# Patient Record
Sex: MALE | Race: WHITE | NOT HISPANIC OR LATINO
[De-identification: names, ages, dates, MRNs, and addresses within clinical notes are randomized per-mention and may not be internally consistent; named-entity substitution may affect disease eponyms.]

---

## 2017-04-20 ENCOUNTER — RESULT REVIEW (OUTPATIENT)
Age: 67
End: 2017-04-20

## 2017-04-21 ENCOUNTER — RESULT REVIEW (OUTPATIENT)
Age: 67
End: 2017-04-21

## 2017-05-30 ENCOUNTER — OUTPATIENT (OUTPATIENT)
Dept: OUTPATIENT SERVICES | Facility: HOSPITAL | Age: 67
LOS: 1 days | End: 2017-05-30
Payer: COMMERCIAL

## 2017-05-30 VITALS
TEMPERATURE: 98 F | DIASTOLIC BLOOD PRESSURE: 80 MMHG | SYSTOLIC BLOOD PRESSURE: 140 MMHG | HEIGHT: 68 IN | OXYGEN SATURATION: 96 % | HEART RATE: 76 BPM | WEIGHT: 250 LBS | RESPIRATION RATE: 18 BRPM

## 2017-05-30 DIAGNOSIS — K43.0 INCISIONAL HERNIA WITH OBSTRUCTION, WITHOUT GANGRENE: ICD-10-CM

## 2017-05-30 DIAGNOSIS — I25.10 ATHEROSCLEROTIC HEART DISEASE OF NATIVE CORONARY ARTERY WITHOUT ANGINA PECTORIS: ICD-10-CM

## 2017-05-30 DIAGNOSIS — J44.9 CHRONIC OBSTRUCTIVE PULMONARY DISEASE, UNSPECIFIED: ICD-10-CM

## 2017-05-30 DIAGNOSIS — C19 MALIGNANT NEOPLASM OF RECTOSIGMOID JUNCTION: ICD-10-CM

## 2017-05-30 DIAGNOSIS — I65.29 OCCLUSION AND STENOSIS OF UNSPECIFIED CAROTID ARTERY: ICD-10-CM

## 2017-05-30 DIAGNOSIS — Z95.4 PRESENCE OF OTHER HEART-VALVE REPLACEMENT: Chronic | ICD-10-CM

## 2017-05-30 DIAGNOSIS — G47.30 SLEEP APNEA, UNSPECIFIED: ICD-10-CM

## 2017-05-30 LAB
BASOPHILS # BLD AUTO: 0.01 K/UL — SIGNIFICANT CHANGE UP (ref 0–0.2)
BASOPHILS NFR BLD AUTO: 0.1 % — SIGNIFICANT CHANGE UP (ref 0–2)
BLD GP AB SCN SERPL QL: NEGATIVE — SIGNIFICANT CHANGE UP
BUN SERPL-MCNC: 12 MG/DL — SIGNIFICANT CHANGE UP (ref 7–23)
CALCIUM SERPL-MCNC: 9.1 MG/DL — SIGNIFICANT CHANGE UP (ref 8.4–10.5)
CHLORIDE SERPL-SCNC: 103 MMOL/L — SIGNIFICANT CHANGE UP (ref 98–107)
CO2 SERPL-SCNC: 28 MMOL/L — SIGNIFICANT CHANGE UP (ref 22–31)
CREAT SERPL-MCNC: 0.82 MG/DL — SIGNIFICANT CHANGE UP (ref 0.5–1.3)
EOSINOPHIL # BLD AUTO: 0.15 K/UL — SIGNIFICANT CHANGE UP (ref 0–0.5)
EOSINOPHIL NFR BLD AUTO: 2.2 % — SIGNIFICANT CHANGE UP (ref 0–6)
GLUCOSE SERPL-MCNC: 99 MG/DL — SIGNIFICANT CHANGE UP (ref 70–99)
HCT VFR BLD CALC: 41.1 % — SIGNIFICANT CHANGE UP (ref 39–50)
HGB BLD-MCNC: 13.1 G/DL — SIGNIFICANT CHANGE UP (ref 13–17)
IMM GRANULOCYTES NFR BLD AUTO: 0.1 % — SIGNIFICANT CHANGE UP (ref 0–1.5)
LYMPHOCYTES # BLD AUTO: 0.92 K/UL — LOW (ref 1–3.3)
LYMPHOCYTES # BLD AUTO: 13.5 % — SIGNIFICANT CHANGE UP (ref 13–44)
MCHC RBC-ENTMCNC: 27.2 PG — SIGNIFICANT CHANGE UP (ref 27–34)
MCHC RBC-ENTMCNC: 31.9 % — LOW (ref 32–36)
MCV RBC AUTO: 85.4 FL — SIGNIFICANT CHANGE UP (ref 80–100)
MONOCYTES # BLD AUTO: 0.43 K/UL — SIGNIFICANT CHANGE UP (ref 0–0.9)
MONOCYTES NFR BLD AUTO: 6.3 % — SIGNIFICANT CHANGE UP (ref 2–14)
NEUTROPHILS # BLD AUTO: 5.27 K/UL — SIGNIFICANT CHANGE UP (ref 1.8–7.4)
NEUTROPHILS NFR BLD AUTO: 77.8 % — HIGH (ref 43–77)
PLATELET # BLD AUTO: 251 K/UL — SIGNIFICANT CHANGE UP (ref 150–400)
PMV BLD: 10.1 FL — SIGNIFICANT CHANGE UP (ref 7–13)
POTASSIUM SERPL-MCNC: 4.5 MMOL/L — SIGNIFICANT CHANGE UP (ref 3.5–5.3)
POTASSIUM SERPL-SCNC: 4.5 MMOL/L — SIGNIFICANT CHANGE UP (ref 3.5–5.3)
RBC # BLD: 4.81 M/UL — SIGNIFICANT CHANGE UP (ref 4.2–5.8)
RBC # FLD: 14 % — SIGNIFICANT CHANGE UP (ref 10.3–14.5)
RH IG SCN BLD-IMP: POSITIVE — SIGNIFICANT CHANGE UP
SODIUM SERPL-SCNC: 144 MMOL/L — SIGNIFICANT CHANGE UP (ref 135–145)
WBC # BLD: 6.79 K/UL — SIGNIFICANT CHANGE UP (ref 3.8–10.5)
WBC # FLD AUTO: 6.79 K/UL — SIGNIFICANT CHANGE UP (ref 3.8–10.5)

## 2017-05-30 PROCEDURE — 93010 ELECTROCARDIOGRAM REPORT: CPT

## 2017-05-30 PROCEDURE — 71020: CPT | Mod: 26

## 2017-05-30 RX ORDER — SODIUM CHLORIDE 9 MG/ML
1000 INJECTION, SOLUTION INTRAVENOUS
Qty: 0 | Refills: 0 | Status: DISCONTINUED | OUTPATIENT
Start: 2017-06-12 | End: 2017-06-12

## 2017-05-30 NOTE — H&P PST ADULT - PROBLEM SELECTOR PLAN 5
Pt rx plavix, aspirin ; pt states as per surgeon pt to remain on asa and dc plavix 1 week pre op  Pt to DR Ocampo cardiologist pre op ; pt to review plan with cardiologist prior ot surgery Pt rx plavix, aspirin ; pt states as per surgeon pt to remain on asa and dc plavix 1 week pre op  Pt to DR Oreilly ;cardiologist pre op   Call to DR Oreilly office ; s/w Latesha Charlton to review with DR Oreilly and return call   Dr Oreilly to provide cardiology clearance Pt rx plavix, aspirin ; pt states as per surgeon pt to remain on asa and dc plavix 1 week pre op  Pt to DR Oreilly ;cardiologist pre op   Call to DR Oreilly office ; s/w Latesha Charlton to review with DR Oreilly and return call   Dr Oreilly to provide cardiology clearance  As per Dr Castillo pt to remain on aspirin and hold plavix x 5 days ; Dr Oreilly has cleared pt to be off plavix x 5 days Pt rx plavix, aspirin ; pt states as per surgeon pt to remain on asa and dc plavix 1 week pre op  Pt to DR Oreilly ;cardiologist pre op   Call to DR Oreilly office ; s/w Latesha Charlton to review with DR Oreilly and return call   Dr Oreilly to provide cardiology clearance  As per Dr Castillo pt to remain on aspirin and hold plavix x 5 days ; Dr Oreilly has cleared pt to be off plavix x 5 days  6/08/17 call pt x 2 message left on cell ; no answer on home awaiting return call Pt rx plavix, aspirin ; pt states as per surgeon pt to remain on asa and dc plavix 1 week pre op  Pt to DR Oreilly ;cardiologist pre op   Call to DR Oreilly office ; s/w Latesha Charlton to review with DR Oreilly and return call   Dr Oreilly to provide cardiology clearance  As per Dr Castillo pt to remain on aspirin and hold plavix x 5 days ;confirmed with Dr Castillo s office  Dr Oreilly has cleared pt to be off plavix x 5 days  6/08/17 call pt x 2 message left on cell ; no answer on home awaiting return call  6/08/17 s/w pt ; pt remains on aspirin as per surgeon ; pt dc d plavix 6/04/17 Pt rx plavix, aspirin ; pt states as per surgeon pt to remain on asa and dc plavix 1 week pre op  Pt to DR Oreilly ;cardiologist pre op   Call to DR Oreilly office ; s/w Latesha Charlton to review with DR Oreilly and return call   Dr Oreilly to provide cardiology clearance  As per Dr Castillo pt to remain on aspirin and hold plavix x 5 days ;confirmed with Dr Castillo s office  Dr Oreilly has cleared pt to be off plavix x 5 days  6/08/17 call pt x 2 message left on cell ; no answer on home awaiting return call  6/08/17 s/w pt ; pt remains on aspirin as per surgeon ; pt dc d plavix 6/05/17

## 2017-05-30 NOTE — H&P PST ADULT - LYMPHATIC
anterior cervical R/supraclavicular R/supraclavicular L/posterior cervical R/posterior cervical L/anterior cervical L

## 2017-05-30 NOTE — H&P PST ADULT - PROBLEM SELECTOR PLAN 4
Mild intermittent wheeze  O@ sat 97 % ; pt in nad     Pt to see dr causey for pre op m/c Mild intermittent wheeze  O@ sat 97 % ; pt in nad   CXR done results pending     Pt to see dr causey for pre op m/c Mild intermittent wheeze  O@ sat 97 % ; pt in nad   CXR done results pending     Pt to Dr Romano for pre op pulmonary clearance    Pt to see dr causey for pre op m/c

## 2017-05-30 NOTE — H&P PST ADULT - PROBLEM SELECTOR PLAN 1
Lower Anterior Resection Incisional Hernia Repair    Pre op instructions including pepcid and Hibeclens givento pt pt appears to have a good understanding of pre op instructions    Pt to Dr Marquez for pre op m/c  Pt to Dr Yahir Oreilly for pre op cardiac clearance Lower Anterior Resection Incisional Hernia Repair    Pre op instructions including pepcid and Hibeclens given to pt pt appears to have a good understanding of pre op instructions    Pt to Dr Marquez for pre op m/c  Pt to Dr Yahir Oreilly for pre op cardiac clearance

## 2017-05-30 NOTE — H&P PST ADULT - FALL HARM RISK
other/coagulation(Bleeding disorder R/T clinical cond/anti-coags)/surgery/left fott issues / stability issues

## 2017-05-30 NOTE — H&P PST ADULT - HISTORY OF PRESENT ILLNESS
Pt is a 66 y.o male ; pt s/p Umbilical Hernia Repair 2011 and 2016. Pt reports reoccurrence. Pt reports recent colonoscopy " there was a polyp and it was cancer" Pt to surgeon ; pt now presents for surgery    Pt denies weight loss, pt denies change in bowel habits

## 2017-05-30 NOTE — H&P PST ADULT - NEGATIVE NEUROLOGICAL SYMPTOMS
no syncope/no difficulty walking/no focal seizures/no paresthesias/no vertigo/no loss of sensation/no generalized seizures/no headache

## 2017-05-30 NOTE — H&P PST ADULT - PSH
Aortic valve replaced    History of Appendectomy    History of Hernia Repair    History of Lt Total Knee Replacement  4/11  History of Nasal Septoplasty  1979  I & D of Pilonidal cyst    Plantar Tendonitis  Repair- B L  Renal Calculi  lithotripsy  S/P Cardiac Cath    S/P Cataract Surgery  Right - with lens placement  S/P Cystoscopy  2010  S/P Right Inguinal Hernia Repair  1979  Skin Tag Excision  Right Leg-2010  Tibialis Posterior Tendonitis  Repair B/L revision left 2016

## 2017-05-30 NOTE — H&P PST ADULT - PMH
Amaurosis fugax  od occurring x 2  Aortic valve disease    Arthritis    COPD (Chronic Obstructive Pulmonary Disease)    HTN (hypertension)    HTN (Hypertension)    Hypercholesterolemia    Kidney Stone    Microscopic Hematuria    OA (Osteoarthritis)    Obesity    Pilonidal Abscess    Sleep apnea    Spinal Stenosis    Stenosis of carotid artery, unspecified laterality    Varicose Vein  B/L lower extremities  Venous Insufficiency Amaurosis fugax  od occurring x 2  Aortic valve disease    Arthritis    COPD (Chronic Obstructive Pulmonary Disease)    HTN (Hypertension)    Hypercholesterolemia    Kidney Stone    Microscopic Hematuria    OA (Osteoarthritis)    Obesity    Pilonidal Abscess    Sleep apnea    Spinal Stenosis    Stenosis of carotid artery, unspecified laterality    Varicose Vein  B/L lower extremities  Venous Insufficiency

## 2017-06-12 ENCOUNTER — RESULT REVIEW (OUTPATIENT)
Age: 67
End: 2017-06-12

## 2017-06-12 ENCOUNTER — INPATIENT (INPATIENT)
Facility: HOSPITAL | Age: 67
LOS: 7 days | Discharge: HOME | End: 2017-06-20
Attending: SURGERY | Admitting: SURGERY
Payer: COMMERCIAL

## 2017-06-12 VITALS
HEART RATE: 77 BPM | TEMPERATURE: 98 F | DIASTOLIC BLOOD PRESSURE: 92 MMHG | HEIGHT: 68 IN | SYSTOLIC BLOOD PRESSURE: 159 MMHG | OXYGEN SATURATION: 94 % | RESPIRATION RATE: 18 BRPM | WEIGHT: 250 LBS

## 2017-06-12 DIAGNOSIS — Z95.4 PRESENCE OF OTHER HEART-VALVE REPLACEMENT: Chronic | ICD-10-CM

## 2017-06-12 DIAGNOSIS — K43.0 INCISIONAL HERNIA WITH OBSTRUCTION, WITHOUT GANGRENE: ICD-10-CM

## 2017-06-12 DIAGNOSIS — Z96.651 PRESENCE OF RIGHT ARTIFICIAL KNEE JOINT: Chronic | ICD-10-CM

## 2017-06-12 LAB
BASE EXCESS BLDA CALC-SCNC: 0.2 MMOL/L — SIGNIFICANT CHANGE UP
BUN SERPL-MCNC: 10 MG/DL — SIGNIFICANT CHANGE UP (ref 7–23)
CA-I BLDA-SCNC: 1.13 MMOL/L — LOW (ref 1.15–1.29)
CALCIUM SERPL-MCNC: 8.3 MG/DL — LOW (ref 8.4–10.5)
CHLORIDE SERPL-SCNC: 104 MMOL/L — SIGNIFICANT CHANGE UP (ref 98–107)
CO2 SERPL-SCNC: 26 MMOL/L — SIGNIFICANT CHANGE UP (ref 22–31)
CREAT SERPL-MCNC: 1.16 MG/DL — SIGNIFICANT CHANGE UP (ref 0.5–1.3)
GLUCOSE BLDA-MCNC: 119 MG/DL — HIGH (ref 70–99)
GLUCOSE SERPL-MCNC: 156 MG/DL — HIGH (ref 70–99)
HCO3 BLDA-SCNC: 25 MMOL/L — SIGNIFICANT CHANGE UP (ref 22–26)
HCT VFR BLD CALC: 40.5 % — SIGNIFICANT CHANGE UP (ref 39–50)
HCT VFR BLDA CALC: 34.4 % — LOW (ref 39–51)
HGB BLD-MCNC: 12.8 G/DL — LOW (ref 13–17)
HGB BLDA-MCNC: 11.1 G/DL — LOW (ref 13–17)
MCHC RBC-ENTMCNC: 27.4 PG — SIGNIFICANT CHANGE UP (ref 27–34)
MCHC RBC-ENTMCNC: 31.6 % — LOW (ref 32–36)
MCV RBC AUTO: 86.7 FL — SIGNIFICANT CHANGE UP (ref 80–100)
PCO2 BLDA: 40 MMHG — SIGNIFICANT CHANGE UP (ref 35–48)
PH BLDA: 7.4 PH — SIGNIFICANT CHANGE UP (ref 7.35–7.45)
PLATELET # BLD AUTO: 252 K/UL — SIGNIFICANT CHANGE UP (ref 150–400)
PMV BLD: 9.7 FL — SIGNIFICANT CHANGE UP (ref 7–13)
PO2 BLDA: 162 MMHG — HIGH (ref 83–108)
POTASSIUM BLDA-SCNC: 3.6 MMOL/L — SIGNIFICANT CHANGE UP (ref 3.4–4.5)
POTASSIUM SERPL-MCNC: 3.9 MMOL/L — SIGNIFICANT CHANGE UP (ref 3.5–5.3)
POTASSIUM SERPL-SCNC: 3.9 MMOL/L — SIGNIFICANT CHANGE UP (ref 3.5–5.3)
RBC # BLD: 4.67 M/UL — SIGNIFICANT CHANGE UP (ref 4.2–5.8)
RBC # FLD: 14.3 % — SIGNIFICANT CHANGE UP (ref 10.3–14.5)
SAO2 % BLDA: 99.5 % — HIGH (ref 95–99)
SODIUM BLDA-SCNC: 138 MMOL/L — SIGNIFICANT CHANGE UP (ref 136–146)
SODIUM SERPL-SCNC: 144 MMOL/L — SIGNIFICANT CHANGE UP (ref 135–145)
WBC # BLD: 21.27 K/UL — HIGH (ref 3.8–10.5)
WBC # FLD AUTO: 21.27 K/UL — HIGH (ref 3.8–10.5)

## 2017-06-12 PROCEDURE — 88305 TISSUE EXAM BY PATHOLOGIST: CPT | Mod: 26

## 2017-06-12 PROCEDURE — 88309 TISSUE EXAM BY PATHOLOGIST: CPT | Mod: 26

## 2017-06-12 PROCEDURE — 88331 PATH CONSLTJ SURG 1 BLK 1SPC: CPT | Mod: 26

## 2017-06-12 PROCEDURE — 74000: CPT | Mod: 26

## 2017-06-12 PROCEDURE — 88302 TISSUE EXAM BY PATHOLOGIST: CPT | Mod: 26

## 2017-06-12 PROCEDURE — 88332 PATH CONSLTJ SURG EA ADD BLK: CPT | Mod: 26

## 2017-06-12 RX ORDER — SODIUM CHLORIDE 9 MG/ML
1000 INJECTION, SOLUTION INTRAVENOUS
Qty: 0 | Refills: 0 | Status: DISCONTINUED | OUTPATIENT
Start: 2017-06-12 | End: 2017-06-15

## 2017-06-12 RX ORDER — ONDANSETRON 8 MG/1
4 TABLET, FILM COATED ORAL ONCE
Qty: 0 | Refills: 0 | Status: DISCONTINUED | OUTPATIENT
Start: 2017-06-12 | End: 2017-06-19

## 2017-06-12 RX ORDER — NALOXONE HYDROCHLORIDE 4 MG/.1ML
0.1 SPRAY NASAL
Qty: 0 | Refills: 0 | Status: DISCONTINUED | OUTPATIENT
Start: 2017-06-12 | End: 2017-06-19

## 2017-06-12 RX ORDER — HEPARIN SODIUM 5000 [USP'U]/ML
5000 INJECTION INTRAVENOUS; SUBCUTANEOUS EVERY 8 HOURS
Qty: 0 | Refills: 0 | Status: DISCONTINUED | OUTPATIENT
Start: 2017-06-12 | End: 2017-06-20

## 2017-06-12 RX ORDER — HYDROMORPHONE HYDROCHLORIDE 2 MG/ML
0.5 INJECTION INTRAMUSCULAR; INTRAVENOUS; SUBCUTANEOUS
Qty: 0 | Refills: 0 | Status: DISCONTINUED | OUTPATIENT
Start: 2017-06-12 | End: 2017-06-16

## 2017-06-12 RX ORDER — ACETAMINOPHEN 500 MG
1000 TABLET ORAL ONCE
Qty: 0 | Refills: 0 | Status: COMPLETED | OUTPATIENT
Start: 2017-06-13 | End: 2017-06-13

## 2017-06-12 RX ORDER — ONDANSETRON 8 MG/1
4 TABLET, FILM COATED ORAL EVERY 6 HOURS
Qty: 0 | Refills: 0 | Status: DISCONTINUED | OUTPATIENT
Start: 2017-06-12 | End: 2017-06-19

## 2017-06-12 RX ORDER — HYDROMORPHONE HYDROCHLORIDE 2 MG/ML
0.5 INJECTION INTRAMUSCULAR; INTRAVENOUS; SUBCUTANEOUS
Qty: 0 | Refills: 0 | Status: DISCONTINUED | OUTPATIENT
Start: 2017-06-12 | End: 2017-06-12

## 2017-06-12 RX ORDER — HYDROMORPHONE HYDROCHLORIDE 2 MG/ML
30 INJECTION INTRAMUSCULAR; INTRAVENOUS; SUBCUTANEOUS
Qty: 0 | Refills: 0 | Status: DISCONTINUED | OUTPATIENT
Start: 2017-06-12 | End: 2017-06-13

## 2017-06-12 RX ORDER — ACETAMINOPHEN 500 MG
1000 TABLET ORAL ONCE
Qty: 0 | Refills: 0 | Status: COMPLETED | OUTPATIENT
Start: 2017-06-12 | End: 2017-06-12

## 2017-06-12 RX ADMIN — HYDROMORPHONE HYDROCHLORIDE 0.5 MILLIGRAM(S): 2 INJECTION INTRAMUSCULAR; INTRAVENOUS; SUBCUTANEOUS at 23:00

## 2017-06-12 RX ADMIN — Medication 400 MILLIGRAM(S): at 23:15

## 2017-06-12 RX ADMIN — SODIUM CHLORIDE 100 MILLILITER(S): 9 INJECTION, SOLUTION INTRAVENOUS at 22:46

## 2017-06-12 RX ADMIN — Medication 1000 MILLIGRAM(S): at 23:42

## 2017-06-12 RX ADMIN — Medication 2.5 MILLIGRAM(S): at 22:27

## 2017-06-12 RX ADMIN — HYDROMORPHONE HYDROCHLORIDE 0.5 MILLIGRAM(S): 2 INJECTION INTRAMUSCULAR; INTRAVENOUS; SUBCUTANEOUS at 22:30

## 2017-06-12 RX ADMIN — HEPARIN SODIUM 5000 UNIT(S): 5000 INJECTION INTRAVENOUS; SUBCUTANEOUS at 22:45

## 2017-06-12 RX ADMIN — HYDROMORPHONE HYDROCHLORIDE 0.5 MILLIGRAM(S): 2 INJECTION INTRAMUSCULAR; INTRAVENOUS; SUBCUTANEOUS at 22:45

## 2017-06-12 RX ADMIN — SODIUM CHLORIDE 30 MILLILITER(S): 9 INJECTION, SOLUTION INTRAVENOUS at 12:29

## 2017-06-12 RX ADMIN — HYDROMORPHONE HYDROCHLORIDE 30 MILLILITER(S): 2 INJECTION INTRAMUSCULAR; INTRAVENOUS; SUBCUTANEOUS at 23:24

## 2017-06-12 NOTE — ASU PATIENT PROFILE, ADULT - FALL HARM RISK
surgery/other/coagulation(Bleeding disorder R/T clinical cond/anti-coags)/left fott issues / stability issues

## 2017-06-12 NOTE — ASU PATIENT PROFILE, ADULT - PSH
Aortic valve replaced    History of Appendectomy    History of Hernia Repair    History of Lt Total Knee Replacement  4/11  History of Nasal Septoplasty  1979  I & D of Pilonidal cyst    Plantar Tendonitis  Repair- B L  Renal Calculi  lithotripsy  S/P Cardiac Cath    S/P Cataract Surgery  Right - with lens placement  S/P Cystoscopy  2010  S/P Right Inguinal Hernia Repair  1979  Skin Tag Excision  Right Leg-2010  Tibialis Posterior Tendonitis  Repair B/L revision left 2016 Aortic valve replaced    H/O total knee replacement, right  2011  History of Appendectomy    History of Hernia Repair    History of Lt Total Knee Replacement  4/11  History of Nasal Septoplasty  1979  I & D of Pilonidal cyst    Plantar Tendonitis  Repair- B L  Renal Calculi  lithotripsy  S/P Cardiac Cath    S/P Cataract Surgery  Right - with lens placement  S/P Cystoscopy  2010  S/P Right Inguinal Hernia Repair  1979  Skin Tag Excision  Right Leg-2010  Tibialis Posterior Tendonitis  Repair B/L revision left 2016

## 2017-06-12 NOTE — ASU PATIENT PROFILE, ADULT - PMH
Amaurosis fugax  od occurring x 2  Aortic valve disease    Arthritis    COPD (Chronic Obstructive Pulmonary Disease)    HTN (Hypertension)    Hypercholesterolemia    Kidney Stone    Microscopic Hematuria    OA (Osteoarthritis)    Obesity    Pilonidal Abscess    Sleep apnea    Spinal Stenosis    Stenosis of carotid artery, unspecified laterality    Varicose Vein  B/L lower extremities  Venous Insufficiency

## 2017-06-12 NOTE — BRIEF OPERATIVE NOTE - OPERATION/FINDINGS
low anterior resection with intra-operative colonoscopy low anterior resection with intra-operative colonoscopy, loop ileostomy

## 2017-06-13 LAB
BUN SERPL-MCNC: 11 MG/DL — SIGNIFICANT CHANGE UP (ref 7–23)
CALCIUM SERPL-MCNC: 7.6 MG/DL — LOW (ref 8.4–10.5)
CHLORIDE SERPL-SCNC: 105 MMOL/L — SIGNIFICANT CHANGE UP (ref 98–107)
CO2 SERPL-SCNC: 23 MMOL/L — SIGNIFICANT CHANGE UP (ref 22–31)
CREAT SERPL-MCNC: 1 MG/DL — SIGNIFICANT CHANGE UP (ref 0.5–1.3)
GLUCOSE SERPL-MCNC: 131 MG/DL — HIGH (ref 70–99)
HCT VFR BLD CALC: 35 % — LOW (ref 39–50)
HGB BLD-MCNC: 10.7 G/DL — LOW (ref 13–17)
MAGNESIUM SERPL-MCNC: 1.9 MG/DL — SIGNIFICANT CHANGE UP (ref 1.6–2.6)
MCHC RBC-ENTMCNC: 26.4 PG — LOW (ref 27–34)
MCHC RBC-ENTMCNC: 30.6 % — LOW (ref 32–36)
MCV RBC AUTO: 86.4 FL — SIGNIFICANT CHANGE UP (ref 80–100)
PHOSPHATE SERPL-MCNC: 4.6 MG/DL — HIGH (ref 2.5–4.5)
PLATELET # BLD AUTO: 229 K/UL — SIGNIFICANT CHANGE UP (ref 150–400)
PMV BLD: 9.2 FL — SIGNIFICANT CHANGE UP (ref 7–13)
POTASSIUM SERPL-MCNC: 4.6 MMOL/L — SIGNIFICANT CHANGE UP (ref 3.5–5.3)
POTASSIUM SERPL-SCNC: 4.6 MMOL/L — SIGNIFICANT CHANGE UP (ref 3.5–5.3)
RBC # BLD: 4.05 M/UL — LOW (ref 4.2–5.8)
RBC # FLD: 14.2 % — SIGNIFICANT CHANGE UP (ref 10.3–14.5)
SODIUM SERPL-SCNC: 142 MMOL/L — SIGNIFICANT CHANGE UP (ref 135–145)
WBC # BLD: 15.27 K/UL — HIGH (ref 3.8–10.5)
WBC # FLD AUTO: 15.27 K/UL — HIGH (ref 3.8–10.5)

## 2017-06-13 RX ORDER — ACETAMINOPHEN 500 MG
1000 TABLET ORAL ONCE
Qty: 0 | Refills: 0 | Status: COMPLETED | OUTPATIENT
Start: 2017-06-13 | End: 2017-06-13

## 2017-06-13 RX ORDER — HYDROMORPHONE HYDROCHLORIDE 2 MG/ML
30 INJECTION INTRAMUSCULAR; INTRAVENOUS; SUBCUTANEOUS
Qty: 0 | Refills: 0 | Status: DISCONTINUED | OUTPATIENT
Start: 2017-06-13 | End: 2017-06-16

## 2017-06-13 RX ADMIN — Medication 400 MILLIGRAM(S): at 06:12

## 2017-06-13 RX ADMIN — HEPARIN SODIUM 5000 UNIT(S): 5000 INJECTION INTRAVENOUS; SUBCUTANEOUS at 06:12

## 2017-06-13 RX ADMIN — Medication 1000 MILLIGRAM(S): at 06:57

## 2017-06-13 RX ADMIN — HEPARIN SODIUM 5000 UNIT(S): 5000 INJECTION INTRAVENOUS; SUBCUTANEOUS at 21:37

## 2017-06-13 RX ADMIN — HYDROMORPHONE HYDROCHLORIDE 30 MILLILITER(S): 2 INJECTION INTRAMUSCULAR; INTRAVENOUS; SUBCUTANEOUS at 10:57

## 2017-06-13 RX ADMIN — Medication 1000 MILLIGRAM(S): at 13:15

## 2017-06-13 RX ADMIN — HYDROMORPHONE HYDROCHLORIDE 30 MILLILITER(S): 2 INJECTION INTRAMUSCULAR; INTRAVENOUS; SUBCUTANEOUS at 20:29

## 2017-06-13 RX ADMIN — Medication 400 MILLIGRAM(S): at 19:00

## 2017-06-13 RX ADMIN — HYDROMORPHONE HYDROCHLORIDE 30 MILLILITER(S): 2 INJECTION INTRAMUSCULAR; INTRAVENOUS; SUBCUTANEOUS at 17:02

## 2017-06-13 RX ADMIN — SODIUM CHLORIDE 100 MILLILITER(S): 9 INJECTION, SOLUTION INTRAVENOUS at 07:34

## 2017-06-13 RX ADMIN — Medication 1000 MILLIGRAM(S): at 20:10

## 2017-06-13 RX ADMIN — Medication 400 MILLIGRAM(S): at 12:47

## 2017-06-13 RX ADMIN — HEPARIN SODIUM 5000 UNIT(S): 5000 INJECTION INTRAVENOUS; SUBCUTANEOUS at 14:00

## 2017-06-13 NOTE — PATIENT PROFILE ADULT. - FALL HARM RISK
left fott issues / stability issues/surgery/other/coagulation(Bleeding disorder R/T clinical cond/anti-coags)

## 2017-06-13 NOTE — CONSULT NOTE ADULT - ASSESSMENT
67 yo with a h/o AVR now day #1 s/p incisional hernia repair, anterior resection for colonic polyp.  Patient is doing well post op.  Please obtain a post op ECG.  Continue pre op medications (full pre-op note in paper chart).  Will follow with you.

## 2017-06-13 NOTE — CONSULT NOTE ADULT - SUBJECTIVE AND OBJECTIVE BOX
CHIEF COMPLAINT: Chest Pain    HPI:  Pt is a 66 y.o male ; pt s/p Umbilical Hernia Repair  and . Pt reports reoccurrence. Pt reports recent colonoscopy " there was a polyp and it was cancer" Pt to surgeon ; pt now post op Day # 1 post anterior resection and ventral hernia repair    Pt denies weight loss, pt denies change in bowel habits (30 May 2017 10:13)      PAST MEDICAL & SURGICAL HISTORY:  Arthritis  Hypercholesterolemia  HTN (hypertension)  Stenosis of carotid artery, unspecified laterality  Aortic valve disease  Amaurosis fugax: od occurring x 2  Sleep apnea  Snoring  Venous Insufficiency  Arterial Insufficiency  Microscopic Hematuria  OA (Osteoarthritis)  Obesity  Varicose Vein: B/L lower extremities  Spinal Stenosis  Arthritis  cardiac valve disorder  HTN (Hypertension)  COPD with Chronic Bronchitis  Pilonidal Abscess  Kidney Stone  COPD (Chronic Obstructive Pulmonary Disease)  H/O total knee replacement, right:   Aortic valve replaced  History of Lt Total Knee Replacement:   S/P Cystoscopy:   History of Nasal Septoplasty:   Skin Tag Excision: Right Leg-  Status Post Umbilical Hernia Repair: 2011  S/P Right Inguinal Hernia Repair:   S/P Cataract Surgery: Right - with lens placement  Tibialis Posterior Tendonitis: Repair B/L revision left 2016  Plantar Tendonitis: Repair- B L  Renal Calculi: lithotripsy  I &amp; D of Pilonidal cyst  S/P Cardiac Cath  History of Spinal Stenosis  HTN (Hypertension)  Hyperlipidemia  History of Hernia Repair  History of Appendectomy      Allergies    codeine (Hives)    Intolerances        SOCIAL HISTORY    Smoking Hx:  ETOH Hx:  Marital Status:  Occupational Hx:    FAMILY HISTORY:  Family history of cancer (Mother): Mother ( since - unknown cancer)  Family history of COPD (chronic obstructive pulmonary disease) (Father): Father ( since )      MEDICATIONS:  HYDROmorphone PCA (1 mG/mL) 30milliLiter(s) PCA Continuous PCA Continuous  naloxone Injectable 0.1milliGRAM(s) IV Push every 3 minutes PRN  ondansetron Injectable 4milliGRAM(s) IV Push every 6 hours PRN  HYDROmorphone PCA (1 mG/mL) Rescue Clinician Bolus 0.5milliGRAM(s) IV Push every 15 minutes PRN  heparin  Injectable 5000Unit(s) SubCutaneous every 8 hours  lactated ringers. 1000milliLiter(s) IV Continuous <Continuous>  ondansetron Injectable 4milliGRAM(s) IV Push once PRN  acetaminophen  IVPB. 1000milliGRAM(s) IV Intermittent once      REVIEW OF SYSTEMS:    CONSTITUTIONAL: No weakness, fevers or chills  EYES/ENT: No visual changes;  No vertigo or throat pain   NECK: No pain or stiffness  RESPIRATORY: No cough, wheezing, hemoptysis; No shortness of breath  CARDIOVASCULAR: No chest pain or palpitations  GASTROINTESTINAL: No abdominal or epigastric pain. No nausea, vomiting, or hematemesis; No diarrhea or constipation. No melena or hematochezia.  GENITOURINARY: No dysuria, frequency or hematuria  NEUROLOGICAL: No numbness or weakness  SKIN: No itching, burning, rashes, or lesions   All other review of systems is negative unless indicated above    Vital Signs Last 24 Hrs  T(C): 37.3, Max: 37.4 ( @ 06:00)  T(F): 99.1, Max: 99.3 ( @ 06:00)  HR: 77 (77 - 97)  BP: 138/56 (116/61 - 190/100)  BP(mean): --  RR: 15 (10 - 30)  SpO2: 96% (94% - 99%)    I&O's Summary  I & Os for 24h ending 2017 07:00  =============================================  IN: 1000 ml / OUT: 1105 ml / NET: -105 ml    I & Os for current day (as of 2017 09:41)  =============================================  IN: 300 ml / OUT: 160 ml / NET: 140 ml      PHYSICAL EXAM:    Constitutional: NAD, awake and alert, well-developed  HEENT: PERR, EOMI  Neck: soft and supple, No LAD, No JVD  Respiratory: Breath sounds are clear bilaterally, No wheezing, rales or rhonchi  Cardiovascular: Regular rate and rhythm, normal S1 and S2,  no murmurs, gallops or rubs  Gastrointestinal: Bowel Sounds absent, NG tube in place.   Extremities: No peripheral edema. No clubbing or cyanosis.  Vascular: 2+ peripheral pulses  Neurological: A/O x 3, no focal deficits  Musculoskeletal: no calf tenderness.  Skin: No rashes.      LABS: All Labs Reviewed:                        10.7   15. )-----------( 229      ( 2017 05:57 )             35.0                         12.8   21. )-----------( 252      ( 2017 21:00 )             40.5     2017 05:57    142    |  105    |  11     ----------------------------<  131    4.6     |  23     |  1.00   2017 21:00    144    |  104    |  10     ----------------------------<  156    3.9     |  26     |  1.16     Ca    7.6        2017 05:57  Ca    8.3        2017 21:00  Phos  4.6       2017 05:57  Mg     1.9       2017 05:57    RADIOLOGY/EKG: Not done CHIEF COMPLAINT: Post op follow up    HPI:  Pt is a 66 y.o male ; pt s/p Umbilical Hernia Repair  and . Pt reports reoccurrence. Pt reports recent colonoscopy " there was a polyp and it was cancer" Pt to surgeon ; pt now post op Day # 1 post anterior resection and ventral hernia repair    Pt denies weight loss, pt denies change in bowel habits (30 May 2017 10:13)      PAST MEDICAL & SURGICAL HISTORY:  Arthritis  Hypercholesterolemia  HTN (hypertension)  Stenosis of carotid artery, unspecified laterality  Aortic valve disease  Amaurosis fugax: od occurring x 2  Sleep apnea  Snoring  Venous Insufficiency  Arterial Insufficiency  Microscopic Hematuria  OA (Osteoarthritis)  Obesity  Varicose Vein: B/L lower extremities  Spinal Stenosis  Arthritis  cardiac valve disorder  HTN (Hypertension)  COPD with Chronic Bronchitis  Pilonidal Abscess  Kidney Stone  COPD (Chronic Obstructive Pulmonary Disease)  H/O total knee replacement, right:   Aortic valve replaced  History of Lt Total Knee Replacement:   S/P Cystoscopy:   History of Nasal Septoplasty:   Skin Tag Excision: Right Leg-  Status Post Umbilical Hernia Repair: 2011  S/P Right Inguinal Hernia Repair:   S/P Cataract Surgery: Right - with lens placement  Tibialis Posterior Tendonitis: Repair B/L revision left 2016  Plantar Tendonitis: Repair- B L  Renal Calculi: lithotripsy  I &amp; D of Pilonidal cyst  S/P Cardiac Cath  History of Spinal Stenosis  HTN (Hypertension)  Hyperlipidemia  History of Hernia Repair  History of Appendectomy      Allergies    codeine (Hives)    Intolerances        SOCIAL HISTORY    Smoking Hx:  ETOH Hx:  Marital Status:  Occupational Hx:    FAMILY HISTORY:  Family history of cancer (Mother): Mother ( since - unknown cancer)  Family history of COPD (chronic obstructive pulmonary disease) (Father): Father ( since )      MEDICATIONS:  HYDROmorphone PCA (1 mG/mL) 30milliLiter(s) PCA Continuous PCA Continuous  naloxone Injectable 0.1milliGRAM(s) IV Push every 3 minutes PRN  ondansetron Injectable 4milliGRAM(s) IV Push every 6 hours PRN  HYDROmorphone PCA (1 mG/mL) Rescue Clinician Bolus 0.5milliGRAM(s) IV Push every 15 minutes PRN  heparin  Injectable 5000Unit(s) SubCutaneous every 8 hours  lactated ringers. 1000milliLiter(s) IV Continuous <Continuous>  ondansetron Injectable 4milliGRAM(s) IV Push once PRN  acetaminophen  IVPB. 1000milliGRAM(s) IV Intermittent once      REVIEW OF SYSTEMS:    CONSTITUTIONAL: No weakness, fevers or chills  EYES/ENT: No visual changes;  No vertigo or throat pain   NECK: No pain or stiffness  RESPIRATORY: No cough, wheezing, hemoptysis; No shortness of breath  CARDIOVASCULAR: No chest pain or palpitations  GASTROINTESTINAL: No abdominal or epigastric pain. No nausea, vomiting, or hematemesis; No diarrhea or constipation. No melena or hematochezia.  GENITOURINARY: No dysuria, frequency or hematuria  NEUROLOGICAL: No numbness or weakness  SKIN: No itching, burning, rashes, or lesions   All other review of systems is negative unless indicated above    Vital Signs Last 24 Hrs  T(C): 37.3, Max: 37.4 ( @ 06:00)  T(F): 99.1, Max: 99.3 ( @ 06:00)  HR: 77 (77 - 97)  BP: 138/56 (116/61 - 190/100)  BP(mean): --  RR: 15 (10 - 30)  SpO2: 96% (94% - 99%)    I&O's Summary  I & Os for 24h ending 2017 07:00  =============================================  IN: 1000 ml / OUT: 1105 ml / NET: -105 ml    I & Os for current day (as of 2017 09:41)  =============================================  IN: 300 ml / OUT: 160 ml / NET: 140 ml      PHYSICAL EXAM:    Constitutional: NAD, awake and alert, well-developed  HEENT: PERR, EOMI  Neck: soft and supple, No LAD, No JVD  Respiratory: Breath sounds are clear bilaterally, No wheezing, rales or rhonchi  Cardiovascular: Regular rate and rhythm, normal S1 and S2,  no murmurs, gallops or rubs  Gastrointestinal: Bowel Sounds absent, NG tube in place.   Extremities: No peripheral edema. No clubbing or cyanosis.  Vascular: 2+ peripheral pulses  Neurological: A/O x 3, no focal deficits  Musculoskeletal: no calf tenderness.  Skin: No rashes.      LABS: All Labs Reviewed:                        10.7   15.27 )-----------( 229      ( 2017 05:57 )             35.0                         12.8   . )-----------( 252      ( 2017 21:00 )             40.5     2017 05:57    142    |  105    |  11     ----------------------------<  131    4.6     |  23     |  1.00   2017 21:00    144    |  104    |  10     ----------------------------<  156    3.9     |  26     |  1.16     Ca    7.6        2017 05:57  Ca    8.3        2017 21:00  Phos  4.6       2017 05:57  Mg     1.9       2017 05:57    RADIOLOGY/EKG: Not done

## 2017-06-13 NOTE — PATIENT PROFILE ADULT. - PSH
Aortic valve replaced    H/O total knee replacement, right  2011  History of Appendectomy    History of Hernia Repair    History of Lt Total Knee Replacement  4/11  History of Nasal Septoplasty  1979  I & D of Pilonidal cyst    Plantar Tendonitis  Repair- B L  Renal Calculi  lithotripsy  S/P Cardiac Cath    S/P Cataract Surgery  Right - with lens placement  S/P Cystoscopy  2010  S/P Right Inguinal Hernia Repair  1979  Skin Tag Excision  Right Leg-2010  Tibialis Posterior Tendonitis  Repair B/L revision left 2016

## 2017-06-14 LAB
BASE EXCESS BLDA CALC-SCNC: 1 MMOL/L — SIGNIFICANT CHANGE UP
BASE EXCESS BLDA CALC-SCNC: 1.4 MMOL/L — SIGNIFICANT CHANGE UP
BASE EXCESS BLDA CALC-SCNC: 1.6 MMOL/L — SIGNIFICANT CHANGE UP
BASE EXCESS BLDA CALC-SCNC: 3.9 MMOL/L — SIGNIFICANT CHANGE UP
BUN SERPL-MCNC: 9 MG/DL — SIGNIFICANT CHANGE UP (ref 7–23)
CA-I BLDA-SCNC: 1.16 MMOL/L — SIGNIFICANT CHANGE UP (ref 1.15–1.29)
CALCIUM SERPL-MCNC: 8.1 MG/DL — LOW (ref 8.4–10.5)
CHLORIDE SERPL-SCNC: 105 MMOL/L — SIGNIFICANT CHANGE UP (ref 98–107)
CO2 SERPL-SCNC: 24 MMOL/L — SIGNIFICANT CHANGE UP (ref 22–31)
CREAT SERPL-MCNC: 0.88 MG/DL — SIGNIFICANT CHANGE UP (ref 0.5–1.3)
GLUCOSE BLDA-MCNC: 104 MG/DL — HIGH (ref 70–99)
GLUCOSE BLDA-MCNC: 113 MG/DL — HIGH (ref 70–99)
GLUCOSE BLDA-MCNC: 114 MG/DL — HIGH (ref 70–99)
GLUCOSE SERPL-MCNC: 119 MG/DL — HIGH (ref 70–99)
HCO3 BLDA-SCNC: 24 MMOL/L — SIGNIFICANT CHANGE UP (ref 22–26)
HCO3 BLDA-SCNC: 25 MMOL/L — SIGNIFICANT CHANGE UP (ref 22–26)
HCO3 BLDA-SCNC: 25 MMOL/L — SIGNIFICANT CHANGE UP (ref 22–26)
HCO3 BLDA-SCNC: 28 MMOL/L — HIGH (ref 22–26)
HCT VFR BLD CALC: 34.7 % — LOW (ref 39–50)
HCT VFR BLDA CALC: 30 % — LOW (ref 39–51)
HCT VFR BLDA CALC: 33.9 % — LOW (ref 39–51)
HCT VFR BLDA CALC: 41.2 % — SIGNIFICANT CHANGE UP (ref 39–51)
HGB BLD-MCNC: 10.3 G/DL — LOW (ref 13–17)
HGB BLDA-MCNC: 11 G/DL — LOW (ref 13–17)
HGB BLDA-MCNC: 13.4 G/DL — SIGNIFICANT CHANGE UP (ref 13–17)
HGB BLDA-MCNC: 9.7 G/DL — LOW (ref 13–17)
LACTATE BLDA-SCNC: 0.8 MMOL/L — SIGNIFICANT CHANGE UP (ref 0.5–2)
MAGNESIUM SERPL-MCNC: 2.2 MG/DL — SIGNIFICANT CHANGE UP (ref 1.6–2.6)
MCHC RBC-ENTMCNC: 26.4 PG — LOW (ref 27–34)
MCHC RBC-ENTMCNC: 29.7 % — LOW (ref 32–36)
MCV RBC AUTO: 89 FL — SIGNIFICANT CHANGE UP (ref 80–100)
PCO2 BLDA: 48 MMHG — SIGNIFICANT CHANGE UP (ref 35–48)
PCO2 BLDA: 49 MMHG — HIGH (ref 35–48)
PCO2 BLDA: 54 MMHG — HIGH (ref 35–48)
PCO2 BLDA: 58 MMHG — HIGH (ref 35–48)
PH BLDA: 7.3 PH — LOW (ref 7.35–7.45)
PH BLDA: 7.31 PH — LOW (ref 7.35–7.45)
PH BLDA: 7.35 PH — SIGNIFICANT CHANGE UP (ref 7.35–7.45)
PH BLDA: 7.39 PH — SIGNIFICANT CHANGE UP (ref 7.35–7.45)
PHOSPHATE SERPL-MCNC: 2.5 MG/DL — SIGNIFICANT CHANGE UP (ref 2.5–4.5)
PLATELET # BLD AUTO: 185 K/UL — SIGNIFICANT CHANGE UP (ref 150–400)
PMV BLD: 9.3 FL — SIGNIFICANT CHANGE UP (ref 7–13)
PO2 BLDA: 111 MMHG — HIGH (ref 83–108)
PO2 BLDA: 60 MMHG — LOW (ref 83–108)
PO2 BLDA: 64 MMHG — LOW (ref 83–108)
PO2 BLDA: 88 MMHG — SIGNIFICANT CHANGE UP (ref 83–108)
POTASSIUM BLDA-SCNC: 3.9 MMOL/L — SIGNIFICANT CHANGE UP (ref 3.4–4.5)
POTASSIUM BLDA-SCNC: 4 MMOL/L — SIGNIFICANT CHANGE UP (ref 3.4–4.5)
POTASSIUM BLDA-SCNC: 4.1 MMOL/L — SIGNIFICANT CHANGE UP (ref 3.4–4.5)
POTASSIUM SERPL-MCNC: 4.5 MMOL/L — SIGNIFICANT CHANGE UP (ref 3.5–5.3)
POTASSIUM SERPL-SCNC: 4.5 MMOL/L — SIGNIFICANT CHANGE UP (ref 3.5–5.3)
RBC # BLD: 3.9 M/UL — LOW (ref 4.2–5.8)
RBC # FLD: 14.7 % — HIGH (ref 10.3–14.5)
SAO2 % BLDA: 89.3 % — LOW (ref 95–99)
SAO2 % BLDA: 91 % — LOW (ref 95–99)
SAO2 % BLDA: 97.8 % — SIGNIFICANT CHANGE UP (ref 95–99)
SAO2 % BLDA: 99.2 % — HIGH (ref 95–99)
SODIUM BLDA-SCNC: 140 MMOL/L — SIGNIFICANT CHANGE UP (ref 136–146)
SODIUM BLDA-SCNC: 141 MMOL/L — SIGNIFICANT CHANGE UP (ref 136–146)
SODIUM BLDA-SCNC: 143 MMOL/L — SIGNIFICANT CHANGE UP (ref 136–146)
SODIUM SERPL-SCNC: 143 MMOL/L — SIGNIFICANT CHANGE UP (ref 135–145)
WBC # BLD: 13.67 K/UL — HIGH (ref 3.8–10.5)
WBC # FLD AUTO: 13.67 K/UL — HIGH (ref 3.8–10.5)

## 2017-06-14 PROCEDURE — 99222 1ST HOSP IP/OBS MODERATE 55: CPT

## 2017-06-14 PROCEDURE — 71010: CPT | Mod: 26

## 2017-06-14 RX ORDER — ACETAMINOPHEN 500 MG
1000 TABLET ORAL ONCE
Qty: 0 | Refills: 0 | Status: COMPLETED | OUTPATIENT
Start: 2017-06-14 | End: 2017-06-14

## 2017-06-14 RX ORDER — TETRACAINE/BENZOCAINE/BUTAMBEN 2%-14%-2%
1 OINTMENT (GRAM) TOPICAL DAILY
Qty: 0 | Refills: 0 | Status: DISCONTINUED | OUTPATIENT
Start: 2017-06-14 | End: 2017-06-19

## 2017-06-14 RX ORDER — KETOROLAC TROMETHAMINE 30 MG/ML
30 SYRINGE (ML) INJECTION EVERY 6 HOURS
Qty: 0 | Refills: 0 | Status: DISCONTINUED | OUTPATIENT
Start: 2017-06-14 | End: 2017-06-16

## 2017-06-14 RX ORDER — TETRACAINE/BENZOCAINE/BUTAMBEN 2%-14%-2%
1 OINTMENT (GRAM) TOPICAL EVERY 6 HOURS
Qty: 0 | Refills: 0 | Status: DISCONTINUED | OUTPATIENT
Start: 2017-06-14 | End: 2017-06-14

## 2017-06-14 RX ADMIN — HYDROMORPHONE HYDROCHLORIDE 30 MILLILITER(S): 2 INJECTION INTRAMUSCULAR; INTRAVENOUS; SUBCUTANEOUS at 11:33

## 2017-06-14 RX ADMIN — Medication 1000 MILLIGRAM(S): at 16:03

## 2017-06-14 RX ADMIN — Medication 30 MILLIGRAM(S): at 12:11

## 2017-06-14 RX ADMIN — HYDROMORPHONE HYDROCHLORIDE 30 MILLILITER(S): 2 INJECTION INTRAMUSCULAR; INTRAVENOUS; SUBCUTANEOUS at 08:39

## 2017-06-14 RX ADMIN — HEPARIN SODIUM 5000 UNIT(S): 5000 INJECTION INTRAVENOUS; SUBCUTANEOUS at 15:34

## 2017-06-14 RX ADMIN — HEPARIN SODIUM 5000 UNIT(S): 5000 INJECTION INTRAVENOUS; SUBCUTANEOUS at 22:09

## 2017-06-14 RX ADMIN — Medication 30 MILLIGRAM(S): at 11:56

## 2017-06-14 RX ADMIN — Medication 400 MILLIGRAM(S): at 21:30

## 2017-06-14 RX ADMIN — Medication 63.75 MILLIMOLE(S): at 11:33

## 2017-06-14 RX ADMIN — Medication 1000 MILLIGRAM(S): at 22:00

## 2017-06-14 RX ADMIN — HYDROMORPHONE HYDROCHLORIDE 30 MILLILITER(S): 2 INJECTION INTRAMUSCULAR; INTRAVENOUS; SUBCUTANEOUS at 20:32

## 2017-06-14 RX ADMIN — Medication 400 MILLIGRAM(S): at 00:52

## 2017-06-14 RX ADMIN — HYDROMORPHONE HYDROCHLORIDE 0.5 MILLIGRAM(S): 2 INJECTION INTRAMUSCULAR; INTRAVENOUS; SUBCUTANEOUS at 11:32

## 2017-06-14 RX ADMIN — HYDROMORPHONE HYDROCHLORIDE 0.5 MILLIGRAM(S): 2 INJECTION INTRAMUSCULAR; INTRAVENOUS; SUBCUTANEOUS at 00:38

## 2017-06-14 RX ADMIN — Medication 30 MILLIGRAM(S): at 19:03

## 2017-06-14 RX ADMIN — HYDROMORPHONE HYDROCHLORIDE 0.5 MILLIGRAM(S): 2 INJECTION INTRAMUSCULAR; INTRAVENOUS; SUBCUTANEOUS at 12:24

## 2017-06-14 RX ADMIN — SODIUM CHLORIDE 100 MILLILITER(S): 9 INJECTION, SOLUTION INTRAVENOUS at 11:33

## 2017-06-14 RX ADMIN — SODIUM CHLORIDE 100 MILLILITER(S): 9 INJECTION, SOLUTION INTRAVENOUS at 05:56

## 2017-06-14 RX ADMIN — Medication 1000 MILLIGRAM(S): at 08:12

## 2017-06-14 RX ADMIN — ONDANSETRON 4 MILLIGRAM(S): 8 TABLET, FILM COATED ORAL at 20:40

## 2017-06-14 RX ADMIN — Medication 1 SPRAY(S): at 20:33

## 2017-06-14 RX ADMIN — Medication 400 MILLIGRAM(S): at 15:33

## 2017-06-14 RX ADMIN — Medication 1000 MILLIGRAM(S): at 01:22

## 2017-06-14 RX ADMIN — Medication 400 MILLIGRAM(S): at 07:42

## 2017-06-14 RX ADMIN — HEPARIN SODIUM 5000 UNIT(S): 5000 INJECTION INTRAVENOUS; SUBCUTANEOUS at 05:55

## 2017-06-14 RX ADMIN — Medication 30 MILLIGRAM(S): at 19:18

## 2017-06-14 NOTE — PROVIDER CONTACT NOTE (OTHER) - ASSESSMENT
not in any visible distress. denies SOB. hx of COPD. shown how to use incentive spirometer without much improvement in sats.

## 2017-06-15 LAB
BUN SERPL-MCNC: 11 MG/DL — SIGNIFICANT CHANGE UP (ref 7–23)
CALCIUM SERPL-MCNC: 8.6 MG/DL — SIGNIFICANT CHANGE UP (ref 8.4–10.5)
CHLORIDE SERPL-SCNC: 105 MMOL/L — SIGNIFICANT CHANGE UP (ref 98–107)
CO2 SERPL-SCNC: 31 MMOL/L — SIGNIFICANT CHANGE UP (ref 22–31)
CREAT SERPL-MCNC: 0.81 MG/DL — SIGNIFICANT CHANGE UP (ref 0.5–1.3)
GLUCOSE SERPL-MCNC: 100 MG/DL — HIGH (ref 70–99)
HCT VFR BLD CALC: 34.3 % — LOW (ref 39–50)
HGB BLD-MCNC: 10.4 G/DL — LOW (ref 13–17)
MAGNESIUM SERPL-MCNC: 2.3 MG/DL — SIGNIFICANT CHANGE UP (ref 1.6–2.6)
MCHC RBC-ENTMCNC: 27 PG — SIGNIFICANT CHANGE UP (ref 27–34)
MCHC RBC-ENTMCNC: 30.3 % — LOW (ref 32–36)
MCV RBC AUTO: 89.1 FL — SIGNIFICANT CHANGE UP (ref 80–100)
PHOSPHATE SERPL-MCNC: 2 MG/DL — LOW (ref 2.5–4.5)
PLATELET # BLD AUTO: 202 K/UL — SIGNIFICANT CHANGE UP (ref 150–400)
PMV BLD: 9.7 FL — SIGNIFICANT CHANGE UP (ref 7–13)
POTASSIUM SERPL-MCNC: 3.9 MMOL/L — SIGNIFICANT CHANGE UP (ref 3.5–5.3)
POTASSIUM SERPL-SCNC: 3.9 MMOL/L — SIGNIFICANT CHANGE UP (ref 3.5–5.3)
RBC # BLD: 3.85 M/UL — LOW (ref 4.2–5.8)
RBC # FLD: 14.6 % — HIGH (ref 10.3–14.5)
SODIUM SERPL-SCNC: 150 MMOL/L — HIGH (ref 135–145)
WBC # BLD: 11.09 K/UL — HIGH (ref 3.8–10.5)
WBC # FLD AUTO: 11.09 K/UL — HIGH (ref 3.8–10.5)

## 2017-06-15 RX ORDER — SODIUM CHLORIDE 9 MG/ML
1000 INJECTION, SOLUTION INTRAVENOUS
Qty: 0 | Refills: 0 | Status: DISCONTINUED | OUTPATIENT
Start: 2017-06-15 | End: 2017-06-16

## 2017-06-15 RX ORDER — POTASSIUM PHOSPHATE, MONOBASIC POTASSIUM PHOSPHATE, DIBASIC 236; 224 MG/ML; MG/ML
15 INJECTION, SOLUTION INTRAVENOUS ONCE
Qty: 0 | Refills: 0 | Status: COMPLETED | OUTPATIENT
Start: 2017-06-15 | End: 2017-06-15

## 2017-06-15 RX ORDER — PANTOPRAZOLE SODIUM 20 MG/1
40 TABLET, DELAYED RELEASE ORAL DAILY
Qty: 0 | Refills: 0 | Status: DISCONTINUED | OUTPATIENT
Start: 2017-06-15 | End: 2017-06-19

## 2017-06-15 RX ADMIN — Medication 30 MILLIGRAM(S): at 18:35

## 2017-06-15 RX ADMIN — HEPARIN SODIUM 5000 UNIT(S): 5000 INJECTION INTRAVENOUS; SUBCUTANEOUS at 13:33

## 2017-06-15 RX ADMIN — Medication 30 MILLIGRAM(S): at 13:30

## 2017-06-15 RX ADMIN — HEPARIN SODIUM 5000 UNIT(S): 5000 INJECTION INTRAVENOUS; SUBCUTANEOUS at 05:56

## 2017-06-15 RX ADMIN — SODIUM CHLORIDE 100 MILLILITER(S): 9 INJECTION, SOLUTION INTRAVENOUS at 18:01

## 2017-06-15 RX ADMIN — Medication 30 MILLIGRAM(S): at 00:46

## 2017-06-15 RX ADMIN — Medication 30 MILLIGRAM(S): at 17:55

## 2017-06-15 RX ADMIN — Medication 30 MILLIGRAM(S): at 05:56

## 2017-06-15 RX ADMIN — Medication 30 MILLIGRAM(S): at 00:16

## 2017-06-15 RX ADMIN — SODIUM CHLORIDE 75 MILLILITER(S): 9 INJECTION, SOLUTION INTRAVENOUS at 10:07

## 2017-06-15 RX ADMIN — HEPARIN SODIUM 5000 UNIT(S): 5000 INJECTION INTRAVENOUS; SUBCUTANEOUS at 21:22

## 2017-06-15 RX ADMIN — HYDROMORPHONE HYDROCHLORIDE 30 MILLILITER(S): 2 INJECTION INTRAMUSCULAR; INTRAVENOUS; SUBCUTANEOUS at 20:14

## 2017-06-15 RX ADMIN — HYDROMORPHONE HYDROCHLORIDE 30 MILLILITER(S): 2 INJECTION INTRAMUSCULAR; INTRAVENOUS; SUBCUTANEOUS at 08:36

## 2017-06-15 RX ADMIN — Medication 30 MILLIGRAM(S): at 01:33

## 2017-06-15 RX ADMIN — Medication 1.25 MILLIGRAM(S): at 17:54

## 2017-06-15 RX ADMIN — Medication 1.25 MILLIGRAM(S): at 12:45

## 2017-06-15 RX ADMIN — PANTOPRAZOLE SODIUM 40 MILLIGRAM(S): 20 TABLET, DELAYED RELEASE ORAL at 13:28

## 2017-06-15 RX ADMIN — POTASSIUM PHOSPHATE, MONOBASIC POTASSIUM PHOSPHATE, DIBASIC 62.5 MILLIMOLE(S): 236; 224 INJECTION, SOLUTION INTRAVENOUS at 10:28

## 2017-06-15 RX ADMIN — Medication 30 MILLIGRAM(S): at 06:26

## 2017-06-15 NOTE — PHYSICAL THERAPY INITIAL EVALUATION ADULT - ADDITIONAL COMMENTS
PAtient lives wife in an apartment with 2 SARAH; patient uses a Single Axis Cane and works at [employer]

## 2017-06-15 NOTE — PROVIDER CONTACT NOTE (OTHER) - ASSESSMENT
Patient refuses to use BiPAP mask to sleep, states that he is not able to sleep to with mask on, as it is uncomfortable.  Currently patient is on Venturi mask, satting between 91-94%. Patient denies SOB, dizziness or chest pain.

## 2017-06-15 NOTE — PHYSICAL THERAPY INITIAL EVALUATION ADULT - GENERAL OBSERVATIONS, REHAB EVAL
Patient received supine in bed; No apparent distress. (+) oxygen via Nasal Canula, stephen, NG tube connected to suction, IV fluids, PCA pump

## 2017-06-15 NOTE — PROVIDER CONTACT NOTE (OTHER) - ACTION/TREATMENT ORDERED:
MD stated that patient can be off BiPAP device, to continue with venturi mask. No other action taken at this moment. RN will continue to monitor.

## 2017-06-16 LAB
BUN SERPL-MCNC: 11 MG/DL — SIGNIFICANT CHANGE UP (ref 7–23)
CALCIUM SERPL-MCNC: 8.5 MG/DL — SIGNIFICANT CHANGE UP (ref 8.4–10.5)
CHLORIDE SERPL-SCNC: 105 MMOL/L — SIGNIFICANT CHANGE UP (ref 98–107)
CO2 SERPL-SCNC: 32 MMOL/L — HIGH (ref 22–31)
CREAT SERPL-MCNC: 0.73 MG/DL — SIGNIFICANT CHANGE UP (ref 0.5–1.3)
GLUCOSE SERPL-MCNC: 130 MG/DL — HIGH (ref 70–99)
HCT VFR BLD CALC: 34.4 % — LOW (ref 39–50)
HGB BLD-MCNC: 10.4 G/DL — LOW (ref 13–17)
MAGNESIUM SERPL-MCNC: 2.3 MG/DL — SIGNIFICANT CHANGE UP (ref 1.6–2.6)
MCHC RBC-ENTMCNC: 27.1 PG — SIGNIFICANT CHANGE UP (ref 27–34)
MCHC RBC-ENTMCNC: 30.2 % — LOW (ref 32–36)
MCV RBC AUTO: 89.6 FL — SIGNIFICANT CHANGE UP (ref 80–100)
PHOSPHATE SERPL-MCNC: 1.8 MG/DL — LOW (ref 2.5–4.5)
PLATELET # BLD AUTO: 248 K/UL — SIGNIFICANT CHANGE UP (ref 150–400)
PMV BLD: 9.6 FL — SIGNIFICANT CHANGE UP (ref 7–13)
POTASSIUM SERPL-MCNC: 3.7 MMOL/L — SIGNIFICANT CHANGE UP (ref 3.5–5.3)
POTASSIUM SERPL-SCNC: 3.7 MMOL/L — SIGNIFICANT CHANGE UP (ref 3.5–5.3)
RBC # BLD: 3.84 M/UL — LOW (ref 4.2–5.8)
RBC # FLD: 15 % — HIGH (ref 10.3–14.5)
SODIUM SERPL-SCNC: 149 MMOL/L — HIGH (ref 135–145)
WBC # BLD: 10.75 K/UL — HIGH (ref 3.8–10.5)
WBC # FLD AUTO: 10.75 K/UL — HIGH (ref 3.8–10.5)

## 2017-06-16 RX ORDER — HYDROMORPHONE HYDROCHLORIDE 2 MG/ML
4 INJECTION INTRAMUSCULAR; INTRAVENOUS; SUBCUTANEOUS
Qty: 0 | Refills: 0 | Status: DISCONTINUED | OUTPATIENT
Start: 2017-06-16 | End: 2017-06-20

## 2017-06-16 RX ORDER — HYDROMORPHONE HYDROCHLORIDE 2 MG/ML
1 INJECTION INTRAMUSCULAR; INTRAVENOUS; SUBCUTANEOUS EVERY 6 HOURS
Qty: 0 | Refills: 0 | Status: DISCONTINUED | OUTPATIENT
Start: 2017-06-16 | End: 2017-06-19

## 2017-06-16 RX ORDER — ASPIRIN/CALCIUM CARB/MAGNESIUM 324 MG
81 TABLET ORAL DAILY
Qty: 0 | Refills: 0 | Status: DISCONTINUED | OUTPATIENT
Start: 2017-06-17 | End: 2017-06-20

## 2017-06-16 RX ORDER — BUDESONIDE AND FORMOTEROL FUMARATE DIHYDRATE 160; 4.5 UG/1; UG/1
2 AEROSOL RESPIRATORY (INHALATION)
Qty: 0 | Refills: 0 | Status: DISCONTINUED | OUTPATIENT
Start: 2017-06-16 | End: 2017-06-20

## 2017-06-16 RX ORDER — ACETAMINOPHEN 500 MG
650 TABLET ORAL EVERY 6 HOURS
Qty: 0 | Refills: 0 | Status: COMPLETED | OUTPATIENT
Start: 2017-06-16 | End: 2017-06-18

## 2017-06-16 RX ORDER — LOSARTAN POTASSIUM 100 MG/1
100 TABLET, FILM COATED ORAL DAILY
Qty: 0 | Refills: 0 | Status: DISCONTINUED | OUTPATIENT
Start: 2017-06-17 | End: 2017-06-20

## 2017-06-16 RX ORDER — SIMVASTATIN 20 MG/1
20 TABLET, FILM COATED ORAL AT BEDTIME
Qty: 0 | Refills: 0 | Status: DISCONTINUED | OUTPATIENT
Start: 2017-06-16 | End: 2017-06-20

## 2017-06-16 RX ORDER — AMLODIPINE BESYLATE 2.5 MG/1
10 TABLET ORAL DAILY
Qty: 0 | Refills: 0 | Status: DISCONTINUED | OUTPATIENT
Start: 2017-06-16 | End: 2017-06-17

## 2017-06-16 RX ORDER — POTASSIUM PHOSPHATE, MONOBASIC POTASSIUM PHOSPHATE, DIBASIC 236; 224 MG/ML; MG/ML
15 INJECTION, SOLUTION INTRAVENOUS ONCE
Qty: 0 | Refills: 0 | Status: COMPLETED | OUTPATIENT
Start: 2017-06-16 | End: 2017-06-16

## 2017-06-16 RX ORDER — HYDROMORPHONE HYDROCHLORIDE 2 MG/ML
2 INJECTION INTRAMUSCULAR; INTRAVENOUS; SUBCUTANEOUS
Qty: 0 | Refills: 0 | Status: DISCONTINUED | OUTPATIENT
Start: 2017-06-16 | End: 2017-06-20

## 2017-06-16 RX ORDER — ACETAMINOPHEN 500 MG
1000 TABLET ORAL EVERY 6 HOURS
Qty: 0 | Refills: 0 | Status: DISCONTINUED | OUTPATIENT
Start: 2017-06-16 | End: 2017-06-16

## 2017-06-16 RX ORDER — LOSARTAN POTASSIUM 100 MG/1
100 TABLET, FILM COATED ORAL DAILY
Qty: 0 | Refills: 0 | Status: DISCONTINUED | OUTPATIENT
Start: 2017-06-16 | End: 2017-06-16

## 2017-06-16 RX ADMIN — Medication 30 MILLIGRAM(S): at 12:53

## 2017-06-16 RX ADMIN — Medication 1.25 MILLIGRAM(S): at 06:28

## 2017-06-16 RX ADMIN — Medication 30 MILLIGRAM(S): at 06:28

## 2017-06-16 RX ADMIN — HYDROMORPHONE HYDROCHLORIDE 4 MILLIGRAM(S): 2 INJECTION INTRAMUSCULAR; INTRAVENOUS; SUBCUTANEOUS at 22:54

## 2017-06-16 RX ADMIN — HEPARIN SODIUM 5000 UNIT(S): 5000 INJECTION INTRAVENOUS; SUBCUTANEOUS at 22:50

## 2017-06-16 RX ADMIN — PANTOPRAZOLE SODIUM 40 MILLIGRAM(S): 20 TABLET, DELAYED RELEASE ORAL at 12:24

## 2017-06-16 RX ADMIN — BUDESONIDE AND FORMOTEROL FUMARATE DIHYDRATE 2 PUFF(S): 160; 4.5 AEROSOL RESPIRATORY (INHALATION) at 21:45

## 2017-06-16 RX ADMIN — Medication 30 MILLIGRAM(S): at 00:04

## 2017-06-16 RX ADMIN — Medication 650 MILLIGRAM(S): at 17:47

## 2017-06-16 RX ADMIN — HYDROMORPHONE HYDROCHLORIDE 30 MILLILITER(S): 2 INJECTION INTRAMUSCULAR; INTRAVENOUS; SUBCUTANEOUS at 08:16

## 2017-06-16 RX ADMIN — HYDROMORPHONE HYDROCHLORIDE 4 MILLIGRAM(S): 2 INJECTION INTRAMUSCULAR; INTRAVENOUS; SUBCUTANEOUS at 18:17

## 2017-06-16 RX ADMIN — HEPARIN SODIUM 5000 UNIT(S): 5000 INJECTION INTRAVENOUS; SUBCUTANEOUS at 06:28

## 2017-06-16 RX ADMIN — AMLODIPINE BESYLATE 10 MILLIGRAM(S): 2.5 TABLET ORAL at 22:52

## 2017-06-16 RX ADMIN — Medication 30 MILLIGRAM(S): at 12:23

## 2017-06-16 RX ADMIN — Medication 1.25 MILLIGRAM(S): at 00:04

## 2017-06-16 RX ADMIN — POTASSIUM PHOSPHATE, MONOBASIC POTASSIUM PHOSPHATE, DIBASIC 62.5 MILLIMOLE(S): 236; 224 INJECTION, SOLUTION INTRAVENOUS at 12:23

## 2017-06-16 RX ADMIN — SODIUM CHLORIDE 100 MILLILITER(S): 9 INJECTION, SOLUTION INTRAVENOUS at 02:53

## 2017-06-16 RX ADMIN — SIMVASTATIN 20 MILLIGRAM(S): 20 TABLET, FILM COATED ORAL at 22:50

## 2017-06-16 RX ADMIN — HEPARIN SODIUM 5000 UNIT(S): 5000 INJECTION INTRAVENOUS; SUBCUTANEOUS at 14:15

## 2017-06-16 RX ADMIN — Medication 30 MILLIGRAM(S): at 00:34

## 2017-06-16 RX ADMIN — HYDROMORPHONE HYDROCHLORIDE 4 MILLIGRAM(S): 2 INJECTION INTRAMUSCULAR; INTRAVENOUS; SUBCUTANEOUS at 23:24

## 2017-06-16 RX ADMIN — Medication 1.25 MILLIGRAM(S): at 12:25

## 2017-06-16 RX ADMIN — HYDROMORPHONE HYDROCHLORIDE 4 MILLIGRAM(S): 2 INJECTION INTRAMUSCULAR; INTRAVENOUS; SUBCUTANEOUS at 17:47

## 2017-06-17 LAB
BUN SERPL-MCNC: 9 MG/DL — SIGNIFICANT CHANGE UP (ref 7–23)
CALCIUM SERPL-MCNC: 8.5 MG/DL — SIGNIFICANT CHANGE UP (ref 8.4–10.5)
CHLORIDE SERPL-SCNC: 100 MMOL/L — SIGNIFICANT CHANGE UP (ref 98–107)
CO2 SERPL-SCNC: 29 MMOL/L — SIGNIFICANT CHANGE UP (ref 22–31)
CREAT SERPL-MCNC: 0.59 MG/DL — SIGNIFICANT CHANGE UP (ref 0.5–1.3)
GLUCOSE SERPL-MCNC: 113 MG/DL — HIGH (ref 70–99)
HCT VFR BLD CALC: 34.9 % — LOW (ref 39–50)
HGB BLD-MCNC: 10.7 G/DL — LOW (ref 13–17)
MAGNESIUM SERPL-MCNC: 1.9 MG/DL — SIGNIFICANT CHANGE UP (ref 1.6–2.6)
MCHC RBC-ENTMCNC: 26.8 PG — LOW (ref 27–34)
MCHC RBC-ENTMCNC: 30.7 % — LOW (ref 32–36)
MCV RBC AUTO: 87.3 FL — SIGNIFICANT CHANGE UP (ref 80–100)
PHOSPHATE SERPL-MCNC: 2.9 MG/DL — SIGNIFICANT CHANGE UP (ref 2.5–4.5)
PLATELET # BLD AUTO: 218 K/UL — SIGNIFICANT CHANGE UP (ref 150–400)
PMV BLD: 9.5 FL — SIGNIFICANT CHANGE UP (ref 7–13)
POTASSIUM SERPL-MCNC: 3.7 MMOL/L — SIGNIFICANT CHANGE UP (ref 3.5–5.3)
POTASSIUM SERPL-SCNC: 3.7 MMOL/L — SIGNIFICANT CHANGE UP (ref 3.5–5.3)
RBC # BLD: 4 M/UL — LOW (ref 4.2–5.8)
RBC # FLD: 14.9 % — HIGH (ref 10.3–14.5)
SODIUM SERPL-SCNC: 142 MMOL/L — SIGNIFICANT CHANGE UP (ref 135–145)
WBC # BLD: 7.48 K/UL — SIGNIFICANT CHANGE UP (ref 3.8–10.5)
WBC # FLD AUTO: 7.48 K/UL — SIGNIFICANT CHANGE UP (ref 3.8–10.5)

## 2017-06-17 RX ORDER — AMLODIPINE BESYLATE 2.5 MG/1
10 TABLET ORAL
Qty: 0 | Refills: 0 | Status: DISCONTINUED | OUTPATIENT
Start: 2017-06-17 | End: 2017-06-20

## 2017-06-17 RX ORDER — SODIUM,POTASSIUM PHOSPHATES 278-250MG
1 POWDER IN PACKET (EA) ORAL
Qty: 0 | Refills: 0 | Status: COMPLETED | OUTPATIENT
Start: 2017-06-17 | End: 2017-06-17

## 2017-06-17 RX ORDER — CLOPIDOGREL BISULFATE 75 MG/1
75 TABLET, FILM COATED ORAL DAILY
Qty: 0 | Refills: 0 | Status: DISCONTINUED | OUTPATIENT
Start: 2017-06-17 | End: 2017-06-20

## 2017-06-17 RX ORDER — MAGNESIUM SULFATE 500 MG/ML
1 VIAL (ML) INJECTION ONCE
Qty: 0 | Refills: 0 | Status: COMPLETED | OUTPATIENT
Start: 2017-06-17 | End: 2017-06-17

## 2017-06-17 RX ADMIN — Medication 1 TABLET(S): at 11:56

## 2017-06-17 RX ADMIN — HYDROMORPHONE HYDROCHLORIDE 4 MILLIGRAM(S): 2 INJECTION INTRAMUSCULAR; INTRAVENOUS; SUBCUTANEOUS at 05:45

## 2017-06-17 RX ADMIN — BUDESONIDE AND FORMOTEROL FUMARATE DIHYDRATE 2 PUFF(S): 160; 4.5 AEROSOL RESPIRATORY (INHALATION) at 09:16

## 2017-06-17 RX ADMIN — LOSARTAN POTASSIUM 100 MILLIGRAM(S): 100 TABLET, FILM COATED ORAL at 06:48

## 2017-06-17 RX ADMIN — HEPARIN SODIUM 5000 UNIT(S): 5000 INJECTION INTRAVENOUS; SUBCUTANEOUS at 05:15

## 2017-06-17 RX ADMIN — Medication 1 TABLET(S): at 19:33

## 2017-06-17 RX ADMIN — Medication 650 MILLIGRAM(S): at 19:33

## 2017-06-17 RX ADMIN — AMLODIPINE BESYLATE 10 MILLIGRAM(S): 2.5 TABLET ORAL at 05:15

## 2017-06-17 RX ADMIN — HYDROMORPHONE HYDROCHLORIDE 4 MILLIGRAM(S): 2 INJECTION INTRAMUSCULAR; INTRAVENOUS; SUBCUTANEOUS at 05:15

## 2017-06-17 RX ADMIN — BUDESONIDE AND FORMOTEROL FUMARATE DIHYDRATE 2 PUFF(S): 160; 4.5 AEROSOL RESPIRATORY (INHALATION) at 21:12

## 2017-06-17 RX ADMIN — PANTOPRAZOLE SODIUM 40 MILLIGRAM(S): 20 TABLET, DELAYED RELEASE ORAL at 11:53

## 2017-06-17 RX ADMIN — CLOPIDOGREL BISULFATE 75 MILLIGRAM(S): 75 TABLET, FILM COATED ORAL at 11:53

## 2017-06-17 RX ADMIN — HEPARIN SODIUM 5000 UNIT(S): 5000 INJECTION INTRAVENOUS; SUBCUTANEOUS at 15:00

## 2017-06-17 RX ADMIN — Medication 100 GRAM(S): at 11:56

## 2017-06-17 RX ADMIN — Medication 650 MILLIGRAM(S): at 11:53

## 2017-06-17 RX ADMIN — HEPARIN SODIUM 5000 UNIT(S): 5000 INJECTION INTRAVENOUS; SUBCUTANEOUS at 21:47

## 2017-06-17 RX ADMIN — Medication 650 MILLIGRAM(S): at 05:15

## 2017-06-17 RX ADMIN — SIMVASTATIN 20 MILLIGRAM(S): 20 TABLET, FILM COATED ORAL at 21:47

## 2017-06-17 RX ADMIN — Medication 1 TABLET(S): at 21:47

## 2017-06-17 RX ADMIN — Medication 81 MILLIGRAM(S): at 11:53

## 2017-06-17 RX ADMIN — Medication 650 MILLIGRAM(S): at 00:03

## 2017-06-18 LAB
BUN SERPL-MCNC: 11 MG/DL — SIGNIFICANT CHANGE UP (ref 7–23)
CALCIUM SERPL-MCNC: 8.6 MG/DL — SIGNIFICANT CHANGE UP (ref 8.4–10.5)
CHLORIDE SERPL-SCNC: 103 MMOL/L — SIGNIFICANT CHANGE UP (ref 98–107)
CO2 SERPL-SCNC: 26 MMOL/L — SIGNIFICANT CHANGE UP (ref 22–31)
CREAT SERPL-MCNC: 0.73 MG/DL — SIGNIFICANT CHANGE UP (ref 0.5–1.3)
GLUCOSE SERPL-MCNC: 114 MG/DL — HIGH (ref 70–99)
HCT VFR BLD CALC: 35.6 % — LOW (ref 39–50)
HGB BLD-MCNC: 11.2 G/DL — LOW (ref 13–17)
MCHC RBC-ENTMCNC: 26.5 PG — LOW (ref 27–34)
MCHC RBC-ENTMCNC: 31.5 % — LOW (ref 32–36)
MCV RBC AUTO: 84.4 FL — SIGNIFICANT CHANGE UP (ref 80–100)
PLATELET # BLD AUTO: 262 K/UL — SIGNIFICANT CHANGE UP (ref 150–400)
PMV BLD: 9.5 FL — SIGNIFICANT CHANGE UP (ref 7–13)
POTASSIUM SERPL-MCNC: 3.6 MMOL/L — SIGNIFICANT CHANGE UP (ref 3.5–5.3)
POTASSIUM SERPL-SCNC: 3.6 MMOL/L — SIGNIFICANT CHANGE UP (ref 3.5–5.3)
RBC # BLD: 4.22 M/UL — SIGNIFICANT CHANGE UP (ref 4.2–5.8)
RBC # FLD: 14.9 % — HIGH (ref 10.3–14.5)
SODIUM SERPL-SCNC: 145 MMOL/L — SIGNIFICANT CHANGE UP (ref 135–145)
WBC # BLD: 7.28 K/UL — SIGNIFICANT CHANGE UP (ref 3.8–10.5)
WBC # FLD AUTO: 7.28 K/UL — SIGNIFICANT CHANGE UP (ref 3.8–10.5)

## 2017-06-18 RX ORDER — POTASSIUM CHLORIDE 20 MEQ
40 PACKET (EA) ORAL ONCE
Qty: 0 | Refills: 0 | Status: COMPLETED | OUTPATIENT
Start: 2017-06-18 | End: 2017-06-18

## 2017-06-18 RX ADMIN — LOSARTAN POTASSIUM 100 MILLIGRAM(S): 100 TABLET, FILM COATED ORAL at 05:50

## 2017-06-18 RX ADMIN — HEPARIN SODIUM 5000 UNIT(S): 5000 INJECTION INTRAVENOUS; SUBCUTANEOUS at 05:50

## 2017-06-18 RX ADMIN — HYDROMORPHONE HYDROCHLORIDE 1 MILLIGRAM(S): 2 INJECTION INTRAMUSCULAR; INTRAVENOUS; SUBCUTANEOUS at 19:21

## 2017-06-18 RX ADMIN — HEPARIN SODIUM 5000 UNIT(S): 5000 INJECTION INTRAVENOUS; SUBCUTANEOUS at 14:00

## 2017-06-18 RX ADMIN — HYDROMORPHONE HYDROCHLORIDE 4 MILLIGRAM(S): 2 INJECTION INTRAMUSCULAR; INTRAVENOUS; SUBCUTANEOUS at 13:45

## 2017-06-18 RX ADMIN — HYDROMORPHONE HYDROCHLORIDE 4 MILLIGRAM(S): 2 INJECTION INTRAMUSCULAR; INTRAVENOUS; SUBCUTANEOUS at 04:01

## 2017-06-18 RX ADMIN — AMLODIPINE BESYLATE 10 MILLIGRAM(S): 2.5 TABLET ORAL at 12:45

## 2017-06-18 RX ADMIN — HYDROMORPHONE HYDROCHLORIDE 4 MILLIGRAM(S): 2 INJECTION INTRAMUSCULAR; INTRAVENOUS; SUBCUTANEOUS at 12:54

## 2017-06-18 RX ADMIN — BUDESONIDE AND FORMOTEROL FUMARATE DIHYDRATE 2 PUFF(S): 160; 4.5 AEROSOL RESPIRATORY (INHALATION) at 09:00

## 2017-06-18 RX ADMIN — HYDROMORPHONE HYDROCHLORIDE 4 MILLIGRAM(S): 2 INJECTION INTRAMUSCULAR; INTRAVENOUS; SUBCUTANEOUS at 03:31

## 2017-06-18 RX ADMIN — Medication 40 MILLIEQUIVALENT(S): at 12:44

## 2017-06-18 RX ADMIN — PANTOPRAZOLE SODIUM 40 MILLIGRAM(S): 20 TABLET, DELAYED RELEASE ORAL at 12:44

## 2017-06-18 RX ADMIN — HYDROMORPHONE HYDROCHLORIDE 1 MILLIGRAM(S): 2 INJECTION INTRAMUSCULAR; INTRAVENOUS; SUBCUTANEOUS at 20:15

## 2017-06-18 RX ADMIN — CLOPIDOGREL BISULFATE 75 MILLIGRAM(S): 75 TABLET, FILM COATED ORAL at 12:44

## 2017-06-18 RX ADMIN — Medication 81 MILLIGRAM(S): at 12:44

## 2017-06-18 NOTE — PROGRESS NOTE ADULT - ATTENDING COMMENTS
Must adjust pain meds  ANesthesia eval  OOB to chair  Insentive spirometry  wound looks good  no gi fxn yet  stoma pink  cont ngt stephen
Doing well. Stoma functioning.  - start clears  - ambulate
monitor stoma output  reasses from phys tx  Patient seen and examined   agree with above plan

## 2017-06-19 LAB
BUN SERPL-MCNC: 15 MG/DL — SIGNIFICANT CHANGE UP (ref 7–23)
CALCIUM SERPL-MCNC: 8.9 MG/DL — SIGNIFICANT CHANGE UP (ref 8.4–10.5)
CHLORIDE SERPL-SCNC: 101 MMOL/L — SIGNIFICANT CHANGE UP (ref 98–107)
CO2 SERPL-SCNC: 26 MMOL/L — SIGNIFICANT CHANGE UP (ref 22–31)
CREAT SERPL-MCNC: 0.89 MG/DL — SIGNIFICANT CHANGE UP (ref 0.5–1.3)
GLUCOSE SERPL-MCNC: 110 MG/DL — HIGH (ref 70–99)
POTASSIUM SERPL-MCNC: 4 MMOL/L — SIGNIFICANT CHANGE UP (ref 3.5–5.3)
POTASSIUM SERPL-SCNC: 4 MMOL/L — SIGNIFICANT CHANGE UP (ref 3.5–5.3)
SODIUM SERPL-SCNC: 141 MMOL/L — SIGNIFICANT CHANGE UP (ref 135–145)
SURGICAL PATHOLOGY STUDY: SIGNIFICANT CHANGE UP

## 2017-06-19 RX ORDER — PANTOPRAZOLE SODIUM 20 MG/1
40 TABLET, DELAYED RELEASE ORAL
Qty: 0 | Refills: 0 | Status: DISCONTINUED | OUTPATIENT
Start: 2017-06-19 | End: 2017-06-20

## 2017-06-19 RX ADMIN — SIMVASTATIN 20 MILLIGRAM(S): 20 TABLET, FILM COATED ORAL at 00:29

## 2017-06-19 RX ADMIN — HEPARIN SODIUM 5000 UNIT(S): 5000 INJECTION INTRAVENOUS; SUBCUTANEOUS at 06:30

## 2017-06-19 RX ADMIN — HEPARIN SODIUM 5000 UNIT(S): 5000 INJECTION INTRAVENOUS; SUBCUTANEOUS at 00:28

## 2017-06-19 RX ADMIN — AMLODIPINE BESYLATE 10 MILLIGRAM(S): 2.5 TABLET ORAL at 12:48

## 2017-06-19 RX ADMIN — CLOPIDOGREL BISULFATE 75 MILLIGRAM(S): 75 TABLET, FILM COATED ORAL at 12:48

## 2017-06-19 RX ADMIN — HEPARIN SODIUM 5000 UNIT(S): 5000 INJECTION INTRAVENOUS; SUBCUTANEOUS at 22:20

## 2017-06-19 RX ADMIN — HYDROMORPHONE HYDROCHLORIDE 4 MILLIGRAM(S): 2 INJECTION INTRAMUSCULAR; INTRAVENOUS; SUBCUTANEOUS at 04:14

## 2017-06-19 RX ADMIN — HEPARIN SODIUM 5000 UNIT(S): 5000 INJECTION INTRAVENOUS; SUBCUTANEOUS at 13:26

## 2017-06-19 RX ADMIN — LOSARTAN POTASSIUM 100 MILLIGRAM(S): 100 TABLET, FILM COATED ORAL at 06:30

## 2017-06-19 RX ADMIN — HYDROMORPHONE HYDROCHLORIDE 4 MILLIGRAM(S): 2 INJECTION INTRAMUSCULAR; INTRAVENOUS; SUBCUTANEOUS at 01:00

## 2017-06-19 RX ADMIN — SIMVASTATIN 20 MILLIGRAM(S): 20 TABLET, FILM COATED ORAL at 22:20

## 2017-06-19 RX ADMIN — BUDESONIDE AND FORMOTEROL FUMARATE DIHYDRATE 2 PUFF(S): 160; 4.5 AEROSOL RESPIRATORY (INHALATION) at 10:53

## 2017-06-19 RX ADMIN — PANTOPRAZOLE SODIUM 40 MILLIGRAM(S): 20 TABLET, DELAYED RELEASE ORAL at 12:48

## 2017-06-19 RX ADMIN — Medication 81 MILLIGRAM(S): at 12:48

## 2017-06-19 RX ADMIN — HYDROMORPHONE HYDROCHLORIDE 4 MILLIGRAM(S): 2 INJECTION INTRAMUSCULAR; INTRAVENOUS; SUBCUTANEOUS at 00:29

## 2017-06-19 RX ADMIN — HYDROMORPHONE HYDROCHLORIDE 4 MILLIGRAM(S): 2 INJECTION INTRAMUSCULAR; INTRAVENOUS; SUBCUTANEOUS at 04:50

## 2017-06-20 ENCOUNTER — INPATIENT (INPATIENT)
Facility: HOSPITAL | Age: 67
LOS: 3 days | Discharge: HOME | End: 2017-06-24
Attending: SURGERY | Admitting: SURGERY
Payer: COMMERCIAL

## 2017-06-20 VITALS
OXYGEN SATURATION: 96 % | DIASTOLIC BLOOD PRESSURE: 71 MMHG | RESPIRATION RATE: 20 BRPM | HEART RATE: 102 BPM | TEMPERATURE: 98 F | SYSTOLIC BLOOD PRESSURE: 117 MMHG

## 2017-06-20 VITALS — WEIGHT: 251.33 LBS

## 2017-06-20 DIAGNOSIS — Z96.651 PRESENCE OF RIGHT ARTIFICIAL KNEE JOINT: Chronic | ICD-10-CM

## 2017-06-20 DIAGNOSIS — Z95.4 PRESENCE OF OTHER HEART-VALVE REPLACEMENT: Chronic | ICD-10-CM

## 2017-06-20 DIAGNOSIS — K62.5 HEMORRHAGE OF ANUS AND RECTUM: ICD-10-CM

## 2017-06-20 LAB
ALBUMIN SERPL ELPH-MCNC: 3.6 G/DL — SIGNIFICANT CHANGE UP (ref 3.3–5)
ALP SERPL-CCNC: 87 U/L — SIGNIFICANT CHANGE UP (ref 40–120)
ALT FLD-CCNC: 16 U/L — SIGNIFICANT CHANGE UP (ref 4–41)
APTT BLD: 27.3 SEC — LOW (ref 27.5–37.4)
AST SERPL-CCNC: 22 U/L — SIGNIFICANT CHANGE UP (ref 4–40)
BASOPHILS # BLD AUTO: 0.03 K/UL — SIGNIFICANT CHANGE UP (ref 0–0.2)
BASOPHILS NFR BLD AUTO: 0.2 % — SIGNIFICANT CHANGE UP (ref 0–2)
BILIRUB SERPL-MCNC: 0.6 MG/DL — SIGNIFICANT CHANGE UP (ref 0.2–1.2)
BLD GP AB SCN SERPL QL: NEGATIVE — SIGNIFICANT CHANGE UP
BUN SERPL-MCNC: 19 MG/DL — SIGNIFICANT CHANGE UP (ref 7–23)
BUN SERPL-MCNC: 21 MG/DL — SIGNIFICANT CHANGE UP (ref 7–23)
CALCIUM SERPL-MCNC: 7.6 MG/DL — LOW (ref 8.4–10.5)
CALCIUM SERPL-MCNC: 9.2 MG/DL — SIGNIFICANT CHANGE UP (ref 8.4–10.5)
CHLORIDE SERPL-SCNC: 102 MMOL/L — SIGNIFICANT CHANGE UP (ref 98–107)
CHLORIDE SERPL-SCNC: 107 MMOL/L — SIGNIFICANT CHANGE UP (ref 98–107)
CO2 SERPL-SCNC: 20 MMOL/L — LOW (ref 22–31)
CO2 SERPL-SCNC: 23 MMOL/L — SIGNIFICANT CHANGE UP (ref 22–31)
CREAT SERPL-MCNC: 1.59 MG/DL — HIGH (ref 0.5–1.3)
CREAT SERPL-MCNC: 1.84 MG/DL — HIGH (ref 0.5–1.3)
EOSINOPHIL # BLD AUTO: 0.23 K/UL — SIGNIFICANT CHANGE UP (ref 0–0.5)
EOSINOPHIL NFR BLD AUTO: 1.4 % — SIGNIFICANT CHANGE UP (ref 0–6)
GLUCOSE SERPL-MCNC: 143 MG/DL — HIGH (ref 70–99)
GLUCOSE SERPL-MCNC: 179 MG/DL — HIGH (ref 70–99)
HCT VFR BLD CALC: 31.4 % — LOW (ref 39–50)
HCT VFR BLD CALC: 36.2 % — LOW (ref 39–50)
HGB BLD-MCNC: 10.1 G/DL — LOW (ref 13–17)
HGB BLD-MCNC: 11.4 G/DL — LOW (ref 13–17)
HYPOCHROMIA BLD QL: SLIGHT — SIGNIFICANT CHANGE UP
IMM GRANULOCYTES NFR BLD AUTO: 1.6 % — HIGH (ref 0–1.5)
INR BLD: 1.07 — SIGNIFICANT CHANGE UP (ref 0.88–1.17)
LACTATE SERPL-SCNC: 2.1 MMOL/L — HIGH (ref 0.5–2)
LYMPHOCYTES # BLD AUTO: 1.75 K/UL — SIGNIFICANT CHANGE UP (ref 1–3.3)
LYMPHOCYTES # BLD AUTO: 10.9 % — LOW (ref 13–44)
MAGNESIUM SERPL-MCNC: 1.7 MG/DL — SIGNIFICANT CHANGE UP (ref 1.6–2.6)
MANUAL SMEAR VERIFICATION: SIGNIFICANT CHANGE UP
MCHC RBC-ENTMCNC: 27.1 PG — SIGNIFICANT CHANGE UP (ref 27–34)
MCHC RBC-ENTMCNC: 27.7 PG — SIGNIFICANT CHANGE UP (ref 27–34)
MCHC RBC-ENTMCNC: 31.5 % — LOW (ref 32–36)
MCHC RBC-ENTMCNC: 32.2 % — SIGNIFICANT CHANGE UP (ref 32–36)
MCV RBC AUTO: 86 FL — SIGNIFICANT CHANGE UP (ref 80–100)
MCV RBC AUTO: 86.3 FL — SIGNIFICANT CHANGE UP (ref 80–100)
MONOCYTES # BLD AUTO: 0.76 K/UL — SIGNIFICANT CHANGE UP (ref 0–0.9)
MONOCYTES NFR BLD AUTO: 4.7 % — SIGNIFICANT CHANGE UP (ref 2–14)
NEUTROPHILS # BLD AUTO: 13 K/UL — HIGH (ref 1.8–7.4)
NEUTROPHILS NFR BLD AUTO: 81.2 % — HIGH (ref 43–77)
PHOSPHATE SERPL-MCNC: 5.1 MG/DL — HIGH (ref 2.5–4.5)
PLATELET # BLD AUTO: 250 K/UL — SIGNIFICANT CHANGE UP (ref 150–400)
PLATELET # BLD AUTO: 339 K/UL — SIGNIFICANT CHANGE UP (ref 150–400)
PLATELET COUNT - ESTIMATE: NORMAL — SIGNIFICANT CHANGE UP
PMV BLD: 9.5 FL — SIGNIFICANT CHANGE UP (ref 7–13)
PMV BLD: 9.7 FL — SIGNIFICANT CHANGE UP (ref 7–13)
POTASSIUM SERPL-MCNC: 4.5 MMOL/L — SIGNIFICANT CHANGE UP (ref 3.5–5.3)
POTASSIUM SERPL-MCNC: 4.6 MMOL/L — SIGNIFICANT CHANGE UP (ref 3.5–5.3)
POTASSIUM SERPL-SCNC: 4.5 MMOL/L — SIGNIFICANT CHANGE UP (ref 3.5–5.3)
POTASSIUM SERPL-SCNC: 4.6 MMOL/L — SIGNIFICANT CHANGE UP (ref 3.5–5.3)
PROT SERPL-MCNC: 6.9 G/DL — SIGNIFICANT CHANGE UP (ref 6–8.3)
PROTHROM AB SERPL-ACNC: 12 SEC — SIGNIFICANT CHANGE UP (ref 9.8–13.1)
RBC # BLD: 3.64 M/UL — LOW (ref 4.2–5.8)
RBC # BLD: 4.21 M/UL — SIGNIFICANT CHANGE UP (ref 4.2–5.8)
RBC # FLD: 14.8 % — HIGH (ref 10.3–14.5)
RBC # FLD: 15.4 % — HIGH (ref 10.3–14.5)
RH IG SCN BLD-IMP: POSITIVE — SIGNIFICANT CHANGE UP
SODIUM SERPL-SCNC: 142 MMOL/L — SIGNIFICANT CHANGE UP (ref 135–145)
SODIUM SERPL-SCNC: 143 MMOL/L — SIGNIFICANT CHANGE UP (ref 135–145)
WBC # BLD: 16.02 K/UL — HIGH (ref 3.8–10.5)
WBC # BLD: 18.82 K/UL — HIGH (ref 3.8–10.5)
WBC # FLD AUTO: 16.02 K/UL — HIGH (ref 3.8–10.5)
WBC # FLD AUTO: 18.82 K/UL — HIGH (ref 3.8–10.5)

## 2017-06-20 RX ORDER — PANTOPRAZOLE SODIUM 20 MG/1
1 TABLET, DELAYED RELEASE ORAL
Qty: 0 | Refills: 0 | COMMUNITY
Start: 2017-06-20

## 2017-06-20 RX ORDER — HYDROMORPHONE HYDROCHLORIDE 2 MG/ML
1 INJECTION INTRAMUSCULAR; INTRAVENOUS; SUBCUTANEOUS
Qty: 20 | Refills: 0 | OUTPATIENT
Start: 2017-06-20

## 2017-06-20 RX ORDER — METRONIDAZOLE 500 MG
500 TABLET ORAL EVERY 8 HOURS
Qty: 0 | Refills: 0 | Status: DISCONTINUED | OUTPATIENT
Start: 2017-06-21 | End: 2017-06-22

## 2017-06-20 RX ORDER — MIDAZOLAM HYDROCHLORIDE 1 MG/ML
2 INJECTION, SOLUTION INTRAMUSCULAR; INTRAVENOUS ONCE
Qty: 0 | Refills: 0 | Status: DISCONTINUED | OUTPATIENT
Start: 2017-06-20 | End: 2017-06-20

## 2017-06-20 RX ORDER — NALOXONE HYDROCHLORIDE 4 MG/.1ML
0.2 SPRAY NASAL ONCE
Qty: 0 | Refills: 0 | Status: DISCONTINUED | OUTPATIENT
Start: 2017-06-20 | End: 2017-06-21

## 2017-06-20 RX ORDER — ACETYLCYSTEINE 200 MG/ML
1200 VIAL (ML) MISCELLANEOUS EVERY 12 HOURS
Qty: 0 | Refills: 0 | Status: DISCONTINUED | OUTPATIENT
Start: 2017-06-20 | End: 2017-06-20

## 2017-06-20 RX ORDER — METRONIDAZOLE 500 MG
TABLET ORAL
Qty: 0 | Refills: 0 | Status: DISCONTINUED | OUTPATIENT
Start: 2017-06-20 | End: 2017-06-20

## 2017-06-20 RX ORDER — SODIUM CHLORIDE 9 MG/ML
2000 INJECTION INTRAMUSCULAR; INTRAVENOUS; SUBCUTANEOUS ONCE
Qty: 0 | Refills: 0 | Status: COMPLETED | OUTPATIENT
Start: 2017-06-20 | End: 2017-06-20

## 2017-06-20 RX ORDER — METRONIDAZOLE 500 MG
TABLET ORAL
Qty: 0 | Refills: 0 | Status: DISCONTINUED | OUTPATIENT
Start: 2017-06-21 | End: 2017-06-22

## 2017-06-20 RX ORDER — NOREPINEPHRINE BITARTRATE/D5W 8 MG/250ML
0.3 PLASTIC BAG, INJECTION (ML) INTRAVENOUS
Qty: 16 | Refills: 0 | Status: DISCONTINUED | OUTPATIENT
Start: 2017-06-20 | End: 2017-06-21

## 2017-06-20 RX ORDER — CIPROFLOXACIN LACTATE 400MG/40ML
400 VIAL (ML) INTRAVENOUS EVERY 12 HOURS
Qty: 0 | Refills: 0 | Status: DISCONTINUED | OUTPATIENT
Start: 2017-06-21 | End: 2017-06-22

## 2017-06-20 RX ORDER — DESMOPRESSIN ACETATE 0.1 MG/1
30 TABLET ORAL ONCE
Qty: 0 | Refills: 0 | Status: COMPLETED | OUTPATIENT
Start: 2017-06-20 | End: 2017-06-20

## 2017-06-20 RX ORDER — HYDROMORPHONE HYDROCHLORIDE 2 MG/ML
0.5 INJECTION INTRAMUSCULAR; INTRAVENOUS; SUBCUTANEOUS ONCE
Qty: 0 | Refills: 0 | Status: DISCONTINUED | OUTPATIENT
Start: 2017-06-20 | End: 2017-06-20

## 2017-06-20 RX ORDER — ACETAMINOPHEN 500 MG
1000 TABLET ORAL ONCE
Qty: 0 | Refills: 0 | Status: COMPLETED | OUTPATIENT
Start: 2017-06-20 | End: 2017-06-20

## 2017-06-20 RX ORDER — CIPROFLOXACIN LACTATE 400MG/40ML
VIAL (ML) INTRAVENOUS
Qty: 0 | Refills: 0 | Status: DISCONTINUED | OUTPATIENT
Start: 2017-06-20 | End: 2017-06-20

## 2017-06-20 RX ORDER — SODIUM CHLORIDE 9 MG/ML
1000 INJECTION INTRAMUSCULAR; INTRAVENOUS; SUBCUTANEOUS
Qty: 0 | Refills: 0 | Status: DISCONTINUED | OUTPATIENT
Start: 2017-06-20 | End: 2017-06-21

## 2017-06-20 RX ORDER — ALBUTEROL 90 UG/1
2 AEROSOL, METERED ORAL EVERY 6 HOURS
Qty: 0 | Refills: 0 | Status: DISCONTINUED | OUTPATIENT
Start: 2017-06-20 | End: 2017-06-24

## 2017-06-20 RX ORDER — CIPROFLOXACIN LACTATE 400MG/40ML
VIAL (ML) INTRAVENOUS
Qty: 0 | Refills: 0 | Status: DISCONTINUED | OUTPATIENT
Start: 2017-06-21 | End: 2017-06-22

## 2017-06-20 RX ORDER — METRONIDAZOLE 500 MG
500 TABLET ORAL ONCE
Qty: 0 | Refills: 0 | Status: COMPLETED | OUTPATIENT
Start: 2017-06-20 | End: 2017-06-21

## 2017-06-20 RX ORDER — FENTANYL CITRATE 50 UG/ML
50 INJECTION INTRAVENOUS ONCE
Qty: 0 | Refills: 0 | Status: DISCONTINUED | OUTPATIENT
Start: 2017-06-20 | End: 2017-06-20

## 2017-06-20 RX ORDER — HYDROMORPHONE HYDROCHLORIDE 2 MG/ML
1 INJECTION INTRAMUSCULAR; INTRAVENOUS; SUBCUTANEOUS
Qty: 0 | Refills: 0 | COMMUNITY
Start: 2017-06-20

## 2017-06-20 RX ORDER — FENTANYL CITRATE 50 UG/ML
100 INJECTION INTRAVENOUS ONCE
Qty: 0 | Refills: 0 | Status: DISCONTINUED | OUTPATIENT
Start: 2017-06-20 | End: 2017-06-20

## 2017-06-20 RX ORDER — CIPROFLOXACIN LACTATE 400MG/40ML
400 VIAL (ML) INTRAVENOUS ONCE
Qty: 0 | Refills: 0 | Status: COMPLETED | OUTPATIENT
Start: 2017-06-20 | End: 2017-06-21

## 2017-06-20 RX ORDER — MIDAZOLAM HYDROCHLORIDE 1 MG/ML
1 INJECTION, SOLUTION INTRAMUSCULAR; INTRAVENOUS ONCE
Qty: 0 | Refills: 0 | Status: DISCONTINUED | OUTPATIENT
Start: 2017-06-20 | End: 2017-06-20

## 2017-06-20 RX ORDER — SODIUM CHLORIDE 9 MG/ML
1000 INJECTION INTRAMUSCULAR; INTRAVENOUS; SUBCUTANEOUS ONCE
Qty: 0 | Refills: 0 | Status: COMPLETED | OUTPATIENT
Start: 2017-06-20 | End: 2017-06-21

## 2017-06-20 RX ORDER — DESMOPRESSIN ACETATE 0.1 MG/1
30 TABLET ORAL ONCE
Qty: 0 | Refills: 0 | Status: DISCONTINUED | OUTPATIENT
Start: 2017-06-20 | End: 2017-06-20

## 2017-06-20 RX ADMIN — HYDROMORPHONE HYDROCHLORIDE 4 MILLIGRAM(S): 2 INJECTION INTRAMUSCULAR; INTRAVENOUS; SUBCUTANEOUS at 01:34

## 2017-06-20 RX ADMIN — Medication 1200 MILLIGRAM(S): at 19:49

## 2017-06-20 RX ADMIN — PANTOPRAZOLE SODIUM 40 MILLIGRAM(S): 20 TABLET, DELAYED RELEASE ORAL at 12:51

## 2017-06-20 RX ADMIN — Medication 400 MILLIGRAM(S): at 23:30

## 2017-06-20 RX ADMIN — HYDROMORPHONE HYDROCHLORIDE 4 MILLIGRAM(S): 2 INJECTION INTRAMUSCULAR; INTRAVENOUS; SUBCUTANEOUS at 02:10

## 2017-06-20 RX ADMIN — DESMOPRESSIN ACETATE 230 MICROGRAM(S): 0.1 TABLET ORAL at 23:19

## 2017-06-20 RX ADMIN — FENTANYL CITRATE 50 MICROGRAM(S): 50 INJECTION INTRAVENOUS at 22:15

## 2017-06-20 RX ADMIN — MIDAZOLAM HYDROCHLORIDE 1 MILLIGRAM(S): 1 INJECTION, SOLUTION INTRAMUSCULAR; INTRAVENOUS at 22:25

## 2017-06-20 RX ADMIN — MIDAZOLAM HYDROCHLORIDE 2 MILLIGRAM(S): 1 INJECTION, SOLUTION INTRAMUSCULAR; INTRAVENOUS at 22:18

## 2017-06-20 RX ADMIN — CLOPIDOGREL BISULFATE 75 MILLIGRAM(S): 75 TABLET, FILM COATED ORAL at 12:52

## 2017-06-20 RX ADMIN — LOSARTAN POTASSIUM 100 MILLIGRAM(S): 100 TABLET, FILM COATED ORAL at 06:21

## 2017-06-20 RX ADMIN — HYDROMORPHONE HYDROCHLORIDE 0.5 MILLIGRAM(S): 2 INJECTION INTRAMUSCULAR; INTRAVENOUS; SUBCUTANEOUS at 23:15

## 2017-06-20 RX ADMIN — Medication 81 MILLIGRAM(S): at 12:51

## 2017-06-20 RX ADMIN — SODIUM CHLORIDE 2000 MILLILITER(S): 9 INJECTION INTRAMUSCULAR; INTRAVENOUS; SUBCUTANEOUS at 18:33

## 2017-06-20 RX ADMIN — BUDESONIDE AND FORMOTEROL FUMARATE DIHYDRATE 2 PUFF(S): 160; 4.5 AEROSOL RESPIRATORY (INHALATION) at 12:51

## 2017-06-20 RX ADMIN — FENTANYL CITRATE 50 MICROGRAM(S): 50 INJECTION INTRAVENOUS at 23:14

## 2017-06-20 RX ADMIN — HEPARIN SODIUM 5000 UNIT(S): 5000 INJECTION INTRAVENOUS; SUBCUTANEOUS at 06:21

## 2017-06-20 RX ADMIN — SODIUM CHLORIDE 150 MILLILITER(S): 9 INJECTION INTRAMUSCULAR; INTRAVENOUS; SUBCUTANEOUS at 19:49

## 2017-06-20 RX ADMIN — FENTANYL CITRATE 50 MICROGRAM(S): 50 INJECTION INTRAVENOUS at 22:20

## 2017-06-20 RX ADMIN — HYDROMORPHONE HYDROCHLORIDE 0.5 MILLIGRAM(S): 2 INJECTION INTRAMUSCULAR; INTRAVENOUS; SUBCUTANEOUS at 23:30

## 2017-06-20 RX ADMIN — Medication 1000 MILLIGRAM(S): at 23:45

## 2017-06-20 NOTE — ED PROVIDER NOTE - FAMILY HISTORY
Father  Still living? Unknown  Family history of COPD (chronic obstructive pulmonary disease), Age at diagnosis: Age Unknown     Mother  Still living? Unknown  Family history of cancer, Age at diagnosis: Age Unknown

## 2017-06-20 NOTE — DISCHARGE NOTE ADULT - INSTRUCTIONS
LRD as tolerated Call md for follow up appointment.  Call md for any increase in pain fever or unable to tolerate food or fluids. Ostomy care as instructed by ostomy nurse. Supplies given. Call md for any problems with ostomy

## 2017-06-20 NOTE — ED PROVIDER NOTE - ATTENDING CONTRIBUTION TO CARE
Dr Bloch- Patient examined,  hx taken, just d/c today after surgery for rectal CA, passing large amts of blood per rectum, no rectal pain, feels lightheaded. no CP SOB, hr 104, /71- pale, heent nml, lungs clear heart sounds nml abd soft, passesd large amt of blood.

## 2017-06-20 NOTE — DISCHARGE NOTE ADULT - MEDICATION SUMMARY - MEDICATIONS TO TAKE
I will START or STAY ON the medications listed below when I get home from the hospital:    HYDROmorphone 2 mg oral tablet  -- 1 tab(s) by mouth every 3 hours, As needed, Moderate Pain (4 - 6)  -- Indication: For pain prn     HYDROmorphone 4 mg oral tablet  -- 1 tab(s) by mouth every 3 hours, As needed, Severe Pain (7 - 10)  -- Indication: For pain prn     Tylenol 500 mg oral tablet  -- 2 tab(s) by mouth 1 to 2 times a day, As Needed  -- Indication: For parn prn     aspirin 81 mg oral tablet  -- 1 tab(s) by mouth once a day  -- Indication: For Home med    losartan 100 mg oral tablet  -- 1 tab(s) by mouth once a day  -- Indication: For Home med     simvastatin 20 mg oral tablet  -- 1 tab(s) by mouth once a day (at bedtime)  -- Indication: For Home med     clopidogrel 75 mg oral tablet  -- 1 tab(s) by mouth once a day  -- Indication: For Home med     Breo Ellipta 200 mcg-25 mcg/inh inhalation powder  -- 1 puff(s) inhaled once a day, As Needed  -- Indication: For Home med     amLODIPine 10 mg oral tablet  -- 1 tab(s) by mouth once a day  -- Indication: For Home med     pantoprazole 40 mg oral delayed release tablet  -- 1 tab(s) by mouth once a day (before a meal)  -- Indication: For Home med I will START or STAY ON the medications listed below when I get home from the hospital:    out patient Physical Therapy   -- Indication: For Physical Therapy     Tylenol 500 mg oral tablet  -- 2 tab(s) by mouth 1 to 2 times a day, As Needed  -- Indication: For Pain Control    aspirin 81 mg oral tablet  -- 1 tab(s) by mouth once a day  -- Indication: For Home med    HYDROmorphone 2 mg oral tablet  -- 1 tab(s) by mouth every 3 hours, As needed, Moderate Pain (4 - 6)  -- Indication: For pain prn     HYDROmorphone 4 mg oral tablet  -- 1 tab(s) by mouth every 3 hours, As needed, Severe Pain (7 - 10)  -- Indication: For pain prn     losartan 100 mg oral tablet  -- 1 tab(s) by mouth once a day  -- Indication: For Home med     simvastatin 20 mg oral tablet  -- 1 tab(s) by mouth once a day (at bedtime)  -- Indication: For Home med     clopidogrel 75 mg oral tablet  -- 1 tab(s) by mouth once a day  -- Indication: For Home med     Breo Ellipta 200 mcg-25 mcg/inh inhalation powder  -- 1 puff(s) inhaled once a day, As Needed  -- Indication: For Home med     amLODIPine 10 mg oral tablet  -- 1 tab(s) by mouth once a day  -- Indication: For Home med     pantoprazole 40 mg oral delayed release tablet  -- 1 tab(s) by mouth once a day (before a meal)  -- Indication: For Home med I will START or STAY ON the medications listed below when I get home from the hospital:    out patient Physical Therapy   -- Indication: For Physical Therapy     HYDROmorphone 2 mg oral tablet  -- 1 tab(s) by mouth every 4 hours (5 times/day), As Needed, Moderate Pain (4 - 6) MDD:6  -- Indication: For pain control    HYDROmorphone 4 mg oral tablet  -- 1 tab(s) by mouth every 3 hours, As needed, Severe Pain (7 - 10)  -- Indication: For pain prn     Tylenol 500 mg oral tablet  -- 2 tab(s) by mouth 1 to 2 times a day, As Needed  -- Indication: For Pain Control    aspirin 81 mg oral tablet  -- 1 tab(s) by mouth once a day  -- Indication: For Home med    losartan 100 mg oral tablet  -- 1 tab(s) by mouth once a day  -- Indication: For Home med     simvastatin 20 mg oral tablet  -- 1 tab(s) by mouth once a day (at bedtime)  -- Indication: For Home med     clopidogrel 75 mg oral tablet  -- 1 tab(s) by mouth once a day  -- Indication: For Home med     Breo Ellipta 200 mcg-25 mcg/inh inhalation powder  -- 1 puff(s) inhaled once a day, As Needed  -- Indication: For Home med     amLODIPine 10 mg oral tablet  -- 1 tab(s) by mouth once a day  -- Indication: For Home med     pantoprazole 40 mg oral delayed release tablet  -- 1 tab(s) by mouth once a day (before a meal)  -- Indication: For Home med I will START or STAY ON the medications listed below when I get home from the hospital:    out patient Physical Therapy   -- Indication: For Physical Therapy     HYDROmorphone 2 mg oral tablet  -- 1 tab(s) by mouth every 4 hours (5 times/day), As Needed, Moderate Pain (4 - 6) MDD:6  -- Indication: For Moderate pain     HYDROmorphone 4 mg oral tablet  -- 1 tab(s) by mouth every 3 hours, As needed, Severe Pain (7 - 10)  -- Indication: For severe pain    Tylenol 500 mg oral tablet  -- 2 tab(s) by mouth 1 to 2 times a day, As Needed  -- Indication: For Mild pain    aspirin 81 mg oral tablet  -- 1 tab(s) by mouth once a day  -- Indication: For Home med    losartan 100 mg oral tablet  -- 1 tab(s) by mouth once a day  -- Indication: For Home med     simvastatin 20 mg oral tablet  -- 1 tab(s) by mouth once a day (at bedtime)  -- Indication: For Home med     clopidogrel 75 mg oral tablet  -- 1 tab(s) by mouth once a day  -- Indication: For Home med     Breo Ellipta 200 mcg-25 mcg/inh inhalation powder  -- 1 puff(s) inhaled once a day, As Needed  -- Indication: For Home med     amLODIPine 10 mg oral tablet  -- 1 tab(s) by mouth once a day  -- Indication: For Home med     pantoprazole 40 mg oral delayed release tablet  -- 1 tab(s) by mouth once a day (before a meal)  -- Indication: For Home med

## 2017-06-20 NOTE — DISCHARGE NOTE ADULT - CONDITIONS AT DISCHARGE
Tolerating po.  Pt doing ostomy care. Pt changed appliance with ostomy nurse today. Pt expresses understanding of care.  Voiding without difficulty.  OOB ambulating with walker

## 2017-06-20 NOTE — PROGRESS NOTE ADULT - PROVIDER SPECIALTY LIST ADULT
Cardiology
Pain Medicine
Surgery
Cardiology
Cardiology

## 2017-06-20 NOTE — PROGRESS NOTE ADULT - SUBJECTIVE AND OBJECTIVE BOX
COLONOSCOPY    -Informed consent obtained from patient prior to exam. pt wife    INDICATION:  brbpr    ANESTHESIA provided by:[facility] staff versed and fentanyl    RECTUM: blood clots anastamosis seen and scope passed mariela the cecum appendix visualized blood throughout thwe colon, bleeding seen at anstamosis 4 clips and 10cc of 1 to [id number] of epi were placed into the area    The patient tolerated the procedure well.    FINDINGS: anastamotic bleed    PLAN: as per [facility] team    npo   ivf  check cbc and transfuse   reverse coagulopathy  in and out  if rebleed ct angio to be done  ppi once a day   to follow closely

## 2017-06-20 NOTE — PROGRESS NOTE ADULT - SUBJECTIVE AND OBJECTIVE BOX
66y Male  Post operative day number 8 LAR loop ileost IHR  T(C): 36.8, Max: 36.8 (06-20 @ 16:33)  HR: 91 (87 - 102)  BP: 157/78 (117/71 - 157/78)  RR: 22 (20 - 22)  SpO2: 98% (96% - 98%)  Wt(kg): --                          11.4   16.02 )-----------( 339      ( 20 Jun 2017 17:16 )             36.2     06-20    142  |  102  |  19  ----------------------------<  143<H>  4.5   |  23  |  1.59<H>    Ca    9.2      20 Jun 2017 16:55    TPro  6.9  /  Alb  3.6  /  TBili  0.6  /  DBili  x   /  AST  22  /  ALT  16  /  AlkPhos  87  06-20    LIVER FUNCTIONS - ( 20 Jun 2017 16:55 )  Alb: 3.6 g/dL / Pro: 6.9 g/dL / ALK PHOS: 87 u/L / ALT: 16 u/L / AST: 22 u/L / GGT: x           PT/INR - ( 20 Jun 2017 17:16 )   PT: 12.0 SEC;   INR: 1.07          PTT - ( 20 Jun 2017 17:16 )  PTT:27.3 SEC    Physical exam  clots in rectum  wound looks god   stoma fxn pink no blood in stoma  hold plavix asa  DDAVP to correct plavix assoc plt dysfxn        Plan   IVF  hydrate 2l  CTA  flex sig in am  IV abx r/o ischemic anast  CT r/o leak  sig to clip bleeding  admit to ICU      Tulio Castillo MD FACS [facility]  [phone] office

## 2017-06-20 NOTE — DISCHARGE NOTE ADULT - PATIENT PORTAL LINK FT
“You can access the [facility] Patient Portal, offered by [facility], by registering with the following website: [email or web address]”

## 2017-06-20 NOTE — DISCHARGE NOTE ADULT - CARE PLAN
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

## 2017-06-20 NOTE — DIETITIAN INITIAL EVALUATION ADULT. - OTHER INFO
Initial Dietitian Evaluation 2/2 to extended length of stay. Pt remains s/p Low anterior resection, loop Ileostomy. Pt reports to tolerating current diet without nausea, vomiting. Attempted to provide nutrition education on Ileostomy and related diet---pt reported not good time at present.

## 2017-06-20 NOTE — H&P ADULT - ASSESSMENT
66M POD 8 LAR with ileostomy, readmission for blood per rectum  - Admit to Dr. Castillo, A team, ICU level care  - NPO/IV hydration  - Plan for CTA A/P after 2L NC hydration (Cr 1.5)  - Serial CBC  - Plan for flex sig/colonoscopy   - Cipro/flagyl IV concern for ischemia at anastamosis

## 2017-06-20 NOTE — H&P ADULT - NSHPPHYSICALEXAM_GEN_ALL_CORE
On exam the patient was afebrile with stable vital signs. He appeared anxious but in no acute distress. His abdomen was soft, NT/ND. Abdomen soft without tenderness. Well-healing midline incision. Ostomy patent/viable. No bleeding in ostomy bag. Blood (dark clots) per rectum observed in ED.

## 2017-06-20 NOTE — ED PROVIDER NOTE - OBJECTIVE STATEMENT
66 year old m HTN HLD SAMMY COPDs/p low anterior resection with diverting loop ileostomy arrived with rectal bleeding today after leaving the hospital today.  Patient reports urinating and noting blood leaking from his rectum.  Patient reports filling the toilet and covering the floor and bath tub.  Patient was discussing with Dr Castillo his surgeon who instructed him to return to the ED.    Patient denies chest pain, shortness of breath, dizziness, nausea or vomiting.

## 2017-06-20 NOTE — H&P ADULT - NSHPLABSRESULTS_GEN_ALL_CORE
11.4   16.02 )-----------( 339      ( 20 Jun 2017 17:16 )             36.2   06-20    142  |  102  |  19  ----------------------------<  143<H>  4.5   |  23  |  1.59<H>    Ca    9.2      20 Jun 2017 16:55    TPro  6.9  /  Alb  3.6  /  TBili  0.6  /  DBili  x   /  AST  22  /  ALT  16  /  AlkPhos  87  06-20

## 2017-06-20 NOTE — DISCHARGE NOTE ADULT - CARE PROVIDER_API CALL
Tulio Castillo), ColonRectal Surgery; Surgery  [address] [facility] [address]  [place] [address]  Phone: [phone]  Fax: [phone]

## 2017-06-20 NOTE — ED PROVIDER NOTE - MEDICAL DECISION MAKING DETAILS
post op day 8 s/p LAR with diverting loop ileostomy arrived with rectal bleeding concern for bleeding anastomosis.  Dr. Castillo at bedside during interview and exam.  Will admit to [facility] for colonoscopy.

## 2017-06-20 NOTE — PROGRESS NOTE ADULT - SUBJECTIVE AND OBJECTIVE BOX
Feeling better  Now passing gas and unformed stool Started on PO fluids    VITALS:    T(F): 98.6, Max: 98.7 (06-16 @ 22:00)  HR: 79 (62 - 79)  BP: 142/84 (142/84 - 189/93)  RR: 18 (17 - 18)  SpO2: 95% (95% - 97%)  Wt(kg): --  ,   I&O's Summary  I & Os for 24h ending 17 Jun 2017 07:00  =============================================  IN: 420 ml / OUT: 2947.5 ml / NET: -2527.5 ml    I & Os for current day (as of 17 Jun 2017 11:28)  =============================================  IN: 0 ml / OUT: 845 ml / NET: -845 ml      Lungs clear  RR 1-2/6 systolic murmur  No edema                          10.7   7.48  )-----------( 218      ( 17 Jun 2017 06:23 )             34.9               06-17    142  |  100  |  9   ----------------------------<  113<H>  3.7   |  29  |  0.59    Ca    8.5      17 Jun 2017 06:23  Phos  2.9     06-17  Mg     1.9     06-17
Patient is a 66y old  Male s/p intraop colonoscopy  and loop ileostomy , LAR       SUBJECTIVE: Pt complained of abdominal pain and back pain. He was unable to sleep through the night.  Pt denies  nausea/vomiting/headache/dizziness/chest pain/shortness of breath    OBJECTIVE:  PHYSICAL EXAM:  GA: NAD  Abdomen:  -  soft, non-distended, appropriate incisional tenderness  - Incision: clean/dry/intact   - ostomy : viable, 40 ml output     Vitals/Labs:  Vital Signs Last 24 Hrs  T(C): 37, Max: 37.3 (06-13 @ 21:16)  T(F): 98.6, Max: 99.1 (06-13 @ 21:16)  HR: 94 (76 - 94)  BP: 142/80 (110/68 - 158/91)  BP(mean): --  RR: 19 (15 - 19)  SpO2: 93% (91% - 96%)    I&O's Detail    I & Os for current day (as of 14 Jun 2017 08:00)  =============================================  IN:    lactated ringers.: 1200 ml    Total IN: 1200 ml  ---------------------------------------------  OUT:    Indwelling Catheter - Urethral: 1630 ml    Bulb: 40 ml    Ileostomy: 40 ml    Bulb: 27.5 ml    Bulb: 27.5 ml    Total OUT: 1765 ml  ---------------------------------------------  Total NET: -565 ml                            10.7   15.27 )-----------( 229      ( 13 Jun 2017 05:57 )             35.0       06-13    142  |  105  |  11  ----------------------------<  131<H>  4.6   |  23  |  1.00    Ca    7.6<L>      13 Jun 2017 05:57  Phos  4.6     06-13  Mg     1.9     06-13        CAPILLARY BLOOD GLUCOSE                  ASSESSMENT/PLAN: ESTRELLA GARCIA 66y Male s/p    - Diet: NPO   - Pain control   - f/u Resp status   - Input and outputs   - DVT ppx  - OOB/incentive spirometry     MEDICATIONS  (STANDING):  heparin  Injectable 5000Unit(s) SubCutaneous every 8 hours  lactated ringers. 1000milliLiter(s) IV Continuous <Continuous>  HYDROmorphone PCA (1 mG/mL) 30milliLiter(s) PCA Continuous PCA Continuous  acetaminophen  IVPB. 1000milliGRAM(s) IV Intermittent once  acetaminophen  IVPB. 1000milliGRAM(s) IV Intermittent once    MEDICATIONS  (PRN):  naloxone Injectable 0.1milliGRAM(s) IV Push every 3 minutes PRN For ANY of the following changes in patient status:  A. RR LESS THAN 10 breaths per minute, B. Oxygen saturation LESS THAN 90%, C. Sedation score of 6  ondansetron Injectable 4milliGRAM(s) IV Push every 6 hours PRN Nausea  HYDROmorphone PCA (1 mG/mL) Rescue Clinician Bolus 0.5milliGRAM(s) IV Push every 15 minutes PRN for Pain Scale GREATER THAN 6  ondansetron Injectable 4milliGRAM(s) IV Push once PRN Nausea and/or Vomiting
The patient denies chest pain, shortness of breath, arm pain or jaw pain, dizziness or palpitations.    VITALS:    T(F): 97.5, Max: 98.6 (06-17 @ 10:58)  HR: 72 (71 - 85)  BP: 155/92 (133/86 - 175/92)  RR: 18 (15 - 18)  SpO2: 95% (94% - 99%)  Wt(kg): --  ,   I&O's Summary    I & Os for current day (as of 18 Jun 2017 07:46)  =============================================  IN: 400 ml / OUT: 3254.5 ml / NET: -2854.5 ml      Physical exam:  Lungs clear  RR 1-2/6 systolic murmur  No edema    Labs:                        10.7   7.48  )-----------( 218      ( 17 Jun 2017 06:23 )             34.9               06-17    142  |  100  |  9   ----------------------------<  113<H>  3.7   |  29  |  0.59    Ca    8.5      17 Jun 2017 06:23  Phos  2.9     06-17  Mg     1.9     06-17
A Team DAILY PROGRESS NOTE:    Subjective:  No events overnight.    Objective:  T(C): 36.4, Max: 37 (06-17 @ 10:58)  HR: 72 (71 - 85)  BP: 155/92 (133/86 - 175/92)  ABP: --  ABP(mean): --  RR: 18 (15 - 18)  SpO2: 95% (94% - 99%)  Wt(kg): --  CVP(mm Hg): --      I & Os for current day (as of 06-18 @ 08:56)  =============================================  IN:    Oral Fluid: 400 ml    Total IN: 400 ml  ---------------------------------------------  OUT:    Voided: 2550 ml    Ileostomy: 600 ml    Bulb: 40.5 ml    Bulb: 40 ml    Bulb: 24 ml    Total OUT: 3254.5 ml  ---------------------------------------------  Total NET: -2854.5 ml    EXAM:  Awake and alert in NAD  Abdomen soft, distended, non-tender  Ostomy with gas and liquid stool
Anesthesia Pain Management Service    SUBJECTIVE: Pt doing well with IV PCA without problems reported.    Therapy:	  [ X] IV PCA	   [ ] Epidural           [ ] s/p Spinal Opoid              [ ] Postpartum infusion	  [ ] Patient controlled regional anesthesia (PCRA)    [ ] prn Analgesics    Allergies    codeine (Hives)    Intolerances      MEDICATIONS  (STANDING):  heparin  Injectable 5000Unit(s) SubCutaneous every 8 hours  HYDROmorphone PCA (1 mG/mL) 30milliLiter(s) PCA Continuous PCA Continuous  ketorolac   Injectable 30milliGRAM(s) IV Push every 6 hours  dextrose 5% + sodium chloride 0.45%. 1000milliLiter(s) IV Continuous <Continuous>  enalaprilat Injectable 1.25milliGRAM(s) IV Push every 6 hours  pantoprazole  Injectable 40milliGRAM(s) IV Push daily    MEDICATIONS  (PRN):  naloxone Injectable 0.1milliGRAM(s) IV Push every 3 minutes PRN For ANY of the following changes in patient status:  A. RR LESS THAN 10 breaths per minute, B. Oxygen saturation LESS THAN 90%, C. Sedation score of 6  ondansetron Injectable 4milliGRAM(s) IV Push every 6 hours PRN Nausea  HYDROmorphone PCA (1 mG/mL) Rescue Clinician Bolus 0.5milliGRAM(s) IV Push every 15 minutes PRN for Pain Scale GREATER THAN 6  ondansetron Injectable 4milliGRAM(s) IV Push once PRN Nausea and/or Vomiting  tetracaine/benzocaine/butamben Spray 1Spray(s) Topical daily PRN sore throat      OBJECTIVE:   [X] No new signs     [ ] Other:    Side Effects:  [X ] None			[ ] Other:    Assessment of Catheter Site:		[ ] Intact		[ ] Other:    ASSESSMENT/PLAN  [ X] Continue current therapy    [ ] Therapy changed to:    [ ] IV PCA       [ ] Epidural     [ ] prn Analgesics     Comments:
Anesthesia Pain Management Service    SUBJECTIVE: Pt now off IV PCA without problems reported.    Therapy:	  [ X] IV PCA	   [ ] Epidural           [ ] s/p Spinal Opoid              [ ] Postpartum infusion	  [ ] Patient controlled regional anesthesia (PCRA)    [ ] prn Analgesics    Allergies    codeine (Hives)    Intolerances      MEDICATIONS  (STANDING):  heparin  Injectable 5000Unit(s) SubCutaneous every 8 hours  pantoprazole  Injectable 40milliGRAM(s) IV Push daily  amLODIPine   Tablet 10milliGRAM(s) Oral daily  simvastatin 20milliGRAM(s) Oral at bedtime  buDESOnide 160 MICROgram(s)/formoterol 4.5 MICROgram(s) Inhaler 2Puff(s) Inhalation two times a day  acetaminophen   Tablet 650milliGRAM(s) Oral every 6 hours    MEDICATIONS  (PRN):  naloxone Injectable 0.1milliGRAM(s) IV Push every 3 minutes PRN For ANY of the following changes in patient status:  A. RR LESS THAN 10 breaths per minute, B. Oxygen saturation LESS THAN 90%, C. Sedation score of 6  ondansetron Injectable 4milliGRAM(s) IV Push every 6 hours PRN Nausea  ondansetron Injectable 4milliGRAM(s) IV Push once PRN Nausea and/or Vomiting  tetracaine/benzocaine/butamben Spray 1Spray(s) Topical daily PRN sore throat  HYDROmorphone   Tablet 2milliGRAM(s) Oral every 3 hours PRN Moderate Pain (4 - 6)  HYDROmorphone   Tablet 4milliGRAM(s) Oral every 3 hours PRN Severe Pain (7 - 10)  HYDROmorphone  Injectable 1milliGRAM(s) IV Push every 6 hours PRN Severe Pain unrelieved by oral opioids      OBJECTIVE:   [X] No new signs     [ ] Other:    Side Effects:  [X ] None			[ ] Other:    Assessment of Catheter Site:		[ ] Intact		[ ] Other:    ASSESSMENT/PLAN  [ ] Continue current therapy    [X ] Therapy changed to:    [ ] IV PCA       [ ] Epidural     [ X] prn Analgesics     Comments: Opioids or Adjuvent analgesics to be used at this point.
Anesthesia Pain Management Service- Attending Addendum    SUBJECTIVE: Pt doing well with IV PCA without problems reported.    Therapy:	  [ X] IV PCA	   [ ] Epidural           [ ] s/p Spinal Opoid              [ ] Postpartum infusion	  [ ] Patient controlled regional anesthesia (PCRA)    [ ] prn Analgesics    Allergies    codeine (Hives)    Intolerances      MEDICATIONS  (STANDING):  heparin  Injectable 5000Unit(s) SubCutaneous every 8 hours  lactated ringers. 1000milliLiter(s) IV Continuous <Continuous>  HYDROmorphone PCA (1 mG/mL) 30milliLiter(s) PCA Continuous PCA Continuous    MEDICATIONS  (PRN):  naloxone Injectable 0.1milliGRAM(s) IV Push every 3 minutes PRN For ANY of the following changes in patient status:  A. RR LESS THAN 10 breaths per minute, B. Oxygen saturation LESS THAN 90%, C. Sedation score of 6  ondansetron Injectable 4milliGRAM(s) IV Push every 6 hours PRN Nausea  HYDROmorphone PCA (1 mG/mL) Rescue Clinician Bolus 0.5milliGRAM(s) IV Push every 15 minutes PRN for Pain Scale GREATER THAN 6  ondansetron Injectable 4milliGRAM(s) IV Push once PRN Nausea and/or Vomiting      OBJECTIVE:   [X] No new signs     [ ] Other:    Side Effects:  [X ] None			[ ] Other:    Assessment of Catheter Site:		[ ] Intact		[ ] Other:    ASSESSMENT/PLAN  [ X] Continue current therapy    [ ] Therapy changed to:    [ ] IV PCA       [ ] Epidural     [ ] prn Analgesics     Comments:
Anesthesia Pain Management Service- Attending Addendum    SUBJECTIVE: Pt doing well with IV PCA without problems reported. Increased this morning.  Ketorolac added with improvement    Therapy:	  [ X] IV PCA	   [ ] Epidural           [ ] s/p Spinal Opoid              [ ] Postpartum infusion	  [ ] Patient controlled regional anesthesia (PCRA)    [ ] prn Analgesics    Allergies    codeine (Hives)    Intolerances      MEDICATIONS  (STANDING):  heparin  Injectable 5000Unit(s) SubCutaneous every 8 hours  lactated ringers. 1000milliLiter(s) IV Continuous <Continuous>  HYDROmorphone PCA (1 mG/mL) 30milliLiter(s) PCA Continuous PCA Continuous  acetaminophen  IVPB. 1000milliGRAM(s) IV Intermittent once  acetaminophen  IVPB. 1000milliGRAM(s) IV Intermittent once  ketorolac   Injectable 30milliGRAM(s) IV Push every 6 hours    MEDICATIONS  (PRN):  naloxone Injectable 0.1milliGRAM(s) IV Push every 3 minutes PRN For ANY of the following changes in patient status:  A. RR LESS THAN 10 breaths per minute, B. Oxygen saturation LESS THAN 90%, C. Sedation score of 6  ondansetron Injectable 4milliGRAM(s) IV Push every 6 hours PRN Nausea  HYDROmorphone PCA (1 mG/mL) Rescue Clinician Bolus 0.5milliGRAM(s) IV Push every 15 minutes PRN for Pain Scale GREATER THAN 6  ondansetron Injectable 4milliGRAM(s) IV Push once PRN Nausea and/or Vomiting      OBJECTIVE:   [X] No new signs     [ ] Other:    Side Effects:  [X ] None			[ ] Other:    Assessment of Catheter Site:		[ ] Intact		[ ] Other:    ASSESSMENT/PLAN  [ X] Continue current therapy    [ ] Therapy changed to:    [ ] IV PCA       [ ] Epidural     [ ] prn Analgesics     Comments:
Complaining of generalized pain but no chest pain or dyspnea  BP  HR 95  O2 Sat 94%  Lungs clear  RR 1/6 systolic murmur  No edema    ABG 7.30 / 58 / 64  Hgb 11.0  Hct 33.9    Imp: Stable from a CV standpoint  no CHF or ischemia  Rec: Pulmonary follow up re: COPD and sleep apnea
Day _2_ of Anesthesia Pain Management Service    Allergies    codeine (Hives)    Intolerances        SUBJECTIVE: "It seems to help when I press."    Pain Scale Score	At rest: _6/10_ 	With Activity: ___ 	[ ] Refer to charted pain scores    THERAPY:    [ ] IV PCA Morphine		[ ] 5 mg/mL	[ ] 1 mg/mL  [X] IV PCA Hydromorphone	[ ] 5 mg/mL	[X] 1 mg/mL  [ ] IV PCA Fentanyl		[ ] 50 micrograms/mL    Demand dose _0.3mg_ lockout _10_ (minutes) Continuous Rate _0_ Total: _8.6mg_  Daily      MEDICATIONS  (STANDING):  HYDROmorphone PCA (1 mG/mL) 30milliLiter(s) PCA Continuous PCA Continuous  heparin  Injectable 5000Unit(s) SubCutaneous every 8 hours  lactated ringers. 1000milliLiter(s) IV Continuous <Continuous>    MEDICATIONS  (PRN):  naloxone Injectable 0.1milliGRAM(s) IV Push every 3 minutes PRN For ANY of the following changes in patient status:  A. RR LESS THAN 10 breaths per minute, B. Oxygen saturation LESS THAN 90%, C. Sedation score of 6  ondansetron Injectable 4milliGRAM(s) IV Push every 6 hours PRN Nausea  HYDROmorphone PCA (1 mG/mL) Rescue Clinician Bolus 0.5milliGRAM(s) IV Push every 15 minutes PRN for Pain Scale GREATER THAN 6  ondansetron Injectable 4milliGRAM(s) IV Push once PRN Nausea and/or Vomiting      OBJECTIVE: Asleep, awakens to verbal stimuli, NAD, sitting up in chair.     Sedation Score:	[ ] Alert	[ ] Drowsy	[X] Arousable	[X] Asleep	[ ] Unresponsive    Side Effects:	[X] None	[ ] Nausea	[ ] Vomiting	[ ] Pruritus  		  [ ] Weakness		[ ] Numbness	[ ] Other:                              10.7   15.27 )-----------( 229      ( 13 Jun 2017 05:57 )             35.0       06-13    142  |  105  |  11  ----------------------------<  131<H>  4.6   |  23  |  1.00    Ca    7.6<L>      13 Jun 2017 05:57  Phos  4.6     06-13  Mg     1.9     06-13        ASSESSMENT/ PLAN    Therapy to  be:	[X] Continue   [ ] Discontinued   [ ] Change to prn Analgesics    Documentation and Verification of current medications:  [X] Done	[ ] Not done, not eligible  [ ] Not done, reason not given      Comments:  NPO with NGTube. Discussed use of Ketorolac 15mg IV Push q6hrs x 2 days or IV Tylenol 1gram q6hrs x2 days with [facility] surgery.
Day _3_ of Anesthesia Pain Management Service    Allergies    codeine (Hives)    Intolerances        SUBJECTIVE: "My back hurts. I've had back pain since before surgery. The surgical pain is better now after the additional medications."    Pain Scale Score	At rest: _5-6/10_ 	With Activity: ___ 	[ ] Refer to charted pain scores    THERAPY:    [ ] IV PCA Morphine		[ ] 5 mg/mL	[ ] 1 mg/mL  [X] IV PCA Hydromorphone	[ ] 5 mg/mL	[X ] 1 mg/mL  [ ] IV PCA Fentanyl		[ ] 50 micrograms/mL    Demand dose _0.35mg_ lockout _6_ (minutes) Continuous Rate _0_ Total: _13.3mg_  Daily      MEDICATIONS  (STANDING):  heparin  Injectable 5000Unit(s) SubCutaneous every 8 hours  lactated ringers. 1000milliLiter(s) IV Continuous <Continuous>  HYDROmorphone PCA (1 mG/mL) 30milliLiter(s) PCA Continuous PCA Continuous  acetaminophen  IVPB. 1000milliGRAM(s) IV Intermittent once  acetaminophen  IVPB. 1000milliGRAM(s) IV Intermittent once  ketorolac   Injectable 30milliGRAM(s) IV Push every 6 hours    MEDICATIONS  (PRN):  naloxone Injectable 0.1milliGRAM(s) IV Push every 3 minutes PRN For ANY of the following changes in patient status:  A. RR LESS THAN 10 breaths per minute, B. Oxygen saturation LESS THAN 90%, C. Sedation score of 6  ondansetron Injectable 4milliGRAM(s) IV Push every 6 hours PRN Nausea  HYDROmorphone PCA (1 mG/mL) Rescue Clinician Bolus 0.5milliGRAM(s) IV Push every 15 minutes PRN for Pain Scale GREATER THAN 6  ondansetron Injectable 4milliGRAM(s) IV Push once PRN Nausea and/or Vomiting      OBJECTIVE: A&Ox3, supine in bed, NAD with O2 via facemask    Sedation Score:	[X] Alert	[ ] Drowsy	[ ] Arousable	[ ] Asleep	[ ] Unresponsive    Side Effects:	[X] None	[ ] Nausea	[ ] Vomiting	[ ] Pruritus  		  [ ] Weakness		[ ] Numbness	[ ] Other:                              10.3   13.67 )-----------( 185      ( 14 Jun 2017 09:30 )             34.7       06-14    143  |  105  |  9   ----------------------------<  119<H>  4.5   |  24  |  0.88    Ca    8.1<L>      14 Jun 2017 09:30  Phos  2.5     06-14  Mg     2.2     06-14        ASSESSMENT/ PLAN    Therapy to  be:	[X] Continue   [ ] Discontinued   [ ] Change to prn Analgesics    Documentation and Verification of current medications:  [X] Done	[ ] Not done, not eligible  [ ] Not done, reason not given    Comments:  NPO with NG Tube  Encouraged to get up OOB to chair  Use of IVPCA reviewed
Day _4_ of Anesthesia Pain Management Service    Allergies    codeine (Hives)    Intolerances    SUBJECTIVE: "My back still hurts. The surgical area is just sore."    Pain Scale Score	At rest: _5/10_ 	With Activity: ___ 	[ ] Refer to charted pain scores    THERAPY:    [ ] IV PCA Morphine		[ ] 5 mg/mL	[ ] 1 mg/mL  [X] IV PCA Hydromorphone	[ ] 5 mg/mL	[X] 1 mg/mL  [ ] IV PCA Fentanyl		[ ] 50 micrograms/mL    Demand dose _0.2mg_ lockout _6_ (minutes) Continuous Rate _0_ Total: _5.6mg_  Daily      MEDICATIONS  (STANDING):  heparin  Injectable 5000Unit(s) SubCutaneous every 8 hours  HYDROmorphone PCA (1 mG/mL) 30milliLiter(s) PCA Continuous PCA Continuous  ketorolac   Injectable 30milliGRAM(s) IV Push every 6 hours  dextrose 5% + sodium chloride 0.45%. 1000milliLiter(s) IV Continuous <Continuous>  enalaprilat Injectable 1.25milliGRAM(s) IV Push every 6 hours  pantoprazole  Injectable 40milliGRAM(s) IV Push daily    MEDICATIONS  (PRN):  naloxone Injectable 0.1milliGRAM(s) IV Push every 3 minutes PRN For ANY of the following changes in patient status:  A. RR LESS THAN 10 breaths per minute, B. Oxygen saturation LESS THAN 90%, C. Sedation score of 6  ondansetron Injectable 4milliGRAM(s) IV Push every 6 hours PRN Nausea  HYDROmorphone PCA (1 mG/mL) Rescue Clinician Bolus 0.5milliGRAM(s) IV Push every 15 minutes PRN for Pain Scale GREATER THAN 6  ondansetron Injectable 4milliGRAM(s) IV Push once PRN Nausea and/or Vomiting  tetracaine/benzocaine/butamben Spray 1Spray(s) Topical daily PRN sore throat      OBJECTIVE: A&Ox3, NAD, supine in bed    Sedation Score:	[X] Alert	[ ] Drowsy	[ ] Arousable	[ ] Asleep	[ ] Unresponsive    Side Effects:	[X] None	[ ] Nausea	[ ] Vomiting	[ ] Pruritus  		  [ ] Weakness		[ ] Numbness	[ ] Other:                              10.4   11.09 )-----------( 202      ( 15 Cristobal 2017 06:22 )             34.3       06-15    150<H>  |  105  |  11  ----------------------------<  100<H>  3.9   |  31  |  0.81    Ca    8.6      15 Cristobal 2017 06:22  Phos  2.0     06-15  Mg     2.3     06-15        ASSESSMENT/ PLAN    Therapy to  be:	[X] Continue   [ ] Discontinued   [ ] Change to prn Analgesics    Documentation and Verification of current medications:  [X] Done	[ ] Not done, not eligible  [ ] Not done, reason not given    [ ]  [place]  Reviewed and Copied to Chart    Comments:  Mariya
Day _5_ of Anesthesia Pain Management Service    Allergies    codeine (Hives)    Intolerances    SUBJECTIVE: "I'm a little better today."    Pain Scale Score	At rest: _4/10_ 	With Activity: ___ 	[ ] Refer to charted pain scores    THERAPY:    [ ] IV PCA Morphine		[ ] 5 mg/mL	[ ] 1 mg/mL  [X] IV PCA Hydromorphone	[ ] 5 mg/mL	[X] 1 mg/mL  [ ] IV PCA Fentanyl		[ ] 50 micrograms/mL    Demand dose _0.35mg_ lockout _6_ (minutes) Continuous Rate _0_ Total: _7mg_  Daily      MEDICATIONS  (STANDING):  heparin  Injectable 5000Unit(s) SubCutaneous every 8 hours  ketorolac   Injectable 30milliGRAM(s) IV Push every 6 hours  enalaprilat Injectable 1.25milliGRAM(s) IV Push every 6 hours  pantoprazole  Injectable 40milliGRAM(s) IV Push daily    MEDICATIONS  (PRN):  naloxone Injectable 0.1milliGRAM(s) IV Push every 3 minutes PRN For ANY of the following changes in patient status:  A. RR LESS THAN 10 breaths per minute, B. Oxygen saturation LESS THAN 90%, C. Sedation score of 6  ondansetron Injectable 4milliGRAM(s) IV Push every 6 hours PRN Nausea  ondansetron Injectable 4milliGRAM(s) IV Push once PRN Nausea and/or Vomiting  tetracaine/benzocaine/butamben Spray 1Spray(s) Topical daily PRN sore throat  HYDROmorphone   Tablet 2milliGRAM(s) Oral every 3 hours PRN Moderate Pain (4 - 6)  HYDROmorphone   Tablet 4milliGRAM(s) Oral every 3 hours PRN Severe Pain (7 - 10)  HYDROmorphone  Injectable 1milliGRAM(s) IV Push every 6 hours PRN Severe Pain unrelieved by oral opioids      OBJECTIVE: A&Ox3, NAD, sitting up in chair    Sedation Score:	[X] Alert	[ ] Drowsy	[ ] Arousable	[ ] Asleep	[ ] Unresponsive    Side Effects:	[X] None	[ ] Nausea	[ ] Vomiting	[ ] Pruritus  		  [ ] Weakness		[ ] Numbness	[ ] Other:                            10.4   10.75 )-----------( 248      ( 16 Jun 2017 07:30 )             34.4       06-16    149<H>  |  105  |  11  ----------------------------<  130<H>  3.7   |  32<H>  |  0.73    Ca    8.5      16 Jun 2017 07:30  Phos  1.8     06-16  Mg     2.3     06-16        ASSESSMENT/ PLAN    Therapy to  be:	[ ] Continue   [X] Discontinued   [X] Change to prn Analgesics    Documentation and Verification of current medications:  [X] Done	[ ] Not done, not eligible  [ ] Not done, reason not given    [ ]  [place]  Reviewed and Copied to Chart    Comments:  Tolerates clear liquids
Feeling better  Now passing gas and unformed stool Started on PO fluids  /83 HR 73  Lungs clear  RR 1-2/6 systolic murmur  No edema    WBC 10.7  Hgb 10.4  Hct 34.4 Plt 248K  BUN 11 Crt 0.73  Na 149    Imp: Stable from a CV standpoint  No CHF or ischemia  Continue present care as per surgery.
GENERAL SURGERY DAILY PROGRESS NOTE:     Hospital Day: 7    Postoperative Day: 6    Status post: Loop ileostomy and LAR    Subjective:  No acute events ON.  Ostomy functioning.  Tolerating diet.  Pain controlled.         Objective:    MEDICATIONS  (STANDING):  heparin  Injectable 5000Unit(s) SubCutaneous every 8 hours  pantoprazole  Injectable 40milliGRAM(s) IV Push daily  simvastatin 20milliGRAM(s) Oral at bedtime  losartan 100milliGRAM(s) Oral daily  buDESOnide 160 MICROgram(s)/formoterol 4.5 MICROgram(s) Inhaler 2Puff(s) Inhalation two times a day  aspirin enteric coated 81milliGRAM(s) Oral daily  acetaminophen   Tablet 650milliGRAM(s) Oral every 6 hours  clopidogrel Tablet 75milliGRAM(s) Oral daily  amLODIPine   Tablet 10milliGRAM(s) Oral <User Schedule>    MEDICATIONS  (PRN):  naloxone Injectable 0.1milliGRAM(s) IV Push every 3 minutes PRN For ANY of the following changes in patient status:  A. RR LESS THAN 10 breaths per minute, B. Oxygen saturation LESS THAN 90%, C. Sedation score of 6  ondansetron Injectable 4milliGRAM(s) IV Push every 6 hours PRN Nausea  ondansetron Injectable 4milliGRAM(s) IV Push once PRN Nausea and/or Vomiting  tetracaine/benzocaine/butamben Spray 1Spray(s) Topical daily PRN sore throat  HYDROmorphone   Tablet 2milliGRAM(s) Oral every 3 hours PRN Moderate Pain (4 - 6)  HYDROmorphone   Tablet 4milliGRAM(s) Oral every 3 hours PRN Severe Pain (7 - 10)  HYDROmorphone  Injectable 1milliGRAM(s) IV Push every 6 hours PRN Severe Pain unrelieved by oral opioids      Vital Signs Last 24 Hrs  T(C): 36.8, Max: 37 (06-17 @ 10:58)  T(F): 98.2, Max: 98.6 (06-17 @ 10:58)  HR: 85 (62 - 85)  BP: 145/82 (136/97 - 189/93)  BP(mean): --  RR: 17 (15 - 18)  SpO2: 99% (95% - 99%)    I&O's Detail  I & Os for 24h ending 17 Jun 2017 07:00  =============================================  IN:    Oral Fluid: 420 ml    Total IN: 420 ml  ---------------------------------------------  OUT:    Voided: 2075 ml    Ileostomy: 450 ml    Indwelling Catheter - Urethral: 200 ml    Bulb: 97.5 ml    Bulb: 70 ml    Bulb: 55 ml    Total OUT: 2947.5 ml  ---------------------------------------------  Total NET: -2527.5 ml    I & Os for current day (as of 17 Jun 2017 23:51)  =============================================  IN:    Oral Fluid: 250 ml    Total IN: 250 ml  ---------------------------------------------  OUT:    Voided: 1900 ml    Ileostomy: 450 ml    Bulb: 27.5 ml    Bulb: 25.5 ml    Bulb: 16.5 ml    Total OUT: 2419.5 ml  ---------------------------------------------  Total NET: -2169.5 ml      EXAM:  Awake and alert in NAD  Abdomen soft, distended, non-tender  Ostomy with gas and liquid stool    LABS:                        10.7   7.48  )-----------( 218      ( 17 Jun 2017 06:23 )             34.9     06-17    142  |  100  |  9   ----------------------------<  113<H>  3.7   |  29  |  0.59    Ca    8.5      17 Jun 2017 06:23  Phos  2.9     06-17  Mg     1.9     06-17            RADIOLOGY & ADDITIONAL STUDIES:
INTERVAL HPI/OVERNIGHT EVENTS: Feeling ok, pain controlled. No GI function. Afebrile.     STATUS POST:  Low anterior resection with diverting loop ileostomy, incisional hernia repair    POST OPERATIVE DAY #: 1     MEDICATIONS  (STANDING):  HYDROmorphone PCA (1 mG/mL) 30milliLiter(s) PCA Continuous PCA Continuous  heparin  Injectable 5000Unit(s) SubCutaneous every 8 hours  lactated ringers. 1000milliLiter(s) IV Continuous <Continuous>  acetaminophen  IVPB. 1000milliGRAM(s) IV Intermittent once    MEDICATIONS  (PRN):  naloxone Injectable 0.1milliGRAM(s) IV Push every 3 minutes PRN For ANY of the following changes in patient status:  A. RR LESS THAN 10 breaths per minute, B. Oxygen saturation LESS THAN 90%, C. Sedation score of 6  ondansetron Injectable 4milliGRAM(s) IV Push every 6 hours PRN Nausea  HYDROmorphone PCA (1 mG/mL) Rescue Clinician Bolus 0.5milliGRAM(s) IV Push every 15 minutes PRN for Pain Scale GREATER THAN 6  ondansetron Injectable 4milliGRAM(s) IV Push once PRN Nausea and/or Vomiting      Vital Signs Last 24 Hrs  T(C): 37.3, Max: 37.4 (06-13 @ 06:00)  T(F): 99.1, Max: 99.3 (06-13 @ 06:00)  HR: 77 (77 - 97)  BP: 138/56 (116/61 - 190/100)  BP(mean): --  RR: 14 (10 - 30)  SpO2: 98% (94% - 99%)    PHYSICAL EXAM:      Constitutional: AOx3, NAD    Gastrointestinal: Abd softly distended, appropriate tenderness. Dressing C/D/I. Stoma, pink viable, no stoma function. Drains serosanguinous        I&O's Detail  I & Os for 24h ending 13 Jun 2017 07:00  =============================================  IN:    lactated ringers.: 1000 ml    Total IN: 1000 ml  ---------------------------------------------  OUT:    Indwelling Catheter - Urethral: 850 ml    Bulb: 90 ml    Bulb: 75 ml    Bulb: 65 ml    Ileostomy: 25 ml    Total OUT: 1105 ml  ---------------------------------------------  Total NET: -105 ml    I & Os for current day (as of 13 Jun 2017 09:19)  =============================================  IN:    lactated ringers.: 200 ml    Total IN: 200 ml  ---------------------------------------------  OUT:    Indwelling Catheter - Urethral: 80 ml    Total OUT: 80 ml  ---------------------------------------------  Total NET: 120 ml      LABS:                        10.7   15.27 )-----------( 229      ( 13 Jun 2017 05:57 )             35.0     06-13    142  |  105  |  11  ----------------------------<  131<H>  4.6   |  23  |  1.00    Ca    7.6<L>      13 Jun 2017 05:57  Phos  4.6     06-13  Mg     1.9     06-13            RADIOLOGY & ADDITIONAL STUDIES:
INTERVAL HPI/OVERNIGHT EVENTS: Feeling well. Tolerating diet, stoma functioning w/ gas and stool. Afebrile    STATUS POST:  Low anterior resection with diverting loop ileostomy    POST OPERATIVE DAY #: 8    MEDICATIONS  (STANDING):  heparin  Injectable 5000Unit(s) SubCutaneous every 8 hours  simvastatin 20milliGRAM(s) Oral at bedtime  losartan 100milliGRAM(s) Oral daily  buDESOnide 160 MICROgram(s)/formoterol 4.5 MICROgram(s) Inhaler 2Puff(s) Inhalation two times a day  aspirin enteric coated 81milliGRAM(s) Oral daily  clopidogrel Tablet 75milliGRAM(s) Oral daily  amLODIPine   Tablet 10milliGRAM(s) Oral <User Schedule>  pantoprazole    Tablet 40milliGRAM(s) Oral before breakfast    MEDICATIONS  (PRN):  HYDROmorphone   Tablet 2milliGRAM(s) Oral every 3 hours PRN Moderate Pain (4 - 6)  HYDROmorphone   Tablet 4milliGRAM(s) Oral every 3 hours PRN Severe Pain (7 - 10)      Vital Signs Last 24 Hrs  T(C): 36.7, Max: 37.1 (06-19 @ 18:00)  T(F): 98, Max: 98.8 (06-20 @ 02:50)  HR: 75 (72 - 96)  BP: 144/91 (128/68 - 150/90)  BP(mean): --  RR: 18 (17 - 18)  SpO2: 98% (76% - 98%)    PHYSICAL EXAM:      Constitutional: AOx3, NAD    Gastrointestinal: Abd soft, minimal tenderness to palpation, ND    Wound: Incision healing nicely, staples in place    Stoma: Pink viable, thin brown stool & gas      I&O's Detail    I & Os for current day (as of 20 Jun 2017 10:15)  =============================================  IN:    Oral Fluid: 600 ml    Total IN: 600 ml  ---------------------------------------------  OUT:    Ileostomy: 600 ml    Voided: 400 ml    Total OUT: 1000 ml  ---------------------------------------------  Total NET: -400 ml      LABS:    06-19    141  |  101  |  15  ----------------------------<  110<H>  4.0   |  26  |  0.89    Ca    8.9      19 Jun 2017 07:05            RADIOLOGY & ADDITIONAL STUDIES:
INTERVAL HPI/OVERNIGHT EVENTS: NG tube dislodged after patient moved in bed    STATUS POST:  LAR with intra-op colonoscopy and loop ileostomy     POST OPERATIVE DAY #: 4    SUBJECTIVE: Patient seen and examined at bedside, patient without complaints. He states he feels "much better" today.     T(C): 36.8, Max: 37.6 (06-15 @ 22:05)  HR: 73 (73 - 88)  BP: 164/90 (144/90 - 168/94)  RR: 18 (18 - 19)  SpO2: 96% (92% - 97%)  Wt(kg): --  I&O's Detail    I & Os for current day (as of 16 Jun 2017 08:33)  =============================================  IN:    dextrose 5% + sodium chloride 0.45%.: 700 ml    IV PiggyBack: 250 ml    lactated ringers.: 100 ml    Total IN: 1050 ml  ---------------------------------------------  OUT:    Indwelling Catheter - Urethral: 1400 ml    Nasoenteral Tube: 1050 ml    Ileostomy: 125 ml    Bulb: 55 ml    Bulb: 47.5 ml    Bulb: 26.5 ml    Total OUT: 2704 ml  ---------------------------------------------  Total NET: -1654 ml    Physical Exam  General: A&Ox3, NAD, 2 L NC   Abdominal: obese, soft nontender, ostomy with stool and air in bag, obdulia serosang.
INTERVAL HPI/OVERNIGHT EVENTS: Stoma functioning, Tolerating diet. Awaiting stoma teaching.     STATUS POST:  Low anterior resection with diverting loop ileostomy for malignant polyp    POST OPERATIVE DAY #: 7    MEDICATIONS  (STANDING):  heparin  Injectable 5000Unit(s) SubCutaneous every 8 hours  pantoprazole  Injectable 40milliGRAM(s) IV Push daily  simvastatin 20milliGRAM(s) Oral at bedtime  losartan 100milliGRAM(s) Oral daily  buDESOnide 160 MICROgram(s)/formoterol 4.5 MICROgram(s) Inhaler 2Puff(s) Inhalation two times a day  aspirin enteric coated 81milliGRAM(s) Oral daily  clopidogrel Tablet 75milliGRAM(s) Oral daily  amLODIPine   Tablet 10milliGRAM(s) Oral <User Schedule>    MEDICATIONS  (PRN):  naloxone Injectable 0.1milliGRAM(s) IV Push every 3 minutes PRN For ANY of the following changes in patient status:  A. RR LESS THAN 10 breaths per minute, B. Oxygen saturation LESS THAN 90%, C. Sedation score of 6  ondansetron Injectable 4milliGRAM(s) IV Push every 6 hours PRN Nausea  ondansetron Injectable 4milliGRAM(s) IV Push once PRN Nausea and/or Vomiting  tetracaine/benzocaine/butamben Spray 1Spray(s) Topical daily PRN sore throat  HYDROmorphone   Tablet 2milliGRAM(s) Oral every 3 hours PRN Moderate Pain (4 - 6)  HYDROmorphone   Tablet 4milliGRAM(s) Oral every 3 hours PRN Severe Pain (7 - 10)  HYDROmorphone  Injectable 1milliGRAM(s) IV Push every 6 hours PRN Severe Pain unrelieved by oral opioids      Vital Signs Last 24 Hrs  T(C): 36.8, Max: 37 (06-18 @ 11:55)  T(F): 98.2, Max: 98.6 (06-18 @ 11:55)  HR: 72 (72 - 81)  BP: 156/68 (136/84 - 156/68)  BP(mean): --  RR: 17 (16 - 18)  SpO2: 94% (94% - 98%)    PHYSICAL EXAM:      Constitutional: AOx3, NAD    Gastrointestinal: Abd soft, mildly distended, appropriately tender. JPs serous (all three d/c'd)      I&O's Detail    I & Os for current day (as of 19 Jun 2017 10:51)  =============================================  IN:    Oral Fluid: 300 ml    Total IN: 300 ml  ---------------------------------------------  OUT:    Voided: 2000 ml    Ileostomy: 550 ml    Bulb: 29 ml    Bulb: 28 ml    Bulb: 24.5 ml    Total OUT: 2631.5 ml  ---------------------------------------------  Total NET: -2331.5 ml      LABS:                        11.2   7.28  )-----------( 262      ( 18 Jun 2017 07:10 )             35.6     06-19    141  |  101  |  15  ----------------------------<  110<H>  4.0   |  26  |  0.89    Ca    8.9      19 Jun 2017 07:05            RADIOLOGY & ADDITIONAL STUDIES:
Patient is a 66y old  Male who presents with a chief complaint of "I had a polyp in my colon and it is cancer ' (30 May 2017 10:13)      SUBJECTIVE: pain localized to abdomen, no nausea/vomiting/headache/dizziness/chest pain/shortness of breath    OBJECTIVE:  PHYSICAL EXAM:  GA: NAD  Abdomen:  - distended, incisional tenderness  - Incision: clean/dry/intact   - ostomy: pink/viable     Vitals/Labs:  Vital Signs Last 24 Hrs  T(C): 37.4, Max: 37.4 (06-13 @ 06:00)  T(F): 99.3, Max: 99.3 (06-13 @ 06:00)  HR: 80 (77 - 97)  BP: 116/61 (116/61 - 190/100)  BP(mean): --  RR: 22 (10 - 30)  SpO2: 98% (94% - 99%)    I&O's Detail    I & Os for current day (as of 13 Jun 2017 07:07)  =============================================  IN:    lactated ringers.: 1000 ml    Total IN: 1000 ml  ---------------------------------------------  OUT:    Indwelling Catheter - Urethral: 850 ml    Bulb: 90 ml    Bulb: 75 ml    Bulb: 65 ml    Ileostomy: 25 ml    Total OUT: 1105 ml  ---------------------------------------------  Total NET: -105 ml                            10.7   15.27 )-----------( 229      ( 13 Jun 2017 05:57 )             35.0       06-13    142  |  105  |  11  ----------------------------<  131<H>  4.6   |  23  |  1.00    Ca    7.6<L>      13 Jun 2017 05:57  Phos  4.6     06-13  Mg     1.9     06-13        CAPILLARY BLOOD GLUCOSE  102 (12 Jun 2017 11:44)          ASSESSMENT/PLAN: ESTRELLA GARCIA 66y Male s/p  LAR with intra-op colonoscopy and loop ileostomy   - Diet: NPO/NGT  - hold ASA and plavix   - Pain control with PCA  - Input and outputs   - DVT ppx  - OOB/incentive spirometry     A team Surgery  Pager: [phone]
Patient is a 66y old  Male who presents with a chief complaint of "I had a polyp in my colon and it is cancer ' (30 May 2017 10:13)    SUBJECTIVE: Pain localized to abdomen, but improving. No nausea/vomiting/headache/dizziness/chest pain. Shortness of breath improving     OBJECTIVE:  PHYSICAL EXAM:  GA: NAD, NGT, face mask   Abdomen:  - distended, incisional tenderness  - Incision: clean/dry/intact   - ostomy: pink/viable, negative flatus/BM      Vital Signs Last 24 Hrs  T(C): 36.9, Max: 37.3 (06-14 @ 10:03)  T(F): 98.5, Max: 99.1 (06-14 @ 10:03)  HR: 83 (75 - 88)  BP: 163/86 (140/60 - 166/91)  BP(mean): --  RR: 19 (18 - 19)  SpO2: 94% (90% - 98%)    I&O's Detail    I & Os for current day (as of 15 Cristobal 2017 10:01)  =============================================  IN:    lactated ringers.: 2100 ml    IV PiggyBack: 350 ml    Total IN: 2450 ml  ---------------------------------------------  OUT:    Nasoenteral Tube: 2200 ml    Indwelling Catheter - Urethral: 1640 ml    Ileostomy: 115 ml    Bulb: 42.5 ml    Bulb: 31.5 ml    Bulb: 14.5 ml    Total OUT: 4043.5 ml  ---------------------------------------------  Total NET: -1593.5 ml                            10.4   11.09 )-----------( 202      ( 15 Cristobal 2017 06:22 )             34.3       06-15    150<H>  |  105  |  11  ----------------------------<  100<H>  3.9   |  31  |  0.81    Ca    8.6      15 Cristobal 2017 06:22  Phos  2.0     06-15  Mg     2.3     06-15      ASSESSMENT/PLAN: ESTRELLA GARCIA 66y Male s/p  LAR with intra-op colonoscopy and loop ileostomy   - Diet: NPO/NGT  - Pain control with PCA, IV acetaminophen/ketorolac   - Input and outputs   - DVT ppx  - OOB/incentive spirometry   - BIPAP  - f/u pulmonary     A team Surgery  Pager: [phone]
Patient is a 66y old  Male who presents with a chief complaint of "I had a polyp in my colon and it is cancer ' (30 May 2017 10:13)   s/p  intraop colonoscopy and loop ileostomy , LAR for rectosigmoid Cancer     SUBJECTIVE: pain controlled, no nausea/vomiting/headache/dizziness/chest pain/shortness of breath    OBJECTIVE:  PHYSICAL EXAM:  GA: NAD  Abdomen:  -  soft,distended, appropriate incisional tenderness  - Incision: clean/dry/intact   - Drain: serosanguinous output     Vitals/Labs:  Vital Signs Last 24 Hrs  T(C): 36.8, Max: 37.1 (06-18 @ 10:33)  T(F): 98.2, Max: 98.7 (06-18 @ 10:33)  HR: 72 (72 - 81)  BP: 156/68 (136/84 - 156/68)  BP(mean): --  RR: 17 (16 - 18)  SpO2: 94% (94% - 98%)    I&O's Detail    I & Os for current day (as of 19 Jun 2017 07:15)  =============================================  IN:    Oral Fluid: 300 ml    Total IN: 300 ml  ---------------------------------------------  OUT:    Voided: 2000 ml    Ileostomy: 550 ml    Bulb: 29 ml    Bulb: 28 ml    Bulb: 24.5 ml    Total OUT: 2631.5 ml  ---------------------------------------------  Total NET: -2331.5 ml                            11.2   7.28  )-----------( 262      ( 18 Jun 2017 07:10 )             35.6       06-18    145  |  103  |  11  ----------------------------<  114<H>  3.6   |  26  |  0.73    Ca    8.6      18 Jun 2017 07:10        CAPILLARY BLOOD GLUCOSE          ASSESSMENT/PLAN: ESTRELLA GARCIA 66y Male s/p  intraop colonoscopy and loop ileostomy , LAR for rectosigmoid Cancer     - Diet: Low Residue Diet   - Pain control   - Input and outputs   - DVT ppx  - OOB/incentive spirometry  -PT evaluation   -Ostomy teaching       MEDICATIONS  (STANDING):  heparin  Injectable 5000Unit(s) SubCutaneous every 8 hours  pantoprazole  Injectable 40milliGRAM(s) IV Push daily  simvastatin 20milliGRAM(s) Oral at bedtime  losartan 100milliGRAM(s) Oral daily  buDESOnide 160 MICROgram(s)/formoterol 4.5 MICROgram(s) Inhaler 2Puff(s) Inhalation two times a day  aspirin enteric coated 81milliGRAM(s) Oral daily  clopidogrel Tablet 75milliGRAM(s) Oral daily  amLODIPine   Tablet 10milliGRAM(s) Oral <User Schedule>    MEDICATIONS  (PRN):  naloxone Injectable 0.1milliGRAM(s) IV Push every 3 minutes PRN For ANY of the following changes in patient status:  A. RR LESS THAN 10 breaths per minute, B. Oxygen saturation LESS THAN 90%, C. Sedation score of 6  ondansetron Injectable 4milliGRAM(s) IV Push every 6 hours PRN Nausea  ondansetron Injectable 4milliGRAM(s) IV Push once PRN Nausea and/or Vomiting  tetracaine/benzocaine/butamben Spray 1Spray(s) Topical daily PRN sore throat  HYDROmorphone   Tablet 2milliGRAM(s) Oral every 3 hours PRN Moderate Pain (4 - 6)  HYDROmorphone   Tablet 4milliGRAM(s) Oral every 3 hours PRN Severe Pain (7 - 10)  HYDROmorphone  Injectable 1milliGRAM(s) IV Push every 6 hours PRN Severe Pain unrelieved by oral opioids
Patient is a 66y old  Male who presents with a chief complaint of "I had a polyp in my colon and it is cancer ' (30 May 2017 10:13) s/p intraop colonoscopy and loop ileostomy , LAR for rectosigmoid cancer     POD : 8       SUBJECTIVE:  Pt was on NC 2 L , overnight . Pain well controlled, no nausea/vomiting/headache/dizziness/chest pain. Positive Flatus , Positive Bowel movement .     OBJECTIVE:  PHYSICAL EXAM:  GA: NAD  Abdomen:  -  soft, non-distended, appropriate incisional tenderness  - Incision: clean/dry/intact   - ostomy functioning        Vitals/Labs:  Vital Signs Last 24 Hrs  T(C): 36.7, Max: 37.1 (06-19 @ 18:00)  T(F): 98, Max: 98.8 (06-20 @ 02:50)  HR: 75 (72 - 96)  BP: 144/91 (128/68 - 150/90)  BP(mean): --  RR: 18 (17 - 18)  SpO2: 98% (76% - 98%)    I&O's Detail    I & Os for current day (as of 20 Jun 2017 08:16)  =============================================  IN:    Oral Fluid: 360 ml    Total IN: 360 ml  ---------------------------------------------  OUT:    Ileostomy: 600 ml    Voided: 400 ml    Total OUT: 1000 ml  ---------------------------------------------  Total NET: -640 ml          06-19    141  |  101  |  15  ----------------------------<  110<H>  4.0   |  26  |  0.89    Ca    8.9      19 Jun 2017 07:05        CAPILLARY BLOOD GLUCOSE          ASSESSMENT/PLAN: ESTRELLA GARCIA 66y Male s/p  intraop colonoscopy and loop ileostomy , LAR for rectosigmoid cancer  - Diet: Low Fiber   - Pain control   - Wean Nasal Cannula as tolerated   - Input and outputs   - DVT ppx  - OOB/incentive spirometry   - Ostomy Teaching   - Discharge Planning   -Will discuss with attending     MEDICATIONS  (STANDING):  heparin  Injectable 5000Unit(s) SubCutaneous every 8 hours  simvastatin 20milliGRAM(s) Oral at bedtime  losartan 100milliGRAM(s) Oral daily  buDESOnide 160 MICROgram(s)/formoterol 4.5 MICROgram(s) Inhaler 2Puff(s) Inhalation two times a day  aspirin enteric coated 81milliGRAM(s) Oral daily  clopidogrel Tablet 75milliGRAM(s) Oral daily  amLODIPine   Tablet 10milliGRAM(s) Oral <User Schedule>  pantoprazole    Tablet 40milliGRAM(s) Oral before breakfast    MEDICATIONS  (PRN):  HYDROmorphone   Tablet 2milliGRAM(s) Oral every 3 hours PRN Moderate Pain (4 - 6)  HYDROmorphone   Tablet 4milliGRAM(s) Oral every 3 hours PRN Severe Pain (7 - 10)        A team   [id number]
SUBJECTIVE: Asymptomatic, anxious for discharge.  	  MEDICATIONS:  losartan 100milliGRAM(s) Oral daily  amLODIPine   Tablet 10milliGRAM(s) Oral <User Schedule>  buDESOnide 160 MICROgram(s)/formoterol 4.5 MICROgram(s) Inhaler 2Puff(s) Inhalation two times a day  HYDROmorphone   Tablet 2milliGRAM(s) Oral every 3 hours PRN  HYDROmorphone   Tablet 4milliGRAM(s) Oral every 3 hours PRN  pantoprazole    Tablet 40milliGRAM(s) Oral before breakfast  simvastatin 20milliGRAM(s) Oral at bedtime  heparin  Injectable 5000Unit(s) SubCutaneous every 8 hours  aspirin enteric coated 81milliGRAM(s) Oral daily  clopidogrel Tablet 75milliGRAM(s) Oral daily      REVIEW OF SYSTEMS:    CONSTITUTIONAL: No fever, weight loss, or fatigue  EYES: No eye pain, visual disturbances, or discharge  NECK: No pain or stiffness  RESPIRATORY: No cough, wheezing, chills or hemoptysis; No Shortness of Breath  CARDIOVASCULAR: No chest pain, palpitations, dizziness, or leg swelling  GASTROINTESTINAL: No abdominal or epigastric pain. No nausea, vomiting, or hematemesis; No diarrhea or constipation. No melena or hematochezia.  GENITOURINARY: No dysuria, frequency, hematuria, or incontinence  NEUROLOGICAL: No headaches, memory loss, loss of strength, numbness, or tremors  SKIN: No itching, burning, rashes, or lesions   LYMPH Nodes: No enlarged glands  MUSCULOSKELETAL: No joint pain or swelling; No muscle, back, or extremity pain  All other review of systems are negative.      PHYSICAL EXAM:  T(C): 36.3, Max: 37.1 (06-19 @ 18:00)  HR: 81 (70 - 96)  BP: 142/89 (128/68 - 150/90)  RR: 18 (18 - 18)  SpO2: 97% (76% - 98%)  Wt(kg): --  I&O's Summary    I & Os for current day (as of 20 Jun 2017 11:35)  =============================================  IN: 600 ml / OUT: 1000 ml / NET: -400 ml        PHYSICAL EXAM    Appearance: Normal	  HEENT:   Normal oral mucosa, PERRL, EOMI	  NECK: Soft and supple, No LAD, No JVD  Cardiovascular: Regular Rate and Rhythm, Normal S1 S2, healed midline scar, crisp pv sounds.  Respiratory: Lungs clear to auscultation	  Gastrointestinal:  Soft, Non-tender, + BS	  Skin: No rashes, No ecchymoses, No cyanosis  Neurologic: Non-focal  Extremities: No clubbing, cyanosis or edema  Vascular: Peripheral pulses palpable 2+ bilaterally    T  LABS:	 	        06-19    141  |  101  |  15  ----------------------------<  110<H>  4.0   |  26  |  0.89    Ca    8.9      19 Jun 2017 07:05
The patient denies chest pain, shortness of breath, arm pain or jaw pain, dizziness or palpitations.    VITALS:    T(F): 98.2, Max: 98.7 (06-18 @ 10:33)  HR: 72 (72 - 81)  BP: 156/68 (136/84 - 156/68)  RR: 17 (16 - 18)  SpO2: 94% (94% - 98%)  Wt(kg): --  ,   I&O's Summary    I & Os for current day (as of 19 Jun 2017 09:44)  =============================================  IN: 300 ml / OUT: 2631.5 ml / NET: -2331.5 ml          Physical exam:  Lungs clear  RR 1-2/6 systolic murmur  Abd: drains still present, colostomy bag RMQ  No edema    Labs:                                     11.2   7.28  )-----------( 262      ( 18 Jun 2017 07:10 )             35.6               06-19    141  |  101  |  15  ----------------------------<  110<H>  4.0   |  26  |  0.89    Ca    8.9      19 Jun 2017 07:05
· Subjective and Objective: 	  Complaining of generalized pain but no chest pain or dyspnea  T(F): 98.8, Max: 99 (06-14 @ 18:29)  HR: 88 (75 - 88)  BP: 168/94 (140/60 - 168/94)  RR: 19 (18 - 19)  SpO2: 96% (90% - 98%)  Wt(kg): --  ,   I&O's Summary  I & Os for 24h ending 15 Cristobal 2017 07:00  =============================================  IN: 2450 ml / OUT: 4043.5 ml / NET: -1593.5 ml    I & Os for current day (as of 15 Cristobal 2017 12:23)  =============================================  IN: 575 ml / OUT: 571.5 ml / NET: 3.5 ml      Lungs clear  RR 1/6 systolic murmur  No edema                          10.4   11.09 )-----------( 202      ( 15 Cristobal 2017 06:22 )             34.3               06-15    150<H>  |  105  |  11  ----------------------------<  100<H>  3.9   |  31  |  0.81    Ca    8.6      15 Cristobal 2017 06:22  Phos  2.0     06-15  Mg     2.3     06-15                       ABG - ( 14 Jun 2017 18:46 )  pH: 7.39  /  pCO2: 48    /  pO2: 111   / HCO3: 28    / Base Excess: 3.9   /  SaO2: 99.2

## 2017-06-20 NOTE — H&P ADULT - HISTORY OF PRESENT ILLNESS
66M POD 8 LAR with diverting loop ileostomy for rectosigmoid CA presents to ED approximately 6h post discharge with c/o blood per rectum. Patient states that he was at home when he experienced 2 episodes of large volume, dark-colored blood per rectum. Patient called Dr. Tulio Castillo ([facility]) who advised presentation to ED. Patient had additional large volume (approx 500cc) episode of dark blood per rectum in ED in presence of surgical team. Denies dizziness, SOB at this time. Denies associated fevers/chills.  Patient's PMH: COPD, HTN, obesity, rectosigmoid CA. SurgHx significant for AVR, LAR with loop ileostomy, R IHR, TKR, Appendectomy. He was discharged on his home ASA/Plavix along with Losartan, Simvastatin, Breo inhaler, Amlodipine, and Protonix.  On exam the patient was afebrile with stable vital signs. He appeared anxious but in no acute distress. His abdomen was soft, NT/ND. Abdomen soft without tenderness. Well-healing midline incision. Ostomy patent/viable. No bleeding in ostomy bag. Blood (dark clots) per rectum observed in ED.     Laboratory values showed leukocytosis to 16. H/H 11/36, drawn prior to bleeding observed in ED. INR 1.07. Cr elevated to 1.5.

## 2017-06-20 NOTE — DISCHARGE NOTE ADULT - HOSPITAL COURSE
Pt is a 66 y.o male ; pt s/p Umbilical Hernia Repair 2011 and 2016. Pt reports reoccurrence. Pt reports recent colonoscopy " there was a polyp and it was cancer" Pt to surgeon ; pt now presents for surgery  Pt denies weight loss, pt denies change in bowel habits. PT is sp LAR with intraop colonoscopy with a diverting loop ileostomy; tolerated procedure well. Pain controlled, tolerated diet, plan for discharge home with out pt PT. Continue ASA/Plavix. Pt is a 66 y.o male ; pt s/p Umbilical Hernia Repair 2011 and 2016. Pt reports reoccurrence. Pt reports recent colonoscopy " there was a polyp and it was cancer" Pt to surgeon ; pt now presents for surgery  Pt denies weight loss, pt denies change in bowel habits.   PT is s/p LAR with intraop colonoscopy with a diverting loop ileostomy; tolerated procedure well.   Ostomy teaching was given .   Pain controlled, tolerated diet, plan for discharge home with out pt PT. Continue ASA/Plavix.

## 2017-06-20 NOTE — PROGRESS NOTE ADULT - ASSESSMENT
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  Expected discharge early next week.
Stable from a CV standpoint  No CHF or ischemia  Continue present care as per surgery.  Expected discharge early next week.
#S/p AVR   #COPD   # s/p  Low anterior resection with diverting loop ileostomy  Stable cv perspective.
65 yo man with rectosigmoid cancer s/p loop ileostomy and lower anterior resection  Stable from a CV standpoint  No CHF or ischemia  Continue present care as per surgery.
66M POD# 8 Low anterior resection with diverting loop ileostomy
66M pod# 7 LAR with diverting loop ileostomy for malignant colon polyp
66M with Adenocarcinoma of the sigmoid colon
66y Male s/p  LAR with intra-op colonoscopy and loop ileostomy     Imp: Stable from a CV standpoint  no CHF or ischemia  Rec: Pulmonary follow up re: COPD and sleep apnea
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     - LRD  - pain control prn  - monitor gi fxn  - OOB  - DVT ppx
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     - LRD  - pain control prn  - monitor gi fxn  - OOB  - DVT ppx
Patient is a 66y Male s/p  LAR with intra-op colonoscopy and loop ileostomy POD 4, patient now with ostomy function. Patient doing well today.  -NPO  -DC NGT  -DC Vazquez  -OOB to chair  -Wean Nasal cannula, continue bipap at night  -DVT ppx  -D/W a team surgery

## 2017-06-20 NOTE — ED ADULT NURSE REASSESSMENT NOTE - NS ED NURSE REASSESS COMMENT FT1
ER S/P ileostomy Sx. was D/C yest. and coming with acting BRBPR today approx. 1 cup with blood clots, while in ER pt had another episode of BRBPR with blood clots. pt AOX 3 denies ABd pain/dizziness 2 IV line place to right arm labs sent.   pending dispo.     Gisell Kearns RN (facilitator)

## 2017-06-20 NOTE — ED ADULT TRIAGE NOTE - CHIEF COMPLAINT QUOTE
Pt was d/c'd from here 1h ago, went home and went to bathroom and states blood was gushing out of rectum.  Denies dizziness, lightheadedness, or CP.  Pt has colostomy.  Denies blood in colostomy.  Appears pale and shaky.  On aspirin and plavix.

## 2017-06-21 ENCOUNTER — TRANSCRIPTION ENCOUNTER (OUTPATIENT)
Age: 67
End: 2017-06-21

## 2017-06-21 LAB
ALBUMIN SERPL ELPH-MCNC: 2.4 G/DL — LOW (ref 3.3–5)
ALP SERPL-CCNC: 54 U/L — SIGNIFICANT CHANGE UP (ref 40–120)
ALT FLD-CCNC: 15 U/L — SIGNIFICANT CHANGE UP (ref 4–41)
APTT BLD: 24.2 SEC — LOW (ref 27.5–37.4)
APTT BLD: 24.8 SEC — LOW (ref 27.5–37.4)
AST SERPL-CCNC: 14 U/L — SIGNIFICANT CHANGE UP (ref 4–40)
BASE EXCESS BLDA CALC-SCNC: -2.6 MMOL/L — SIGNIFICANT CHANGE UP
BILIRUB SERPL-MCNC: 1 MG/DL — SIGNIFICANT CHANGE UP (ref 0.2–1.2)
BUN SERPL-MCNC: 22 MG/DL — SIGNIFICANT CHANGE UP (ref 7–23)
BUN SERPL-MCNC: 24 MG/DL — HIGH (ref 7–23)
CA-I BLD-SCNC: 0.96 MMOL/L — LOW (ref 1.03–1.23)
CA-I BLDA-SCNC: 1.06 MMOL/L — LOW (ref 1.15–1.29)
CALCIUM SERPL-MCNC: 7.1 MG/DL — LOW (ref 8.4–10.5)
CALCIUM SERPL-MCNC: 7.1 MG/DL — LOW (ref 8.4–10.5)
CALCIUM SERPL-MCNC: 7.2 MG/DL — LOW (ref 8.4–10.5)
CALCIUM SERPL-MCNC: 7.5 MG/DL — LOW (ref 8.4–10.5)
CHLORIDE SERPL-SCNC: 107 MMOL/L — SIGNIFICANT CHANGE UP (ref 98–107)
CHLORIDE SERPL-SCNC: 109 MMOL/L — HIGH (ref 98–107)
CO2 SERPL-SCNC: 19 MMOL/L — LOW (ref 22–31)
CO2 SERPL-SCNC: 20 MMOL/L — LOW (ref 22–31)
CO2 SERPL-SCNC: 20 MMOL/L — LOW (ref 22–31)
CO2 SERPL-SCNC: 21 MMOL/L — LOW (ref 22–31)
CREAT ?TM UR-MCNC: 255.11 MG/DL — SIGNIFICANT CHANGE UP
CREAT SERPL-MCNC: 1.91 MG/DL — HIGH (ref 0.5–1.3)
CREAT SERPL-MCNC: 2.05 MG/DL — HIGH (ref 0.5–1.3)
CREAT SERPL-MCNC: 2.12 MG/DL — HIGH (ref 0.5–1.3)
CREAT SERPL-MCNC: 2.12 MG/DL — HIGH (ref 0.5–1.3)
FIBRINOGEN PPP-MCNC: 458 MG/DL — SIGNIFICANT CHANGE UP (ref 310–510)
GLUCOSE BLDA-MCNC: 114 MG/DL — HIGH (ref 70–99)
GLUCOSE SERPL-MCNC: 120 MG/DL — HIGH (ref 70–99)
GLUCOSE SERPL-MCNC: 120 MG/DL — HIGH (ref 70–99)
GLUCOSE SERPL-MCNC: 123 MG/DL — HIGH (ref 70–99)
GLUCOSE SERPL-MCNC: 196 MG/DL — HIGH (ref 70–99)
HCO3 BLDA-SCNC: 22 MMOL/L — SIGNIFICANT CHANGE UP (ref 22–26)
HCT VFR BLD CALC: 25 % — LOW (ref 39–50)
HCT VFR BLD CALC: 26.2 % — LOW (ref 39–50)
HCT VFR BLD CALC: 26.3 % — LOW (ref 39–50)
HCT VFR BLD CALC: 28.3 % — LOW (ref 39–50)
HCT VFR BLDA CALC: 28.6 % — LOW (ref 39–51)
HGB BLD-MCNC: 8.4 G/DL — LOW (ref 13–17)
HGB BLD-MCNC: 8.6 G/DL — LOW (ref 13–17)
HGB BLD-MCNC: 8.8 G/DL — LOW (ref 13–17)
HGB BLD-MCNC: 9.5 G/DL — LOW (ref 13–17)
HGB BLDA-MCNC: 9.2 G/DL — LOW (ref 13–17)
INR BLD: 1.21 — HIGH (ref 0.88–1.17)
INR BLD: 1.29 — HIGH (ref 0.88–1.17)
LACTATE BLDA-SCNC: 1 MMOL/L — SIGNIFICANT CHANGE UP (ref 0.5–2)
MAGNESIUM SERPL-MCNC: 1.6 MG/DL — SIGNIFICANT CHANGE UP (ref 1.6–2.6)
MAGNESIUM SERPL-MCNC: 1.7 MG/DL — SIGNIFICANT CHANGE UP (ref 1.6–2.6)
MAGNESIUM SERPL-MCNC: 2.2 MG/DL — SIGNIFICANT CHANGE UP (ref 1.6–2.6)
MCHC RBC-ENTMCNC: 28.3 PG — SIGNIFICANT CHANGE UP (ref 27–34)
MCHC RBC-ENTMCNC: 28.4 PG — SIGNIFICANT CHANGE UP (ref 27–34)
MCHC RBC-ENTMCNC: 28.5 PG — SIGNIFICANT CHANGE UP (ref 27–34)
MCHC RBC-ENTMCNC: 28.5 PG — SIGNIFICANT CHANGE UP (ref 27–34)
MCHC RBC-ENTMCNC: 32.7 % — SIGNIFICANT CHANGE UP (ref 32–36)
MCHC RBC-ENTMCNC: 33.6 % — SIGNIFICANT CHANGE UP (ref 32–36)
MCV RBC AUTO: 84.7 FL — SIGNIFICANT CHANGE UP (ref 80–100)
MCV RBC AUTO: 84.7 FL — SIGNIFICANT CHANGE UP (ref 80–100)
MCV RBC AUTO: 84.8 FL — SIGNIFICANT CHANGE UP (ref 80–100)
MCV RBC AUTO: 86.5 FL — SIGNIFICANT CHANGE UP (ref 80–100)
PCO2 BLDA: 39 MMHG — SIGNIFICANT CHANGE UP (ref 35–48)
PH BLDA: 7.37 PH — SIGNIFICANT CHANGE UP (ref 7.35–7.45)
PHOSPHATE SERPL-MCNC: 4.9 MG/DL — HIGH (ref 2.5–4.5)
PHOSPHATE SERPL-MCNC: 5.4 MG/DL — HIGH (ref 2.5–4.5)
PHOSPHATE SERPL-MCNC: 7.2 MG/DL — HIGH (ref 2.5–4.5)
PLATELET # BLD AUTO: 230 K/UL — SIGNIFICANT CHANGE UP (ref 150–400)
PLATELET # BLD AUTO: 230 K/UL — SIGNIFICANT CHANGE UP (ref 150–400)
PLATELET # BLD AUTO: 231 K/UL — SIGNIFICANT CHANGE UP (ref 150–400)
PLATELET # BLD AUTO: 283 K/UL — SIGNIFICANT CHANGE UP (ref 150–400)
PMV BLD: 9.6 FL — SIGNIFICANT CHANGE UP (ref 7–13)
PMV BLD: 9.7 FL — SIGNIFICANT CHANGE UP (ref 7–13)
PMV BLD: 9.7 FL — SIGNIFICANT CHANGE UP (ref 7–13)
PMV BLD: 9.8 FL — SIGNIFICANT CHANGE UP (ref 7–13)
PO2 BLDA: 148 MMHG — HIGH (ref 83–108)
POTASSIUM BLDA-SCNC: 4.5 MMOL/L — SIGNIFICANT CHANGE UP (ref 3.4–4.5)
POTASSIUM SERPL-MCNC: 4.7 MMOL/L — SIGNIFICANT CHANGE UP (ref 3.5–5.3)
POTASSIUM SERPL-MCNC: 4.9 MMOL/L — SIGNIFICANT CHANGE UP (ref 3.5–5.3)
POTASSIUM SERPL-SCNC: 4.7 MMOL/L — SIGNIFICANT CHANGE UP (ref 3.5–5.3)
POTASSIUM SERPL-SCNC: 4.9 MMOL/L — SIGNIFICANT CHANGE UP (ref 3.5–5.3)
PROT SERPL-MCNC: 4.5 G/DL — LOW (ref 6–8.3)
PROTHROM AB SERPL-ACNC: 13.6 SEC — HIGH (ref 9.8–13.1)
PROTHROM AB SERPL-ACNC: 14.5 SEC — HIGH (ref 9.8–13.1)
RBC # BLD: 2.95 M/UL — LOW (ref 4.2–5.8)
RBC # BLD: 3.04 M/UL — LOW (ref 4.2–5.8)
RBC # BLD: 3.09 M/UL — LOW (ref 4.2–5.8)
RBC # BLD: 3.34 M/UL — LOW (ref 4.2–5.8)
RBC # FLD: 14.8 % — HIGH (ref 10.3–14.5)
RBC # FLD: 15 % — HIGH (ref 10.3–14.5)
RBC # FLD: 15.6 % — HIGH (ref 10.3–14.5)
RBC # FLD: 15.9 % — HIGH (ref 10.3–14.5)
SAO2 % BLDA: 99.6 % — HIGH (ref 95–99)
SODIUM BLDA-SCNC: 134 MMOL/L — LOW (ref 136–146)
SODIUM SERPL-SCNC: 140 MMOL/L — SIGNIFICANT CHANGE UP (ref 135–145)
SODIUM SERPL-SCNC: 140 MMOL/L — SIGNIFICANT CHANGE UP (ref 135–145)
SODIUM SERPL-SCNC: 141 MMOL/L — SIGNIFICANT CHANGE UP (ref 135–145)
SODIUM SERPL-SCNC: 142 MMOL/L — SIGNIFICANT CHANGE UP (ref 135–145)
SODIUM UR-SCNC: 14 MEQ/L — SIGNIFICANT CHANGE UP
WBC # BLD: 12.43 K/UL — HIGH (ref 3.8–10.5)
WBC # BLD: 15.85 K/UL — HIGH (ref 3.8–10.5)
WBC # BLD: 20.12 K/UL — HIGH (ref 3.8–10.5)
WBC # BLD: 21.67 K/UL — HIGH (ref 3.8–10.5)
WBC # FLD AUTO: 12.43 K/UL — HIGH (ref 3.8–10.5)
WBC # FLD AUTO: 15.85 K/UL — HIGH (ref 3.8–10.5)
WBC # FLD AUTO: 20.12 K/UL — HIGH (ref 3.8–10.5)
WBC # FLD AUTO: 21.67 K/UL — HIGH (ref 3.8–10.5)

## 2017-06-21 PROCEDURE — 99232 SBSQ HOSP IP/OBS MODERATE 35: CPT | Mod: GC

## 2017-06-21 PROCEDURE — 71010: CPT | Mod: 26

## 2017-06-21 RX ORDER — SIMVASTATIN 20 MG/1
20 TABLET, FILM COATED ORAL AT BEDTIME
Qty: 0 | Refills: 0 | Status: DISCONTINUED | OUTPATIENT
Start: 2017-06-21 | End: 2017-06-24

## 2017-06-21 RX ORDER — CHLORHEXIDINE GLUCONATE 213 G/1000ML
1 SOLUTION TOPICAL DAILY
Qty: 0 | Refills: 0 | Status: DISCONTINUED | OUTPATIENT
Start: 2017-06-21 | End: 2017-06-22

## 2017-06-21 RX ORDER — AMLODIPINE BESYLATE 2.5 MG/1
10 TABLET ORAL
Qty: 0 | Refills: 0 | Status: DISCONTINUED | OUTPATIENT
Start: 2017-06-22 | End: 2017-06-24

## 2017-06-21 RX ORDER — ACETAMINOPHEN 500 MG
1000 TABLET ORAL ONCE
Qty: 0 | Refills: 0 | Status: COMPLETED | OUTPATIENT
Start: 2017-06-21 | End: 2017-06-21

## 2017-06-21 RX ORDER — SODIUM CHLORIDE 9 MG/ML
1000 INJECTION, SOLUTION INTRAVENOUS
Qty: 0 | Refills: 0 | Status: DISCONTINUED | OUTPATIENT
Start: 2017-06-21 | End: 2017-06-22

## 2017-06-21 RX ORDER — HEPARIN SODIUM 5000 [USP'U]/ML
5000 INJECTION INTRAVENOUS; SUBCUTANEOUS EVERY 8 HOURS
Qty: 0 | Refills: 0 | Status: DISCONTINUED | OUTPATIENT
Start: 2017-06-21 | End: 2017-06-22

## 2017-06-21 RX ORDER — NOREPINEPHRINE BITARTRATE/D5W 8 MG/250ML
0.1 PLASTIC BAG, INJECTION (ML) INTRAVENOUS
Qty: 8 | Refills: 0 | Status: DISCONTINUED | OUTPATIENT
Start: 2017-06-21 | End: 2017-06-21

## 2017-06-21 RX ORDER — MAGNESIUM SULFATE 500 MG/ML
2 VIAL (ML) INJECTION ONCE
Qty: 0 | Refills: 0 | Status: COMPLETED | OUTPATIENT
Start: 2017-06-21 | End: 2017-06-21

## 2017-06-21 RX ORDER — CALCIUM GLUCONATE 100 MG/ML
2 VIAL (ML) INTRAVENOUS ONCE
Qty: 2 | Refills: 0 | Status: COMPLETED | OUTPATIENT
Start: 2017-06-21 | End: 2017-06-21

## 2017-06-21 RX ORDER — HEPARIN SODIUM 5000 [USP'U]/ML
5000 INJECTION INTRAVENOUS; SUBCUTANEOUS EVERY 8 HOURS
Qty: 0 | Refills: 0 | Status: DISCONTINUED | OUTPATIENT
Start: 2017-06-21 | End: 2017-06-21

## 2017-06-21 RX ORDER — LOSARTAN POTASSIUM 100 MG/1
25 TABLET, FILM COATED ORAL
Qty: 0 | Refills: 0 | Status: DISCONTINUED | OUTPATIENT
Start: 2017-06-21 | End: 2017-06-24

## 2017-06-21 RX ADMIN — Medication 100 MILLIGRAM(S): at 00:20

## 2017-06-21 RX ADMIN — Medication 100 MILLIGRAM(S): at 08:28

## 2017-06-21 RX ADMIN — Medication 200 MILLIGRAM(S): at 00:20

## 2017-06-21 RX ADMIN — HEPARIN SODIUM 5000 UNIT(S): 5000 INJECTION INTRAVENOUS; SUBCUTANEOUS at 14:19

## 2017-06-21 RX ADMIN — Medication 200 MILLIGRAM(S): at 12:07

## 2017-06-21 RX ADMIN — SODIUM CHLORIDE 2000 MILLILITER(S): 9 INJECTION INTRAMUSCULAR; INTRAVENOUS; SUBCUTANEOUS at 00:16

## 2017-06-21 RX ADMIN — CHLORHEXIDINE GLUCONATE 1 APPLICATION(S): 213 SOLUTION TOPICAL at 04:00

## 2017-06-21 RX ADMIN — SODIUM CHLORIDE 100 MILLILITER(S): 9 INJECTION, SOLUTION INTRAVENOUS at 10:08

## 2017-06-21 RX ADMIN — Medication 100 MILLIGRAM(S): at 16:01

## 2017-06-21 RX ADMIN — SIMVASTATIN 20 MILLIGRAM(S): 20 TABLET, FILM COATED ORAL at 21:13

## 2017-06-21 RX ADMIN — Medication 50 GRAM(S): at 09:34

## 2017-06-21 RX ADMIN — Medication 1000 MILLIGRAM(S): at 08:30

## 2017-06-21 RX ADMIN — SODIUM CHLORIDE 150 MILLILITER(S): 9 INJECTION INTRAMUSCULAR; INTRAVENOUS; SUBCUTANEOUS at 08:29

## 2017-06-21 RX ADMIN — Medication 200 GRAM(S): at 06:07

## 2017-06-21 RX ADMIN — Medication 400 MILLIGRAM(S): at 08:03

## 2017-06-21 RX ADMIN — HEPARIN SODIUM 5000 UNIT(S): 5000 INJECTION INTRAVENOUS; SUBCUTANEOUS at 21:13

## 2017-06-21 NOTE — CONSULT NOTE ADULT - PROBLEM SELECTOR RECOMMENDATION 9
continue to observe for gi bleed  cbc daily   advance diet as tolerated  hold asa/plavix  care per sicu

## 2017-06-21 NOTE — PROCEDURE NOTE - NSNUMATTEMPT_GEN_A_CORE
Bilateral radial arteries attempted, left femoral attempted; eventual success on second attempt in right radial/4
1

## 2017-06-21 NOTE — PROGRESS NOTE ADULT - SUBJECTIVE AND OBJECTIVE BOX
A Team Progress Note    Overnight events: Patient transferred to SICU with hypotension to 80/50s with continued episodes of clots from rectum with complaints of lethargy and diaphoretic. Patient was bolused 1 L with moderate response. Dr. Claire performed bedside colonoscopy which demostrated active bleeding at the prior anastomitic site which was clipped and injected with epinephrine. Rectal bleeding stopped following the colonoscopy. Patient received 1 FFP and 1 u pRBC after procedure for continued hypotension for a total of 4 u pRBC, 1 u FFP, 1 PLT. Patient intermittently required levo but is off as of this morning.    --------------------------------------------------------------------------------------    VITAL SIGNS, INS/OUTS (last 24 hours):  --------------------------------------------------------------------------------------  T(C): 36.4, Max: 36.9 (06-20 @ 23:30)  HR: 68 (61 - 102)  BP: 116/44 (78/45 - 157/78)  BP(mean): 59 (49 - 94)  ABP: 113/54 (105/56 - 128/64)  ABP(mean): 73 (72 - 86)  RR: 17 (14 - 27)  SpO2: 100% (90% - 100%)  Wt(kg): --  CVP(mm Hg): --  CI: --  CAPILLARY BLOOD GLUCOSE   N/A      I & Os for current day (as of 06-21 @ 05:31)  =============================================  IN:    Sodium Chloride 0.9% IV Bolus: 2000 ml    sodium chloride 0.9%.: 1425 ml    Packed Red Blood Cells: 1400 ml    IV PiggyBack: 450 ml    FFP (Fresh Frozen Plasma): 300 ml    Platelets - Single Donor: 200 ml    Oral Fluid: 50 ml    norepinephrine Infusion: 40.9 ml    norepinephrine Infusion: 19.8 ml    Total IN: 5885.7 ml  ---------------------------------------------  OUT:    Indwelling Catheter - Urethral: 105 ml    Total OUT: 105 ml  ---------------------------------------------  Total NET: 5780.7 ml    --------------------------------------------------------------------------------------                                              9.5                   Neurophils% (auto):   x      (06-21 @ 05:05):    20.12)-----------(230          Lymphocytes% (auto):  x                                             28.3                   Eosinphils% (auto):   x        Manual%: Neutrophils x    ; Lymphocytes x    ; Eosinophils x    ; Bands%: x    ; Blasts x          06-21    140  |  107  |  24<H>  ----------------------------<  120<H>  4.9   |  20<L>  |  2.12<H>    Ca    7.1<L>      21 Jun 2017 05:05  Phos  5.4     06-21  Mg     1.6     06-21    TPro  4.5<L>  /  Alb  2.4<L>  /  TBili  1.0  /  DBili  x   /  AST  14  /  ALT  15  /  AlkPhos  54  06-21    ( 06-21 @ 05:05 )   PT: 13.6 SEC;   INR: 1.21   aPTT: 24.2 SEC    ABG - ( 21 Jun 2017 05:05 )  pH: 7.37  /  pCO2: 39    /  pO2: 148   / HCO3: 22    / Base Excess: -2.6  /  SaO2: 99.6  / Lactate: 1.0        RECENT CULTURES:        Physical Exam:  Gen: NAD, Awake, Alert, Oriented  Abdominal Exam: Soft, NT, ND, air in stool present in ostomy bag  Wound: Staples clean, dry, intact.    \ASSESSMENT:  66y Male colonic polyp s/p LAR, diverting loop ileostomy with return to hospital for bleeding per rectum s/p colonoscopic clipping, now stabilized.     PLAN:    - Monitor hemodynamic  - Remain NPO  - Gifx, check stool color.  - afternoon CBC.

## 2017-06-21 NOTE — PROGRESS NOTE ADULT - SUBJECTIVE AND OBJECTIVE BOX
SICU AM Progress Note  =====================================================  Overnight events: Upon arrival to SICU, patient became acutely hypotensive to 80/50s.  He continued to have multiple episodes of dark blood clots per rectum, began complaining of feeling weak and became lethargic and diaphoretic.  Patient bolused 1 liter crystalloid and continued blood transfusion.  Dr. Castillo notified and arrived shortly after with Dr. Claire of gastroenterology.  They performed a bedside colonoscopy, which revealed blood clots throughout entire colon and active bleeding at the anastamosis.  The bleeding area was injected with epinephrine and clipped, which achieved hemostasis.  Patient had no bloody bowel movements after the procedure but again became hypotensive, then given 1FFP and a 4th unit of PRBC.  Patient with worsening ELIAS and oliguria. Started on levophed overnight but weaned off by morning.      HPI: 66y male POD#9 s/p LAR with diverting loop ileostomy for malignant colon polyp presented with multiple episodes of blood and clots per rectum.  At the time of presentation, he had no complaints of abdominal pain, no shortness of breath, dizziness or chest pain.  The surgical ICU team was consulted for close hemodynamic monitoring.    HISTORY    66M POD 8 LAR with diverting loop ileostomy for rectosigmoid CA presents to ED after being discharged this afternoon with complaint of blood per rectum. Patient states that he was at home when he experienced 2 episodes of large volume, dark-colored blood per rectum. Patient called Dr. Tulio Castillo ([facility]) who advised presentation to ED. Patient had additional large volume episodes of dark blood per rectum in ED and in the SICU.      Laboratory values showed leukocytosis to 16. H/H 11/36, drawn prior to bleeding observed in ED. INR 1.07. Cr elevated to 1.5. (2017 18:50)    Allergies:   PAST MEDICAL & SURGICAL HISTORY:  Arthritis  Hypercholesterolemia  HTN (hypertension)  Stenosis of carotid artery, unspecified laterality  Aortic valve disease  Amaurosis fugax: od occurring x 2  Sleep apnea  Snoring  Venous Insufficiency  Arterial Insufficiency  Microscopic Hematuria  OA (Osteoarthritis)  Obesity  Varicose Vein: B/L lower extremities  Spinal Stenosis  Arthritis  cardiac valve disorder  HTN (Hypertension)  COPD with Chronic Bronchitis  Pilonidal Abscess  Kidney Stone  COPD (Chronic Obstructive Pulmonary Disease)  H/O total knee replacement, right:   Aortic valve replaced  History of Lt Total Knee Replacement:   S/P Cystoscopy:   History of Nasal Septoplasty:   Skin Tag Excision: Right Leg-  Status Post Umbilical Hernia Repair: 2011  S/P Right Inguinal Hernia Repair:   S/P Cataract Surgery: Right - with lens placement  Tibialis Posterior Tendonitis: Repair B/L revision left 2016  Plantar Tendonitis: Repair- B L  Renal Calculi: lithotripsy  I &amp; D of Pilonidal cyst  S/P Cardiac Cath  History of Spinal Stenosis  HTN (Hypertension)  Hyperlipidemia  History of Hernia Repair  History of Appendectomy    FAMILY HISTORY:  Family history of cancer: Mother ( since - unknown cancer)  Family history of COPD (chronic obstructive pulmonary disease): Father ( since )    ADVANCE DIRECTIVES: Full Code     REVIEW OF SYSTEMS:    General: No loss of consciousness, no falls  Skin/Breast: Non-Contributory  Ophthalmologic: No blurry vision  ENMT: Non-Contributory  Respiratory and Thorax: No shortness of breath  Cardiovascular: No chest pain  Gastrointestinal: No abdominal pain; bleeding per rectum  Genitourinary: No hematuria  Neurological: No focal weakness  Psychiatric: Non-Contributory    CURRENT MEDICATIONS:   --------------------------------------------------------------------------------------  Neurologic Medications    Respiratory Medications  ALBUTerol    90 MICROgram(s) HFA Inhaler 2Puff(s) Inhalation every 6 hours PRN Shortness of Breath    Cardiovascular Medications  norepinephrine Infusion 0.1MICROgram(s)/kG/Min IV Continuous <Continuous>    Gastrointestinal Medications  sodium chloride 0.9%. 1000milliLiter(s) IV Continuous <Continuous>    Genitourinary Medications    Hematologic/Oncologic Medications    Antimicrobial/Immunologic Medications  metroNIDAZOLE  IVPB     ciprofloxacin   IVPB     metroNIDAZOLE  IVPB 500milliGRAM(s) IV Intermittent every 8 hours  ciprofloxacin   IVPB 400milliGRAM(s) IV Intermittent every 12 hours    Endocrine/Metabolic Medications    Topical/Other Medications    --------------------------------------------------------------------------------------    VITAL SIGNS, INS/OUTS (last 24 hours):  --------------------------------------------------------------------------------------  T(C): 36.4, Max: 36.9 ( @ 23:30)  HR: 68 (61 - 102)  BP: 116/44 (78/45 - 157/78)  BP(mean): 59 (49 - 94)  ABP: 113/54 (105/56 - 128/64)  ABP(mean): 73 (72 - 86)  RR: 17 (14 - 27)  SpO2: 100% (90% - 100%)  Wt(kg): --  CVP(mm Hg): --  CI: --  CAPILLARY BLOOD GLUCOSE   N/A      I & Os for current day (as of  @ 05:31)  =============================================  IN:    Sodium Chloride 0.9% IV Bolus: 2000 ml    sodium chloride 0.9%.: 1425 ml    Packed Red Blood Cells: 1400 ml    IV PiggyBack: 450 ml    FFP (Fresh Frozen Plasma): 300 ml    Platelets - Single Donor: 200 ml    Oral Fluid: 50 ml    norepinephrine Infusion: 40.9 ml    norepinephrine Infusion: 19.8 ml    Total IN: 5885.7 ml  ---------------------------------------------  OUT:    Indwelling Catheter - Urethral: 105 ml    Total OUT: 105 ml  ---------------------------------------------  Total NET: 5780.7 ml    --------------------------------------------------------------------------------------    EXAM  NEUROLOGY  RASS:   	GCS:    Exam: Lethargic but fully oriented, no focal deficits.     HEENT  Exam: Normocephalic, atraumatic    RESPIRATORY  Exam: Breathing unlabored    CARDIOVASCULAR  Exam: S1, S2.  Regular rate and rhythm.      GI/NUTRITION  Exam: Abdomen soft, Non-tender, nondistended; ostomy viable with air and stool in bag; no blood in bag; midline incision healing well with staples, no bleeding or signs of infection  Current Diet:  NPO     VASCULAR  Exam: Extremities warm, well-perfused, palpable distal pulses    MUSCULOSKELETAL  Exam: All extremities moving spontaneously without limitations.     SKIN:  Exam: Good skin turgor    METABOLIC/FLUIDS/ELECTROLYTES  sodium chloride 0.9%. 1000milliLiter(s) IV Continuous <Continuous>      HEMATOLOGIC  [x] DVT Prophylaxis:   Transfusions:	[x] PRBCx4	[x] Plateletsx1		[x] FFPx1	[] Cryoprecipitate    INFECTIOUS DISEASE  Antimicrobials/Immunologic Medications:  metroNIDAZOLE  IVPB     ciprofloxacin   IVPB     metroNIDAZOLE  IVPB 500milliGRAM(s) IV Intermittent every 8 hours  ciprofloxacin   IVPB 400milliGRAM(s) IV Intermittent every 12 hours    Day #  2    Tubes/Lines/Drains    [x] Peripheral IV  [x] Central Venous Line     	[x] R	[] L	[x] IJ	[] Fem	[] SC	Date Placed:   [x] Arterial Line		[x] R	[] L	[] Fem	[x] Rad	[] Ax	Date Placed:   [] PICC:         	[] Midline		[] Mediport  [x] Urinary Catheter		Date Placed:     LABS  --------------------------------------------------------------------------------------  CBC ( @ 00:00)                          8.6<L>                   21.67<H>  )--------------(  283        --    % Neuts, --    % Lymphs, ANC: --                              26.3<L>  CBC ( 21:50)                          10.1<L>                   18.82<H>  )--------------(  250        --    % Neuts, --    % Lymphs, ANC: --                              31.4<L>    BMP ( @ 00:00)       142     |  109<H>  |  22    			Ca++ --      Ca 7.2<L>       ---------------------------------( 196<H>		Mg 1.7          4.9     |  19<L>   |  2.05<H>			Ph 4.9<H>  BMP ( 21:50)       143     |  107     |  21    			Ca++ --      Ca 7.6<L>       ---------------------------------( 179<H>		Mg 1.7          4.6     |  20<L>   |  1.84<H>			Ph 5.1<H>    LFTs ( @ 16:55)      TPro 6.9 / Alb 3.6 / TBili 0.6 / DBili -- / AST 22 / ALT 16 / AlkPhos 87    Coags ( @ 00:00)  aPTT 24.8<L> / INR 1.29<H> / PT 14.5<H>  Coags ( @ 17:16)  aPTT 27.3<L> / INR 1.07 / PT 12.0      ABG ( 21:50)      /  /  /  /  / %     Lactate:  2.1<H>    --------------------------------------------------------------------------------------  ASSESSMENT:  66y Male POD#9 s/p LAR, diverting loop ileostomy for colonic polyp with bleeding per rectum s/p colonoscopic clipping, now stabilized.     PLAN:    Neurologic: monitor mental status  Respiratory: no acute issues; COPD - will give breo ellipta   Cardiovascular: bleeding per rectum, resolved at this time - monitor blood pressure, heart rate; resuscitate as needed;  Gastrointestinal/Nutrition: NPO, monitor bowel movements  Renal/Genitourinary: ELIAS - trend creatinine and resuscitate as needed; likely secondary to hypoperfusion  Hematologic: on antiplatelet medications - gave platelets and DDAVP  Infectious Disease: cipro/flagyl for leukocytosis, concern for anastamotic leak  Tubes/Lines/Drains: keep lines for now  Endocrine: no acute issues   Disposition: SICU for hemodynamic monitoring and resuscitation  --------------------------------------------------------------------------------------    Critical Care Diagnoses:    Hemorrhagic shock  Gastrointestinal bleeding  Hypotension

## 2017-06-21 NOTE — PROCEDURE NOTE - NSPROCDETAILS_GEN_ALL_CORE
sutured in place/connected to a pressurized flush line/hemostasis with direct pressure, dressing applied/all materials/supplies accounted for at end of procedure/positive blood return obtained via catheter/Seldinger technique/location identified, draped/prepped, sterile technique used, needle inserted/introduced/ultrasound guidance
sterile technique, catheter placed/lumen(s) aspirated and flushed/ultrasound guidance/sterile dressing applied/guidewire recovered

## 2017-06-21 NOTE — PROCEDURE NOTE - PROCEDURE
Arterial line placement  06/21/2017    Active  LAYAO
Central venous catheter insertion  06/21/2017    Active  INNA

## 2017-06-22 DIAGNOSIS — C19 MALIGNANT NEOPLASM OF RECTOSIGMOID JUNCTION: ICD-10-CM

## 2017-06-22 DIAGNOSIS — K62.6 ULCER OF ANUS AND RECTUM: ICD-10-CM

## 2017-06-22 LAB
BASOPHILS # BLD AUTO: 0.01 K/UL — SIGNIFICANT CHANGE UP (ref 0–0.2)
BASOPHILS NFR BLD AUTO: 0.1 % — SIGNIFICANT CHANGE UP (ref 0–2)
BUN SERPL-MCNC: 16 MG/DL — SIGNIFICANT CHANGE UP (ref 7–23)
CA-I BLD-SCNC: 1.08 MMOL/L — SIGNIFICANT CHANGE UP (ref 1.03–1.23)
CALCIUM SERPL-MCNC: 7.4 MG/DL — LOW (ref 8.4–10.5)
CHLORIDE SERPL-SCNC: 109 MMOL/L — HIGH (ref 98–107)
CO2 SERPL-SCNC: 20 MMOL/L — LOW (ref 22–31)
CREAT SERPL-MCNC: 1.08 MG/DL — SIGNIFICANT CHANGE UP (ref 0.5–1.3)
EOSINOPHIL # BLD AUTO: 0.3 K/UL — SIGNIFICANT CHANGE UP (ref 0–0.5)
EOSINOPHIL NFR BLD AUTO: 3 % — SIGNIFICANT CHANGE UP (ref 0–6)
GLUCOSE SERPL-MCNC: 126 MG/DL — HIGH (ref 70–99)
HCT VFR BLD CALC: 24.4 % — LOW (ref 39–50)
HGB BLD-MCNC: 8 G/DL — LOW (ref 13–17)
IMM GRANULOCYTES NFR BLD AUTO: 1.6 % — HIGH (ref 0–1.5)
LYMPHOCYTES # BLD AUTO: 1.21 K/UL — SIGNIFICANT CHANGE UP (ref 1–3.3)
LYMPHOCYTES # BLD AUTO: 12.2 % — LOW (ref 13–44)
MAGNESIUM SERPL-MCNC: 1.9 MG/DL — SIGNIFICANT CHANGE UP (ref 1.6–2.6)
MCHC RBC-ENTMCNC: 27.9 PG — SIGNIFICANT CHANGE UP (ref 27–34)
MCHC RBC-ENTMCNC: 32.8 % — SIGNIFICANT CHANGE UP (ref 32–36)
MCV RBC AUTO: 85 FL — SIGNIFICANT CHANGE UP (ref 80–100)
MONOCYTES # BLD AUTO: 0.7 K/UL — SIGNIFICANT CHANGE UP (ref 0–0.9)
MONOCYTES NFR BLD AUTO: 7.1 % — SIGNIFICANT CHANGE UP (ref 2–14)
NEUTROPHILS # BLD AUTO: 7.5 K/UL — HIGH (ref 1.8–7.4)
NEUTROPHILS NFR BLD AUTO: 76 % — SIGNIFICANT CHANGE UP (ref 43–77)
PHOSPHATE SERPL-MCNC: 2.5 MG/DL — SIGNIFICANT CHANGE UP (ref 2.5–4.5)
PLATELET # BLD AUTO: 211 K/UL — SIGNIFICANT CHANGE UP (ref 150–400)
PMV BLD: 8.9 FL — SIGNIFICANT CHANGE UP (ref 7–13)
POTASSIUM SERPL-MCNC: 4.3 MMOL/L — SIGNIFICANT CHANGE UP (ref 3.5–5.3)
POTASSIUM SERPL-SCNC: 4.3 MMOL/L — SIGNIFICANT CHANGE UP (ref 3.5–5.3)
RBC # BLD: 2.87 M/UL — LOW (ref 4.2–5.8)
RBC # FLD: 15.9 % — HIGH (ref 10.3–14.5)
SODIUM SERPL-SCNC: 142 MMOL/L — SIGNIFICANT CHANGE UP (ref 135–145)
WBC # BLD: 9.88 K/UL — SIGNIFICANT CHANGE UP (ref 3.8–10.5)
WBC # FLD AUTO: 9.88 K/UL — SIGNIFICANT CHANGE UP (ref 3.8–10.5)

## 2017-06-22 RX ORDER — METRONIDAZOLE 500 MG
500 TABLET ORAL EVERY 8 HOURS
Qty: 0 | Refills: 0 | Status: DISCONTINUED | OUTPATIENT
Start: 2017-06-22 | End: 2017-06-22

## 2017-06-22 RX ORDER — ENOXAPARIN SODIUM 100 MG/ML
40 INJECTION SUBCUTANEOUS
Qty: 0 | Refills: 0 | Status: DISCONTINUED | OUTPATIENT
Start: 2017-06-22 | End: 2017-06-24

## 2017-06-22 RX ORDER — SODIUM CHLORIDE 9 MG/ML
1000 INJECTION, SOLUTION INTRAVENOUS
Qty: 0 | Refills: 0 | Status: DISCONTINUED | OUTPATIENT
Start: 2017-06-22 | End: 2017-06-22

## 2017-06-22 RX ORDER — HYDROMORPHONE HYDROCHLORIDE 2 MG/ML
0.5 INJECTION INTRAMUSCULAR; INTRAVENOUS; SUBCUTANEOUS ONCE
Qty: 0 | Refills: 0 | Status: DISCONTINUED | OUTPATIENT
Start: 2017-06-22 | End: 2017-06-22

## 2017-06-22 RX ORDER — LOPERAMIDE HCL 2 MG
2 TABLET ORAL
Qty: 0 | Refills: 0 | Status: DISCONTINUED | OUTPATIENT
Start: 2017-06-22 | End: 2017-06-24

## 2017-06-22 RX ORDER — METRONIDAZOLE 500 MG
500 TABLET ORAL EVERY 8 HOURS
Qty: 0 | Refills: 0 | Status: DISCONTINUED | OUTPATIENT
Start: 2017-06-22 | End: 2017-06-24

## 2017-06-22 RX ORDER — HYDRALAZINE HCL 50 MG
10 TABLET ORAL ONCE
Qty: 0 | Refills: 0 | Status: DISCONTINUED | OUTPATIENT
Start: 2017-06-22 | End: 2017-06-22

## 2017-06-22 RX ORDER — CIPROFLOXACIN LACTATE 400MG/40ML
500 VIAL (ML) INTRAVENOUS EVERY 12 HOURS
Qty: 0 | Refills: 0 | Status: DISCONTINUED | OUTPATIENT
Start: 2017-06-22 | End: 2017-06-22

## 2017-06-22 RX ORDER — CIPROFLOXACIN LACTATE 400MG/40ML
400 VIAL (ML) INTRAVENOUS EVERY 12 HOURS
Qty: 0 | Refills: 0 | Status: DISCONTINUED | OUTPATIENT
Start: 2017-06-22 | End: 2017-06-24

## 2017-06-22 RX ORDER — ENOXAPARIN SODIUM 100 MG/ML
40 INJECTION SUBCUTANEOUS DAILY
Qty: 0 | Refills: 0 | Status: DISCONTINUED | OUTPATIENT
Start: 2017-06-22 | End: 2017-06-22

## 2017-06-22 RX ADMIN — Medication 200 MILLIGRAM(S): at 23:00

## 2017-06-22 RX ADMIN — ENOXAPARIN SODIUM 40 MILLIGRAM(S): 100 INJECTION SUBCUTANEOUS at 12:24

## 2017-06-22 RX ADMIN — Medication 100 MILLIGRAM(S): at 00:29

## 2017-06-22 RX ADMIN — LOSARTAN POTASSIUM 25 MILLIGRAM(S): 100 TABLET, FILM COATED ORAL at 09:46

## 2017-06-22 RX ADMIN — HYDROMORPHONE HYDROCHLORIDE 0.5 MILLIGRAM(S): 2 INJECTION INTRAMUSCULAR; INTRAVENOUS; SUBCUTANEOUS at 04:58

## 2017-06-22 RX ADMIN — Medication 2 MILLIGRAM(S): at 17:33

## 2017-06-22 RX ADMIN — HYDROMORPHONE HYDROCHLORIDE 0.5 MILLIGRAM(S): 2 INJECTION INTRAMUSCULAR; INTRAVENOUS; SUBCUTANEOUS at 04:43

## 2017-06-22 RX ADMIN — Medication 200 MILLIGRAM(S): at 00:29

## 2017-06-22 RX ADMIN — SIMVASTATIN 20 MILLIGRAM(S): 20 TABLET, FILM COATED ORAL at 22:24

## 2017-06-22 RX ADMIN — Medication 200 MILLIGRAM(S): at 12:24

## 2017-06-22 RX ADMIN — AMLODIPINE BESYLATE 10 MILLIGRAM(S): 2.5 TABLET ORAL at 09:46

## 2017-06-22 RX ADMIN — HEPARIN SODIUM 5000 UNIT(S): 5000 INJECTION INTRAVENOUS; SUBCUTANEOUS at 06:01

## 2017-06-22 RX ADMIN — Medication 100 MILLIGRAM(S): at 07:30

## 2017-06-22 RX ADMIN — Medication 100 MILLIGRAM(S): at 22:25

## 2017-06-22 NOTE — PROGRESS NOTE ADULT - SUBJECTIVE AND OBJECTIVE BOX
A Team Progress Note    Overnight events: Patient doing well overnight. Tolerating diet. H/H stable. Patient feels well.  Vitals:  --------------------------------------------------------------------------------------  T(C): 37.1, Max: 37.1 (06-22 @ 00:00)  HR: 67 (61 - 74)  BP: --  ABP: 146/70 (123/57 - 148/69)  ABP(mean): 96 (75 - 100)  RR: 20 (17 - 23)  SpO2: 97% (95% - 100%)  Wt(kg): --  CVP(mm Hg): --    I & Os for 24h ending 06-22 @ 07:00  =============================================  IN:    dextrose 5% + sodium chloride 0.9%.: 2200 ml    IV PiggyBack: 550 ml    sodium chloride 0.9%: 100 ml    Total IN: 2850 ml  ---------------------------------------------  OUT:    Indwelling Catheter - Urethral: 1540 ml    Colostomy: 425 ml    Total OUT: 1965 ml  ---------------------------------------------  Total NET: 885 ml    I & Os for current day (as of 06-22 @ 08:33)  =============================================  IN:    dextrose 5% + sodium chloride 0.9%.: 100 ml    Total IN: 100 ml  ---------------------------------------------  OUT:    Total OUT: 0 ml  ---------------------------------------------  Total NET: 100 ml                                              8.0                   Neurophils% (auto):   76.0   (06-22 @ 03:20):    9.88 )-----------(211          Lymphocytes% (auto):  12.2                                          24.4                   Eosinphils% (auto):   3.0      Manual%: Neutrophils x    ; Lymphocytes x    ; Eosinophils x    ; Bands%: x    ; Blasts x          06-22    142  |  109<H>  |  16  ----------------------------<  126<H>  4.3   |  20<L>  |  1.08    Ca    7.4<L>      22 Jun 2017 03:20  Phos  2.5     06-22  Mg     1.9     06-22    TPro  4.5<L>  /  Alb  2.4<L>  /  TBili  1.0  /  DBili  x   /  AST  14  /  ALT  15  /  AlkPhos  54  06-21      ABG - ( 21 Jun 2017 05:05 )  pH: 7.37  /  pCO2: 39    /  pO2: 148   / HCO3: 22    / Base Excess: -2.6  /  SaO2: 99.6  / Lactate: 1.0        Physical Exam:  Gen: NAD, Awake, Alert, Oriented  Abdominal Exam: Soft, NT, ND, air in stool present in ostomy bag  Wound: Staples clean, dry, intact.    \ASSESSMENT:  66y Male colonic polyp s/p LAR, diverting loop ileostomy with return to hospital for bleeding per rectum s/p colonoscopic clipping, now stabilized.     PLAN:    - Follow Hct/Hgb daily.  - Consider removal of central and arterial lines.  - CLD   - Gifx, check stool color.  - OOB/ ambulate

## 2017-06-22 NOTE — PROGRESS NOTE ADULT - ASSESSMENT
66y Male colonic polyp s/p LAR, diverting loop ileostomy with return to hospital for bleeding per rectum s/p colonoscopic clipping, now stabilized.

## 2017-06-22 NOTE — PROGRESS NOTE ADULT - SUBJECTIVE AND OBJECTIVE BOX
INTERVAL HPI/OVERNIGHT EVENTS: Transferred from SICU to floor today. Afebrile. No further rectal bleed. Denies pain. Patient feels hungry     STATUS POST:  LAR with diverting loop ileostomy    POST OPERATIVE DAY #: 10      MEDICATIONS  (STANDING):  metroNIDAZOLE  IVPB     ciprofloxacin   IVPB     metroNIDAZOLE  IVPB 500milliGRAM(s) IV Intermittent every 8 hours  ciprofloxacin   IVPB 400milliGRAM(s) IV Intermittent every 12 hours  simvastatin 20milliGRAM(s) Oral at bedtime  amLODIPine   Tablet 10milliGRAM(s) Oral <User Schedule>  losartan 25milliGRAM(s) Oral <User Schedule>  enoxaparin Injectable 40milliGRAM(s) SubCutaneous <User Schedule>  loperamide 2milliGRAM(s) Oral two times a day    MEDICATIONS  (PRN):  ALBUTerol    90 MICROgram(s) HFA Inhaler 2Puff(s) Inhalation every 6 hours PRN Shortness of Breath      Vital Signs Last 24 Hrs  T(C): 36.9, Max: 37.1 (06-22 @ 00:00)  T(F): 98.5, Max: 98.8 (06-22 @ 04:00)  HR: 74 (61 - 77)  BP: 139/92 (133/73 - 139/92)  BP(mean): 86 (86 - 86)  RR: 18 (17 - 24)  SpO2: 99% (95% - 99%)    PHYSICAL EXAM:      Constitutional:  WA, AOx3, NAD    Gastrointestinal: Abd soft, NT, ND    Stoma: Pink & Viable, thin brown stool        I&O's Detail  I & Os for 24h ending 22 Jun 2017 07:00  =============================================  IN:    dextrose 5% + sodium chloride 0.9%: 2200 ml    IV PiggyBack: 550 ml    sodium chloride 0.9%: 100 ml    Total IN: 2850 ml  ---------------------------------------------  OUT:    Indwelling Catheter - Urethral: 1540 ml    Colostomy: 425 ml    Total OUT: 1965 ml  ---------------------------------------------  Total NET: 885 ml    I & Os for current day (as of 22 Jun 2017 14:34)  =============================================  IN:    dextrose 5% + sodium chloride 0.9%: 200 ml    Total IN: 200 ml  ---------------------------------------------  OUT:    Voided: 700 ml    Indwelling Catheter - Urethral: 625 ml    Colostomy: 420 ml    Total OUT: 1745 ml  ---------------------------------------------  Total NET: -1545 ml      LABS:                        8.0    9.88  )-----------( 211      ( 22 Jun 2017 03:20 )             24.4     06-22    142  |  109<H>  |  16  ----------------------------<  126<H>  4.3   |  20<L>  |  1.08    Ca    7.4<L>      22 Jun 2017 03:20  Phos  2.5     06-22  Mg     1.9     06-22    TPro  4.5<L>  /  Alb  2.4<L>  /  TBili  1.0  /  DBili  x   /  AST  14  /  ALT  15  /  AlkPhos  54  06-21    PT/INR - ( 21 Jun 2017 05:05 )   PT: 13.6 SEC;   INR: 1.21          PTT - ( 21 Jun 2017 05:05 )  PTT:24.2 SEC      RADIOLOGY & ADDITIONAL STUDIES:

## 2017-06-22 NOTE — PROGRESS NOTE ADULT - SUBJECTIVE AND OBJECTIVE BOX
INTERVAL HPI/OVERNIGHT EVENTS:    abd pain controlled  no gib noted overnight or this morning in ostomy bag  tolerating cld    MEDICATIONS  (STANDING):  metroNIDAZOLE  IVPB     ciprofloxacin   IVPB     metroNIDAZOLE  IVPB 500milliGRAM(s) IV Intermittent every 8 hours  ciprofloxacin   IVPB 400milliGRAM(s) IV Intermittent every 12 hours  simvastatin 20milliGRAM(s) Oral at bedtime  amLODIPine   Tablet 10milliGRAM(s) Oral <User Schedule>  losartan 25milliGRAM(s) Oral <User Schedule>  enoxaparin Injectable 40milliGRAM(s) SubCutaneous <User Schedule>    MEDICATIONS  (PRN):  ALBUTerol    90 MICROgram(s) HFA Inhaler 2Puff(s) Inhalation every 6 hours PRN Shortness of Breath      Allergies    codeine (Hives)    Intolerances        Review of Systems:    General:  No wt loss, fevers, chills, night sweats,fatigue,   Eyes:  Good vision, no reported pain  ENT:  No sore throat, pain, runny nose, dysphagia  CV:  No pain, palpitatioins, hypo/hypertension  Resp:  No dyspnea, cough, tachypnea, wheezing  GI:  No pain, No nausea, No vomiting, No diarrhea, No constipatiion, No weight loss, No fever, No pruritis, No rectal bleeding, No tarry stools, No dysphagia,  :  No pain, bleeding, incontinence, nocturia  Muscle:  No pain, weakness  Neuro:  No weakness, tingling, memory problems  Psych:  No fatigue, insomnia, mood problems, depression  Endocrine:  No polyuria, polydypsia, cold/heat intolerance  Heme:  No petechiae, ecchymosis, easy bruisability  Skin:  No rash, tattoos, scars, edema      Vital Signs Last 24 Hrs  T(C): 36.9, Max: 37.1 (06-22 @ 00:00)  T(F): 98.5, Max: 98.8 (06-22 @ 04:00)  HR: 74 (61 - 77)  BP: 139/92 (133/73 - 139/92)  BP(mean): 86 (86 - 86)  RR: 18 (17 - 24)  SpO2: 99% (95% - 100%)    PHYSICAL EXAM:    Constitutional: NAD, well-developed  HEENT: EOMI, throat clear  Neck: No LAD, supple  Respiratory: CTA and P  Cardiovascular: S1 and S2, RRR, no M  Gastrointestinal: BS+, soft, NT/ND, neg HSM, +ostomy with no melena or blood noted  Extremities: No peripheral edema, neg clubing, cyanosis  Vascular: 2+ peripheral pulses  Neurological: A/O x 3, no focal deficits  Psychiatric: Normal mood, normal affect  Skin: No rashes      LABS:                        8.0    9.88  )-----------( 211      ( 22 Jun 2017 03:20 )             24.4     06-22    142  |  109<H>  |  16  ----------------------------<  126<H>  4.3   |  20<L>  |  1.08    Ca    7.4<L>      22 Jun 2017 03:20  Phos  2.5     06-22  Mg     1.9     06-22    TPro  4.5<L>  /  Alb  2.4<L>  /  TBili  1.0  /  DBili  x   /  AST  14  /  ALT  15  /  AlkPhos  54  06-21    PT/INR - ( 21 Jun 2017 05:05 )   PT: 13.6 SEC;   INR: 1.21          PTT - ( 21 Jun 2017 05:05 )  PTT:24.2 SEC      RADIOLOGY & ADDITIONAL TESTS:

## 2017-06-22 NOTE — PROGRESS NOTE ADULT - SUBJECTIVE AND OBJECTIVE BOX
SICU Daily Progress Note  =====================================================  Interval/Overnight Events:     No acute events overnight, remains off pressors. H/H stable.     POD#10 SICU#2    HISTORY    66M POD 10 LAR with diverting loop ileostomy for rectosigmoid CA presents to ED after being discharged this afternoon with complaint of blood per rectum. Patient states that he was at home when he experienced 2 episodes of large volume, dark-colored blood per rectum. Patient called Dr. Tulio Castillo ([facility]) who advised presentation to ED. Patient had additional large volume episodes of dark blood per rectum in ED and in the SICU.      PAST MEDICAL & SURGICAL HISTORY:  Arthritis  Hypercholesterolemia  HTN (hypertension)  Stenosis of carotid artery, unspecified laterality  Aortic valve disease  Amaurosis fugax: od occurring x 2  Sleep apnea  Snoring  Venous Insufficiency  Arterial Insufficiency  Microscopic Hematuria  OA (Osteoarthritis)  Obesity  Varicose Vein: B/L lower extremities  Spinal Stenosis  Arthritis  cardiac valve disorder  HTN (Hypertension)  COPD with Chronic Bronchitis  Pilonidal Abscess  Kidney Stone  COPD (Chronic Obstructive Pulmonary Disease)  H/O total knee replacement, right: 2011  Aortic valve replaced  History of Lt Total Knee Replacement: 4/11  S/P Cystoscopy: 2010  History of Nasal Septoplasty: 1979  Skin Tag Excision: Right Leg-2010  Status Post Umbilical Hernia Repair: 2/2011  S/P Right Inguinal Hernia Repair: 1979  S/P Cataract Surgery: Right - with lens placement  Tibialis Posterior Tendonitis: Repair B/L revision left 2016  Plantar Tendonitis: Repair- B L  Renal Calculi: lithotripsy  I &amp; D of Pilonidal cyst  S/P Cardiac Cath  History of Spinal Stenosis  HTN (Hypertension)  Hyperlipidemia  History of Hernia Repair  History of Appendectomy  LAR for malignant polyp, diverting ileostomy.      Allergies: codeine (Hives)      MEDICATIONS:   --------------------------------------------------------------------------------------  Neurologic Medications    Respiratory Medications  ALBUTerol    90 MICROgram(s) HFA Inhaler 2Puff(s) Inhalation every 6 hours PRN Shortness of Breath    Cardiovascular Medications  amLODIPine   Tablet 10milliGRAM(s) Oral <User Schedule>  losartan 25milliGRAM(s) Oral <User Schedule>    Gastrointestinal Medications  dextrose 5% + sodium chloride 0.9%. 1000milliLiter(s) IV Continuous <Continuous>    Genitourinary Medications    Hematologic/Oncologic Medications  heparin  Injectable 5000Unit(s) SubCutaneous every 8 hours    Antimicrobial/Immunologic Medications  metroNIDAZOLE  IVPB     ciprofloxacin   IVPB     metroNIDAZOLE  IVPB 500milliGRAM(s) IV Intermittent every 8 hours  ciprofloxacin   IVPB 400milliGRAM(s) IV Intermittent every 12 hours    Endocrine/Metabolic Medications  simvastatin 20milliGRAM(s) Oral at bedtime    Topical/Other Medications  chlorhexidine 4% Liquid 1Application(s) Topical daily    --------------------------------------------------------------------------------------    VITAL SIGNS, INS/OUTS (last 24 hours):  --------------------------------------------------------------------------------------  ((Insert SICU Vitals/Is+Os here))***  --------------------------------------------------------------------------------------    EXAM  NEUROLOGY  RASS:   	GCS:    Exam: Normal, NAD, alert, oriented x3, no focal deficits. ***    HEENT  Exam: Normocephalic, atraumatic, EOMI.  ***    RESPIRATORY  Exam: Lungs clear to auscultation, Normal expansion/effort. ***  Mechanical Ventilation:     CARDIOVASCULAR  Exam: S1, S2.  Regular rate and rhythm.   ***    GI/NUTRITION  Exam: Abdomen soft, Non-tender, Non-distended.  ***  Wound:  ***  Current Diet:  NPO***    VASCULAR  Exam: Extremities warm, pink, well-perfused. ***    MUSCULOSKELETAL  Exam: All extremities moving spontaneously without limitations. ***    SKIN  Exam: Good skin turgor, no skin breakdown. ***    METABOLIC/FLUIDS/ELECTROLYTES  dextrose 5% + sodium chloride 0.9%. 1000milliLiter(s) IV Continuous <Continuous>      HEMATOLOGIC  [x] VTE Prophylaxis: heparin  Injectable 5000Unit(s) SubCutaneous every 8 hours    Transfusions:	[] PRBC	[] Platelets		[] FFP	[] Cryoprecipitate    INFECTIOUS DISEASE  Antimicrobials/Immunologic Medications:  metroNIDAZOLE  IVPB     ciprofloxacin   IVPB     metroNIDAZOLE  IVPB 500milliGRAM(s) IV Intermittent every 8 hours  ciprofloxacin   IVPB 400milliGRAM(s) IV Intermittent every 12 hours      Tubes/Lines/Drains    [x] Peripheral IV  [] Central Venous Line     	[] R	[] L	[] IJ	[] Fem	[] SC	Date Placed:   [] Arterial Line		[] R	[] L	[] Fem	[] Rad	[] Ax	Date Placed:   [] PICC		[] Midline		[] Mediport  [] Urinary Catheter		Date Placed:   [x] Necessity of urinary, arterial, and venous catheters discussed    LABS  --------------------------------------------------------------------------------------  ((Insert SICU Labs here))***  --------------------------------------------------------------------------------------    OTHER LABORATORY:     IMAGING STUDIES:   CXR:     ASSESSMENT:  66y Male POD#9 s/p LAR, diverting loop ileostomy for colonic polyp with bleeding per rectum s/p colonoscopic clipping, now stabilized    PLAN:    Neurologic: Monitor mental status  Respiratory: Albuterol PRN  Cardiovascular: Monitor vitals, no further episodes of BRBPR, continue losartan/amlodipine/simvastatin  Gastrointestinal/Nutrition: CLD, advance to regular diet   Renal/Genitourinary: ELIAS - trend creatinine and resuscitate as needed; likely secondary to hypoperfusion, replete lytes PRN  Hematologic: VTE ppx with SQH  Infectious Disease: Cipro/flagyl for leukocytosis, concern for anastamotic leak, follow up cultures   Tubes/Lines/Drains: Keep lines   Endocrine: No active issues    Disposition: SICU for hemodynamic monitoring and resuscitation    --------------------------------------------------------------------------------------    Critical Care Diagnoses:

## 2017-06-22 NOTE — PROGRESS NOTE ADULT - PROBLEM SELECTOR PLAN 2
-s/p colonoscopy with recto-sigmoid ulcer noted s/p  injection of epinephrine & clipping   -cbc qd  -hgb stable  -monitor gi output/color for evidence of recurrent gib  -diet per surgery

## 2017-06-23 DIAGNOSIS — J44.9 CHRONIC OBSTRUCTIVE PULMONARY DISEASE, UNSPECIFIED: ICD-10-CM

## 2017-06-23 DIAGNOSIS — D50.0 IRON DEFICIENCY ANEMIA SECONDARY TO BLOOD LOSS (CHRONIC): ICD-10-CM

## 2017-06-23 DIAGNOSIS — M19.90 UNSPECIFIED OSTEOARTHRITIS, UNSPECIFIED SITE: ICD-10-CM

## 2017-06-23 DIAGNOSIS — I10 ESSENTIAL (PRIMARY) HYPERTENSION: ICD-10-CM

## 2017-06-23 LAB
BLD GP AB SCN SERPL QL: NEGATIVE — SIGNIFICANT CHANGE UP
BUN SERPL-MCNC: 8 MG/DL — SIGNIFICANT CHANGE UP (ref 7–23)
CALCIUM SERPL-MCNC: 8.2 MG/DL — LOW (ref 8.4–10.5)
CHLORIDE SERPL-SCNC: 103 MMOL/L — SIGNIFICANT CHANGE UP (ref 98–107)
CO2 SERPL-SCNC: 23 MMOL/L — SIGNIFICANT CHANGE UP (ref 22–31)
CREAT SERPL-MCNC: 0.88 MG/DL — SIGNIFICANT CHANGE UP (ref 0.5–1.3)
GLUCOSE SERPL-MCNC: 106 MG/DL — HIGH (ref 70–99)
HCT VFR BLD CALC: 26.9 % — LOW (ref 39–50)
HGB BLD-MCNC: 8.8 G/DL — LOW (ref 13–17)
MAGNESIUM SERPL-MCNC: 1.7 MG/DL — SIGNIFICANT CHANGE UP (ref 1.6–2.6)
MCHC RBC-ENTMCNC: 28.2 PG — SIGNIFICANT CHANGE UP (ref 27–34)
MCHC RBC-ENTMCNC: 32.7 % — SIGNIFICANT CHANGE UP (ref 32–36)
MCV RBC AUTO: 86.2 FL — SIGNIFICANT CHANGE UP (ref 80–100)
PHOSPHATE SERPL-MCNC: 2.7 MG/DL — SIGNIFICANT CHANGE UP (ref 2.5–4.5)
PLATELET # BLD AUTO: 267 K/UL — SIGNIFICANT CHANGE UP (ref 150–400)
PMV BLD: 9.2 FL — SIGNIFICANT CHANGE UP (ref 7–13)
POTASSIUM SERPL-MCNC: 4.2 MMOL/L — SIGNIFICANT CHANGE UP (ref 3.5–5.3)
POTASSIUM SERPL-SCNC: 4.2 MMOL/L — SIGNIFICANT CHANGE UP (ref 3.5–5.3)
RBC # BLD: 3.12 M/UL — LOW (ref 4.2–5.8)
RBC # FLD: 15.5 % — HIGH (ref 10.3–14.5)
RH IG SCN BLD-IMP: POSITIVE — SIGNIFICANT CHANGE UP
SODIUM SERPL-SCNC: 140 MMOL/L — SIGNIFICANT CHANGE UP (ref 135–145)
WBC # BLD: 10 K/UL — SIGNIFICANT CHANGE UP (ref 3.8–10.5)
WBC # FLD AUTO: 10 K/UL — SIGNIFICANT CHANGE UP (ref 3.8–10.5)

## 2017-06-23 RX ORDER — MAGNESIUM SULFATE 500 MG/ML
2 VIAL (ML) INJECTION ONCE
Qty: 0 | Refills: 0 | Status: COMPLETED | OUTPATIENT
Start: 2017-06-23 | End: 2017-06-23

## 2017-06-23 RX ADMIN — SIMVASTATIN 20 MILLIGRAM(S): 20 TABLET, FILM COATED ORAL at 21:41

## 2017-06-23 RX ADMIN — AMLODIPINE BESYLATE 10 MILLIGRAM(S): 2.5 TABLET ORAL at 10:45

## 2017-06-23 RX ADMIN — Medication 200 MILLIGRAM(S): at 23:05

## 2017-06-23 RX ADMIN — LOSARTAN POTASSIUM 25 MILLIGRAM(S): 100 TABLET, FILM COATED ORAL at 06:38

## 2017-06-23 RX ADMIN — Medication 200 MILLIGRAM(S): at 10:45

## 2017-06-23 RX ADMIN — Medication 100 MILLIGRAM(S): at 13:40

## 2017-06-23 RX ADMIN — Medication 2 MILLIGRAM(S): at 17:25

## 2017-06-23 RX ADMIN — Medication 50 GRAM(S): at 10:44

## 2017-06-23 RX ADMIN — Medication 100 MILLIGRAM(S): at 21:41

## 2017-06-23 RX ADMIN — Medication 2 MILLIGRAM(S): at 05:30

## 2017-06-23 RX ADMIN — Medication 100 MILLIGRAM(S): at 05:30

## 2017-06-23 RX ADMIN — ENOXAPARIN SODIUM 40 MILLIGRAM(S): 100 INJECTION SUBCUTANEOUS at 10:47

## 2017-06-23 NOTE — PROGRESS NOTE ADULT - PROBLEM SELECTOR PLAN 2
-s/p colonoscopy with recto-sigmoid ulcer noted s/p injection of epinephrine & clipping   -cbc qd  -hgb stable  -monitor gi output/color for evidence of recurrent gib; remains brown  -tolerating diet  -dc per primary team with outpatient GI followup in our office -s/p colonoscopy with recto-sigmoid ulcer noted s/p injection of epinephrine & clipping   -cbc qd  -hgb stable  -monitor gi output/color for evidence of recurrent gib; remains brown  -tolerating diet  --outpt GI fu in office w/Dr. Hollins on 7/10/17 @ 1:45pm

## 2017-06-23 NOTE — PROGRESS NOTE ADULT - SUBJECTIVE AND OBJECTIVE BOX
INTERVAL HPI/OVERNIGHT EVENTS: No overnight events, No further rectal bleed. Tolerating diet, wants to go home.     STATUS POST:  LAR with diverting loop ileostomy    POST OPERATIVE DAY #: 11      T(C): 36.8, Max: 36.9 (06-22 @ 13:06)  HR: 72 (67 - 77)  BP: 133/80 (122/70 - 139/92)  RR: 18 (18 - 24)  SpO2: 96% (96% - 99%)  Wt(kg): --  I&O's Detail    I & Os for current day (as of 23 Jun 2017 07:14)  =============================================  IN:    dextrose 5% + sodium chloride 0.9%: 200 ml    IV PiggyBack: 200 ml    Total IN: 400 ml  ---------------------------------------------  OUT:    Voided: 2100 ml    Indwelling Catheter - Urethral: 625 ml    Colostomy: 400 ml    Ileostomy: 320 ml    Total OUT: 3445 ml  ---------------------------------------------  Total NET: -3045 ml    PHYSICAL EXAM:  Constitutional:  WA, AOx3, NAD  Gastrointestinal: Abd soft, nontender, nondistended  Stoma: [place] & Viable, gas in bag, minimal stool INTERVAL HPI/OVERNIGHT EVENTS: No overnight events, No further rectal bleed. Tolerating diet, wants to go home.     STATUS POST:  LAR with diverting loop ileostomy    POST OPERATIVE DAY #: 11    T(C): 36.8, Max: 36.9 (06-22 @ 13:06)  HR: 72 (67 - 77)  BP: 133/80 (122/70 - 139/92)  RR: 18 (18 - 24)  SpO2: 96% (96% - 99%)  Wt(kg): --  I&O's Detail    I & Os for current day (as of 23 Jun 2017 07:14)  =============================================  IN:    dextrose 5% + sodium chloride 0.9%: 200 ml    IV PiggyBack: 200 ml    Total IN: 400 ml  ---------------------------------------------  OUT:    Voided: 2100 ml    Indwelling Catheter - Urethral: 625 ml    Colostomy: 400 ml    Ileostomy: 320 ml    Total OUT: 3445 ml  ---------------------------------------------  Total NET: -3045 ml    PHYSICAL EXAM:  Constitutional:  WA, AOx3, NAD  Gastrointestinal: Abd soft, nontender, nondistended  Stoma: [place] & Viable, gas in bag, minimal stool                          8.8    10.00 )-----------( 267      ( 23 Jun 2017 06:18 )             26.9

## 2017-06-23 NOTE — CONSULT NOTE ADULT - PROBLEM SELECTOR RECOMMENDATION 2
A single (solitary) ulcer was found in the recto-sigmoid colo, area was successfully injected with 10 mL of a epinephrine for hemostasis. For hemostasis, four hemostatic clips were successfully placed. Patient monitored in SICU, now transferred out  Hb improved to 8.8 today  Recommend discharge on po Feso4 BID x I month  then discontinue  discussed with surgery service

## 2017-06-23 NOTE — DISCHARGE NOTE ADULT - PLAN OF CARE
s/p colonoscopic clipping for Lower GastroIntestional Bleeding WOUND CARE:  Please keep incisions clean and dry. Please do not Scrub or rub incisions. Do not use lotion or powder on incisions  BATHING: Please do not submerge wound underwater. You may shower and/or sponge bathe.  ACTIVITY: No heavy lifting or straining. Otherwise, you may return to your usual level of physical activity. If you are taking narcotic pain medication (such as Percocet) DO NOT drive a car, operate machinery or make important decisions.  DIET: Return to your usual diet.  NOTIFY YOUR SURGEON IF: You have any bleeding that does not stop, any pus draining from your wound(s), any fever (over 100.4 F) or chills, persistent nausea/vomiting, persistent diarrhea, or if your pain is not controlled on your discharge pain medications.  FOLLOW-UP: Please follow up with your primary care physician in one week regarding your hospitalization.  Please follow up with your surgeon, Dr. Castillo in  7 to 10 days

## 2017-06-23 NOTE — PROGRESS NOTE ADULT - SUBJECTIVE AND OBJECTIVE BOX
INTERVAL HPI/OVERNIGHT EVENTS: Feeling well. No further GI bleed. Tolerating diet    STATUS POST:  LAR with diverting loop ileostomy, readmitted with LGIB    POST OPERATIVE DAY #: 11      MEDICATIONS  (STANDING):  simvastatin 20milliGRAM(s) Oral at bedtime  amLODIPine   Tablet 10milliGRAM(s) Oral <User Schedule>  losartan 25milliGRAM(s) Oral <User Schedule>  enoxaparin Injectable 40milliGRAM(s) SubCutaneous <User Schedule>  loperamide 2milliGRAM(s) Oral two times a day  ciprofloxacin   IVPB 400milliGRAM(s) IV Intermittent every 12 hours  metroNIDAZOLE  IVPB 500milliGRAM(s) IV Intermittent every 8 hours  magnesium sulfate  IVPB 2Gram(s) IV Intermittent once    MEDICATIONS  (PRN):  ALBUTerol    90 MICROgram(s) HFA Inhaler 2Puff(s) Inhalation every 6 hours PRN Shortness of Breath      Vital Signs Last 24 Hrs  T(C): 36.8, Max: 36.9 (06-22 @ 13:06)  T(F): 98.3, Max: 98.5 (06-22 @ 13:06)  HR: 72 (67 - 77)  BP: 133/80 (122/70 - 139/92)  BP(mean): 86 (86 - 86)  RR: 18 (18 - 24)  SpO2: 96% (96% - 99%)    PHYSICAL EXAM:      Constitutional: AOx3, NAD    Gastrointestinal: Abd soft, NT, ND. Stoma functioning, thin brown stool. Wound healing nicely, staples in place      I&O's Detail  I & Os for 24h ending 23 Jun 2017 07:00  =============================================  IN:    dextrose 5% + sodium chloride 0.9%: 200 ml    IV PiggyBack: 200 ml    Total IN: 400 ml  ---------------------------------------------  OUT:    Voided: 2100 ml    Indwelling Catheter - Urethral: 625 ml    Colostomy: 400 ml    Ileostomy: 320 ml    Total OUT: 3445 ml  ---------------------------------------------  Total NET: -3045 ml    I & Os for current day (as of 23 Jun 2017 10:06)  =============================================  IN:    Total IN: 0 ml  ---------------------------------------------  OUT:    Voided: 450 ml    Total OUT: 450 ml  ---------------------------------------------  Total NET: -450 ml      LABS:                        8.8    10.00 )-----------( 267      ( 23 Jun 2017 06:18 )             26.9     06-23    140  |  103  |  8   ----------------------------<  106<H>  4.2   |  23  |  0.88    Ca    8.2<L>      23 Jun 2017 06:18  Phos  2.7     06-23  Mg     1.7     06-23            RADIOLOGY & ADDITIONAL STUDIES:

## 2017-06-23 NOTE — DISCHARGE NOTE ADULT - CARE PROVIDER_API CALL
Tulio Castillo), ColonRectal Surgery; Surgery  [address] [facility] [address]  [place] [address]  Phone: [phone]  Fax: [phone]    Bulmaro Hollins), [facility]  [address] [address]  Phone: [phone]  Fax: [phone]

## 2017-06-23 NOTE — PROGRESS NOTE ADULT - ASSESSMENT
66M s/p LAR with diverting loop ileostomy, readmitted with LGIB s/p  s/p colonoscopic clipping, HD#3 66M s/p LAR with diverting loop ileostomy, readmitted with LGIB s/p colonoscopy found to have oozing ulcer which was injected with epinephrine and was clipped, HD#3 66M s/p LAR with diverting loop ileostomy, readmitted with LGIB s/p colonoscopy found to have oozing ulcer which was injected with epinephrine and was clipped, HD#3, Hemocrit increased today.

## 2017-06-23 NOTE — DISCHARGE NOTE ADULT - HOME CARE AGENCY
[facility] [phone]. Initial visit will be day after discharge home. A nurse will call prior to home visit

## 2017-06-23 NOTE — DISCHARGE NOTE ADULT - MEDICATION SUMMARY - MEDICATIONS TO TAKE
I will START or STAY ON the medications listed below when I get home from the hospital:    out patient Physical Therapy   -- Indication: For Home    Flagyl 500 mg oral tablet  -- 1 tab(s) by mouth every 8 hours  -- Do not drink alcoholic beverages when taking this medication.  Finish all this medication unless otherwise directed by prescriber.  May discolor urine or feces.    -- Indication: For Abdominal Surgery    HYDROmorphone 2 mg oral tablet  -- 1 tab(s) by mouth every 4 hours (5 times/day), As Needed, Moderate Pain (4 - 6) MDD:6  -- Indication: For Pain Medication    HYDROmorphone 4 mg oral tablet  -- 1 tab(s) by mouth every 3 hours, As needed, Severe Pain (7 - 10)  -- Indication: For Pain Medication    Tylenol 500 mg oral tablet  -- 2 tab(s) by mouth 1 to 2 times a day, As Needed  -- Indication: For Home    losartan 100 mg oral tablet  -- 1 tab(s) by mouth once a day  -- Indication: For Home    simvastatin 20 mg oral tablet  -- 1 tab(s) by mouth once a day (at bedtime)  -- Indication: For Home    Breo Ellipta 200 mcg-25 mcg/inh inhalation powder  -- 1 puff(s) inhaled once a day, As Needed  -- Indication: For Home    amLODIPine 10 mg oral tablet  -- 1 tab(s) by mouth once a day  -- Indication: For Home    pantoprazole 40 mg oral delayed release tablet  -- 1 tab(s) by mouth once a day (before a meal)  -- Indication: For Home    Cipro 500 mg oral tablet  -- 1 tab(s) by mouth 2 times a day  -- Avoid prolonged or excessive exposure to direct and/or artificial sunlight while taking this medication.  Check with your doctor before becoming pregnant.  Do not take dairy products, antacids, or iron preparations within one hour of this medication.  Finish all this medication unless otherwise directed by prescriber.  Medication should be taken with plenty of water.    -- Indication: For Abdominal Surgery

## 2017-06-23 NOTE — PROGRESS NOTE ADULT - SUBJECTIVE AND OBJECTIVE BOX
INTERVAL HPI/OVERNIGHT EVENTS:    "much better, no more bleeding"  tolerating PO intake, +flatus/+bm, no abd pain    MEDICATIONS  (STANDING):  simvastatin 20milliGRAM(s) Oral at bedtime  amLODIPine   Tablet 10milliGRAM(s) Oral <User Schedule>  losartan 25milliGRAM(s) Oral <User Schedule>  enoxaparin Injectable 40milliGRAM(s) SubCutaneous <User Schedule>  loperamide 2milliGRAM(s) Oral two times a day  ciprofloxacin   IVPB 400milliGRAM(s) IV Intermittent every 12 hours  metroNIDAZOLE  IVPB 500milliGRAM(s) IV Intermittent every 8 hours  magnesium sulfate  IVPB 2Gram(s) IV Intermittent once    MEDICATIONS  (PRN):  ALBUTerol    90 MICROgram(s) HFA Inhaler 2Puff(s) Inhalation every 6 hours PRN Shortness of Breath      Allergies    codeine (Hives)    Intolerances        Review of Systems:    General:  No wt loss, fevers, chills, night sweats,fatigue,   Eyes:  Good vision, no reported pain  ENT:  No sore throat, pain, runny nose, dysphagia  CV:  No pain, palpitatioins, hypo/hypertension  Resp:  No dyspnea, cough, tachypnea, wheezing  GI:  No pain, No nausea, No vomiting, No diarrhea, No constipatiion, No weight loss, No fever, No pruritis, No rectal bleeding, No tarry stools, No dysphagia,  :  No pain, bleeding, incontinence, nocturia  Muscle:  No pain, weakness  Neuro:  No weakness, tingling, memory problems  Psych:  No fatigue, insomnia, mood problems, depression  Endocrine:  No polyuria, polydypsia, cold/heat intolerance  Heme:  No petechiae, ecchymosis, easy bruisability  Skin:  No rash, tattoos, scars, edema      Vital Signs Last 24 Hrs  T(C): 36.8, Max: 36.9 (06-22 @ 13:06)  T(F): 98.3, Max: 98.5 (06-22 @ 13:06)  HR: 72 (67 - 77)  BP: 133/80 (122/70 - 139/92)  BP(mean): 86 (86 - 86)  RR: 18 (18 - 24)  SpO2: 96% (96% - 99%)    PHYSICAL EXAM:    Constitutional: NAD, well-developed  HEENT: EOMI, throat clear  Neck: No LAD, supple  Respiratory: CTA and P  Cardiovascular: S1 and S2, RRR, no M  Gastrointestinal: BS+, soft, NT/ND, neg HSM, +brown stool in ileostomy  Extremities: No peripheral edema, neg clubing, cyanosis  Vascular: 2+ peripheral pulses  Neurological: A/O x 3, no focal deficits  Psychiatric: Normal mood, normal affect  Skin: No rashes      LABS:                        8.8    10.00 )-----------( 267      ( 23 Jun 2017 06:18 )             26.9     06-23    140  |  103  |  8   ----------------------------<  106<H>  4.2   |  23  |  0.88    Ca    8.2<L>      23 Jun 2017 06:18  Phos  2.7     06-23  Mg     1.7     06-23            RADIOLOGY & ADDITIONAL TESTS:

## 2017-06-23 NOTE — DISCHARGE NOTE ADULT - HOSPITAL COURSE
66M s/p LAR with diverting loop ileostomy, readmitted on POD 8 with LGIB.  That same day he went for a colonscopy with Dr. Nicole,  A single (solitary) ulcer was found in the recto-sigmoid colo, area was successfully injected with 10 mL of a epinephrine for hemostasis. For hemostasis, four hemostatic clips were successfully placed. Patient monitored in SICU. HD 2 he was transferred to the floor. Patients diet was slowly advanced as tolerated.  At this time, pt is tolerating a regular diet, ambulating and voiding. Patient without further Gi Bleed. Pt is stable for discharge as per the attending at this time.

## 2017-06-23 NOTE — CONSULT NOTE ADULT - SUBJECTIVE AND OBJECTIVE BOX
Patient is a 66y old  Male who presents with a chief complaint of lower GI bleeding (2017 07:18)      HPI:  66M POD 8 LAR with diverting loop ileostomy for rectosigmoid CA presents to ED approximately 6h post discharge with c/o blood per rectum. Patient states that he was at home when he experienced 2 episodes of large volume, dark-colored blood per rectum. Patient called Dr. Tulio Castillo ([facility]) who advised presentation to ED. Patient had additional large volume (approx 500cc) episode of dark blood per rectum in ED in presence of surgical team. Denies dizziness, SOB at this time. Denies associated fevers/chills.  Patient's PMH: COPD, HTN, obesity, rectosigmoid CA. SurgHx significant for AVR, LAR with loop ileostomy, R IHR, TKR, Appendectomy. He was discharged on his home ASA/Plavix along with Losartan, Simvastatin, Breo inhaler, Amlodipine, and Protonix.  On exam the patient was afebrile with stable vital signs. He appeared anxious but in no acute distress. His abdomen was soft, NT/ND. Abdomen soft without tenderness. Well-healing midline incision. Ostomy patent/viable. No bleeding in ostomy bag. Blood (dark clots) per rectum observed in ED.   Pt with initial SICU stay now transferred out t floor  No N/V  tolerating diet      PAST MEDICAL & SURGICAL HISTORY:  Arthritis  Hypercholesterolemia  HTN (hypertension)  Stenosis of carotid artery, unspecified laterality  Aortic valve disease  Amaurosis fugax: od occurring x 2  Sleep apnea  Snoring  Venous Insufficiency  Arterial Insufficiency  Microscopic Hematuria  OA (Osteoarthritis)  Obesity  Varicose Vein: B/L lower extremities  Spinal Stenosis  Arthritis  cardiac valve disorder  HTN (Hypertension)  COPD with Chronic Bronchitis  Pilonidal Abscess  Kidney Stone  COPD (Chronic Obstructive Pulmonary Disease)  H/O total knee replacement, right:   Aortic valve replaced  History of Lt Total Knee Replacement:   S/P Cystoscopy:   History of Nasal Septoplasty:   Skin Tag Excision: Right Leg-  Status Post Umbilical Hernia Repair: 2011  S/P Right Inguinal Hernia Repair:   S/P Cataract Surgery: Right - with lens placement  Tibialis Posterior Tendonitis: Repair B/L revision left 2016  Plantar Tendonitis: Repair- B L  Renal Calculi: lithotripsy  I &amp; D of Pilonidal cyst  S/P Cardiac Cath  History of Spinal Stenosis  HTN (Hypertension)  Hyperlipidemia  History of Hernia Repair  History of Appendectomy      Review of Systems:   CONSTITUTIONAL: No fever, weight loss, or fatigue  EYES: No eye pain, visual disturbances, or discharge  ENMT:  No difficulty hearing, tinnitus, vertigo; No sinus or throat pain  NECK: No pain or stiffness  RESPIRATORY: No cough, wheezing, chills or hemoptysis; No shortness of breath  CARDIOVASCULAR: No chest pain, palpitations, dizziness, or leg swelling  GASTROINTESTINAL: No abdominal or epigastric pain. No nausea, vomiting, or hematemesis; No diarrhea or constipation.   positive hematochezia on admit  GENITOURINARY: No dysuria, frequency, hematuria, or incontinence  NEUROLOGICAL: No headaches, memory loss, loss of strength, numbness, or tremors  SKIN: No itching, burning, rashes, or lesions   LYMPH NODES: No enlarged glands  ENDOCRINE: No heat or cold intolerance; No hair loss  MUSCULOSKELETAL: No joint pain or swelling; No muscle, back, or extremity pain  PSYCHIATRIC: No depression, anxiety, mood swings, or difficulty sleeping  HEME/LYMPH: No easy bruising, or bleeding gums      Allergies     codeine (Hives)        Social History:  nonsmoker, no IVDA, rare ETOH    FAMILY HISTORY:  Family history of cancer: Mother ( since - unknown cancer)  Family history of COPD (chronic obstructive pulmonary disease): Father ( since )      MEDICATIONS  (STANDING):  simvastatin 20milliGRAM(s) Oral at bedtime  amLODIPine   Tablet 10milliGRAM(s) Oral <User Schedule>  losartan 25milliGRAM(s) Oral <User Schedule>  enoxaparin Injectable 40milliGRAM(s) SubCutaneous <User Schedule>  loperamide 2milliGRAM(s) Oral two times a day  ciprofloxacin   IVPB 400milliGRAM(s) IV Intermittent every 12 hours  metroNIDAZOLE  IVPB 500milliGRAM(s) IV Intermittent every 8 hours    MEDICATIONS  (PRN):  ALBUTerol    90 MICROgram(s) HFA Inhaler 2Puff(s) Inhalation every 6 hours PRN Shortness of Breath      Vital Signs Last 24 Hrs  T(C): 37, Max: 37 (06-23 @ 10:30)  HR: 72 (67 - 79)  BP: 130/83 (122/70 - 148/91)  RR: 20 (18 - 20)  SpO2: 98% (96% - 99%)  Wt(kg): --  CAPILLARY BLOOD GLUCOSE    I&O's Summary  I & Os for 24h ending 2017 07:00  =============================================  IN: 400 ml / OUT: 3445 ml / NET: -3045 ml    I & Os for current day (as of 2017 11:57)  =============================================  IN: 0 ml / OUT: 975 ml / NET: -975 ml      PHYSICAL EXAM:  GENERAL: NAD, well-developed  HEAD:  Atraumatic, Normocephalic  EYES: EOMI, PERRLA, conjunctiva and sclera clear, mild pallor  NECK: Supple, No JVD  CHEST/LUNG: Clear to auscultation bilaterally; No wheeze  HEART: Regular rate and rhythm; No murmurs, rubs, or gallops  ABDOMEN: Soft, Nontender, Nondistended; Bowel sounds present  healing incision  EXTREMITIES:  2+ Peripheral Pulses, No clubbing, cyanosis, or edema  PSYCH: AAOx3  NEUROLOGY: non-focal  SKIN: No rashes or lesions    LABS:                        8.8    10.00 )-----------( 267      ( 2017 06:18 )             26.9     06-23    140  |  103  |  8   ----------------------------<  106<H>  4.2   |  23  |  0.88    Ca    8.2<L>      2017 06:18  Phos  2.7     06-23  Mg     1.7     -                RADIOLOGY & ADDITIONAL TESTS:    Imaging Personally Reviewed:    Consultant(s) Notes Reviewed:      Care Discussed with Consultants/Other Providers:
66 year old male with a past history of tissue AVR, HTN, paroxysmal atrial flutter (immediately posy op), TIA's, COPD who returns with severe rectal bleeding requiring 4 units of PRBC's 1 unit of FFP and 1 unit of Platelets  Colonoscopy revealed an anastomotic bleeding requiring clips and injection with Epinephrine.  He is now off Levophed.    Meds: IV Cipro            IV Flagyl    /66  HR 68 (sinus)   Lungs clear  RR 1/6 systolic murmur  No edema    WBC 20.12 Hgb 9.5  Hct 28.3  Plt 230K  BUN 24  Crt 2.12  CO2  20     Imp: Stable from a CV standpoint.  No CHF despite the multiple units of blood products. Remains in NSR  Plan: Monitor CBC  Hold ASA and Plavix
Chief Complaint:  Patient is a 66y old  Male who presents with a chief complaint of lower GI bleeding (2017 00:31)      HPI: 66M with recent resection and diversion doing, well went home returns with lower gi bleed requiring icu care and vasopressors  patient had urgent colonoscopy with injection of epinephrine and clips placed  doing well sitting in bed  no bloody stool since colonoscopy    Allergies:  codeine (Hives)      Medications:  metroNIDAZOLE  IVPB     ciprofloxacin   IVPB     metroNIDAZOLE  IVPB 500milliGRAM(s) IV Intermittent every 8 hours  ciprofloxacin   IVPB 400milliGRAM(s) IV Intermittent every 12 hours  ALBUTerol    90 MICROgram(s) HFA Inhaler 2Puff(s) Inhalation every 6 hours PRN  chlorhexidine 4% Liquid 1Application(s) Topical daily  dextrose 5% + sodium chloride 0.9%. 1000milliLiter(s) IV Continuous <Continuous>  heparin  Injectable 5000Unit(s) SubCutaneous every 8 hours      PMHX/PSHX:  Arthritis  Hypercholesterolemia  HTN (hypertension)  Stenosis of carotid artery, unspecified laterality  Aortic valve disease  Amaurosis fugax  Sleep apnea  Snoring  Venous Insufficiency  Arterial Insufficiency  Microscopic Hematuria  OA (Osteoarthritis)  Obesity  Varicose Vein  Spinal Stenosis  Arthritis  cardiac valve disorder  HTN (Hypertension)  COPD with Chronic Bronchitis  Pilonidal Abscess  Kidney Stone  COPD (Chronic Obstructive Pulmonary Disease)  H/O total knee replacement, right  Aortic valve replaced  History of Lt Total Knee Replacement  S/P Cystoscopy  History of Nasal Septoplasty  Skin Tag Excision  Status Post Umbilical Hernia Repair  S/P Right Inguinal Hernia Repair  S/P Cataract Surgery  Tibialis Posterior Tendonitis  Plantar Tendonitis  Renal Calculi  I &amp; D of Pilonidal cyst  S/P Cardiac Cath  History of Spinal Stenosis  HTN (Hypertension)  Hyperlipidemia  History of Hernia Repair  History of Appendectomy      Family history:  Family history of cancer  Family history of COPD (chronic obstructive pulmonary disease)      Social History:     ROS:     General:  No wt loss, fevers, chills, night sweats, fatigue,   Eyes:  Good vision, no reported pain  ENT:  No sore throat, pain, runny nose, dysphagia  CV:  No pain, palpitations, hypo/hypertension  Resp:  No dyspnea, cough, tachypnea, wheezing  GI:  No pain, No nausea, No vomiting, No diarrhea, No constipation, No weight loss, No fever, No pruritis, + rectal bleeding, No tarry stools, No dysphagia,  :  No pain, bleeding, incontinence, nocturia  Muscle:  No pain, weakness  Neuro:  No weakness, tingling, memory problems  Psych:  No fatigue, insomnia, mood problems, depression  Endocrine:  No polyuria, polydipsia, cold/heat intolerance  Heme:  No petechiae, ecchymosis, easy bruisability  Skin:  No rash, tattoos, scars, edema      PHYSICAL EXAM:   Vital Signs:  Vital Signs Last 24 Hrs  T(C): 36.6, Max: 36.9 ( @ 23:30)  T(F): 97.9, Max: 98.5 ( @ 23:30)  HR: 66 (61 - 102)  BP: 116/44 (78/45 - 157/78)  BP(mean): 59 (49 - 94)  RR: 17 (14 - 27)  SpO2: 100% (90% - 100%)  Daily Height in cm: 175.26 (2017 20:00)    Daily Weight in k.3 (2017 06:00)    GENERAL:  Appears stated age, well-groomed, well-nourished, no distress  HEENT:  NC/AT,  conjunctivae clear and pink, no thyromegaly, nodules, adenopathy, no JVD, sclera -anicteric  CHEST:  Full & symmetric excursion, no increased effort, breath sounds clear  HEART:  Regular rhythm, S1, S2, no murmur/rub/S3/S4, no abdominal bruit, no edema  ABDOMEN:  Soft, non-tender, non-distended, normoactive bowel sounds,  no masses ,no hepato-splenomegaly, no signs of chronic liver disease  EXTEREMITIES:  no cyanosis,clubbing or edema  SKIN:  No rash/erythema/ecchymoses/petechiae/wounds/abscess/warm/dry  NEURO:  Alert, oriented, no asterixis, no tremor, no encephalopathy    LABS:                        8.8    15.85 )-----------( 231      ( 2017 11:30 )             26.2     06-    141  |  107  |  24<H>  ----------------------------<  123<H>  4.7   |  21<L>  |  1.91<H>    Ca    7.5<L>      2017 11:30  Phos  7.2     06-  Mg     2.2     -    TPro  4.5<L>  /  Alb  2.4<L>  /  TBili  1.0  /  DBili  x   /  AST  14  /  ALT  15  /  AlkPhos  54  -    LIVER FUNCTIONS - ( 2017 05:05 )  Alb: 2.4 g/dL / Pro: 4.5 g/dL / ALK PHOS: 54 u/L / ALT: 15 u/L / AST: 14 u/L / GGT: x           PT/INR - ( 2017 05:05 )   PT: 13.6 SEC;   INR: 1.21          PTT - ( 2017 05:05 )  PTT:24.2 SEC        Imaging:
Patient is a 66y old  Male who presents with a chief complaint of Blood per rectum (20 Jun 2017 18:50)  HPI:  66M POD 8 LAR with diverting loop ileostomy for rectosigmoid CA presents to ED approximately 6h post discharge with c/o blood per rectum. Patient states that he was at home when he experienced 2 episodes of large volume, dark-colored blood per rectum. Patient had additional large volume episode of dark blood per rectum in ED and later in the SICU. Denies dizziness, SOB at this time. Denies associated fevers/chills. No Abdominal pain, no fever, No diarrhea ostomy functioning,    PAST MEDICAL & SURGICAL HISTORY:  Arthritis  Hypercholesterolemia  HTN (hypertension)  Stenosis of carotid artery, unspecified laterality  Aortic valve disease  Amaurosis fugax: od occurring x 2  Sleep apnea  Snoring  Venous Insufficiency  Arterial Insufficiency  Microscopic Hematuria  OA (Osteoarthritis)  Obesity  Varicose Vein: B/L lower extremities  Spinal Stenosis  Arthritis  cardiac valve disorder  HTN (Hypertension)  COPD with Chronic Bronchitis  Pilonidal Abscess  Kidney Stone  COPD (Chronic Obstructive Pulmonary Disease)  H/O total knee replacement, right: 2011  Aortic valve replaced  History of Lt Total Knee Replacement: 4/11  S/P Cystoscopy: 2010  History of Nasal Septoplasty: 1979  Skin Tag Excision: Right Leg-2010  Status Post Umbilical Hernia Repair: 2/2011  S/P Right Inguinal Hernia Repair: 1979  S/P Cataract Surgery: Right - with lens placement  Tibialis Posterior Tendonitis: Repair B/L revision left 2016  Plantar Tendonitis: Repair- B L  Renal Calculi: lithotripsy  I &amp; D of Pilonidal cyst  S/P Cardiac Cath  History of Spinal Stenosis  HTN (Hypertension)  Hyperlipidemia  History of Hernia Repair  History of Appendectomy  LAR for malignant polyp, diverting ileostomy.    FH: NC  Social HX: non smoker, no ETOH Abuse  Allergies    codeine (Hives)    Vital Signs Last 24 Hrs  T(C): 36.8, Max: 36.8 (06-20 @ 16:33)  T(F): 98.3, Max: 98.3 (06-20 @ 19:45)  HR: 82 (67 - 102)  BP: 99/56 (81/45 - 157/78)  BP(mean): 59 (54 - 94)  RR: 19 (14 - 27)  SpO2: 100% (90% - 100%)  PHYSICAL EXAM:      Constitutional: NAD, pale, anxious.    General:  AOx3    Respiratory:  CTABL    Cardiovascular: S1+S2+0    Gastrointestinal : Abdomen soft, non distended, NT, dressing and incision CDI. Ostomy viable functional.  large vol clots per rectum.  Extremities: NV intact    Neurological: No focal deficit.    Labs:                          10.1   18.82 )-----------( 250      ( 20 Jun 2017 21:50 )             31.4       06-20    143  |  107  |  x   ----------------------------<  x   4.6   |  x   |  x     Ca    9.2      20 Jun 2017 16:55    TPro  6.9  /  Alb  3.6  /  TBili  0.6  /  DBili  x   /  AST  22  /  ALT  16  /  AlkPhos  87  06-20      PT/INR - ( 20 Jun 2017 17:16 )   PT: 12.0 SEC;   INR: 1.07          PTT - ( 20 Jun 2017 17:16 )  PTT:27.3 SEC    Hematocrit: 31.4 % (06.20.17 @ 21:50)    Lactate, Blood: 2.1 mmol/L (06.20.17 @ 21:50)
SICU Consultation Note  =====================================================  HPI: 66y male POD#8 s/p LAR with diverting loop ileostomy for malignant colon polyp presents with multiple episodes of blood and clots per rectum.  At the time of presentation, he had no complaints of abdominal pain, no shortness of breath, dizziness or chest pain.  The surgical ICU team was consulted for close hemodynamic monitoring.    HISTORY  HPI:  66M POD 8 LAR with diverting loop ileostomy for rectosigmoid CA presents to ED after being discharged this afternoon with complaint of blood per rectum. Patient states that he was at home when he experienced 2 episodes of large volume, dark-colored blood per rectum. Patient called Dr. Tulio Castillo ([facility]) who advised presentation to ED. Patient had additional large volume episodes of dark blood per rectum in ED and in the SICU.      Laboratory values showed leukocytosis to 16. H/H , drawn prior to bleeding observed in ED. INR 1.07. Cr elevated to 1.5. (2017 18:50)    Allergies:   PAST MEDICAL & SURGICAL HISTORY:  Arthritis  Hypercholesterolemia  HTN (hypertension)  Stenosis of carotid artery, unspecified laterality  Aortic valve disease  Amaurosis fugax: od occurring x 2  Sleep apnea  Snoring  Venous Insufficiency  Arterial Insufficiency  Microscopic Hematuria  OA (Osteoarthritis)  Obesity  Varicose Vein: B/L lower extremities  Spinal Stenosis  Arthritis  cardiac valve disorder  HTN (Hypertension)  COPD with Chronic Bronchitis  Pilonidal Abscess  Kidney Stone  COPD (Chronic Obstructive Pulmonary Disease)  H/O total knee replacement, right:   Aortic valve replaced  History of Lt Total Knee Replacement:   S/P Cystoscopy:   History of Nasal Septoplasty:   Skin Tag Excision: Right Leg-  Status Post Umbilical Hernia Repair: 2011  S/P Right Inguinal Hernia Repair:   S/P Cataract Surgery: Right - with lens placement  Tibialis Posterior Tendonitis: Repair B/L revision left 2016  Plantar Tendonitis: Repair- B L  Renal Calculi: lithotripsy  I &amp; D of Pilonidal cyst  S/P Cardiac Cath  History of Spinal Stenosis  HTN (Hypertension)  Hyperlipidemia  History of Hernia Repair  History of Appendectomy    FAMILY HISTORY:  Family history of cancer: Mother ( since - unknown cancer)  Family history of COPD (chronic obstructive pulmonary disease): Father ( since )    ADVANCE DIRECTIVES:Full Code     REVIEW OF SYSTEMS:  ***  General: No loss of consciousness, no falls  Skin/Breast: Non-Contributory  Ophthalmologic: No blurry vision  ENMT: Non-Contributory  Respiratory and Thorax: No shortness of breath  Cardiovascular: No chest pain  Gastrointestinal: No abdominal pain; bleeding per rectum  Genitourinary: No hematuria  Neurological: No focal weakness  Psychiatric: Non-Contributory    CURRENT MEDICATIONS:   --------------------------------------------------------------------------------------  Neurologic Medications    Respiratory Medications  ALBUTerol    90 MICROgram(s) HFA Inhaler 2Puff(s) Inhalation every 6 hours PRN Shortness of Breath    Cardiovascular Medications  norepinephrine Infusion 0.1MICROgram(s)/kG/Min IV Continuous <Continuous>    Gastrointestinal Medications  sodium chloride 0.9%. 1000milliLiter(s) IV Continuous <Continuous>    Genitourinary Medications    Hematologic/Oncologic Medications    Antimicrobial/Immunologic Medications  metroNIDAZOLE  IVPB     ciprofloxacin   IVPB     metroNIDAZOLE  IVPB 500milliGRAM(s) IV Intermittent every 8 hours  ciprofloxacin   IVPB 400milliGRAM(s) IV Intermittent every 12 hours    Endocrine/Metabolic Medications    Topical/Other Medications    --------------------------------------------------------------------------------------    VITAL SIGNS, INS/OUTS (last 24 hours):  --------------------------------------------------------------------------------------  T(C): 36.9, Max: 36.9 ( @ 23:30)  HR: 71 (61 - 102)  BP: 116/44 (78/45 - 157/78)  BP(mean): 59 (49 - 94)  ABP: 114/58 (107/55 - 127/61)  ABP(mean): 77 (73 - 83)  RR: 18 (14 - 27)  SpO2: 100% (90% - 100%)  Wt(kg): --  CVP(mm Hg): --  CI: --  CAPILLARY BLOOD GLUCOSE   N/A      I & Os for current day (as of  @ 01:21)  =============================================  IN:    Sodium Chloride 0.9% IV Bolus: 2000 ml    Packed Red Blood Cells: 1050 ml    sodium chloride 0.9%.: 562.5 ml    IV PiggyBack: 350 ml    Platelets - Single Donor: 200 ml    Oral Fluid: 50 ml    norepinephrine Infusion: 19.8 ml    norepinephrine Infusion: 15.6 ml    Total IN: 4247.9 ml  ---------------------------------------------  OUT:    Indwelling Catheter - Urethral: 70 ml    Total OUT: 70 ml  ---------------------------------------------  Total NET: 4177.9 ml    --------------------------------------------------------------------------------------    EXAM  NEUROLOGY  RASS:   	GCS:    Exam: NAD, alert, oriented x 3, no focal deficits.     HEENT  Exam: Normocephalic, atraumatic    RESPIRATORY  Exam: Breathing unlabored    CARDIOVASCULAR  Exam: S1, S2.  Regular rate and rhythm.      GI/NUTRITION  Exam: Abdomen soft, Non-tender, nondistended; ostomy viable with air and stool in bag; no blood in bag; midline incision healing well with staples, no bleeding or signs of infection  Current Diet:  NPO     VASCULAR  Exam: Extremities warm, well-perfused, palpable distal pulses    MUSCULOSKELETAL  Exam: All extremities moving spontaneously without limitations.     SKIN:  Exam: Good skin turgor    METABOLIC/FLUIDS/ELECTROLYTES  sodium chloride 0.9%. 1000milliLiter(s) IV Continuous <Continuous>      HEMATOLOGIC  [x] DVT Prophylaxis:   Transfusions:	[x] PRBCx2	[x] Plateletsx1		[] FFP	[] Cryoprecipitate    INFECTIOUS DISEASE  Antimicrobials/Immunologic Medications:  metroNIDAZOLE  IVPB     ciprofloxacin   IVPB     metroNIDAZOLE  IVPB 500milliGRAM(s) IV Intermittent every 8 hours  ciprofloxacin   IVPB 400milliGRAM(s) IV Intermittent every 12 hours    Day #  1    Tubes/Lines/Drains    [x] Peripheral IV  [] Central Venous Line     	[] R	[] L	[] IJ	[] Fem	[] SC	Date Placed:   [] Arterial Line		[] R	[] L	[] Fem	[] Rad	[] Ax	Date Placed:   [] PICC:         	[] Midline		[] Mediport  [] Urinary Catheter		Date Placed:     LABS  --------------------------------------------------------------------------------------  CBC ( @ 00:00)                          8.6<L>                   21.67<H>  )--------------(  283        --    % Neuts, --    % Lymphs, ANC: --                              26.3<L>  CBC ( @ 21:50)                          10.1<L>                   18.82<H>  )--------------(  250        --    % Neuts, --    % Lymphs, ANC: --                              31.4<L>    BMP ( @ 00:00)       142     |  109<H>  |  22    			Ca++ --      Ca 7.2<L>       ---------------------------------( 196<H>		Mg 1.7          4.9     |  19<L>   |  2.05<H>			Ph 4.9<H>  BMP ( @ 21:50)       143     |  107     |  21    			Ca++ --      Ca 7.6<L>       ---------------------------------( 179<H>		Mg 1.7          4.6     |  20<L>   |  1.84<H>			Ph 5.1<H>    LFTs ( @ 16:55)      TPro 6.9 / Alb 3.6 / TBili 0.6 / DBili -- / AST 22 / ALT 16 / AlkPhos 87    Coags ( @ 00:00)  aPTT 24.8<L> / INR 1.29<H> / PT 14.5<H>  Coags ( @ 17:16)  aPTT 27.3<L> / INR 1.07 / PT 12.0      ABG ( @ 21:50)      /  /  /  /  / %     Lactate:  2.1<H>  --------------------------------------------------------------------------------------    ASSESSMENT:  66y Male POD#8 s/p LAR, diverting loop ileostomy for colonic polyp with bleeding per rectum, likely at anastamosis.     PLAN:    Neurologic: no acute issues; monitor mental status  Respiratory: no acute issues; COPD - will give breo ellipta   Cardiovascular: bleeding per rectum - monitor blood pressure, heart rate; resuscitate as needed; plan for colonoscopy this evening if persistently bleeding otherwise in AM  Gastrointestinal/Nutrition: NPO, monitor bowel movements  Renal/Genitourinary: ELIAS - trend creatinine and resuscitate as needed; likely secondary to hypoperfusione  Hematologic: on antiplatelet medications - gave platelets, will give DDAVP  Infectious Disease: cipro/flagyl for leukocytosis, concern for anastamotic leak  Tubes/Lines/Drains: hailee, will need arterial and central lines placed  Endocrine: no acute issues   Disposition: SICU for hemodynamic monitoring and resuscitation  --------------------------------------------------------------------------------------    Critical Care Diagnoses:    Hemorrhagic shock  Gastrointestinal bleeding  Hypotension

## 2017-06-23 NOTE — DISCHARGE NOTE ADULT - INSTRUCTIONS
No dietary changes. Call md for follow up appointment. Call md for any return in symptoms.  Ostomy care as instructed .  Call md for any pain not relieved by medications.  Fever or unable to tolerate food or fluids

## 2017-06-23 NOTE — CONSULT NOTE ADULT - CONSULT REASON
Cardiology evaluation
Bleeding per rectum
GI bleed, S/P LAR, diverting loop ileostomy 6/12
gi bleed
clearance for discharge

## 2017-06-23 NOTE — DISCHARGE NOTE ADULT - CONDITIONS AT DISCHARGE
Stable, Awake, Alert Stable, Awake, Alert Tolerating diet. No bleeding noted. Pt doing ostomy care.  Ostomy functioning for liquid brown stool

## 2017-06-23 NOTE — DISCHARGE NOTE ADULT - PATIENT PORTAL LINK FT
“You can access the [facility] Patient Portal, offered by [facility], by registering with the following website: [email or web address]”

## 2017-06-23 NOTE — DISCHARGE NOTE ADULT - CARE PLAN
Principal Discharge DX:	Rectal bleeding  Goal:	s/p colonoscopic clipping for Lower GastroIntestional Bleeding  Instructions for follow-up, activity and diet:	WOUND CARE:  Please keep incisions clean and dry. Please do not Scrub or rub incisions. Do not use lotion or powder on incisions  BATHING: Please do not submerge wound underwater. You may shower and/or sponge bathe.  ACTIVITY: No heavy lifting or straining. Otherwise, you may return to your usual level of physical activity. If you are taking narcotic pain medication (such as Percocet) DO NOT drive a car, operate machinery or make important decisions.  DIET: Return to your usual diet.  NOTIFY YOUR SURGEON IF: You have any bleeding that does not stop, any pus draining from your wound(s), any fever (over 100.4 F) or chills, persistent nausea/vomiting, persistent diarrhea, or if your pain is not controlled on your discharge pain medications.  FOLLOW-UP: Please follow up with your primary care physician in one week regarding your hospitalization.  Please follow up with your surgeon, Dr. Castillo in  7 to 10 days

## 2017-06-23 NOTE — CONSULT NOTE ADULT - ASSESSMENT
66 y old male S/P LAR, diverting ileostomy 6/12 with bleeding SD    Pt received 2 units of PRBC,1 unit of platelet  Hct dropped from 36 to 31, hypotensive in 80s, central line placed.  Now S/P colonoscopy , injection of epinephrine and clipping of bleeding site at anastomosis by GI  Procedural sedation was provided during the colonoscopy  low urine out put  will continue IV  1 more unit PRBC  DDAVP  serial abdominal exam  Mechanical DVt prophylaxis  Hold Antiplatelets  Monitor urine out put  Trend H/h Q 4 hours  trend S /Lactate  D/W Dr Castillo.
Patient is a 66y old  Male who presents with a chief complaint of lower GI bleeding, now resolved

## 2017-06-24 VITALS
DIASTOLIC BLOOD PRESSURE: 78 MMHG | HEART RATE: 83 BPM | TEMPERATURE: 98 F | SYSTOLIC BLOOD PRESSURE: 140 MMHG | OXYGEN SATURATION: 99 % | RESPIRATION RATE: 19 BRPM

## 2017-06-24 LAB
BUN SERPL-MCNC: 7 MG/DL — SIGNIFICANT CHANGE UP (ref 7–23)
CALCIUM SERPL-MCNC: 8.4 MG/DL — SIGNIFICANT CHANGE UP (ref 8.4–10.5)
CHLORIDE SERPL-SCNC: 101 MMOL/L — SIGNIFICANT CHANGE UP (ref 98–107)
CO2 SERPL-SCNC: 23 MMOL/L — SIGNIFICANT CHANGE UP (ref 22–31)
CREAT SERPL-MCNC: 0.88 MG/DL — SIGNIFICANT CHANGE UP (ref 0.5–1.3)
GLUCOSE SERPL-MCNC: 109 MG/DL — HIGH (ref 70–99)
HCT VFR BLD CALC: 29 % — LOW (ref 39–50)
HGB BLD-MCNC: 9.6 G/DL — LOW (ref 13–17)
MAGNESIUM SERPL-MCNC: 2 MG/DL — SIGNIFICANT CHANGE UP (ref 1.6–2.6)
MCHC RBC-ENTMCNC: 28.6 PG — SIGNIFICANT CHANGE UP (ref 27–34)
MCHC RBC-ENTMCNC: 33.1 % — SIGNIFICANT CHANGE UP (ref 32–36)
MCV RBC AUTO: 86.3 FL — SIGNIFICANT CHANGE UP (ref 80–100)
PHOSPHATE SERPL-MCNC: 3.3 MG/DL — SIGNIFICANT CHANGE UP (ref 2.5–4.5)
PLATELET # BLD AUTO: 315 K/UL — SIGNIFICANT CHANGE UP (ref 150–400)
PMV BLD: 9 FL — SIGNIFICANT CHANGE UP (ref 7–13)
POTASSIUM SERPL-MCNC: 4.1 MMOL/L — SIGNIFICANT CHANGE UP (ref 3.5–5.3)
POTASSIUM SERPL-SCNC: 4.1 MMOL/L — SIGNIFICANT CHANGE UP (ref 3.5–5.3)
RBC # BLD: 3.36 M/UL — LOW (ref 4.2–5.8)
RBC # FLD: 15.5 % — HIGH (ref 10.3–14.5)
SODIUM SERPL-SCNC: 140 MMOL/L — SIGNIFICANT CHANGE UP (ref 135–145)
WBC # BLD: 11.77 K/UL — HIGH (ref 3.8–10.5)
WBC # FLD AUTO: 11.77 K/UL — HIGH (ref 3.8–10.5)

## 2017-06-24 RX ORDER — MOXIFLOXACIN HYDROCHLORIDE TABLETS, 400 MG 400 MG/1
1 TABLET, FILM COATED ORAL
Qty: 14 | Refills: 0 | OUTPATIENT
Start: 2017-06-24 | End: 2017-07-01

## 2017-06-24 RX ORDER — METRONIDAZOLE 500 MG
1 TABLET ORAL
Qty: 21 | Refills: 0 | OUTPATIENT
Start: 2017-06-24 | End: 2017-07-01

## 2017-06-24 RX ORDER — ASPIRIN/CALCIUM CARB/MAGNESIUM 324 MG
1 TABLET ORAL
Qty: 0 | Refills: 0 | COMMUNITY

## 2017-06-24 RX ADMIN — AMLODIPINE BESYLATE 10 MILLIGRAM(S): 2.5 TABLET ORAL at 10:31

## 2017-06-24 RX ADMIN — ENOXAPARIN SODIUM 40 MILLIGRAM(S): 100 INJECTION SUBCUTANEOUS at 10:31

## 2017-06-24 RX ADMIN — Medication 100 MILLIGRAM(S): at 06:44

## 2017-06-24 RX ADMIN — LOSARTAN POTASSIUM 25 MILLIGRAM(S): 100 TABLET, FILM COATED ORAL at 06:44

## 2017-06-24 RX ADMIN — Medication 2 MILLIGRAM(S): at 06:45

## 2017-06-24 NOTE — PROGRESS NOTE ADULT - SUBJECTIVE AND OBJECTIVE BOX
Patient is a 66y old  Male who presents with a chief complaint of lower GI bleeding (23 Jun 2017 07:18)      SUBJECTIVE / OVERNIGHT EVENTS: No nausea, vomiting or diarrhea, no fever or chills, no dizziness or chest pain, no dysuria or hematuria, no jt pain or swelling  No BRBPR no melena    "I want to go home"    MEDICATIONS  (STANDING):  simvastatin 20milliGRAM(s) Oral at bedtime  amLODIPine   Tablet 10milliGRAM(s) Oral <User Schedule>  losartan 25milliGRAM(s) Oral <User Schedule>  enoxaparin Injectable 40milliGRAM(s) SubCutaneous <User Schedule>  loperamide 2milliGRAM(s) Oral two times a day  ciprofloxacin   IVPB 400milliGRAM(s) IV Intermittent every 12 hours  metroNIDAZOLE  IVPB 500milliGRAM(s) IV Intermittent every 8 hours    MEDICATIONS  (PRN):  ALBUTerol    90 MICROgram(s) HFA Inhaler 2Puff(s) Inhalation every 6 hours PRN Shortness of Breath      Vital Signs Last 24 Hrs  T(C): 36.9, Max: 37.1 (06-23 @ 15:54)  HR: 83 (59 - 83)  BP: 140/78 (125/84 - 140/78)  RR: 19 (18 - 19)  SpO2: 99% (96% - 100%)  Wt(kg): --  CAPILLARY BLOOD GLUCOSE    I&O's Summary  I & Os for 24h ending 24 Jun 2017 07:00  =============================================  IN: 350 ml / OUT: 2945 ml / NET: -2595 ml    I & Os for current day (as of 24 Jun 2017 11:52)  =============================================  IN: 240 ml / OUT: 350 ml / NET: -110 ml          LABS:                        9.6    11.77 )-----------( 315      ( 24 Jun 2017 06:30 )             29.0     06-24    140  |  101  |  7   ----------------------------<  109<H>  4.1   |  23  |  0.88    Ca    8.4      24 Jun 2017 06:30  Phos  3.3     06-24  Mg     2.0     06-24                  Consultant(s) Notes Reviewed:      Care Discussed with Consultants/Other Providers:    Contact Number, Dr Real [phone] Patient is a 66y old  Male who presents with a chief complaint of lower GI bleeding (23 Jun 2017 07:18)      SUBJECTIVE / OVERNIGHT EVENTS: No nausea, vomiting or diarrhea, no fever or chills, no dizziness or chest pain, no dysuria or hematuria, no jt pain or swelling  No BRBPR no melena    "I want to go home"    MEDICATIONS  (STANDING):  simvastatin 20milliGRAM(s) Oral at bedtime  amLODIPine   Tablet 10milliGRAM(s) Oral <User Schedule>  losartan 25milliGRAM(s) Oral <User Schedule>  enoxaparin Injectable 40milliGRAM(s) SubCutaneous <User Schedule>  loperamide 2milliGRAM(s) Oral two times a day  ciprofloxacin   IVPB 400milliGRAM(s) IV Intermittent every 12 hours  metroNIDAZOLE  IVPB 500milliGRAM(s) IV Intermittent every 8 hours    MEDICATIONS  (PRN):  ALBUTerol    90 MICROgram(s) HFA Inhaler 2Puff(s) Inhalation every 6 hours PRN Shortness of Breath      Vital Signs Last 24 Hrs  T(C): 36.9, Max: 37.1 (06-23 @ 15:54)  HR: 83 (59 - 83)  BP: 140/78 (125/84 - 140/78)  RR: 19 (18 - 19)  SpO2: 99% (96% - 100%)  Wt(kg): --  CAPILLARY BLOOD GLUCOSE    I&O's Summary  I & Os for 24h ending 24 Jun 2017 07:00  =============================================  IN: 350 ml / OUT: 2945 ml / NET: -2595 ml    I & Os for current day (as of 24 Jun 2017 11:52)  =============================================  IN: 240 ml / OUT: 350 ml / NET: -110 ml    PHYSICAL EXAM:  GENERAL: NAD, well-developed  HEAD:  Atraumatic, Normocephalic  EYES: EOMI, PERRLA, conjunctiva and sclera clear, mild pallor  NECK: Supple, No JVD  CHEST/LUNG: Clear to auscultation bilaterally; No wheeze  HEART: Regular rate and rhythm; No murmurs, rubs, or gallops  ABDOMEN: Soft, Nontender, Nondistended; Bowel sounds present  healing incision  EXTREMITIES:  2+ Peripheral Pulses, No clubbing, cyanosis, or edema  PSYCH: AAOx3  NEUROLOGY: non-focal  SKIN: No rashes or lesions      LABS:                        9.6    11.77 )-----------( 315      ( 24 Jun 2017 06:30 )             29.0     06-24    140  |  101  |  7   ----------------------------<  109<H>  4.1   |  23  |  0.88    Ca    8.4      24 Jun 2017 06:30  Phos  3.3     06-24  Mg     2.0     06-24                  Consultant(s) Notes Reviewed:      Care Discussed with Consultants/Other Providers:    Contact Number, Dr Real [phone]

## 2017-06-24 NOTE — PROGRESS NOTE ADULT - PROBLEM SELECTOR PLAN 1
- Regular diet  - Imodium 2m po BID  - Cont IV ABX  - OOB  - Possible discharge today
- Regular diet  - Imodium 2m po BID  - Cont IV ABX, complete 7 day course PO antibiotic once discharged.  - OOB  - discharge today
-s/p recent LAR, diverting loop ileostomy  -surgery following/appreciated
- Adv to regular diet  - Trend H&H  - transfuse as necessary  - Imodium 2m po BID  - Cont IV ABX
- Monitor HCT  - Transfuse as needed
as above  full conmsult and report to follow
-s/p recent LAR, diverting loop ileostomy  -surgery following/appreciated
well controlled  continue Losartan 25 and amlodipine 10 mg qd

## 2017-06-24 NOTE — PROGRESS NOTE ADULT - SUBJECTIVE AND OBJECTIVE BOX
INTERVAL HPI/OVERNIGHT EVENTS:  Tolerating diet, wants to go home. Pt to home today.     T(C): 36.8, Max: 37.1 (06-23 @ 15:54)  HR: 59 (59 - 79)  BP: 126/76 (125/84 - 148/91)  ABP: --  ABP(mean): --  RR: 18 (18 - 20)  SpO2: 100% (96% - 100%)  Wt(kg): --  CVP(mm Hg): --      I & Os for current day (as of 06-24 @ 08:28)  =============================================  IN:    IV PiggyBack: 300 ml    Oral Fluid: 50 ml    Total IN: 350 ml  ---------------------------------------------  OUT:    Voided: 2200 ml    Ileostomy: 545 ml    Total OUT: 2745 ml  ---------------------------------------------  Total NET: -2395 ml      PHYSICAL EXAM:  Constitutional:  WA, AOx3, NAD  Gastrointestinal: Abd soft, nontender, nondistended  Stoma: [place] & Viable, gas in bag,  stool present

## 2017-06-24 NOTE — PROGRESS NOTE ADULT - PROBLEM SELECTOR PROBLEM 1
Rectosigmoid cancer
HTN (Hypertension)
Rectal bleeding
Rectal bleeding
Rectosigmoid cancer
Rectal bleeding

## 2017-06-24 NOTE — PROGRESS NOTE ADULT - ASSESSMENT
66M s/p LAR with diverting loop ileostomy, readmitted with LGIB s/p colonoscopy found to have oozing ulcer which was injected with epinephrine and was clipped.

## 2017-06-24 NOTE — PROGRESS NOTE ADULT - ATTENDING COMMENTS
June 21st, 2017  8 PM    Hospital Day #1  Post op Day #9    This patient remains in the SICU where he was seen / evaluated on daily rounds with the multidisciplinary critical care team. The adjacent SICU house staff note was reviewed. He is examined independently now.    BP		=	105 – 147/53 - 75  P		=	61 - 95			O2 Sat	=	90% - 100%  R		=	14 - 27			I/O	=	6,110 in/1,500 out  Temp		=	36.1 –36.9				+6 liters since admission    Glucose	=	-    Current wt	=	104.3 Kg  Max wt		=	104.3 Kg  Admit wt	=	104.3 Kg    BMI		=	34    Awake, alert, and fully oriented. In no acute distress. Non-toxic  Anicteric. Pupils reactive. Extra-occular movements preserved.  No thrush  Right IJ CVC in place  Lungs clear with non-labored respirations. Symmetrical chest wall movements.  COR – RRR. I don’t hear a murmur (has a bovine aortic valve replacement).  Abdomen softly distended. Non-tender. Ostomy viable and functional. Wound clean with staples in place.  Extremities well perfused. No rash.    WBC		=	16	Na		=	141  Neutro		=	-	K		=	4.7  Hgb		=	9	Cl		=	107  Hct		=	26%	HCO3		=	21  Plts		=	231	Glucose	=	123	  				BUN		=	24 (increased from 9)  PT		=	13.6	Creat		=	1.9 (increased from 0.6, decreased from 2.1)  PTT		=	24.2  INR		=	1.21  Fibrinogen	=	458    pH		=	7.37	Lactate		=	1.0  pCO2		=	39  pO2		=	148  BE		=	-2.6    Remains on:    1)	Clear liquid diet  2)	D5NS @ 100 ml/hour  3)	Ciprofloxacin 400 IV q12 hours		-	Day #1  4)	Flagyl 500 mg IV q8 hours		-	Day #1  5)	Heparin 5,000 units sq q8 hours  6)	Chlorhexidine bath q24 hours  7)	Albuterol 2 puffs via spacer q6 hours prn    Assessment:	This patient is assessed as being an obese (BMI = 34) 66-year-old hypertensive  		male who has had paroxysmal atrial flutter, TIA’s and chronic lung disease who  		had a bovine aortic valve replacement and who was found to have a neoplastic  		rectal polyp that was removed. He is now 9 days s/p low anterior resection with  		primary stapled anastomosis and creation of a diverting loop ileostomy who has  		had bleeding from a rectal ulcer that was treated with endoscopic injection of  		epinephrine and the placement of 4 clips. He was transfused 4 units of blood,  		a unit of platelets and 1 units of plasma. He is more stable and does not appear  		to have ongoing bleeding. His antiplatelet medications (aspirin and Plavix) were  		reversed with the transfusion of platelets. He has blood loss anemia.  Plan to:    1)	Resume Zocor.  2)	Resume Amlodipine.  3)	Resume Losartan.  4)	Re-check hematocrit.  5)	Lower the rate of parenteral fluid administration.  6)	Doubt anastomotic leak. Will plan to discontinue the ciprofloxacin and flagyl as  	per the primary surgical team. Will also plan to advance his diet.  7)	Clarify the use of unfractionated sq heparin.  8)	Plan to allow out of bed with assistance.  9)	Monitor vital signs and I/O’s every 4 hours. Probably remove the arterial cannula and  	CVC in the next 12 – 24 hours if stable (expect him to be stable but will monitor).  10)	Care discussed with the patient and his spouse.  11)	Full support  in place    Dustin Ashraf  40 minutes exclusive of procedures
was at bedside last night and in am  now hemodynamically stable, no further bleeding s/p cliping vessel at anastomoses  wbc down to 15  remains afebrile with absolutely no abd, no pelvis pain  if any concerns such as fever or pain or rebleed will get CT but since he is absolutely stable now and no obvious signs of sepsis will hold off  wbc likely reactive and assoc dehydration  ok for clears  monitor in ICU
no medical contraindication to discharge

## 2017-08-28 ENCOUNTER — APPOINTMENT (OUTPATIENT)
Dept: RADIOLOGY | Facility: HOSPITAL | Age: 67
End: 2017-08-28

## 2017-08-28 ENCOUNTER — OUTPATIENT (OUTPATIENT)
Dept: OUTPATIENT SERVICES | Facility: HOSPITAL | Age: 67
LOS: 1 days | End: 2017-08-28
Payer: COMMERCIAL

## 2017-08-28 DIAGNOSIS — Z95.4 PRESENCE OF OTHER HEART-VALVE REPLACEMENT: Chronic | ICD-10-CM

## 2017-08-28 DIAGNOSIS — C20 MALIGNANT NEOPLASM OF RECTUM: ICD-10-CM

## 2017-08-28 DIAGNOSIS — Z96.651 PRESENCE OF RIGHT ARTIFICIAL KNEE JOINT: Chronic | ICD-10-CM

## 2017-08-28 PROCEDURE — 74270 X-RAY XM COLON 1CNTRST STD: CPT | Mod: 26

## 2017-09-01 ENCOUNTER — RESULT REVIEW (OUTPATIENT)
Age: 67
End: 2017-09-01

## 2017-09-06 ENCOUNTER — OUTPATIENT (OUTPATIENT)
Dept: OUTPATIENT SERVICES | Facility: HOSPITAL | Age: 67
LOS: 1 days | End: 2017-09-06

## 2017-09-06 VITALS
OXYGEN SATURATION: 96 % | DIASTOLIC BLOOD PRESSURE: 84 MMHG | HEART RATE: 87 BPM | TEMPERATURE: 209 F | RESPIRATION RATE: 18 BRPM | HEIGHT: 66.5 IN | SYSTOLIC BLOOD PRESSURE: 148 MMHG | WEIGHT: 244.05 LBS

## 2017-09-06 DIAGNOSIS — Z95.4 PRESENCE OF OTHER HEART-VALVE REPLACEMENT: Chronic | ICD-10-CM

## 2017-09-06 DIAGNOSIS — Z93.2 ILEOSTOMY STATUS: ICD-10-CM

## 2017-09-06 DIAGNOSIS — Z98.890 OTHER SPECIFIED POSTPROCEDURAL STATES: Chronic | ICD-10-CM

## 2017-09-06 DIAGNOSIS — Z43.2 ENCOUNTER FOR ATTENTION TO ILEOSTOMY: ICD-10-CM

## 2017-09-06 DIAGNOSIS — Z98.49 CATARACT EXTRACTION STATUS, UNSPECIFIED EYE: Chronic | ICD-10-CM

## 2017-09-06 DIAGNOSIS — Z95.2 PRESENCE OF PROSTHETIC HEART VALVE: Chronic | ICD-10-CM

## 2017-09-06 DIAGNOSIS — G47.33 OBSTRUCTIVE SLEEP APNEA (ADULT) (PEDIATRIC): ICD-10-CM

## 2017-09-06 DIAGNOSIS — R06.09 OTHER FORMS OF DYSPNEA: ICD-10-CM

## 2017-09-06 DIAGNOSIS — Z90.49 ACQUIRED ABSENCE OF OTHER SPECIFIED PARTS OF DIGESTIVE TRACT: Chronic | ICD-10-CM

## 2017-09-06 DIAGNOSIS — Z96.651 PRESENCE OF RIGHT ARTIFICIAL KNEE JOINT: Chronic | ICD-10-CM

## 2017-09-06 LAB
ALBUMIN SERPL ELPH-MCNC: 4 G/DL — SIGNIFICANT CHANGE UP (ref 3.3–5)
ALP SERPL-CCNC: 104 U/L — SIGNIFICANT CHANGE UP (ref 40–120)
ALT FLD-CCNC: 15 U/L — SIGNIFICANT CHANGE UP (ref 4–41)
AST SERPL-CCNC: 15 U/L — SIGNIFICANT CHANGE UP (ref 4–40)
BILIRUB SERPL-MCNC: 0.5 MG/DL — SIGNIFICANT CHANGE UP (ref 0.2–1.2)
BLD GP AB SCN SERPL QL: NEGATIVE — SIGNIFICANT CHANGE UP
BUN SERPL-MCNC: 17 MG/DL — SIGNIFICANT CHANGE UP (ref 7–23)
CALCIUM SERPL-MCNC: 8.9 MG/DL — SIGNIFICANT CHANGE UP (ref 8.4–10.5)
CHLORIDE SERPL-SCNC: 103 MMOL/L — SIGNIFICANT CHANGE UP (ref 98–107)
CO2 SERPL-SCNC: 23 MMOL/L — SIGNIFICANT CHANGE UP (ref 22–31)
CREAT SERPL-MCNC: 1.05 MG/DL — SIGNIFICANT CHANGE UP (ref 0.5–1.3)
GLUCOSE SERPL-MCNC: 137 MG/DL — HIGH (ref 70–99)
HBA1C BLD-MCNC: 6.1 % — HIGH (ref 4–5.6)
HCT VFR BLD CALC: 35.1 % — LOW (ref 39–50)
HGB BLD-MCNC: 10.7 G/DL — LOW (ref 13–17)
MCHC RBC-ENTMCNC: 24.6 PG — LOW (ref 27–34)
MCHC RBC-ENTMCNC: 30.5 % — LOW (ref 32–36)
MCV RBC AUTO: 80.7 FL — SIGNIFICANT CHANGE UP (ref 80–100)
NRBC # FLD: 0 — SIGNIFICANT CHANGE UP
PLATELET # BLD AUTO: 285 K/UL — SIGNIFICANT CHANGE UP (ref 150–400)
PMV BLD: 9.5 FL — SIGNIFICANT CHANGE UP (ref 7–13)
POTASSIUM SERPL-MCNC: 4.2 MMOL/L — SIGNIFICANT CHANGE UP (ref 3.5–5.3)
POTASSIUM SERPL-SCNC: 4.2 MMOL/L — SIGNIFICANT CHANGE UP (ref 3.5–5.3)
PROT SERPL-MCNC: 7.1 G/DL — SIGNIFICANT CHANGE UP (ref 6–8.3)
RBC # BLD: 4.35 M/UL — SIGNIFICANT CHANGE UP (ref 4.2–5.8)
RBC # FLD: 15 % — HIGH (ref 10.3–14.5)
RH IG SCN BLD-IMP: POSITIVE — SIGNIFICANT CHANGE UP
SODIUM SERPL-SCNC: 141 MMOL/L — SIGNIFICANT CHANGE UP (ref 135–145)
WBC # BLD: 5.96 K/UL — SIGNIFICANT CHANGE UP (ref 3.8–10.5)
WBC # FLD AUTO: 5.96 K/UL — SIGNIFICANT CHANGE UP (ref 3.8–10.5)

## 2017-09-06 RX ORDER — SODIUM CHLORIDE 9 MG/ML
1000 INJECTION, SOLUTION INTRAVENOUS
Qty: 0 | Refills: 0 | Status: DISCONTINUED | OUTPATIENT
Start: 2017-09-18 | End: 2017-09-19

## 2017-09-06 RX ORDER — AMLODIPINE BESYLATE 2.5 MG/1
1 TABLET ORAL
Qty: 0 | Refills: 0 | COMMUNITY

## 2017-09-06 NOTE — H&P PST ADULT - RX
CPAP was ordered, pt. states he couldn't afford it and "I can't sleep with that big thing on my face"

## 2017-09-06 NOTE — H&P PST ADULT - PSH
H/O aortic valve replacement  2015  H/O cataract removal with insertion of prosthetic lens  right-9/7/2004  H/O total knee replacement, right  2011  H/O umbilical hernia repair  x2-2011, 2016  History of Appendectomy    History of Lt Total Knee Replacement  4/2011  History of Nasal Septoplasty  1979  I & D of Pilonidal cyst    Plantar Tendonitis  Repair- B L  Renal Calculi  lithotripsy  S/P Cardiac Cath    S/P Cataract Surgery  Right - with lens placement  S/P colon resection  and umbilical hernia repair-6/12/2017  S/P Cystoscopy  2010  S/P Right Inguinal Hernia Repair  1979  Skin Tag Excision  Right Leg-2010  Tibialis Posterior Tendonitis  Repair B/L revision left 2016 H/O aortic valve replacement  2015  H/O cataract removal with insertion of prosthetic lens  right-9/7/2004  H/O total knee replacement, right  2011  H/O umbilical hernia repair  x2-2011, 2016  History of Appendectomy    History of Lt Total Knee Replacement  4/2011  History of Nasal Septoplasty  1979  I & D of Pilonidal cyst    Plantar Tendonitis  Repair- B L  Renal Calculi  lithotripsy  S/P Cardiac Cath    S/P Cataract Surgery  Right - with lens placement  S/P colon resection  and umbilical hernia repair-6/12/2017  S/P Cystoscopy  2010  S/P reconstruction procedure  "my left foot I had no arch"-12/2015  S/P Right Inguinal Hernia Repair  1979  Skin Tag Excision  Right Leg-2010  Tibialis Posterior Tendonitis  Repair B/L revision left 2016

## 2017-09-06 NOTE — H&P PST ADULT - LAST STRESS TEST
pre op AVR, pass? "it was a long time ago, I don't remember, I'm pretty sure I did, otherwise I wouldn't have been able to have the hearst surgery

## 2017-09-06 NOTE — H&P PST ADULT - PROBLEM SELECTOR PLAN 2
Pt. is scheduled for cardiac clearance 9/8/17.  Pt. had aortic valve replacement and has been on ASA and Plavix.  Pt. had a hemorrhage after discharge home 6/20/17 requiring a blood transfusion.  Consequently, ASA and Plavix were d'cd.  Discussed with Dr. Denson.

## 2017-09-06 NOTE — H&P PST ADULT - FAMILY HISTORY
Father  Still living? No  Family history of COPD (chronic obstructive pulmonary disease), Age at diagnosis: Age Unknown     Mother  Still living? No  Family history of lung cancer, Age at diagnosis: Age Unknown

## 2017-09-06 NOTE — H&P PST ADULT - FALL HARM RISK
other/coagulation(Bleeding disorder R/T clinical cond/anti-coags)/left fott issues / stability issues/surgery other/left foot issues / stability issues/surgery

## 2017-09-06 NOTE — H&P PST ADULT - PMH
Amaurosis fugax  "every once in awhile my right eye goes out for about a minute"  Aortic valve disease    Arthritis    COPD (Chronic Obstructive Pulmonary Disease)    HTN (Hypertension)    Hypercholesterolemia    Kidney Stone    Microscopic Hematuria    OA (Osteoarthritis)    Obesity    Pilonidal Abscess    Sleep apnea    Spinal Stenosis    Stenosis of carotid artery, unspecified laterality    Varicose Vein  B/L lower extremities  Venous Insufficiency

## 2017-09-06 NOTE — H&P PST ADULT - MALLAMPATI CLASS
Class II - visualization of the soft palate, fauces, and uvula on phonation/Class III - visualization of the soft palate and the base of the uvula

## 2017-09-06 NOTE — H&P PST ADULT - VENOUS THROMBOEMBOLISM CURRENT STATUS
major surgery lasting 2-3 hrs varicose veins/major surgery lasting 2-3 hrs/present cancer or chemotherapy

## 2017-09-12 ENCOUNTER — APPOINTMENT (OUTPATIENT)
Dept: NUCLEAR MEDICINE | Facility: IMAGING CENTER | Age: 67
End: 2017-09-12
Payer: COMMERCIAL

## 2017-09-12 ENCOUNTER — OUTPATIENT (OUTPATIENT)
Dept: OUTPATIENT SERVICES | Facility: HOSPITAL | Age: 67
LOS: 1 days | End: 2017-09-12
Payer: COMMERCIAL

## 2017-09-12 DIAGNOSIS — Z98.890 OTHER SPECIFIED POSTPROCEDURAL STATES: Chronic | ICD-10-CM

## 2017-09-12 DIAGNOSIS — Z90.49 ACQUIRED ABSENCE OF OTHER SPECIFIED PARTS OF DIGESTIVE TRACT: Chronic | ICD-10-CM

## 2017-09-12 DIAGNOSIS — Z00.8 ENCOUNTER FOR OTHER GENERAL EXAMINATION: ICD-10-CM

## 2017-09-12 DIAGNOSIS — Z95.2 PRESENCE OF PROSTHETIC HEART VALVE: Chronic | ICD-10-CM

## 2017-09-12 DIAGNOSIS — Z98.49 CATARACT EXTRACTION STATUS, UNSPECIFIED EYE: Chronic | ICD-10-CM

## 2017-09-12 DIAGNOSIS — Z96.651 PRESENCE OF RIGHT ARTIFICIAL KNEE JOINT: Chronic | ICD-10-CM

## 2017-09-12 PROCEDURE — 78815 PET IMAGE W/CT SKULL-THIGH: CPT | Mod: 26,PS

## 2017-09-12 PROCEDURE — 78815 PET IMAGE W/CT SKULL-THIGH: CPT

## 2017-09-12 PROCEDURE — A9552: CPT

## 2017-09-18 ENCOUNTER — RESULT REVIEW (OUTPATIENT)
Age: 67
End: 2017-09-18

## 2017-09-18 ENCOUNTER — INPATIENT (INPATIENT)
Facility: HOSPITAL | Age: 67
LOS: 3 days | Discharge: HOME HEALTH | End: 2017-09-22
Attending: SURGERY | Admitting: SURGERY
Payer: COMMERCIAL

## 2017-09-18 VITALS
HEIGHT: 66.5 IN | HEART RATE: 82 BPM | TEMPERATURE: 99 F | SYSTOLIC BLOOD PRESSURE: 137 MMHG | RESPIRATION RATE: 15 BRPM | DIASTOLIC BLOOD PRESSURE: 84 MMHG | OXYGEN SATURATION: 96 % | WEIGHT: 244.05 LBS

## 2017-09-18 DIAGNOSIS — Z90.49 ACQUIRED ABSENCE OF OTHER SPECIFIED PARTS OF DIGESTIVE TRACT: Chronic | ICD-10-CM

## 2017-09-18 DIAGNOSIS — Z43.2 ENCOUNTER FOR ATTENTION TO ILEOSTOMY: ICD-10-CM

## 2017-09-18 DIAGNOSIS — Z96.651 PRESENCE OF RIGHT ARTIFICIAL KNEE JOINT: Chronic | ICD-10-CM

## 2017-09-18 DIAGNOSIS — Z95.2 PRESENCE OF PROSTHETIC HEART VALVE: Chronic | ICD-10-CM

## 2017-09-18 DIAGNOSIS — Z98.890 OTHER SPECIFIED POSTPROCEDURAL STATES: Chronic | ICD-10-CM

## 2017-09-18 DIAGNOSIS — Z98.49 CATARACT EXTRACTION STATUS, UNSPECIFIED EYE: Chronic | ICD-10-CM

## 2017-09-18 LAB
BASOPHILS # BLD AUTO: 0.03 K/UL — SIGNIFICANT CHANGE UP (ref 0–0.2)
BASOPHILS NFR BLD AUTO: 0.3 % — SIGNIFICANT CHANGE UP (ref 0–2)
BLD GP AB SCN SERPL QL: NEGATIVE — SIGNIFICANT CHANGE UP
BUN SERPL-MCNC: 17 MG/DL — SIGNIFICANT CHANGE UP (ref 7–23)
CALCIUM SERPL-MCNC: 8.7 MG/DL — SIGNIFICANT CHANGE UP (ref 8.4–10.5)
CHLORIDE SERPL-SCNC: 103 MMOL/L — SIGNIFICANT CHANGE UP (ref 98–107)
CO2 SERPL-SCNC: 22 MMOL/L — SIGNIFICANT CHANGE UP (ref 22–31)
CREAT SERPL-MCNC: 1.1 MG/DL — SIGNIFICANT CHANGE UP (ref 0.5–1.3)
EOSINOPHIL # BLD AUTO: 0.01 K/UL — SIGNIFICANT CHANGE UP (ref 0–0.5)
EOSINOPHIL NFR BLD AUTO: 0.1 % — SIGNIFICANT CHANGE UP (ref 0–6)
GLUCOSE SERPL-MCNC: 142 MG/DL — HIGH (ref 70–99)
HCT VFR BLD CALC: 34.8 % — LOW (ref 39–50)
HGB BLD-MCNC: 10.6 G/DL — LOW (ref 13–17)
IMM GRANULOCYTES # BLD AUTO: 0.05 # — SIGNIFICANT CHANGE UP
IMM GRANULOCYTES NFR BLD AUTO: 0.5 % — SIGNIFICANT CHANGE UP (ref 0–1.5)
LYMPHOCYTES # BLD AUTO: 0.71 K/UL — LOW (ref 1–3.3)
LYMPHOCYTES # BLD AUTO: 6.9 % — LOW (ref 13–44)
MCHC RBC-ENTMCNC: 24.3 PG — LOW (ref 27–34)
MCHC RBC-ENTMCNC: 30.5 % — LOW (ref 32–36)
MCV RBC AUTO: 79.6 FL — LOW (ref 80–100)
MONOCYTES # BLD AUTO: 0.1 K/UL — SIGNIFICANT CHANGE UP (ref 0–0.9)
MONOCYTES NFR BLD AUTO: 1 % — LOW (ref 2–14)
NEUTROPHILS # BLD AUTO: 9.35 K/UL — HIGH (ref 1.8–7.4)
NEUTROPHILS NFR BLD AUTO: 91.2 % — HIGH (ref 43–77)
NRBC # FLD: 0 — SIGNIFICANT CHANGE UP
PLATELET # BLD AUTO: 243 K/UL — SIGNIFICANT CHANGE UP (ref 150–400)
PMV BLD: 9.3 FL — SIGNIFICANT CHANGE UP (ref 7–13)
POTASSIUM SERPL-MCNC: 4.3 MMOL/L — SIGNIFICANT CHANGE UP (ref 3.5–5.3)
POTASSIUM SERPL-SCNC: 4.3 MMOL/L — SIGNIFICANT CHANGE UP (ref 3.5–5.3)
RBC # BLD: 4.37 M/UL — SIGNIFICANT CHANGE UP (ref 4.2–5.8)
RBC # FLD: 15.6 % — HIGH (ref 10.3–14.5)
SODIUM SERPL-SCNC: 139 MMOL/L — SIGNIFICANT CHANGE UP (ref 135–145)
WBC # BLD: 10.25 K/UL — SIGNIFICANT CHANGE UP (ref 3.8–10.5)
WBC # FLD AUTO: 10.25 K/UL — SIGNIFICANT CHANGE UP (ref 3.8–10.5)

## 2017-09-18 PROCEDURE — 88304 TISSUE EXAM BY PATHOLOGIST: CPT | Mod: 26

## 2017-09-18 RX ORDER — ONDANSETRON 8 MG/1
4 TABLET, FILM COATED ORAL ONCE
Qty: 0 | Refills: 0 | Status: DISCONTINUED | OUTPATIENT
Start: 2017-09-18 | End: 2017-09-19

## 2017-09-18 RX ORDER — FENTANYL CITRATE 50 UG/ML
25 INJECTION INTRAVENOUS
Qty: 0 | Refills: 0 | Status: DISCONTINUED | OUTPATIENT
Start: 2017-09-18 | End: 2017-09-19

## 2017-09-18 RX ORDER — HYDROMORPHONE HYDROCHLORIDE 2 MG/ML
0.5 INJECTION INTRAMUSCULAR; INTRAVENOUS; SUBCUTANEOUS
Qty: 0 | Refills: 0 | Status: DISCONTINUED | OUTPATIENT
Start: 2017-09-18 | End: 2017-09-20

## 2017-09-18 RX ORDER — NALOXONE HYDROCHLORIDE 4 MG/.1ML
0.1 SPRAY NASAL
Qty: 0 | Refills: 0 | Status: DISCONTINUED | OUTPATIENT
Start: 2017-09-18 | End: 2017-09-19

## 2017-09-18 RX ORDER — HYDROMORPHONE HYDROCHLORIDE 2 MG/ML
30 INJECTION INTRAMUSCULAR; INTRAVENOUS; SUBCUTANEOUS
Qty: 0 | Refills: 0 | Status: DISCONTINUED | OUTPATIENT
Start: 2017-09-18 | End: 2017-09-19

## 2017-09-18 RX ORDER — DIPHENHYDRAMINE HCL 50 MG
25 CAPSULE ORAL EVERY 4 HOURS
Qty: 0 | Refills: 0 | Status: DISCONTINUED | OUTPATIENT
Start: 2017-09-18 | End: 2017-09-19

## 2017-09-18 RX ORDER — ONDANSETRON 8 MG/1
4 TABLET, FILM COATED ORAL EVERY 6 HOURS
Qty: 0 | Refills: 0 | Status: DISCONTINUED | OUTPATIENT
Start: 2017-09-18 | End: 2017-09-19

## 2017-09-18 RX ORDER — CEFOTETAN DISODIUM 1 G
2 VIAL (EA) INJECTION EVERY 12 HOURS
Qty: 0 | Refills: 0 | Status: COMPLETED | OUTPATIENT
Start: 2017-09-19 | End: 2017-09-19

## 2017-09-18 RX ORDER — DEXTROSE MONOHYDRATE, SODIUM CHLORIDE, AND POTASSIUM CHLORIDE 50; .745; 4.5 G/1000ML; G/1000ML; G/1000ML
1000 INJECTION, SOLUTION INTRAVENOUS
Qty: 0 | Refills: 0 | Status: DISCONTINUED | OUTPATIENT
Start: 2017-09-18 | End: 2017-09-20

## 2017-09-18 RX ORDER — HYDROMORPHONE HYDROCHLORIDE 2 MG/ML
0.5 INJECTION INTRAMUSCULAR; INTRAVENOUS; SUBCUTANEOUS
Qty: 0 | Refills: 0 | Status: DISCONTINUED | OUTPATIENT
Start: 2017-09-18 | End: 2017-09-19

## 2017-09-18 RX ORDER — HEPARIN SODIUM 5000 [USP'U]/ML
5000 INJECTION INTRAVENOUS; SUBCUTANEOUS EVERY 8 HOURS
Qty: 0 | Refills: 0 | Status: DISCONTINUED | OUTPATIENT
Start: 2017-09-18 | End: 2017-09-19

## 2017-09-18 RX ORDER — SODIUM CHLORIDE 9 MG/ML
500 INJECTION, SOLUTION INTRAVENOUS ONCE
Qty: 0 | Refills: 0 | Status: DISCONTINUED | OUTPATIENT
Start: 2017-09-18 | End: 2017-09-19

## 2017-09-18 RX ADMIN — HYDROMORPHONE HYDROCHLORIDE 0.5 MILLIGRAM(S): 2 INJECTION INTRAMUSCULAR; INTRAVENOUS; SUBCUTANEOUS at 22:22

## 2017-09-18 RX ADMIN — HYDROMORPHONE HYDROCHLORIDE 0.5 MILLIGRAM(S): 2 INJECTION INTRAMUSCULAR; INTRAVENOUS; SUBCUTANEOUS at 22:50

## 2017-09-18 RX ADMIN — FENTANYL CITRATE 25 MICROGRAM(S): 50 INJECTION INTRAVENOUS at 20:40

## 2017-09-18 RX ADMIN — HEPARIN SODIUM 5000 UNIT(S): 5000 INJECTION INTRAVENOUS; SUBCUTANEOUS at 21:22

## 2017-09-18 RX ADMIN — FENTANYL CITRATE 25 MICROGRAM(S): 50 INJECTION INTRAVENOUS at 22:24

## 2017-09-18 RX ADMIN — FENTANYL CITRATE 25 MICROGRAM(S): 50 INJECTION INTRAVENOUS at 22:02

## 2017-09-18 RX ADMIN — FENTANYL CITRATE 25 MICROGRAM(S): 50 INJECTION INTRAVENOUS at 20:59

## 2017-09-18 RX ADMIN — FENTANYL CITRATE 25 MICROGRAM(S): 50 INJECTION INTRAVENOUS at 21:21

## 2017-09-18 RX ADMIN — HYDROMORPHONE HYDROCHLORIDE 30 MILLILITER(S): 2 INJECTION INTRAMUSCULAR; INTRAVENOUS; SUBCUTANEOUS at 23:17

## 2017-09-18 RX ADMIN — FENTANYL CITRATE 25 MICROGRAM(S): 50 INJECTION INTRAVENOUS at 21:01

## 2017-09-18 RX ADMIN — DEXTROSE MONOHYDRATE, SODIUM CHLORIDE, AND POTASSIUM CHLORIDE 125 MILLILITER(S): 50; .745; 4.5 INJECTION, SOLUTION INTRAVENOUS at 20:05

## 2017-09-18 RX ADMIN — FENTANYL CITRATE 25 MICROGRAM(S): 50 INJECTION INTRAVENOUS at 22:00

## 2017-09-18 RX ADMIN — HYDROMORPHONE HYDROCHLORIDE 0.5 MILLIGRAM(S): 2 INJECTION INTRAMUSCULAR; INTRAVENOUS; SUBCUTANEOUS at 23:15

## 2017-09-18 NOTE — ASU PATIENT PROFILE, ADULT - PSH
H/O aortic valve replacement  2015  H/O cataract removal with insertion of prosthetic lens  right-9/7/2004  H/O total knee replacement, right  2011  H/O umbilical hernia repair  x2-2011, 2016  History of Appendectomy    History of Lt Total Knee Replacement  4/2011  History of Nasal Septoplasty  1979  I & D of Pilonidal cyst    Plantar Tendonitis  Repair- B L  Renal Calculi  lithotripsy  S/P Cardiac Cath    S/P Cataract Surgery  Right - with lens placement  S/P colon resection  and umbilical hernia repair-6/12/2017  S/P Cystoscopy  2010  S/P reconstruction procedure  "my left foot I had no arch"-12/2015  S/P Right Inguinal Hernia Repair  1979  Skin Tag Excision  Right Leg-2010  Tibialis Posterior Tendonitis  Repair B/L revision left 2016

## 2017-09-18 NOTE — BRIEF OPERATIVE NOTE - PROCEDURE
<<-----Click on this checkbox to enter Procedure Ileostomy removal  09/18/2017  with primary anastamosis  Active  DLOMASNEY

## 2017-09-19 LAB
BASOPHILS # BLD AUTO: 0.01 K/UL — SIGNIFICANT CHANGE UP (ref 0–0.2)
BASOPHILS NFR BLD AUTO: 0.1 % — SIGNIFICANT CHANGE UP (ref 0–2)
BUN SERPL-MCNC: 16 MG/DL — SIGNIFICANT CHANGE UP (ref 7–23)
CALCIUM SERPL-MCNC: 8.4 MG/DL — SIGNIFICANT CHANGE UP (ref 8.4–10.5)
CHLORIDE SERPL-SCNC: 103 MMOL/L — SIGNIFICANT CHANGE UP (ref 98–107)
CO2 SERPL-SCNC: 20 MMOL/L — LOW (ref 22–31)
CREAT SERPL-MCNC: 1.05 MG/DL — SIGNIFICANT CHANGE UP (ref 0.5–1.3)
EOSINOPHIL # BLD AUTO: 0 K/UL — SIGNIFICANT CHANGE UP (ref 0–0.5)
EOSINOPHIL NFR BLD AUTO: 0 % — SIGNIFICANT CHANGE UP (ref 0–6)
GLUCOSE SERPL-MCNC: 154 MG/DL — HIGH (ref 70–99)
HCT VFR BLD CALC: 32.9 % — LOW (ref 39–50)
HGB BLD-MCNC: 9.9 G/DL — LOW (ref 13–17)
IMM GRANULOCYTES # BLD AUTO: 0.05 # — SIGNIFICANT CHANGE UP
IMM GRANULOCYTES NFR BLD AUTO: 0.4 % — SIGNIFICANT CHANGE UP (ref 0–1.5)
LYMPHOCYTES # BLD AUTO: 0.47 K/UL — LOW (ref 1–3.3)
LYMPHOCYTES # BLD AUTO: 4.2 % — LOW (ref 13–44)
MAGNESIUM SERPL-MCNC: 1.8 MG/DL — SIGNIFICANT CHANGE UP (ref 1.6–2.6)
MCHC RBC-ENTMCNC: 23.9 PG — LOW (ref 27–34)
MCHC RBC-ENTMCNC: 30.1 % — LOW (ref 32–36)
MCV RBC AUTO: 79.3 FL — LOW (ref 80–100)
MONOCYTES # BLD AUTO: 0.35 K/UL — SIGNIFICANT CHANGE UP (ref 0–0.9)
MONOCYTES NFR BLD AUTO: 3.1 % — SIGNIFICANT CHANGE UP (ref 2–14)
NEUTROPHILS # BLD AUTO: 10.37 K/UL — HIGH (ref 1.8–7.4)
NEUTROPHILS NFR BLD AUTO: 92.2 % — HIGH (ref 43–77)
NRBC # FLD: 0 — SIGNIFICANT CHANGE UP
PHOSPHATE SERPL-MCNC: 4 MG/DL — SIGNIFICANT CHANGE UP (ref 2.5–4.5)
PLATELET # BLD AUTO: 239 K/UL — SIGNIFICANT CHANGE UP (ref 150–400)
PMV BLD: 9.5 FL — SIGNIFICANT CHANGE UP (ref 7–13)
POTASSIUM SERPL-MCNC: 4.7 MMOL/L — SIGNIFICANT CHANGE UP (ref 3.5–5.3)
POTASSIUM SERPL-SCNC: 4.7 MMOL/L — SIGNIFICANT CHANGE UP (ref 3.5–5.3)
RBC # BLD: 4.15 M/UL — LOW (ref 4.2–5.8)
RBC # FLD: 15.6 % — HIGH (ref 10.3–14.5)
SODIUM SERPL-SCNC: 140 MMOL/L — SIGNIFICANT CHANGE UP (ref 135–145)
WBC # BLD: 11.25 K/UL — HIGH (ref 3.8–10.5)
WBC # FLD AUTO: 11.25 K/UL — HIGH (ref 3.8–10.5)

## 2017-09-19 RX ORDER — ALBUTEROL 90 UG/1
2 AEROSOL, METERED ORAL EVERY 6 HOURS
Qty: 0 | Refills: 0 | Status: DISCONTINUED | OUTPATIENT
Start: 2017-09-19 | End: 2017-09-22

## 2017-09-19 RX ORDER — SIMVASTATIN 20 MG/1
20 TABLET, FILM COATED ORAL AT BEDTIME
Qty: 0 | Refills: 0 | Status: DISCONTINUED | OUTPATIENT
Start: 2017-09-19 | End: 2017-09-22

## 2017-09-19 RX ORDER — INFLUENZA VIRUS VACCINE 15; 15; 15; 15 UG/.5ML; UG/.5ML; UG/.5ML; UG/.5ML
0.5 SUSPENSION INTRAMUSCULAR ONCE
Qty: 0 | Refills: 0 | Status: DISCONTINUED | OUTPATIENT
Start: 2017-09-19 | End: 2017-09-22

## 2017-09-19 RX ORDER — ACETAMINOPHEN 500 MG
1000 TABLET ORAL EVERY 6 HOURS
Qty: 0 | Refills: 0 | Status: COMPLETED | OUTPATIENT
Start: 2017-09-19 | End: 2017-09-22

## 2017-09-19 RX ORDER — ENOXAPARIN SODIUM 100 MG/ML
40 INJECTION SUBCUTANEOUS DAILY
Qty: 0 | Refills: 0 | Status: DISCONTINUED | OUTPATIENT
Start: 2017-09-19 | End: 2017-09-22

## 2017-09-19 RX ORDER — LOSARTAN POTASSIUM 100 MG/1
100 TABLET, FILM COATED ORAL DAILY
Qty: 0 | Refills: 0 | Status: DISCONTINUED | OUTPATIENT
Start: 2017-09-19 | End: 2017-09-22

## 2017-09-19 RX ADMIN — Medication 400 MILLIGRAM(S): at 17:54

## 2017-09-19 RX ADMIN — Medication 1000 MILLIGRAM(S): at 18:24

## 2017-09-19 RX ADMIN — DEXTROSE MONOHYDRATE, SODIUM CHLORIDE, AND POTASSIUM CHLORIDE 50 MILLILITER(S): 50; .745; 4.5 INJECTION, SOLUTION INTRAVENOUS at 17:53

## 2017-09-19 RX ADMIN — HYDROMORPHONE HYDROCHLORIDE 30 MILLILITER(S): 2 INJECTION INTRAMUSCULAR; INTRAVENOUS; SUBCUTANEOUS at 08:13

## 2017-09-19 RX ADMIN — Medication 100 GRAM(S): at 17:53

## 2017-09-19 RX ADMIN — SIMVASTATIN 20 MILLIGRAM(S): 20 TABLET, FILM COATED ORAL at 23:16

## 2017-09-19 RX ADMIN — Medication 400 MILLIGRAM(S): at 23:17

## 2017-09-19 RX ADMIN — Medication 1000 MILLIGRAM(S): at 23:45

## 2017-09-19 RX ADMIN — Medication 100 GRAM(S): at 05:36

## 2017-09-19 RX ADMIN — HYDROMORPHONE HYDROCHLORIDE 30 MILLILITER(S): 2 INJECTION INTRAMUSCULAR; INTRAVENOUS; SUBCUTANEOUS at 04:33

## 2017-09-19 RX ADMIN — Medication 1000 MILLIGRAM(S): at 12:53

## 2017-09-19 RX ADMIN — Medication 400 MILLIGRAM(S): at 12:23

## 2017-09-19 NOTE — CHART NOTE - NSCHARTNOTEFT_GEN_A_CORE
STATUS POST:  ileostomy reversal    POST OPERATIVE DAY #: 0    SUBJECTIVE: Pt seen and examined. Tolerated procedure well, called after check that he just voided.  SOB:  [ ] YES [ X] NO  Chest Discomfort: [ ] YES [ X] NO    Nausea: [ ] YES [X ] NO           Vomiting: [ ] YES [X ] NO  Flatus: [ ] YES [X ] NO             Bowel Movement: [ ] YES [X ] NO      Void: [X ]YES [ ]No      Pain Control Adequate: [X ] YES [ ] NO  Vazquez: none  NGT: none    Vital Signs Last 24 Hrs  T(C): 36.8 (18 Sep 2017 23:00), Max: 37.1 (18 Sep 2017 14:11)  T(F): 98.3 (18 Sep 2017 23:00), Max: 98.7 (18 Sep 2017 14:11)  HR: 84 (19 Sep 2017 00:00) (69 - 84)  BP: 118/85 (19 Sep 2017 00:00) (118/85 - 160/92)  BP(mean): --  RR: 15 (19 Sep 2017 00:00) (8 - 23)  SpO2: 99% (19 Sep 2017 00:00) (91% - 100%)  I&O's Summary    18 Sep 2017 07:01  -  19 Sep 2017 00:18  --------------------------------------------------------  IN: 1000 mL / OUT: 100 mL / NET: 900 mL      I&O's Detail    18 Sep 2017 07:01  -  19 Sep 2017 00:18  --------------------------------------------------------  IN:    dextrose 5% + sodium chloride 0.45% with potassium chloride 20 mEq/L: 500 mL    Lactated Ringers IV Bolus: 500 mL  Total IN: 1000 mL    OUT:    Voided: 100 mL  Total OUT: 100 mL    Total NET: 900 mL          MEDICATIONS  (STANDING):  lactated ringers. 1000 milliLiter(s) (30 mL/Hr) IV Continuous <Continuous>  lactated ringers Bolus 500 milliLiter(s) IV Bolus once  heparin  Injectable 5000 Unit(s) SubCutaneous every 8 hours  dextrose 5% + sodium chloride 0.45% with potassium chloride 20 mEq/L 1000 milliLiter(s) (125 mL/Hr) IV Continuous <Continuous>  cefoTEtan  IVPB 2 Gram(s) IV Intermittent every 12 hours  HYDROmorphone PCA (1 mG/mL) 30 milliLiter(s) PCA Continuous PCA Continuous    MEDICATIONS  (PRN):  fentaNYL    Injectable 25 MICROGram(s) IV Push every 5 minutes PRN Moderate Pain (4 - 6)  ondansetron Injectable 4 milliGRAM(s) IV Push once PRN Nausea and/or Vomiting  HYDROmorphone  Injectable 0.5 milliGRAM(s) IV Push every 10 minutes PRN Severe Pain (7 - 10)  HYDROmorphone PCA (1 mG/mL) Rescue Clinician Bolus 0.5 milliGRAM(s) IV Push every 15 minutes PRN for Pain Scale GREATER THAN 6  naloxone Injectable 0.1 milliGRAM(s) IV Push every 3 minutes PRN For ANY of the following changes in patient status:  A. RR LESS THAN 10 breaths per minute, B. Oxygen saturation LESS THAN 90%, C. Sedation score of 6  ondansetron Injectable 4 milliGRAM(s) IV Push every 6 hours PRN Nausea  diphenhydrAMINE   Injectable 25 milliGRAM(s) IV Push every 4 hours PRN Pruritus      LABS:                        10.6   10.25 )-----------( 243      ( 18 Sep 2017 20:45 )             34.8     09-18    139  |  103  |  17  ----------------------------<  142<H>  4.3   |  22  |  1.10    Ca    8.7      18 Sep 2017 20:45            RADIOLOGY & ADDITIONAL STUDIES:    PHYSICAL EXAM:      Constitutional: NAD, AAOx3    Eyes: EOMI    Respiratory: breathing comfortably on room air    Gastrointestinal: softly distended, appropriately tender to palpation, dressing is saturated with serosanguinous but not actively bleeding    Extremities: WWP      A/P: 66y Male s/p reversal of ileostomy POD#0. Doing well.  - NPO, ARBF  - PCA for pain  - voided, will monitor I/Os  - DVT ppx  - PACU, floor

## 2017-09-20 LAB
BUN SERPL-MCNC: 10 MG/DL — SIGNIFICANT CHANGE UP (ref 7–23)
CALCIUM SERPL-MCNC: 8 MG/DL — LOW (ref 8.4–10.5)
CHLORIDE SERPL-SCNC: 103 MMOL/L — SIGNIFICANT CHANGE UP (ref 98–107)
CO2 SERPL-SCNC: 23 MMOL/L — SIGNIFICANT CHANGE UP (ref 22–31)
CREAT SERPL-MCNC: 0.92 MG/DL — SIGNIFICANT CHANGE UP (ref 0.5–1.3)
GLUCOSE SERPL-MCNC: 126 MG/DL — HIGH (ref 70–99)
HCT VFR BLD CALC: 31.3 % — LOW (ref 39–50)
HGB BLD-MCNC: 9.7 G/DL — LOW (ref 13–17)
MAGNESIUM SERPL-MCNC: 3.3 MG/DL — HIGH (ref 1.6–2.6)
MCHC RBC-ENTMCNC: 24.4 PG — LOW (ref 27–34)
MCHC RBC-ENTMCNC: 31 % — LOW (ref 32–36)
MCV RBC AUTO: 78.6 FL — LOW (ref 80–100)
NRBC # FLD: 0 — SIGNIFICANT CHANGE UP
PHOSPHATE SERPL-MCNC: 2.2 MG/DL — LOW (ref 2.5–4.5)
PLATELET # BLD AUTO: 233 K/UL — SIGNIFICANT CHANGE UP (ref 150–400)
PMV BLD: 9.3 FL — SIGNIFICANT CHANGE UP (ref 7–13)
POTASSIUM SERPL-MCNC: 4.1 MMOL/L — SIGNIFICANT CHANGE UP (ref 3.5–5.3)
POTASSIUM SERPL-SCNC: 4.1 MMOL/L — SIGNIFICANT CHANGE UP (ref 3.5–5.3)
RBC # BLD: 3.98 M/UL — LOW (ref 4.2–5.8)
RBC # FLD: 15.9 % — HIGH (ref 10.3–14.5)
SODIUM SERPL-SCNC: 139 MMOL/L — SIGNIFICANT CHANGE UP (ref 135–145)
WBC # BLD: 9.79 K/UL — SIGNIFICANT CHANGE UP (ref 3.8–10.5)
WBC # FLD AUTO: 9.79 K/UL — SIGNIFICANT CHANGE UP (ref 3.8–10.5)

## 2017-09-20 RX ORDER — SODIUM CHLORIDE 9 MG/ML
3 INJECTION INTRAMUSCULAR; INTRAVENOUS; SUBCUTANEOUS EVERY 8 HOURS
Qty: 0 | Refills: 0 | Status: DISCONTINUED | OUTPATIENT
Start: 2017-09-20 | End: 2017-09-22

## 2017-09-20 RX ORDER — HYDROMORPHONE HYDROCHLORIDE 2 MG/ML
4 INJECTION INTRAMUSCULAR; INTRAVENOUS; SUBCUTANEOUS EVERY 4 HOURS
Qty: 0 | Refills: 0 | Status: DISCONTINUED | OUTPATIENT
Start: 2017-09-20 | End: 2017-09-21

## 2017-09-20 RX ORDER — HYDROMORPHONE HYDROCHLORIDE 2 MG/ML
2 INJECTION INTRAMUSCULAR; INTRAVENOUS; SUBCUTANEOUS EVERY 4 HOURS
Qty: 0 | Refills: 0 | Status: DISCONTINUED | OUTPATIENT
Start: 2017-09-20 | End: 2017-09-21

## 2017-09-20 RX ORDER — HYDROMORPHONE HYDROCHLORIDE 2 MG/ML
0.5 INJECTION INTRAMUSCULAR; INTRAVENOUS; SUBCUTANEOUS ONCE
Qty: 0 | Refills: 0 | Status: DISCONTINUED | OUTPATIENT
Start: 2017-09-20 | End: 2017-09-20

## 2017-09-20 RX ORDER — ASPIRIN/CALCIUM CARB/MAGNESIUM 324 MG
81 TABLET ORAL DAILY
Qty: 0 | Refills: 0 | Status: DISCONTINUED | OUTPATIENT
Start: 2017-09-20 | End: 2017-09-22

## 2017-09-20 RX ADMIN — Medication 400 MILLIGRAM(S): at 05:20

## 2017-09-20 RX ADMIN — Medication 63.75 MILLIMOLE(S): at 12:33

## 2017-09-20 RX ADMIN — Medication 400 MILLIGRAM(S): at 11:15

## 2017-09-20 RX ADMIN — HYDROMORPHONE HYDROCHLORIDE 4 MILLIGRAM(S): 2 INJECTION INTRAMUSCULAR; INTRAVENOUS; SUBCUTANEOUS at 19:17

## 2017-09-20 RX ADMIN — SODIUM CHLORIDE 3 MILLILITER(S): 9 INJECTION INTRAMUSCULAR; INTRAVENOUS; SUBCUTANEOUS at 23:08

## 2017-09-20 RX ADMIN — LOSARTAN POTASSIUM 100 MILLIGRAM(S): 100 TABLET, FILM COATED ORAL at 05:20

## 2017-09-20 RX ADMIN — Medication 400 MILLIGRAM(S): at 19:17

## 2017-09-20 RX ADMIN — Medication 1000 MILLIGRAM(S): at 19:47

## 2017-09-20 RX ADMIN — Medication 400 MILLIGRAM(S): at 23:50

## 2017-09-20 RX ADMIN — HYDROMORPHONE HYDROCHLORIDE 2 MILLIGRAM(S): 2 INJECTION INTRAMUSCULAR; INTRAVENOUS; SUBCUTANEOUS at 23:51

## 2017-09-20 RX ADMIN — HYDROMORPHONE HYDROCHLORIDE 0.5 MILLIGRAM(S): 2 INJECTION INTRAMUSCULAR; INTRAVENOUS; SUBCUTANEOUS at 01:23

## 2017-09-20 RX ADMIN — ENOXAPARIN SODIUM 40 MILLIGRAM(S): 100 INJECTION SUBCUTANEOUS at 11:24

## 2017-09-20 RX ADMIN — Medication 81 MILLIGRAM(S): at 11:24

## 2017-09-20 RX ADMIN — Medication 1000 MILLIGRAM(S): at 11:45

## 2017-09-20 RX ADMIN — HYDROMORPHONE HYDROCHLORIDE 4 MILLIGRAM(S): 2 INJECTION INTRAMUSCULAR; INTRAVENOUS; SUBCUTANEOUS at 13:03

## 2017-09-20 RX ADMIN — HYDROMORPHONE HYDROCHLORIDE 0.5 MILLIGRAM(S): 2 INJECTION INTRAMUSCULAR; INTRAVENOUS; SUBCUTANEOUS at 02:00

## 2017-09-20 RX ADMIN — HYDROMORPHONE HYDROCHLORIDE 4 MILLIGRAM(S): 2 INJECTION INTRAMUSCULAR; INTRAVENOUS; SUBCUTANEOUS at 12:33

## 2017-09-20 RX ADMIN — HYDROMORPHONE HYDROCHLORIDE 4 MILLIGRAM(S): 2 INJECTION INTRAMUSCULAR; INTRAVENOUS; SUBCUTANEOUS at 19:47

## 2017-09-20 RX ADMIN — SIMVASTATIN 20 MILLIGRAM(S): 20 TABLET, FILM COATED ORAL at 23:09

## 2017-09-20 RX ADMIN — SODIUM CHLORIDE 3 MILLILITER(S): 9 INJECTION INTRAMUSCULAR; INTRAVENOUS; SUBCUTANEOUS at 14:20

## 2017-09-20 RX ADMIN — Medication 1000 MILLIGRAM(S): at 06:00

## 2017-09-20 NOTE — PROGRESS NOTE ADULT - ATTENDING COMMENTS
Pt examined.  Abd: soft, mildly tender to arslan-incisional palpation. less distended. Wound c/d/i    - advance to LRD  - ambulate  - cont wound care

## 2017-09-21 ENCOUNTER — TRANSCRIPTION ENCOUNTER (OUTPATIENT)
Age: 67
End: 2017-09-21

## 2017-09-21 LAB
BUN SERPL-MCNC: 9 MG/DL — SIGNIFICANT CHANGE UP (ref 7–23)
CALCIUM SERPL-MCNC: 8.2 MG/DL — LOW (ref 8.4–10.5)
CHLORIDE SERPL-SCNC: 101 MMOL/L — SIGNIFICANT CHANGE UP (ref 98–107)
CO2 SERPL-SCNC: 27 MMOL/L — SIGNIFICANT CHANGE UP (ref 22–31)
CREAT SERPL-MCNC: 0.76 MG/DL — SIGNIFICANT CHANGE UP (ref 0.5–1.3)
GLUCOSE SERPL-MCNC: 106 MG/DL — HIGH (ref 70–99)
HCT VFR BLD CALC: 29.8 % — LOW (ref 39–50)
HGB BLD-MCNC: 9.3 G/DL — LOW (ref 13–17)
MAGNESIUM SERPL-MCNC: 1.9 MG/DL — SIGNIFICANT CHANGE UP (ref 1.6–2.6)
MCHC RBC-ENTMCNC: 24.5 PG — LOW (ref 27–34)
MCHC RBC-ENTMCNC: 31.2 % — LOW (ref 32–36)
MCV RBC AUTO: 78.6 FL — LOW (ref 80–100)
NRBC # FLD: 0 — SIGNIFICANT CHANGE UP
PHOSPHATE SERPL-MCNC: 2.4 MG/DL — LOW (ref 2.5–4.5)
PLATELET # BLD AUTO: 224 K/UL — SIGNIFICANT CHANGE UP (ref 150–400)
PMV BLD: 9.5 FL — SIGNIFICANT CHANGE UP (ref 7–13)
POTASSIUM SERPL-MCNC: 3.7 MMOL/L — SIGNIFICANT CHANGE UP (ref 3.5–5.3)
POTASSIUM SERPL-SCNC: 3.7 MMOL/L — SIGNIFICANT CHANGE UP (ref 3.5–5.3)
RBC # BLD: 3.79 M/UL — LOW (ref 4.2–5.8)
RBC # FLD: 16.1 % — HIGH (ref 10.3–14.5)
SODIUM SERPL-SCNC: 139 MMOL/L — SIGNIFICANT CHANGE UP (ref 135–145)
WBC # BLD: 8.23 K/UL — SIGNIFICANT CHANGE UP (ref 3.8–10.5)
WBC # FLD AUTO: 8.23 K/UL — SIGNIFICANT CHANGE UP (ref 3.8–10.5)

## 2017-09-21 RX ORDER — SODIUM,POTASSIUM PHOSPHATES 278-250MG
1 POWDER IN PACKET (EA) ORAL
Qty: 0 | Refills: 0 | Status: DISCONTINUED | OUTPATIENT
Start: 2017-09-21 | End: 2017-09-21

## 2017-09-21 RX ORDER — OXYCODONE HYDROCHLORIDE 5 MG/1
5 TABLET ORAL EVERY 4 HOURS
Qty: 0 | Refills: 0 | Status: DISCONTINUED | OUTPATIENT
Start: 2017-09-21 | End: 2017-09-22

## 2017-09-21 RX ORDER — SODIUM,POTASSIUM PHOSPHATES 278-250MG
1 POWDER IN PACKET (EA) ORAL
Qty: 0 | Refills: 0 | Status: COMPLETED | OUTPATIENT
Start: 2017-09-21 | End: 2017-09-22

## 2017-09-21 RX ORDER — OXYCODONE HYDROCHLORIDE 5 MG/1
10 TABLET ORAL EVERY 6 HOURS
Qty: 0 | Refills: 0 | Status: DISCONTINUED | OUTPATIENT
Start: 2017-09-21 | End: 2017-09-22

## 2017-09-21 RX ADMIN — HYDROMORPHONE HYDROCHLORIDE 2 MILLIGRAM(S): 2 INJECTION INTRAMUSCULAR; INTRAVENOUS; SUBCUTANEOUS at 00:40

## 2017-09-21 RX ADMIN — OXYCODONE HYDROCHLORIDE 10 MILLIGRAM(S): 5 TABLET ORAL at 09:27

## 2017-09-21 RX ADMIN — Medication 1 TABLET(S): at 23:27

## 2017-09-21 RX ADMIN — SODIUM CHLORIDE 3 MILLILITER(S): 9 INJECTION INTRAMUSCULAR; INTRAVENOUS; SUBCUTANEOUS at 06:14

## 2017-09-21 RX ADMIN — OXYCODONE HYDROCHLORIDE 10 MILLIGRAM(S): 5 TABLET ORAL at 09:57

## 2017-09-21 RX ADMIN — Medication 1000 MILLIGRAM(S): at 12:19

## 2017-09-21 RX ADMIN — Medication 400 MILLIGRAM(S): at 23:13

## 2017-09-21 RX ADMIN — Medication 400 MILLIGRAM(S): at 18:38

## 2017-09-21 RX ADMIN — Medication 1000 MILLIGRAM(S): at 23:44

## 2017-09-21 RX ADMIN — OXYCODONE HYDROCHLORIDE 10 MILLIGRAM(S): 5 TABLET ORAL at 23:44

## 2017-09-21 RX ADMIN — SODIUM CHLORIDE 3 MILLILITER(S): 9 INJECTION INTRAMUSCULAR; INTRAVENOUS; SUBCUTANEOUS at 23:24

## 2017-09-21 RX ADMIN — Medication 1000 MILLIGRAM(S): at 19:08

## 2017-09-21 RX ADMIN — Medication 1000 MILLIGRAM(S): at 06:50

## 2017-09-21 RX ADMIN — SODIUM CHLORIDE 3 MILLILITER(S): 9 INJECTION INTRAMUSCULAR; INTRAVENOUS; SUBCUTANEOUS at 14:27

## 2017-09-21 RX ADMIN — ENOXAPARIN SODIUM 40 MILLIGRAM(S): 100 INJECTION SUBCUTANEOUS at 11:50

## 2017-09-21 RX ADMIN — HYDROMORPHONE HYDROCHLORIDE 4 MILLIGRAM(S): 2 INJECTION INTRAMUSCULAR; INTRAVENOUS; SUBCUTANEOUS at 05:08

## 2017-09-21 RX ADMIN — Medication 400 MILLIGRAM(S): at 06:20

## 2017-09-21 RX ADMIN — LOSARTAN POTASSIUM 100 MILLIGRAM(S): 100 TABLET, FILM COATED ORAL at 06:20

## 2017-09-21 RX ADMIN — OXYCODONE HYDROCHLORIDE 10 MILLIGRAM(S): 5 TABLET ORAL at 23:14

## 2017-09-21 RX ADMIN — Medication 81 MILLIGRAM(S): at 11:49

## 2017-09-21 RX ADMIN — SIMVASTATIN 20 MILLIGRAM(S): 20 TABLET, FILM COATED ORAL at 23:27

## 2017-09-21 RX ADMIN — Medication 1 TABLET(S): at 11:56

## 2017-09-21 RX ADMIN — Medication 1000 MILLIGRAM(S): at 00:40

## 2017-09-21 RX ADMIN — HYDROMORPHONE HYDROCHLORIDE 4 MILLIGRAM(S): 2 INJECTION INTRAMUSCULAR; INTRAVENOUS; SUBCUTANEOUS at 04:08

## 2017-09-21 RX ADMIN — Medication 400 MILLIGRAM(S): at 11:49

## 2017-09-21 NOTE — DISCHARGE NOTE ADULT - HOSPITAL COURSE
Pt is a 66 y.o male s/p colon resection 6/2017 for colon cancer now presenting for reversal of ileostomy.  Pt underwent reversal of ileostomy with primary anastomosis on 9/18/17. Pt tolerated procedure well. Pain well controlled.  During hospital course patients diet was slowly advanced as tolerated.  At this time, pt is tolerating a regular diet, ambulating and voiding. Pt is to follow up with Dr. Castillo in office.    Pt has been deemed stable for discharge at this time. Pt is a 66 y.o male s/p colon resection 6/2017 for colon cancer now presenting for reversal of ileostomy.  Pt underwent reversal of ileostomy with primary anastomosis on 9/18/17. Pt tolerated procedure well. Post operative course uncomplicated.   During hospital course patients diet was slowly advanced as tolerated.  Per attending pt now stable for discharge. Pt is tolerating a low fiber diet, pain well controlled, ambulating and voiding. Pt is to follow up with Dr. Castillo in office; instructed to call and schedule appointment. Pt is a 66 y.o male s/p colon resection 6/2017 for colon cancer now presenting for reversal of ileostomy.  Pt underwent reversal of ileostomy with primary anastomosis on 9/18/17. Pt tolerated procedure well. Post operative course uncomplicated.     During hospital course patients diet was slowly advanced as tolerated.      Pt's Cardiologist Dr Oreilly followed pt while in the hospital. Rec losartan for BP control. State stable for dc from CV standpoint. Pt to follow up as an outpatient    Per attending pt now stable for discharge. Pt is tolerating a low fiber diet, pain well controlled, ambulating and voiding. Pt is to follow up with Dr. Castillo in office; instructed to call and schedule appointment.     istop#  [id number] Pt is a 66 y.o male s/p colon resection 6/2017 for colon cancer now presenting for reversal of ileostomy.  Pt underwent reversal of ileostomy with primary anastomosis on 9/18/17. Pt tolerated procedure well. Post operative course uncomplicated.     During hospital course patients diet was slowly advanced as tolerated.      Pt's Cardiologist Dr Oreilly followed pt while in the hospital. Rec be discharged on home meds losartan and norvasc for BP control. State stable for dc from CV standpoint. Pt to follow up as an outpatient    Per attending pt now stable for discharge. Pt is tolerating a low fiber diet, pain well controlled, ambulating and voiding. Pt is to follow up with Dr. Castillo in office; instructed to call and schedule appointment.     istop#  [id number]

## 2017-09-21 NOTE — DISCHARGE NOTE ADULT - PATIENT PORTAL LINK FT
“You can access the [employer] Patient Portal, offered by [facility], by registering with the following website: [email or web address]”

## 2017-09-21 NOTE — DISCHARGE NOTE ADULT - PLAN OF CARE
s/p  ileostomy reversal WOUND CARE:  Please keep incisions clean and dry. Please do not Scrub or rub incisions. Do not use lotion or powder on incisions.   BATHING: You may shower and/or sponge bathe. You may use warm soapy water in the shower and rinse, pat dry.  ACTIVITY: No heavy lifting or straining. Otherwise, you may return to your usual level of physical activity. If you are taking narcotic pain medication DO NOT drive a car, operate machinery or make important decisions.  DIET: Return to low fiber diet.  NOTIFY YOUR SURGEON IF: You have any bleeding that does not stop, any pus draining from your wound(s), any fever (over 100.4 F) persistent nausea/vomiting, or if your pain is not controlled on your discharge pain medications.  Please follow up with your primary care physician in one week regarding your hospitalization.  Please follow up with your surgeon, Dr. Castillo; call to make an appointment [phone]   Please follow up with your primary care physician in 1 week. s/p ileostomy reversal WOUND CARE: previous ostomy site; please moisten sterile 4 x 4 gauze with sterile saline and pack wound. cover with dry 4 x 4 and ABD. secure with tape     BATHING: You may shower and/or sponge bathe. You may use warm soapy water in the shower and rinse, pat dry.  ACTIVITY: No heavy lifting or straining. Otherwise, you may return to your usual level of physical activity. If you are taking narcotic pain medication DO NOT drive a car, operate machinery or make important decisions.  DIET: Return to low fiber diet.  NOTIFY YOUR SURGEON IF: You have any bleeding that does not stop, any pus draining from your wound(s), any fever (over 100.4 F) persistent nausea/vomiting, or if your pain is not controlled on your discharge pain medications.  Please follow up with your primary care physician in one week regarding your hospitalization.  Please follow up with your surgeon, Dr. Castillo; call to make an appointment [phone] Per your Cardiologist's recommendations you are being discharged on losartan for your BP. Please f/u with your Cardiologist Yahir Oreilly  as an outpatient in 1-2 wks, please call  to schedule appointment Per your Cardiologist's recommendations you are being discharged on losartan  and amlodipine for your BP. Please f/u with your Cardiologist Yahir Oreilly  as an outpatient in 1-2 wks, please call  to schedule appointment

## 2017-09-21 NOTE — DISCHARGE NOTE ADULT - CONDITIONS AT DISCHARGE
Patient alert and oriented x4 .Patient OOB voids ,ambulates .Patient tolerating regular diet .Patient with RLQ dressing dry and intact .PIV removed

## 2017-09-21 NOTE — DISCHARGE NOTE ADULT - CARE PROVIDER_API CALL
Tulio Castillo), ColonRectal Surgery; Surgery  [address] [facility]  [address]  [place] [address]  Phone: [phone]  Fax: [phone]

## 2017-09-21 NOTE — DISCHARGE NOTE ADULT - MEDICATION SUMMARY - MEDICATIONS TO TAKE
I will START or STAY ON the medications listed below when I get home from the hospital:    Tylenol 500 mg oral tablet  -- 2 tab(s) by mouth 1 to 2 times a day, As Needed  -- Indication: For pain control    aspirin 81 mg oral tablet, chewable  -- 1 tab(s) by mouth once a day  -- Indication: For cardiovascular med    oxyCODONE 5 mg oral tablet  -- 1-2 tab(s) by mouth every 6 hours, As Needed -- for severe pain MDD:6   -- Indication: For pain med    losartan 100 mg oral tablet  -- 1 tab(s) by mouth once a day  -- Indication: For BP med    simvastatin 20 mg oral tablet  -- 1 tab(s) by mouth once a day (at bedtime)  -- Indication: For cardiovascular med    Breo Ellipta 200 mcg-25 mcg/inh inhalation powder  -- 1 puff(s) inhaled once a day, As Needed  -- Indication: For respiratory med    ProAir HFA 90 mcg/inh inhalation aerosol  -- 2 puff(s) inhaled , As Needed  -- Indication: For respiratory med I will START or STAY ON the medications listed below when I get home from the hospital:    Tylenol 500 mg oral tablet  -- 2 tab(s) by mouth 1 to 2 times a day, As Needed  -- Indication: For pain control    aspirin 81 mg oral tablet, chewable  -- 1 tab(s) by mouth once a day  -- Indication: For cardiovascular med    oxyCODONE 5 mg oral tablet  -- 1-2 tab(s) by mouth every 6 hours, As Needed -- for severe pain MDD:6   -- Indication: For pain med    losartan 100 mg oral tablet  -- 1 tab(s) by mouth once a day  -- Indication: For BP med    simvastatin 20 mg oral tablet  -- 1 tab(s) by mouth once a day (at bedtime)  -- Indication: For cardiovascular med    ProAir HFA 90 mcg/inh inhalation aerosol  -- 2 puff(s) inhaled , As Needed  -- Indication: For respiratory med    Breo Ellipta 200 mcg-25 mcg/inh inhalation powder  -- 1 puff(s) inhaled once a day, As Needed  -- Indication: For respiratory med    amLODIPine 5 mg oral tablet  -- 1 tab(s) by mouth once a day  -- Indication: For BP med

## 2017-09-21 NOTE — DISCHARGE NOTE ADULT - MEDICATION SUMMARY - MEDICATIONS TO STOP TAKING
I will STOP taking the medications listed below when I get home from the hospital:    amLODIPine 5 mg oral tablet  -- 1 tab(s) by mouth once a day I will STOP taking the medications listed below when I get home from the hospital:  None

## 2017-09-21 NOTE — DISCHARGE NOTE ADULT - ADDITIONAL INSTRUCTIONS
Please call Dr. Castillo to make and appointment: [phone] Please call Dr. Castillo to make and appointment: [phone]  Please f/u with your PCP Dr Carl Marquez as an outpatient in 1-2 wks, please call  to schedule appointment                        Please f/u with your Pulmonologist Dr Reyna as an outpatient in 1-2 wks, please call to schedule appointment  Please f/u with your Cardiologist Yahir Oreilly  as an outpatient in 1-2 wks, please call  to schedule appointment

## 2017-09-21 NOTE — DISCHARGE NOTE ADULT - INSTRUCTIONS
Low fiber diet Patient instructed to notify in case of severe pain ,nausea and vomiting , bleeding or discharge from incision site

## 2017-09-21 NOTE — DISCHARGE NOTE ADULT - CARE PLAN
Goal:	s/p  ileostomy reversal  Instructions for follow-up, activity and diet:	WOUND CARE:  Please keep incisions clean and dry. Please do not Scrub or rub incisions. Do not use lotion or powder on incisions.   BATHING: You may shower and/or sponge bathe. You may use warm soapy water in the shower and rinse, pat dry.  ACTIVITY: No heavy lifting or straining. Otherwise, you may return to your usual level of physical activity. If you are taking narcotic pain medication DO NOT drive a car, operate machinery or make important decisions.  DIET: Return to low fiber diet.  NOTIFY YOUR SURGEON IF: You have any bleeding that does not stop, any pus draining from your wound(s), any fever (over 100.4 F) persistent nausea/vomiting, or if your pain is not controlled on your discharge pain medications.  Please follow up with your primary care physician in one week regarding your hospitalization.  Please follow up with your surgeon, Dr. Castillo; call to make an appointment [phone]   Please follow up with your primary care physician in 1 week. Goal:	s/p ileostomy reversal  Instructions for follow-up, activity and diet:	WOUND CARE: previous ostomy site; please moisten sterile 4 x 4 gauze with sterile saline and pack wound. cover with dry 4 x 4 and ABD. secure with tape     BATHING: You may shower and/or sponge bathe. You may use warm soapy water in the shower and rinse, pat dry.  ACTIVITY: No heavy lifting or straining. Otherwise, you may return to your usual level of physical activity. If you are taking narcotic pain medication DO NOT drive a car, operate machinery or make important decisions.  DIET: Return to low fiber diet.  NOTIFY YOUR SURGEON IF: You have any bleeding that does not stop, any pus draining from your wound(s), any fever (over 100.4 F) persistent nausea/vomiting, or if your pain is not controlled on your discharge pain medications.  Please follow up with your primary care physician in one week regarding your hospitalization.  Please follow up with your surgeon, Dr. Castillo; call to make an appointment [phone] Principal Discharge DX:	Ileostomy in place  Goal:	s/p ileostomy reversal  Instructions for follow-up, activity and diet:	WOUND CARE: previous ostomy site; please moisten sterile 4 x 4 gauze with sterile saline and pack wound. cover with dry 4 x 4 and ABD. secure with tape     BATHING: You may shower and/or sponge bathe. You may use warm soapy water in the shower and rinse, pat dry.  ACTIVITY: No heavy lifting or straining. Otherwise, you may return to your usual level of physical activity. If you are taking narcotic pain medication DO NOT drive a car, operate machinery or make important decisions.  DIET: Return to low fiber diet.  NOTIFY YOUR SURGEON IF: You have any bleeding that does not stop, any pus draining from your wound(s), any fever (over 100.4 F) persistent nausea/vomiting, or if your pain is not controlled on your discharge pain medications.  Please follow up with your primary care physician in one week regarding your hospitalization.  Please follow up with your surgeon, Dr. Castillo; call to make an appointment [phone]  Secondary Diagnosis:	HTN (hypertension)  Instructions for follow-up, activity and diet:	Per your Cardiologist's recommendations you are being discharged on losartan for your BP. Please f/u with your Cardiologist Yahir Orelily  as an outpatient in 1-2 wks, please call  to schedule appointment Principal Discharge DX:	Ileostomy in place  Goal:	s/p ileostomy reversal  Instructions for follow-up, activity and diet:	WOUND CARE: previous ostomy site; please moisten sterile 4 x 4 gauze with sterile saline and pack wound. cover with dry 4 x 4 and ABD. secure with tape     BATHING: You may shower and/or sponge bathe. You may use warm soapy water in the shower and rinse, pat dry.  ACTIVITY: No heavy lifting or straining. Otherwise, you may return to your usual level of physical activity. If you are taking narcotic pain medication DO NOT drive a car, operate machinery or make important decisions.  DIET: Return to low fiber diet.  NOTIFY YOUR SURGEON IF: You have any bleeding that does not stop, any pus draining from your wound(s), any fever (over 100.4 F) persistent nausea/vomiting, or if your pain is not controlled on your discharge pain medications.  Please follow up with your primary care physician in one week regarding your hospitalization.  Please follow up with your surgeon, Dr. Castillo; call to make an appointment [phone]  Secondary Diagnosis:	HTN (hypertension)  Instructions for follow-up, activity and diet:	Per your Cardiologist's recommendations you are being discharged on losartan  and amlodipine for your BP. Please f/u with your Cardiologist Yahir Oreilly  as an outpatient in 1-2 wks, please call  to schedule appointment

## 2017-09-22 VITALS
DIASTOLIC BLOOD PRESSURE: 92 MMHG | RESPIRATION RATE: 18 BRPM | OXYGEN SATURATION: 98 % | HEART RATE: 79 BPM | SYSTOLIC BLOOD PRESSURE: 146 MMHG | TEMPERATURE: 98 F

## 2017-09-22 LAB
BUN SERPL-MCNC: 9 MG/DL — SIGNIFICANT CHANGE UP (ref 7–23)
CALCIUM SERPL-MCNC: 8 MG/DL — LOW (ref 8.4–10.5)
CHLORIDE SERPL-SCNC: 103 MMOL/L — SIGNIFICANT CHANGE UP (ref 98–107)
CO2 SERPL-SCNC: 26 MMOL/L — SIGNIFICANT CHANGE UP (ref 22–31)
CREAT SERPL-MCNC: 0.88 MG/DL — SIGNIFICANT CHANGE UP (ref 0.5–1.3)
GLUCOSE SERPL-MCNC: 102 MG/DL — HIGH (ref 70–99)
HCT VFR BLD CALC: 31.5 % — LOW (ref 39–50)
HGB BLD-MCNC: 9.7 G/DL — LOW (ref 13–17)
MAGNESIUM SERPL-MCNC: 2 MG/DL — SIGNIFICANT CHANGE UP (ref 1.6–2.6)
MCHC RBC-ENTMCNC: 24.6 PG — LOW (ref 27–34)
MCHC RBC-ENTMCNC: 30.8 % — LOW (ref 32–36)
MCV RBC AUTO: 79.7 FL — LOW (ref 80–100)
NRBC # FLD: 0 — SIGNIFICANT CHANGE UP
PHOSPHATE SERPL-MCNC: 3.1 MG/DL — SIGNIFICANT CHANGE UP (ref 2.5–4.5)
PLATELET # BLD AUTO: 242 K/UL — SIGNIFICANT CHANGE UP (ref 150–400)
PMV BLD: 9.2 FL — SIGNIFICANT CHANGE UP (ref 7–13)
POTASSIUM SERPL-MCNC: 3.7 MMOL/L — SIGNIFICANT CHANGE UP (ref 3.5–5.3)
POTASSIUM SERPL-SCNC: 3.7 MMOL/L — SIGNIFICANT CHANGE UP (ref 3.5–5.3)
RBC # BLD: 3.95 M/UL — LOW (ref 4.2–5.8)
RBC # FLD: 16.1 % — HIGH (ref 10.3–14.5)
SODIUM SERPL-SCNC: 142 MMOL/L — SIGNIFICANT CHANGE UP (ref 135–145)
WBC # BLD: 8.81 K/UL — SIGNIFICANT CHANGE UP (ref 3.8–10.5)
WBC # FLD AUTO: 8.81 K/UL — SIGNIFICANT CHANGE UP (ref 3.8–10.5)

## 2017-09-22 RX ORDER — POTASSIUM CHLORIDE 20 MEQ
40 PACKET (EA) ORAL ONCE
Qty: 0 | Refills: 0 | Status: COMPLETED | OUTPATIENT
Start: 2017-09-22 | End: 2017-09-22

## 2017-09-22 RX ORDER — OXYCODONE HYDROCHLORIDE 5 MG/1
1 TABLET ORAL
Qty: 12 | Refills: 0
Start: 2017-09-22

## 2017-09-22 RX ORDER — AMLODIPINE BESYLATE 2.5 MG/1
1 TABLET ORAL
Qty: 0 | Refills: 0 | COMMUNITY

## 2017-09-22 RX ORDER — OXYCODONE HYDROCHLORIDE 5 MG/1
1 TABLET ORAL
Qty: 12 | Refills: 0 | OUTPATIENT
Start: 2017-09-22

## 2017-09-22 RX ADMIN — OXYCODONE HYDROCHLORIDE 10 MILLIGRAM(S): 5 TABLET ORAL at 07:01

## 2017-09-22 RX ADMIN — OXYCODONE HYDROCHLORIDE 5 MILLIGRAM(S): 5 TABLET ORAL at 04:14

## 2017-09-22 RX ADMIN — Medication 81 MILLIGRAM(S): at 11:29

## 2017-09-22 RX ADMIN — Medication 40 MILLIEQUIVALENT(S): at 11:29

## 2017-09-22 RX ADMIN — Medication 400 MILLIGRAM(S): at 07:02

## 2017-09-22 RX ADMIN — LOSARTAN POTASSIUM 100 MILLIGRAM(S): 100 TABLET, FILM COATED ORAL at 07:02

## 2017-09-22 RX ADMIN — OXYCODONE HYDROCHLORIDE 5 MILLIGRAM(S): 5 TABLET ORAL at 03:44

## 2017-09-22 RX ADMIN — SODIUM CHLORIDE 3 MILLILITER(S): 9 INJECTION INTRAMUSCULAR; INTRAVENOUS; SUBCUTANEOUS at 13:38

## 2017-09-22 RX ADMIN — OXYCODONE HYDROCHLORIDE 10 MILLIGRAM(S): 5 TABLET ORAL at 07:32

## 2017-09-22 RX ADMIN — ENOXAPARIN SODIUM 40 MILLIGRAM(S): 100 INJECTION SUBCUTANEOUS at 11:29

## 2017-09-22 RX ADMIN — OXYCODONE HYDROCHLORIDE 10 MILLIGRAM(S): 5 TABLET ORAL at 13:38

## 2017-09-22 RX ADMIN — OXYCODONE HYDROCHLORIDE 10 MILLIGRAM(S): 5 TABLET ORAL at 14:30

## 2017-09-22 RX ADMIN — Medication 1 TABLET(S): at 07:07

## 2017-09-22 RX ADMIN — Medication 1000 MILLIGRAM(S): at 07:32

## 2017-09-22 RX ADMIN — SODIUM CHLORIDE 3 MILLILITER(S): 9 INJECTION INTRAMUSCULAR; INTRAVENOUS; SUBCUTANEOUS at 05:00

## 2017-09-22 NOTE — PROGRESS NOTE ADULT - PROVIDER SPECIALTY LIST ADULT
Anesthesia
Cardiology
Cardiology
Pain Medicine
Surgery

## 2017-09-22 NOTE — PROGRESS NOTE ADULT - SUBJECTIVE AND OBJECTIVE BOX
Surgery A Team Progress Note:    Hospital Day 4 POD 3 s/p ileostomy reversal    Subjective:  No acute overnight events.  Patient examined at bedside resting.  Wound dressing changed at bedside (repacked).  Tolerating reg diet, +/+, denies n/v.  No complaints at this time.    Objective:  Vital Signs Last 24 Hrs  T(C): 36.6 (21 Sep 2017 09:51), Max: 37.8 (20 Sep 2017 17:48)  T(F): 97.8 (21 Sep 2017 09:51), Max: 100 (20 Sep 2017 17:48)  HR: 78 (21 Sep 2017 09:51) (72 - 88)  BP: 138/52 (21 Sep 2017 09:51) (129/77 - 160/88)  RR: 18 (21 Sep 2017 09:51) (16 - 18)  SpO2: 94% (21 Sep 2017 09:51) (94% - 95%)    Labs:                        9.3    8.23  )-----------( 224      ( 21 Sep 2017 07:00 )             29.8       09-21    139  |  101  |  9   ----------------------------<  106<H>  3.7   |  27  |  0.76    Ca    8.2<L>      21 Sep 2017 07:00  Phos  2.4     09-21  Mg     1.9     09-21        I&O's Detail    20 Sep 2017 07:01  -  21 Sep 2017 07:00  --------------------------------------------------------  IN:    dextrose 5% + sodium chloride 0.45% with potassium chloride 20 mEq/L: 200 mL    IV PiggyBack: 650 mL    Oral Fluid: 630 mL  Total IN: 1480 mL    OUT:    Voided: 950 mL  Total OUT: 950 mL    Total NET: 530 mL    21 Sep 2017 07:01  -  21 Sep 2017 13:54  --------------------------------------------------------  IN:    Oral Fluid: 240 mL  Total IN: 240 mL    OUT:    Voided: 150 mL  Total OUT: 150 mL    Total NET: 90 mL    Focused Physical Exam:  General: NAD  Resp: Nonlabored breathing  Abdomen: Soft, ND, appropriately tender around surgical incision site, incision site packed w/ kerlex wet-to-dry, healing well.    MEDICATIONS  (STANDING):  acetaminophen  IVPB. 1000 milliGRAM(s) IV Intermittent every 6 hours  losartan 100 milliGRAM(s) Oral daily  simvastatin 20 milliGRAM(s) Oral at bedtime  enoxaparin Injectable 40 milliGRAM(s) SubCutaneous daily  influenza   Vaccine 0.5 milliLiter(s) IntraMuscular once  aspirin  chewable 81 milliGRAM(s) Oral daily  sodium chloride 0.9% lock flush 3 milliLiter(s) IV Push every 8 hours  potassium acid phosphate/sodium acid phosphate tablet (K-PHOS No. 2) 1 Tablet(s) Oral three times a day with meals    MEDICATIONS  (PRN):  ALBUTerol    90 MICROgram(s) HFA Inhaler 2 Puff(s) Inhalation every 6 hours PRN Shortness of Breath and/or Wheezing  oxyCODONE    IR 5 milliGRAM(s) Oral every 4 hours PRN Moderate Pain (4 - 6)  oxyCODONE    IR 10 milliGRAM(s) Oral every 6 hours PRN Severe Pain (7 - 10)
A Team Progress Note    S: No acute events overnight. +Flatus    O:  T(C): 37.1 (09-20-17 @ 05:19), Max: 37.1 (09-20-17 @ 05:19)  HR: 81 (09-20-17 @ 05:19) (76 - 98)  BP: 135/80 (09-20-17 @ 05:19) (132/77 - 146/91)  RR: 16 (09-20-17 @ 05:19) (16 - 18)  SpO2: 91% (09-20-17 @ 05:19) (91% - 96%)  Wt(kg): --    09-19 @ 07:01  -  09-20 @ 07:00  --------------------------------------------------------  IN:    dextrose 5% + sodium chloride 0.45% with potassium chloride 20 mEq/L: 1700 mL    IV PiggyBack: 250 mL    Oral Fluid: 640 mL  Total IN: 2590 mL    OUT:    Voided: 1050 mL  Total OUT: 1050 mL    Total NET: 1540 mL        CBC Full  -  ( 20 Sep 2017 06:30 )  WBC Count : 9.79 K/uL  Hemoglobin : 9.7 g/dL  Hematocrit : 31.3 %  Platelet Count - Automated : 233 K/uL  Mean Cell Volume : 78.6 fL  Mean Cell Hemoglobin : 24.4 pg  Mean Cell Hemoglobin Concentration : 31.0 %        PHYSICAL EXAM:  Gen: A&Ox3, laying in bed in NAD  Resp: Nonlabored breathing  Abd: Soft, appropriate incisional tenderness, ND  Incision: Clean
A Team Progress Note    S: No acute events overnight. Patient resting comfortably in bed. Pain well controlled. Denies fevers/chills and N/V. Denies flatus or bowel movement.    O:  T(C): 36.7 (09-19-17 @ 09:29), Max: 37.1 (09-18-17 @ 14:11)  HR: 78 (09-19-17 @ 09:29) (64 - 84)  BP: 146/91 (09-19-17 @ 09:29) (118/85 - 160/92)  RR: 18 (09-19-17 @ 09:29) (8 - 23)  SpO2: 95% (09-19-17 @ 09:29) (91% - 100%)  Wt(kg): --    09-18 @ 07:01  -  09-19 @ 07:00  --------------------------------------------------------  IN:    dextrose 5% + sodium chloride 0.45% with potassium chloride 20 mEq/L: 1250 mL    IV PiggyBack: 50 mL    Lactated Ringers IV Bolus: 500 mL  Total IN: 1800 mL    OUT:    Voided: 700 mL  Total OUT: 700 mL    Total NET: 1100 mL      09-19 @ 07:01  -  09-19 @ 10:33  --------------------------------------------------------  IN:    dextrose 5% + sodium chloride 0.45% with potassium chloride 20 mEq/L: 500 mL    Oral Fluid: 240 mL  Total IN: 740 mL    OUT:    Voided: 200 mL  Total OUT: 200 mL    Total NET: 540 mL        CBC Full  -  ( 19 Sep 2017 06:30 )  WBC Count : 11.25 K/uL  Hemoglobin : 9.9 g/dL  Hematocrit : 32.9 %  Platelet Count - Automated : 239 K/uL  Mean Cell Volume : 79.3 fL  Mean Cell Hemoglobin : 23.9 pg  Mean Cell Hemoglobin Concentration : 30.1 %  Auto Neutrophil # : 10.37 K/uL  Auto Lymphocyte # : 0.47 K/uL  Auto Monocyte # : 0.35 K/uL  Auto Eosinophil # : 0.00 K/uL  Auto Basophil # : 0.01 K/uL  Auto Neutrophil % : 92.2 %  Auto Lymphocyte % : 4.2 %  Auto Monocyte % : 3.1 %  Auto Eosinophil % : 0.0 %  Auto Basophil % : 0.1 %    CAPILLARY BLOOD GLUCOSE  100 (18 Sep 2017 14:11)          PHYSICAL EXAM:  Constitutional: NAD, A&Ox3  HEENT: EOMI  Respiratory: breathing comfortably on room air  GI: softly distended, appropriately tender to palpation, wound c/d/i  Extremities: WWP
ANESTHESIA POSTOP CHECK    66y Male POSTOP DAY 1 S/P     Vital Signs Last 24 Hrs  T(C): 36.9 (19 Sep 2017 13:56), Max: 36.9 (19 Sep 2017 06:30)  T(F): 98.4 (19 Sep 2017 13:56), Max: 98.5 (19 Sep 2017 06:30)  HR: 78 (19 Sep 2017 13:56) (64 - 84)  BP: 132/77 (19 Sep 2017 13:56) (118/85 - 160/92)  BP(mean): --  RR: 17 (19 Sep 2017 13:56) (8 - 23)  SpO2: 94% (19 Sep 2017 13:56) (91% - 100%)  I&O's Summary    18 Sep 2017 07:01  -  19 Sep 2017 07:00  --------------------------------------------------------  IN: 1800 mL / OUT: 700 mL / NET: 1100 mL    19 Sep 2017 07:01  -  19 Sep 2017 15:02  --------------------------------------------------------  IN: 1140 mL / OUT: 650 mL / NET: 490 mL        [X ] NO APPARENT ANESTHESIA COMPLICATIONS      Comments:
Anesthesia Pain Management Service    SUBJECTIVE: Patient is doing well with IV PCA and no significant problems reported.    Pain Scale Score	At rest: ___ 	With Activity: ___ 	[X ] Refer to charted pain scores    THERAPY:    [ ] IV PCA Morphine		[ ] 5 mg/mL	[ ] 1 mg/mL  [X ] IV PCA Hydromorphone	[ ] 5 mg/mL	[X ] 1 mg/mL  [ ] IV PCA Fentanyl		[ ] 50 micrograms/mL    Demand dose __0.2_ lockout __6_ (minutes) Continuous Rate _0__ Total: ___  Daily      MEDICATIONS  (STANDING):  acetaminophen  IVPB. 1000 milliGRAM(s) IV Intermittent every 6 hours  losartan 100 milliGRAM(s) Oral daily  simvastatin 20 milliGRAM(s) Oral at bedtime  enoxaparin Injectable 40 milliGRAM(s) SubCutaneous daily  influenza   Vaccine 0.5 milliLiter(s) IntraMuscular once  aspirin  chewable 81 milliGRAM(s) Oral daily  sodium chloride 0.9% lock flush 3 milliLiter(s) IV Push every 8 hours    MEDICATIONS  (PRN):  ALBUTerol    90 MICROgram(s) HFA Inhaler 2 Puff(s) Inhalation every 6 hours PRN Shortness of Breath and/or Wheezing  HYDROmorphone   Tablet 2 milliGRAM(s) Oral every 4 hours PRN Moderate Pain (4 - 6)  HYDROmorphone   Tablet 4 milliGRAM(s) Oral every 4 hours PRN Severe Pain (7 - 10)      OBJECTIVE:    Sedation Score:	[ X] Alert	[ ] Drowsy 	[ ] Arousable	[ ] Asleep	[ ] Unresponsive    Side Effects:	[X ] None	[ ] Nausea	[ ] Vomiting	[ ] Pruritus  		[ ] Other:    Vital Signs Last 24 Hrs  T(C): 36.9 (20 Sep 2017 09:38), Max: 37.1 (20 Sep 2017 05:19)  T(F): 98.5 (20 Sep 2017 09:38), Max: 98.8 (20 Sep 2017 05:19)  HR: 75 (20 Sep 2017 09:38) (75 - 98)  BP: 143/83 (20 Sep 2017 09:38) (133/84 - 143/83)  BP(mean): --  RR: 18 (20 Sep 2017 09:38) (16 - 18)  SpO2: 94% (20 Sep 2017 09:38) (91% - 96%)    ASSESSMENT/ PLAN    Therapy to  be:	[ ] Continue   [ X] Discontinued   [X ] Change to prn Analgesics    Documentation and Verification of current medications:   [X] Done	[ ] Not done, not elligible    Comments: PRN Oral/IV opioids and/or Adjuvant medication to be ordered at this point.
Anesthesia Pain Management Service    SUBJECTIVE: Pt doing well with IV PCA without problems reported.    Therapy:	  [ X] IV PCA	   [ ] Epidural           [ ] s/p Spinal Opoid              [ ] Postpartum infusion	  [ ] Patient controlled regional anesthesia (PCRA)    [ ] prn Analgesics    Allergies    codeine (Hives)    Intolerances      MEDICATIONS  (STANDING):  dextrose 5% + sodium chloride 0.45% with potassium chloride 20 mEq/L 1000 milliLiter(s) (75 mL/Hr) IV Continuous <Continuous>  cefoTEtan  IVPB 2 Gram(s) IV Intermittent every 12 hours  acetaminophen  IVPB. 1000 milliGRAM(s) IV Intermittent every 6 hours  losartan 100 milliGRAM(s) Oral daily  simvastatin 20 milliGRAM(s) Oral at bedtime  enoxaparin Injectable 40 milliGRAM(s) SubCutaneous daily  influenza   Vaccine 0.5 milliLiter(s) IntraMuscular once    MEDICATIONS  (PRN):  HYDROmorphone  Injectable 0.5 milliGRAM(s) IV Push every 10 minutes PRN Severe Pain (7 - 10)  ALBUTerol    90 MICROgram(s) HFA Inhaler 2 Puff(s) Inhalation every 6 hours PRN Shortness of Breath and/or Wheezing      OBJECTIVE:   [X] No new signs     [ ] Other:    Side Effects:  [X ] None			[ ] Other:    Assessment of Catheter Site:		[ ] Intact		[ ] Other:    ASSESSMENT/PLAN  [ X] Continue current therapy    [ ] Therapy changed to:    [ ] IV PCA       [ ] Epidural     [ ] prn Analgesics     Comments:    Progress Note written now but Patient was seen earlier.
Day _2_ of Anesthesia Pain Management Service    Allergies  codeine (Hives)    SUBJECTIVE: "I have a little pain. The pump helps a little."    Pain Scale Score	At rest: _4/10_ 	With Activity: ___ 	[ ] Refer to charted pain scores    THERAPY:  [ ] IV PCA Morphine		[ ] 5 mg/mL	[ ] 1 mg/mL  [X] IV PCA Hydromorphone	[ ] 5 mg/mL	[X] 1 mg/mL  [ ] IV PCA Fentanyl		[ ] 50 micrograms/mL    Demand dose _0.2mg_ lockout _6_ (minutes) Continuous Rate _0_ Total: _3.2mg_  Daily      MEDICATIONS  (STANDING):  heparin  Injectable 5000 Unit(s) SubCutaneous every 8 hours  dextrose 5% + sodium chloride 0.45% with potassium chloride 20 mEq/L 1000 milliLiter(s) (125 mL/Hr) IV Continuous <Continuous>  cefoTEtan  IVPB 2 Gram(s) IV Intermittent every 12 hours  HYDROmorphone PCA (1 mG/mL) 30 milliLiter(s) PCA Continuous PCA Continuous    MEDICATIONS  (PRN):  ondansetron Injectable 4 milliGRAM(s) IV Push once PRN Nausea and/or Vomiting  HYDROmorphone  Injectable 0.5 milliGRAM(s) IV Push every 10 minutes PRN Severe Pain (7 - 10)  HYDROmorphone PCA (1 mG/mL) Rescue Clinician Bolus 0.5 milliGRAM(s) IV Push every 15 minutes PRN for Pain Scale GREATER THAN 6  naloxone Injectable 0.1 milliGRAM(s) IV Push every 3 minutes PRN For ANY of the following changes in patient status:  A. RR LESS THAN 10 breaths per minute, B. Oxygen saturation LESS THAN 90%, C. Sedation score of 6  ondansetron Injectable 4 milliGRAM(s) IV Push every 6 hours PRN Nausea  diphenhydrAMINE   Injectable 25 milliGRAM(s) IV Push every 4 hours PRN Pruritus      OBJECTIVE: A&Ox3, NAD, sitting up in bed    Sedation Score:	[X] Alert	[ ] Drowsy	[ ] Arousable	[ ] Asleep	[ ] Unresponsive    Side Effects:	[X] None	[ ] Nausea	[ ] Vomiting	[ ] Pruritus  		  [ ] Weakness		[ ] Numbness	[ ] Other:                          9.9    11.25 )-----------( 239      ( 19 Sep 2017 06:30 )             32.9       09-19    140  |  103  |  16  ----------------------------<  154<H>  4.7   |  20<L>  |  1.05    Ca    8.4      19 Sep 2017 06:30  Phos  4.0     09-19  Mg     1.8     09-19        ASSESSMENT/ PLAN    Therapy to  be:	[X] Continue   [ ] Discontinued   [ ] Change to prn Analgesics    Documentation and Verification of current medications:  [X] Done	[ ] Not done, not eligible  [ ] Not done, reason not given    Comments:  Advance to clears  Oral analgesics 09/20/17
INTERVAL HPI/OVERNIGHT EVENTS: Feeling well. Good bowel function. Tolerating diet.     STATUS POST:  Ileostomy reversal    POST OPERATIVE DAY #: 3    MEDICATIONS  (STANDING):  acetaminophen  IVPB. 1000 milliGRAM(s) IV Intermittent every 6 hours  losartan 100 milliGRAM(s) Oral daily  simvastatin 20 milliGRAM(s) Oral at bedtime  enoxaparin Injectable 40 milliGRAM(s) SubCutaneous daily  influenza   Vaccine 0.5 milliLiter(s) IntraMuscular once  aspirin  chewable 81 milliGRAM(s) Oral daily  sodium chloride 0.9% lock flush 3 milliLiter(s) IV Push every 8 hours  potassium acid phosphate/sodium acid phosphate tablet (K-PHOS No. 2) 1 Tablet(s) Oral three times a day with meals    MEDICATIONS  (PRN):  ALBUTerol    90 MICROgram(s) HFA Inhaler 2 Puff(s) Inhalation every 6 hours PRN Shortness of Breath and/or Wheezing  oxyCODONE    IR 5 milliGRAM(s) Oral every 4 hours PRN Moderate Pain (4 - 6)  oxyCODONE    IR 10 milliGRAM(s) Oral every 6 hours PRN Severe Pain (7 - 10)      Vital Signs Last 24 Hrs  T(C): 37.1 (21 Sep 2017 14:26), Max: 37.8 (20 Sep 2017 17:48)  T(F): 98.7 (21 Sep 2017 14:26), Max: 100 (20 Sep 2017 17:48)  HR: 76 (21 Sep 2017 14:26) (72 - 86)  BP: 127/73 (21 Sep 2017 14:26) (127/73 - 160/88)  BP(mean): --  RR: 18 (21 Sep 2017 14:26) (16 - 18)  SpO2: 95% (21 Sep 2017 14:26) (94% - 95%)    PHYSICAL EXAM:      Constitutional: AOx3, NAD    Gastrointestinal: Abd soft, NT, ND. Wound healing nicely, packed with gauze.        I&O's Detail    20 Sep 2017 07:01  -  21 Sep 2017 07:00  --------------------------------------------------------  IN:    dextrose 5% + sodium chloride 0.45% with potassium chloride 20 mEq/L: 200 mL    IV PiggyBack: 650 mL    Oral Fluid: 630 mL  Total IN: 1480 mL    OUT:    Voided: 950 mL  Total OUT: 950 mL    Total NET: 530 mL      21 Sep 2017 07:01  -  21 Sep 2017 14:58  --------------------------------------------------------  IN:    Oral Fluid: 240 mL  Total IN: 240 mL    OUT:    Voided: 150 mL  Total OUT: 150 mL    Total NET: 90 mL          LABS:                        9.3    8.23  )-----------( 224      ( 21 Sep 2017 07:00 )             29.8     09-21    139  |  101  |  9   ----------------------------<  106<H>  3.7   |  27  |  0.76    Ca    8.2<L>      21 Sep 2017 07:00  Phos  2.4     09-21  Mg     1.9     09-21            RADIOLOGY & ADDITIONAL STUDIES:
INTERVAL HPI/OVERNIGHT EVENTS: Pt feels well, small flatus, no Bm, no N/V    STATUS POST:  reversal of loop ileostomy    POST OPERATIVE DAY #: 1    MEDICATIONS  (STANDING):  heparin  Injectable 5000 Unit(s) SubCutaneous every 8 hours  dextrose 5% + sodium chloride 0.45% with potassium chloride 20 mEq/L 1000 milliLiter(s) (125 mL/Hr) IV Continuous <Continuous>  cefoTEtan  IVPB 2 Gram(s) IV Intermittent every 12 hours  HYDROmorphone PCA (1 mG/mL) 30 milliLiter(s) PCA Continuous PCA Continuous    MEDICATIONS  (PRN):  ondansetron Injectable 4 milliGRAM(s) IV Push once PRN Nausea and/or Vomiting  HYDROmorphone  Injectable 0.5 milliGRAM(s) IV Push every 10 minutes PRN Severe Pain (7 - 10)  HYDROmorphone PCA (1 mG/mL) Rescue Clinician Bolus 0.5 milliGRAM(s) IV Push every 15 minutes PRN for Pain Scale GREATER THAN 6  naloxone Injectable 0.1 milliGRAM(s) IV Push every 3 minutes PRN For ANY of the following changes in patient status:  A. RR LESS THAN 10 breaths per minute, B. Oxygen saturation LESS THAN 90%, C. Sedation score of 6  ondansetron Injectable 4 milliGRAM(s) IV Push every 6 hours PRN Nausea  diphenhydrAMINE   Injectable 25 milliGRAM(s) IV Push every 4 hours PRN Pruritus      Vital Signs Last 24 Hrs  T(C): 36.7 (19 Sep 2017 09:29), Max: 37.1 (18 Sep 2017 14:11)  T(F): 98 (19 Sep 2017 09:29), Max: 98.7 (18 Sep 2017 14:11)  HR: 78 (19 Sep 2017 09:29) (64 - 84)  BP: 146/91 (19 Sep 2017 09:29) (118/85 - 160/92)  BP(mean): --  RR: 18 (19 Sep 2017 09:29) (8 - 23)  SpO2: 95% (19 Sep 2017 09:29) (91% - 100%)  I&O's Detail    18 Sep 2017 07:01  -  19 Sep 2017 07:00  --------------------------------------------------------  IN:    dextrose 5% + sodium chloride 0.45% with potassium chloride 20 mEq/L: 1250 mL    IV PiggyBack: 50 mL    Lactated Ringers IV Bolus: 500 mL  Total IN: 1800 mL    OUT:    Voided: 700 mL  Total OUT: 700 mL    Total NET: 1100 mL      19 Sep 2017 07:01  -  19 Sep 2017 09:55  --------------------------------------------------------  IN:    dextrose 5% + sodium chloride 0.45% with potassium chloride 20 mEq/L: 500 mL    Oral Fluid: 240 mL  Total IN: 740 mL    OUT:    Voided: 200 mL  Total OUT: 200 mL    Total NET: 540 mL    Abdominal: Soft, NT, ND +BS, wound c/d/i        LABS:                        9.9    11.25 )-----------( 239      ( 19 Sep 2017 06:30 )             32.9     09-19    140  |  103  |  16  ----------------------------<  154<H>  4.7   |  20<L>  |  1.05    Ca    8.4      19 Sep 2017 06:30  Phos  4.0     09-19  Mg     1.8     09-19            RADIOLOGY & ADDITIONAL STUDIES:
SUBJECTIVE:  	Resting comfortably no c/o CP or SOB    MEDICATIONS:  losartan 100 milliGRAM(s) Oral daily      ALBUTerol    90 MICROgram(s) HFA Inhaler 2 Puff(s) Inhalation every 6 hours PRN    acetaminophen  IVPB. 1000 milliGRAM(s) IV Intermittent every 6 hours  oxyCODONE    IR 5 milliGRAM(s) Oral every 4 hours PRN  oxyCODONE    IR 10 milliGRAM(s) Oral every 6 hours PRN      simvastatin 20 milliGRAM(s) Oral at bedtime    enoxaparin Injectable 40 milliGRAM(s) SubCutaneous daily  influenza   Vaccine 0.5 milliLiter(s) IntraMuscular once  aspirin  chewable 81 milliGRAM(s) Oral daily  potassium acid phosphate/sodium acid phosphate tablet (K-PHOS No. 2) 1 Tablet(s) Oral three times a day with meals      REVIEW OF SYSTEMS:    CONSTITUTIONAL: No fever, weight loss, or fatigue  EYES: No eye pain, visual disturbances, or discharge  NECK: No pain or stiffness  RESPIRATORY: No cough, wheezing, chills or hemoptysis; No Shortness of Breath  CARDIOVASCULAR: No chest pain, palpitations, dizziness, or leg swelling  GASTROINTESTINAL: No abdominal or epigastric pain. No nausea, vomiting, or hematemesis; No diarrhea or constipation. No melena or hematochezia.  GENITOURINARY: No dysuria, frequency, hematuria, or incontinence  NEUROLOGICAL: No headaches, memory loss, loss of strength, numbness, or tremors  SKIN: No itching, burning, rashes, or lesions   LYMPH Nodes: No enlarged glands  MUSCULOSKELETAL: No joint pain or swelling; No muscle, back, or extremity pain  All other review of systems are negative.  	  [ ] Unable to obtain    PHYSICAL EXAM:  T(C): 36.7 (09-21-17 @ 18:13), Max: 37.2 (09-21-17 @ 06:17)  HR: 73 (09-21-17 @ 18:13) (72 - 78)  BP: 148/84 (09-21-17 @ 19:06) (127/73 - 153/100)  RR: 18 (09-21-17 @ 18:13) (16 - 18)  SpO2: 96% (09-21-17 @ 18:13) (94% - 96%)  Wt(kg): --  I&O's Summary    20 Sep 2017 07:01  -  21 Sep 2017 07:00  --------------------------------------------------------  IN: 1480 mL / OUT: 950 mL / NET: 530 mL    21 Sep 2017 07:01  -  21 Sep 2017 22:01  --------------------------------------------------------  IN: 920 mL / OUT: 350 mL / NET: 570 mL          PHYSICAL EXAM    Appearance: Normal	  HEENT:   Normal oral mucosa, PERRL, EOMI	  NECK: Soft and supple, No LAD, No JVD  Cardiovascular: Regular Rate and Rhythm, Normal S1 S2, No murmurs, No clicks, gallops or rubs  Respiratory: Lungs clear to auscultation	  Gastrointestinal:  Soft, Non-tender, + BS	  Skin: No rashes, No ecchymoses, No cyanosis  Neurologic: Non-focal  Extremities: No clubbing, cyanosis or edema  Vascular: Peripheral pulses palpable 2+ bilaterally    LABS:                        9.3    8.23  )-----------( 224      ( 21 Sep 2017 07:00 )             29.8     09-21    139  |  101  |  9   ----------------------------<  106<H>  3.7   |  27  |  0.76    Ca    8.2<L>      21 Sep 2017 07:00  Phos  2.4     09-21  Mg     1.9     09-21      proBNP:   Lipid Profile:   HgA1c:   TSH:
SUBJECTIVE: Denies chest pain or dyspnea, mild abdominal discomfort POD #1 ileostomy reversal.  	  MEDICATIONS:  losartan 100 milliGRAM(s) Oral daily  cefoTEtan  IVPB 2 Gram(s) IV Intermittent every 12 hours  ALBUTerol    90 MICROgram(s) HFA Inhaler 2 Puff(s) Inhalation every 6 hours PRN  HYDROmorphone  Injectable 0.5 milliGRAM(s) IV Push every 10 minutes PRN  acetaminophen  IVPB. 1000 milliGRAM(s) IV Intermittent every 6 hours  simvastatin 20 milliGRAM(s) Oral at bedtime  heparin  Injectable 5000 Unit(s) SubCutaneous every 8 hours  dextrose 5% + sodium chloride 0.45% with potassium chloride 20 mEq/L 1000 milliLiter(s) IV Continuous <Continuous>      REVIEW OF SYSTEMS:    CONSTITUTIONAL: No fever, weight loss, or fatigue  EYES: No eye pain, visual disturbances, or discharge  NECK: No pain or stiffness  RESPIRATORY: No cough, wheezing, chills or hemoptysis; No Shortness of Breath  CARDIOVASCULAR: No chest pain, palpitations, dizziness, or leg swelling  GASTROINTESTINAL: No abdominal or epigastric pain. No nausea, vomiting, or hematemesis; No diarrhea or constipation. No melena or hematochezia.  GENITOURINARY: No dysuria, frequency, hematuria, or incontinence  NEUROLOGICAL: No headaches, memory loss, loss of strength, numbness, or tremors  SKIN: No itching, burning, rashes, or lesions   LYMPH Nodes: No enlarged glands  MUSCULOSKELETAL: No joint pain or swelling; No muscle, back, or extremity pain  All other review of systems are negative.  	      PHYSICAL EXAM:  T(C): 36.7 (09-19-17 @ 09:29), Max: 37.1 (09-18-17 @ 14:11)  HR: 78 (09-19-17 @ 09:29) (64 - 84)  BP: 146/91 (09-19-17 @ 09:29) (118/85 - 160/92)  RR: 18 (09-19-17 @ 09:29) (8 - 23)  SpO2: 95% (09-19-17 @ 09:29) (91% - 100%)  Wt(kg): --  I&O's Summary    18 Sep 2017 07:01  -  19 Sep 2017 07:00  --------------------------------------------------------  IN: 1800 mL / OUT: 700 mL / NET: 1100 mL    19 Sep 2017 07:01  -  19 Sep 2017 11:24  --------------------------------------------------------  IN: 740 mL / OUT: 200 mL / NET: 540 mL      Height (cm): 168.91 (09-18 @ 14:11)  Weight (kg): 110.7 (09-18 @ 14:11)  BMI (kg/m2): 38.8 (09-18 @ 14:11)  BSA (m2): 2.19 (09-18 @ 14:11)    PHYSICAL EXAM    Appearance: Normal	  HEENT:   Normal oral mucosa, PERRL, EOMI	  NECK: Soft and supple, No LAD, No JVD  Cardiovascular: Regular Rate and Rhythm, Normal S1 S2, crisp sounds  Respiratory: Lungs clear to auscultation	  Skin: No rashes, No ecchymoses, No cyanosis  Neurologic: Non-focal  Extremities: No clubbing, cyanosis or edema  Vascular: Peripheral pulses palpable 2+ bilaterally      LABS:	 	                    9.9    11.25 )-----------( 239      ( 19 Sep 2017 06:30 )             32.9     09-19    140  |  103  |  16  ----------------------------<  154<H>  4.7   |  20<L>  |  1.05    Ca    8.4      19 Sep 2017 06:30  Phos  4.0     09-19  Mg     1.8     09-19
Surgery A Team Progress Note:    Hospital Day 2 POD 1 s/p reversal of ileostomy.    Subjective:  No acute overnight events.  Patient was examined at bedside, resting.  He states he is feeling "okay", reports +/-.  No complaints at this time.    Objective:  Vital Signs Last 24 Hrs  T(C): 36.7 (19 Sep 2017 09:29), Max: 37.1 (18 Sep 2017 14:11)  T(F): 98 (19 Sep 2017 09:29), Max: 98.7 (18 Sep 2017 14:11)  HR: 78 (19 Sep 2017 09:29) (64 - 84)  BP: 146/91 (19 Sep 2017 09:29) (118/85 - 160/92)  RR: 18 (19 Sep 2017 09:29) (8 - 23)  SpO2: 95% (19 Sep 2017 09:29) (91% - 100%)    Labs:                        9.9    11.25 )-----------( 239      ( 19 Sep 2017 06:30 )             32.9       09-19    140  |  103  |  16  ----------------------------<  154<H>  4.7   |  20<L>  |  1.05    Ca    8.4      19 Sep 2017 06:30  Phos  4.0     09-19  Mg     1.8     09-19    I&O's Detail    18 Sep 2017 07:01  -  19 Sep 2017 07:00  --------------------------------------------------------  IN:    dextrose 5% + sodium chloride 0.45% with potassium chloride 20 mEq/L: 1250 mL    IV PiggyBack: 50 mL    Lactated Ringers IV Bolus: 500 mL  Total IN: 1800 mL    OUT:    Voided: 700 mL  Total OUT: 700 mL    Total NET: 1100 mL      19 Sep 2017 07:01  -  19 Sep 2017 10:42  --------------------------------------------------------  IN:    dextrose 5% + sodium chloride 0.45% with potassium chloride 20 mEq/L: 500 mL    Oral Fluid: 240 mL  Total IN: 740 mL    OUT:    Voided: 200 mL  Total OUT: 200 mL    Total NET: 540 mL    Focused Physical Exam:  General: NAD  Respiratory: Nonlabored breathing  Abdomen: Soft, ND, appropriately tender around surgical site, wound clean and dry    MEDICATIONS  (STANDING):  heparin  Injectable 5000 Unit(s) SubCutaneous every 8 hours  dextrose 5% + sodium chloride 0.45% with potassium chloride 20 mEq/L 1000 milliLiter(s) (125 mL/Hr) IV Continuous <Continuous>  cefoTEtan  IVPB 2 Gram(s) IV Intermittent every 12 hours  acetaminophen  IVPB. 1000 milliGRAM(s) IV Intermittent every 6 hours  losartan 100 milliGRAM(s) Oral daily  simvastatin 20 milliGRAM(s) Oral at bedtime    MEDICATIONS  (PRN):  HYDROmorphone  Injectable 0.5 milliGRAM(s) IV Push every 10 minutes PRN Severe Pain (7 - 10)  ALBUTerol    90 MICROgram(s) HFA Inhaler 2 Puff(s) Inhalation every 6 hours PRN Shortness of Breath and/or Wheezing
Surgery A Team Progress Note:    Hospital Day 5 POD 4 s/p ileostomy reversal.    Subjective:  No acute overnight events.  Patient examined at bedside, resting.  Dressings were changed by team (repacked wet-to-dry) at bedside.  Pain well controlled, tolerating reg diet, +/+, denies n/v.  No complaints at this time.  Patient agrees to go home today.    Objective:  Vital Signs Last 24 Hrs  T(C): 37.6 (22 Sep 2017 07:31), Max: 37.6 (22 Sep 2017 07:31)  T(F): 99.6 (22 Sep 2017 07:31), Max: 99.6 (22 Sep 2017 07:31)  HR: 71 (22 Sep 2017 07:31) (68 - 78)  BP: 144/98 (22 Sep 2017 07:31) (118/65 - 153/100)  RR: 18 (22 Sep 2017 07:31) (18 - 18)  SpO2: 96% (22 Sep 2017 07:31) (94% - 97%)    Labs:                        9.7    8.81  )-----------( 242      ( 22 Sep 2017 06:10 )             31.5       09-22    142  |  103  |  9   ----------------------------<  102<H>  3.7   |  26  |  0.88    Ca    8.0<L>      22 Sep 2017 06:10  Phos  3.1     09-22  Mg     2.0     09-22        I&O's Detail    21 Sep 2017 07:01  -  22 Sep 2017 07:00  --------------------------------------------------------  IN:    IV PiggyBack: 200 mL    Oral Fluid: 720 mL  Total IN: 920 mL    OUT:    Voided: 850 mL  Total OUT: 850 mL    Total NET: 70 mL    Focused Physical Exam:  General: NAD  Respiratory: Nonlabored breathing  Abdomen: Soft, NT, ND, wound site (ileostomy reversal) packed w/ kerlex wet-to-dry, clean and dry.

## 2017-09-22 NOTE — PROGRESS NOTE ADULT - ASSESSMENT
66y M POD 3 s/p ileostomy reversal, recovering well; avss, pain well controlled, leukocytosis resolved yesterday, GI fn returned (+/+).    -Patient will be d/c to home tomorrow w/ vns for wound management.  Clinically ready to d/c today however approval/processing for vns will likely be completed by tomorrow AM.  -Continue wound care; dressing change BID, packed w/ wet-to-dry kerlex, covered by 4x4 and foam tape.  -Continue pain control  -Continue reg diet
#s/p avr 20-15  #HTN  #HL  #POD #1 reversal ileostomy  Stable thus far cv perspective.
66M POD#3 Ileostomy reversal
66y M POD 1 s/p ileostomy reversal, recovering well; avss, wbc elevated today (11.25 up from 10.25 yesterday) - likely reactive to surgery as he did not have leukocytosis on admission.    1. (+/-), Advance to Clears and await GI Fn (bm)  2. Pain control (IV Tylenol q6h)  3.  OOB  4. DVT ppx SQH
66y M POD 4 s/p ileostomy reversal, recovering well; avss, pain well controlled, GI fn returned (+/+).    -Patient will be d/c to home today w/ visiting nursing service (VNS).
A/P: 66M s/p ileostomy reversal, recovering well with ROBF. Leukocytosis resolved. Hemodynamically stable.    - Diet: CLD; will consider advancing to regular today  - Monitor GI fxn  - Pain control  - Encourage IS/OOB as tolerated  - DVT ppx: Tata Herrera M.D.
A/P: 66M s/p reversal of ileostomy. Hemodynamically stable and doing well awaiting ROBF.    - Diet: Adv to CLD  - Pain control: PO  - Encourage IS/OOB as tolerated  - Monitor labs, replete as necessary  - DVT ppx: SQH  - Dispo: Floor    Jay Herrera M.D.
Doing well    - clears  - ambulate  - IV Tylenol q6rs  - ambulate
H/o AVR, HTN, Hyperlipidemia  S/P Ileostomy reversal POD#3  BP fair control  Cont ASA  Cont Statin  Losartan for HTN control  Standard DVT prophylaxis  No objection to DC home tomorrow, Stable from CV standpoint

## 2017-09-26 ENCOUNTER — TRANSCRIPTION ENCOUNTER (OUTPATIENT)
Age: 67
End: 2017-09-26

## 2018-05-05 ENCOUNTER — EMERGENCY (EMERGENCY)
Facility: HOSPITAL | Age: 68
LOS: 1 days | Discharge: HOME | End: 2018-05-05
Attending: EMERGENCY MEDICINE | Admitting: EMERGENCY MEDICINE
Payer: COMMERCIAL

## 2018-05-05 VITALS
OXYGEN SATURATION: 98 % | HEART RATE: 70 BPM | DIASTOLIC BLOOD PRESSURE: 105 MMHG | SYSTOLIC BLOOD PRESSURE: 183 MMHG | RESPIRATION RATE: 18 BRPM

## 2018-05-05 VITALS
RESPIRATION RATE: 18 BRPM | TEMPERATURE: 98 F | HEART RATE: 77 BPM | SYSTOLIC BLOOD PRESSURE: 164 MMHG | OXYGEN SATURATION: 97 % | DIASTOLIC BLOOD PRESSURE: 100 MMHG

## 2018-05-05 DIAGNOSIS — Z98.890 OTHER SPECIFIED POSTPROCEDURAL STATES: Chronic | ICD-10-CM

## 2018-05-05 DIAGNOSIS — Z96.651 PRESENCE OF RIGHT ARTIFICIAL KNEE JOINT: Chronic | ICD-10-CM

## 2018-05-05 DIAGNOSIS — Z90.49 ACQUIRED ABSENCE OF OTHER SPECIFIED PARTS OF DIGESTIVE TRACT: Chronic | ICD-10-CM

## 2018-05-05 DIAGNOSIS — Z98.49 CATARACT EXTRACTION STATUS, UNSPECIFIED EYE: Chronic | ICD-10-CM

## 2018-05-05 DIAGNOSIS — Z95.2 PRESENCE OF PROSTHETIC HEART VALVE: Chronic | ICD-10-CM

## 2018-05-05 LAB
ALBUMIN SERPL ELPH-MCNC: 3.9 G/DL — SIGNIFICANT CHANGE UP (ref 3.3–5)
ALBUMIN SERPL ELPH-MCNC: 3.9 G/DL — SIGNIFICANT CHANGE UP (ref 3.3–5)
ALP SERPL-CCNC: 99 U/L — SIGNIFICANT CHANGE UP (ref 40–120)
ALP SERPL-CCNC: 99 U/L — SIGNIFICANT CHANGE UP (ref 40–120)
ALT FLD-CCNC: 11 U/L — SIGNIFICANT CHANGE UP (ref 4–41)
ALT FLD-CCNC: 11 U/L — SIGNIFICANT CHANGE UP (ref 4–41)
APPEARANCE UR: CLEAR — SIGNIFICANT CHANGE UP
APTT BLD: 29 SEC — SIGNIFICANT CHANGE UP (ref 27.5–37.4)
AST SERPL-CCNC: 11 U/L — SIGNIFICANT CHANGE UP (ref 4–40)
AST SERPL-CCNC: 11 U/L — SIGNIFICANT CHANGE UP (ref 4–40)
BASOPHILS # BLD AUTO: 0.03 K/UL — SIGNIFICANT CHANGE UP (ref 0–0.2)
BASOPHILS NFR BLD AUTO: 0.3 % — SIGNIFICANT CHANGE UP (ref 0–2)
BILIRUB SERPL-MCNC: 0.5 MG/DL — SIGNIFICANT CHANGE UP (ref 0.2–1.2)
BILIRUB SERPL-MCNC: 0.5 MG/DL — SIGNIFICANT CHANGE UP (ref 0.2–1.2)
BILIRUB UR-MCNC: NEGATIVE — SIGNIFICANT CHANGE UP
BLOOD UR QL VISUAL: NEGATIVE — SIGNIFICANT CHANGE UP
BUN SERPL-MCNC: 11 MG/DL — SIGNIFICANT CHANGE UP (ref 7–23)
BUN SERPL-MCNC: 11 MG/DL — SIGNIFICANT CHANGE UP (ref 7–23)
CALCIUM SERPL-MCNC: 8.5 MG/DL — SIGNIFICANT CHANGE UP (ref 8.4–10.5)
CALCIUM SERPL-MCNC: 8.5 MG/DL — SIGNIFICANT CHANGE UP (ref 8.4–10.5)
CHLORIDE SERPL-SCNC: 102 MMOL/L — SIGNIFICANT CHANGE UP (ref 98–107)
CHLORIDE SERPL-SCNC: 102 MMOL/L — SIGNIFICANT CHANGE UP (ref 98–107)
CO2 SERPL-SCNC: 26 MMOL/L — SIGNIFICANT CHANGE UP (ref 22–31)
CO2 SERPL-SCNC: 26 MMOL/L — SIGNIFICANT CHANGE UP (ref 22–31)
COLOR SPEC: YELLOW — SIGNIFICANT CHANGE UP
CREAT SERPL-MCNC: 0.91 MG/DL — SIGNIFICANT CHANGE UP (ref 0.5–1.3)
CREAT SERPL-MCNC: 0.91 MG/DL — SIGNIFICANT CHANGE UP (ref 0.5–1.3)
EOSINOPHIL # BLD AUTO: 0.06 K/UL — SIGNIFICANT CHANGE UP (ref 0–0.5)
EOSINOPHIL NFR BLD AUTO: 0.6 % — SIGNIFICANT CHANGE UP (ref 0–6)
GLUCOSE SERPL-MCNC: 124 MG/DL — HIGH (ref 70–99)
GLUCOSE SERPL-MCNC: 124 MG/DL — HIGH (ref 70–99)
GLUCOSE UR-MCNC: NEGATIVE — SIGNIFICANT CHANGE UP
HCT VFR BLD CALC: 40.9 % — SIGNIFICANT CHANGE UP (ref 39–50)
HGB BLD-MCNC: 12.4 G/DL — LOW (ref 13–17)
IMM GRANULOCYTES # BLD AUTO: 0.03 # — SIGNIFICANT CHANGE UP
IMM GRANULOCYTES NFR BLD AUTO: 0.3 % — SIGNIFICANT CHANGE UP (ref 0–1.5)
INR BLD: 1.14 — SIGNIFICANT CHANGE UP (ref 0.88–1.17)
KETONES UR-MCNC: NEGATIVE — SIGNIFICANT CHANGE UP
LEUKOCYTE ESTERASE UR-ACNC: NEGATIVE — SIGNIFICANT CHANGE UP
LIDOCAIN IGE QN: 38 U/L — SIGNIFICANT CHANGE UP (ref 7–60)
LYMPHOCYTES # BLD AUTO: 0.66 K/UL — LOW (ref 1–3.3)
LYMPHOCYTES # BLD AUTO: 6.2 % — LOW (ref 13–44)
MAGNESIUM SERPL-MCNC: 2.1 MG/DL — SIGNIFICANT CHANGE UP (ref 1.6–2.6)
MCHC RBC-ENTMCNC: 25.4 PG — LOW (ref 27–34)
MCHC RBC-ENTMCNC: 30.3 % — LOW (ref 32–36)
MCV RBC AUTO: 83.6 FL — SIGNIFICANT CHANGE UP (ref 80–100)
MONOCYTES # BLD AUTO: 0.69 K/UL — SIGNIFICANT CHANGE UP (ref 0–0.9)
MONOCYTES NFR BLD AUTO: 6.5 % — SIGNIFICANT CHANGE UP (ref 2–14)
MUCOUS THREADS # UR AUTO: SIGNIFICANT CHANGE UP
NEUTROPHILS # BLD AUTO: 9.1 K/UL — HIGH (ref 1.8–7.4)
NEUTROPHILS NFR BLD AUTO: 86.1 % — HIGH (ref 43–77)
NITRITE UR-MCNC: NEGATIVE — SIGNIFICANT CHANGE UP
NRBC # FLD: 0 — SIGNIFICANT CHANGE UP
PH UR: 6.5 — SIGNIFICANT CHANGE UP (ref 4.6–8)
PLATELET # BLD AUTO: 228 K/UL — SIGNIFICANT CHANGE UP (ref 150–400)
PMV BLD: 9.4 FL — SIGNIFICANT CHANGE UP (ref 7–13)
POTASSIUM SERPL-MCNC: 4.4 MMOL/L — SIGNIFICANT CHANGE UP (ref 3.5–5.3)
POTASSIUM SERPL-MCNC: 4.4 MMOL/L — SIGNIFICANT CHANGE UP (ref 3.5–5.3)
POTASSIUM SERPL-SCNC: 4.4 MMOL/L — SIGNIFICANT CHANGE UP (ref 3.5–5.3)
POTASSIUM SERPL-SCNC: 4.4 MMOL/L — SIGNIFICANT CHANGE UP (ref 3.5–5.3)
PROT SERPL-MCNC: 7.3 G/DL — SIGNIFICANT CHANGE UP (ref 6–8.3)
PROT SERPL-MCNC: 7.3 G/DL — SIGNIFICANT CHANGE UP (ref 6–8.3)
PROT UR-MCNC: 100 MG/DL — SIGNIFICANT CHANGE UP
PROTHROM AB SERPL-ACNC: 12.7 SEC — SIGNIFICANT CHANGE UP (ref 9.8–13.1)
RBC # BLD: 4.89 M/UL — SIGNIFICANT CHANGE UP (ref 4.2–5.8)
RBC # FLD: 17 % — HIGH (ref 10.3–14.5)
RBC CASTS # UR COMP ASSIST: SIGNIFICANT CHANGE UP (ref 0–?)
SODIUM SERPL-SCNC: 141 MMOL/L — SIGNIFICANT CHANGE UP (ref 135–145)
SODIUM SERPL-SCNC: 141 MMOL/L — SIGNIFICANT CHANGE UP (ref 135–145)
SP GR SPEC: 1.02 — SIGNIFICANT CHANGE UP (ref 1–1.04)
UROBILINOGEN FLD QL: NORMAL MG/DL — SIGNIFICANT CHANGE UP
WBC # BLD: 10.57 K/UL — HIGH (ref 3.8–10.5)
WBC # FLD AUTO: 10.57 K/UL — HIGH (ref 3.8–10.5)
WBC UR QL: SIGNIFICANT CHANGE UP (ref 0–?)

## 2018-05-05 PROCEDURE — 74177 CT ABD & PELVIS W/CONTRAST: CPT | Mod: 26

## 2018-05-05 PROCEDURE — 71045 X-RAY EXAM CHEST 1 VIEW: CPT | Mod: 26

## 2018-05-05 PROCEDURE — 99285 EMERGENCY DEPT VISIT HI MDM: CPT

## 2018-05-05 RX ORDER — SODIUM CHLORIDE 9 MG/ML
1000 INJECTION INTRAMUSCULAR; INTRAVENOUS; SUBCUTANEOUS ONCE
Qty: 0 | Refills: 0 | Status: COMPLETED | OUTPATIENT
Start: 2018-05-05 | End: 2018-05-05

## 2018-05-05 RX ORDER — LIDOCAINE 4 G/100G
1 CREAM TOPICAL ONCE
Qty: 0 | Refills: 0 | Status: COMPLETED | OUTPATIENT
Start: 2018-05-05 | End: 2018-05-05

## 2018-05-05 RX ORDER — CYCLOBENZAPRINE HYDROCHLORIDE 10 MG/1
1 TABLET, FILM COATED ORAL
Qty: 15 | Refills: 0
Start: 2018-05-05 | End: 2018-05-09

## 2018-05-05 RX ORDER — CYCLOBENZAPRINE HYDROCHLORIDE 10 MG/1
5 TABLET, FILM COATED ORAL ONCE
Qty: 0 | Refills: 0 | Status: COMPLETED | OUTPATIENT
Start: 2018-05-05 | End: 2018-05-05

## 2018-05-05 RX ORDER — MORPHINE SULFATE 50 MG/1
4 CAPSULE, EXTENDED RELEASE ORAL ONCE
Qty: 0 | Refills: 0 | Status: DISCONTINUED | OUTPATIENT
Start: 2018-05-05 | End: 2018-05-05

## 2018-05-05 RX ADMIN — SODIUM CHLORIDE 2000 MILLILITER(S): 9 INJECTION INTRAMUSCULAR; INTRAVENOUS; SUBCUTANEOUS at 10:37

## 2018-05-05 RX ADMIN — CYCLOBENZAPRINE HYDROCHLORIDE 5 MILLIGRAM(S): 10 TABLET, FILM COATED ORAL at 15:16

## 2018-05-05 RX ADMIN — LIDOCAINE 1 PATCH: 4 CREAM TOPICAL at 10:12

## 2018-05-05 RX ADMIN — MORPHINE SULFATE 4 MILLIGRAM(S): 50 CAPSULE, EXTENDED RELEASE ORAL at 13:04

## 2018-05-05 NOTE — ED PROVIDER NOTE - ATTENDING CONTRIBUTION TO CARE
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

## 2018-05-05 NOTE — ED PROVIDER NOTE - OBJECTIVE STATEMENT
67y.o M PMH AVR not on AC due to hemorrhage in the past, COPD, HTN, HLD, nephrolithiasis (requiring lithotripsy in the past), multiple abdominal surgeries, including diverting loop ileostomy with reversal in June 2017 due to rectosigmoid CA now presenting with severe back pain described as "spasms." Pt started feeling symptoms yesterday of acute onset, has never had this before, started without exertion and was lying down at the time. No hx of fall/trauma to the area. Pt describes paraspinal spasms, possible radiation toward the front toward groin. Does not remember if the pain feels the same as when he had stones in the past. No fevers, chills, nausea, vomiting, abdominal pain, hematuria, or melena/hematochezia. Unable to ambulate due to spasms. No relief with Tylenol or Oxycodone 5mg. Has had herniated disc in L4-L5 region in past that never gave him problems.

## 2018-05-05 NOTE — ED PROVIDER NOTE - MEDICAL DECISION MAKING DETAILS
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 nephrolithiasis vs. musculoskeletal back spasm

## 2018-05-05 NOTE — ED ADULT TRIAGE NOTE - CHIEF COMPLAINT QUOTE
Pt c/o "not feeling good," Productive cough, nausea, abd pain, and back pain since yesterday. Denies sob, cp, fever/chills, urinary symptoms, Hx: aortic valve disease, COPD, HTN, Kidney stones. Respirations even/unlabored. Appears comfortable in triage.

## 2018-05-07 ENCOUNTER — APPOINTMENT (OUTPATIENT)
Dept: ORTHOPEDIC SURGERY | Facility: CLINIC | Age: 68
End: 2018-05-07
Payer: COMMERCIAL

## 2018-05-07 VITALS
DIASTOLIC BLOOD PRESSURE: 99 MMHG | WEIGHT: 240 LBS | HEIGHT: 70 IN | HEART RATE: 85 BPM | SYSTOLIC BLOOD PRESSURE: 148 MMHG | BODY MASS INDEX: 34.36 KG/M2

## 2018-05-07 PROCEDURE — 72100 X-RAY EXAM L-S SPINE 2/3 VWS: CPT

## 2018-05-07 PROCEDURE — 99204 OFFICE O/P NEW MOD 45 MIN: CPT

## 2018-05-26 ENCOUNTER — EMERGENCY (EMERGENCY)
Facility: HOSPITAL | Age: 68
LOS: 1 days | Discharge: HOME | End: 2018-05-26
Attending: EMERGENCY MEDICINE | Admitting: EMERGENCY MEDICINE
Payer: COMMERCIAL

## 2018-05-26 VITALS
RESPIRATION RATE: 17 BRPM | OXYGEN SATURATION: 97 % | SYSTOLIC BLOOD PRESSURE: 141 MMHG | TEMPERATURE: 98 F | DIASTOLIC BLOOD PRESSURE: 85 MMHG | HEART RATE: 88 BPM

## 2018-05-26 VITALS
RESPIRATION RATE: 18 BRPM | OXYGEN SATURATION: 96 % | TEMPERATURE: 98 F | DIASTOLIC BLOOD PRESSURE: 108 MMHG | SYSTOLIC BLOOD PRESSURE: 156 MMHG | HEART RATE: 85 BPM

## 2018-05-26 DIAGNOSIS — Z98.49 CATARACT EXTRACTION STATUS, UNSPECIFIED EYE: Chronic | ICD-10-CM

## 2018-05-26 DIAGNOSIS — Z90.49 ACQUIRED ABSENCE OF OTHER SPECIFIED PARTS OF DIGESTIVE TRACT: Chronic | ICD-10-CM

## 2018-05-26 DIAGNOSIS — Z96.651 PRESENCE OF RIGHT ARTIFICIAL KNEE JOINT: Chronic | ICD-10-CM

## 2018-05-26 DIAGNOSIS — Z98.890 OTHER SPECIFIED POSTPROCEDURAL STATES: Chronic | ICD-10-CM

## 2018-05-26 DIAGNOSIS — Z95.2 PRESENCE OF PROSTHETIC HEART VALVE: Chronic | ICD-10-CM

## 2018-05-26 LAB
ALBUMIN SERPL ELPH-MCNC: 3.7 G/DL — SIGNIFICANT CHANGE UP (ref 3.3–5)
ALP SERPL-CCNC: 94 U/L — SIGNIFICANT CHANGE UP (ref 40–120)
ALT FLD-CCNC: 11 U/L — SIGNIFICANT CHANGE UP (ref 4–41)
AST SERPL-CCNC: 12 U/L — SIGNIFICANT CHANGE UP (ref 4–40)
BASE EXCESS BLDV CALC-SCNC: 1.6 MMOL/L — SIGNIFICANT CHANGE UP
BASOPHILS # BLD AUTO: 0.02 K/UL — SIGNIFICANT CHANGE UP (ref 0–0.2)
BASOPHILS NFR BLD AUTO: 0.2 % — SIGNIFICANT CHANGE UP (ref 0–2)
BILIRUB SERPL-MCNC: 0.8 MG/DL — SIGNIFICANT CHANGE UP (ref 0.2–1.2)
BLOOD GAS VENOUS - CREATININE: 0.81 MG/DL — SIGNIFICANT CHANGE UP (ref 0.5–1.3)
BUN SERPL-MCNC: 11 MG/DL — SIGNIFICANT CHANGE UP (ref 7–23)
CALCIUM SERPL-MCNC: 8.6 MG/DL — SIGNIFICANT CHANGE UP (ref 8.4–10.5)
CHLORIDE BLDV-SCNC: 108 MMOL/L — SIGNIFICANT CHANGE UP (ref 96–108)
CHLORIDE SERPL-SCNC: 103 MMOL/L — SIGNIFICANT CHANGE UP (ref 98–107)
CO2 SERPL-SCNC: 23 MMOL/L — SIGNIFICANT CHANGE UP (ref 22–31)
CREAT SERPL-MCNC: 0.9 MG/DL — SIGNIFICANT CHANGE UP (ref 0.5–1.3)
CRP SERPL-MCNC: 36.6 MG/L — HIGH
EOSINOPHIL # BLD AUTO: 0.08 K/UL — SIGNIFICANT CHANGE UP (ref 0–0.5)
EOSINOPHIL NFR BLD AUTO: 1 % — SIGNIFICANT CHANGE UP (ref 0–6)
ERYTHROCYTE [SEDIMENTATION RATE] IN BLOOD: 49 MM/HR — HIGH (ref 1–15)
GAS PNL BLDV: 139 MMOL/L — SIGNIFICANT CHANGE UP (ref 136–146)
GLUCOSE BLDV-MCNC: 119 — HIGH (ref 70–99)
GLUCOSE SERPL-MCNC: 117 MG/DL — HIGH (ref 70–99)
HCO3 BLDV-SCNC: 25 MMOL/L — SIGNIFICANT CHANGE UP (ref 20–27)
HCT VFR BLD CALC: 38.7 % — LOW (ref 39–50)
HCT VFR BLDV CALC: 39.7 % — SIGNIFICANT CHANGE UP (ref 39–51)
HGB BLD-MCNC: 12.2 G/DL — LOW (ref 13–17)
HGB BLDV-MCNC: 12.9 G/DL — LOW (ref 13–17)
IMM GRANULOCYTES # BLD AUTO: 0.03 # — SIGNIFICANT CHANGE UP
IMM GRANULOCYTES NFR BLD AUTO: 0.4 % — SIGNIFICANT CHANGE UP (ref 0–1.5)
LACTATE BLDV-MCNC: 1 MMOL/L — SIGNIFICANT CHANGE UP (ref 0.5–2)
LYMPHOCYTES # BLD AUTO: 0.86 K/UL — LOW (ref 1–3.3)
LYMPHOCYTES # BLD AUTO: 10.3 % — LOW (ref 13–44)
MCHC RBC-ENTMCNC: 25.6 PG — LOW (ref 27–34)
MCHC RBC-ENTMCNC: 31.5 % — LOW (ref 32–36)
MCV RBC AUTO: 81.3 FL — SIGNIFICANT CHANGE UP (ref 80–100)
MONOCYTES # BLD AUTO: 0.67 K/UL — SIGNIFICANT CHANGE UP (ref 0–0.9)
MONOCYTES NFR BLD AUTO: 8 % — SIGNIFICANT CHANGE UP (ref 2–14)
NEUTROPHILS # BLD AUTO: 6.7 K/UL — SIGNIFICANT CHANGE UP (ref 1.8–7.4)
NEUTROPHILS NFR BLD AUTO: 80.1 % — HIGH (ref 43–77)
NRBC # FLD: 0 — SIGNIFICANT CHANGE UP
PCO2 BLDV: 46 MMHG — SIGNIFICANT CHANGE UP (ref 41–51)
PH BLDV: 7.37 PH — SIGNIFICANT CHANGE UP (ref 7.32–7.43)
PLATELET # BLD AUTO: 255 K/UL — SIGNIFICANT CHANGE UP (ref 150–400)
PMV BLD: 9.9 FL — SIGNIFICANT CHANGE UP (ref 7–13)
PO2 BLDV: 42 MMHG — HIGH (ref 35–40)
POTASSIUM BLDV-SCNC: 4 MMOL/L — SIGNIFICANT CHANGE UP (ref 3.4–4.5)
POTASSIUM SERPL-MCNC: 4.1 MMOL/L — SIGNIFICANT CHANGE UP (ref 3.5–5.3)
POTASSIUM SERPL-SCNC: 4.1 MMOL/L — SIGNIFICANT CHANGE UP (ref 3.5–5.3)
PROT SERPL-MCNC: 7.1 G/DL — SIGNIFICANT CHANGE UP (ref 6–8.3)
RBC # BLD: 4.76 M/UL — SIGNIFICANT CHANGE UP (ref 4.2–5.8)
RBC # FLD: 16.3 % — HIGH (ref 10.3–14.5)
SAO2 % BLDV: 74.2 % — SIGNIFICANT CHANGE UP (ref 60–85)
SODIUM SERPL-SCNC: 140 MMOL/L — SIGNIFICANT CHANGE UP (ref 135–145)
WBC # BLD: 8.36 K/UL — SIGNIFICANT CHANGE UP (ref 3.8–10.5)
WBC # FLD AUTO: 8.36 K/UL — SIGNIFICANT CHANGE UP (ref 3.8–10.5)

## 2018-05-26 PROCEDURE — 73110 X-RAY EXAM OF WRIST: CPT | Mod: 26,RT

## 2018-05-26 PROCEDURE — 99283 EMERGENCY DEPT VISIT LOW MDM: CPT | Mod: GC

## 2018-05-26 PROCEDURE — 99285 EMERGENCY DEPT VISIT HI MDM: CPT

## 2018-05-26 PROCEDURE — 73130 X-RAY EXAM OF HAND: CPT | Mod: 26,RT

## 2018-05-26 RX ORDER — MORPHINE SULFATE 50 MG/1
4 CAPSULE, EXTENDED RELEASE ORAL ONCE
Qty: 0 | Refills: 0 | Status: DISCONTINUED | OUTPATIENT
Start: 2018-05-26 | End: 2018-05-26

## 2018-05-26 RX ORDER — SODIUM CHLORIDE 9 MG/ML
1000 INJECTION INTRAMUSCULAR; INTRAVENOUS; SUBCUTANEOUS ONCE
Qty: 0 | Refills: 0 | Status: COMPLETED | OUTPATIENT
Start: 2018-05-26 | End: 2018-05-26

## 2018-05-26 RX ADMIN — MORPHINE SULFATE 4 MILLIGRAM(S): 50 CAPSULE, EXTENDED RELEASE ORAL at 10:28

## 2018-05-26 RX ADMIN — SODIUM CHLORIDE 2000 MILLILITER(S): 9 INJECTION INTRAMUSCULAR; INTRAVENOUS; SUBCUTANEOUS at 10:28

## 2018-05-26 RX ADMIN — Medication 20 MILLIGRAM(S): at 16:33

## 2018-05-26 RX ADMIN — MORPHINE SULFATE 4 MILLIGRAM(S): 50 CAPSULE, EXTENDED RELEASE ORAL at 12:35

## 2018-05-26 NOTE — ED PROVIDER NOTE - NS_ ATTENDINGSCRIBEDETAILS _ED_A_ED_FT
The scribe's documentation has been prepared under my direction and personally reviewed by me, Keya Gutierrez MD, in its entirety. I confirm that the note above accurately reflects all work, treatment, procedures, and medical decision making performed by me.

## 2018-05-26 NOTE — ED PROVIDER NOTE - PROGRESS NOTE DETAILS
FARHAN SANCHES, MD: D/W patient x-rays and lab results and ortho consult.  Patient agrees with POC. FARHAN SANCHES MD: D/W ortho who deferred to plastics for hand.  D/W hand cx (Dr. Rodriguez) who stated the patient does not require a tap of the joint.  He's willing to come see the patient to state that require medical management, but will under no circumstances tap the joint secondary to no fever or elevated WBC.  Dr. Rodriguez states needs to f/u with rheumatology and the only thing he would recommend would be steroids.

## 2018-05-26 NOTE — CONSULT NOTE ADULT - ASSESSMENT
67yoM p/w acute monoarthritis of R wrist  Differential includes crystalline arthropathy vs. inflammatory arthritis vs. septic joint.  Arthrocentesis offered but deferred at this time- pt interested in seeing if medications will work first.   Pt w/ no fevers or leukocytosis, low suspicion of septic joint at this time.  Acute isolated monoarthritis less likely to be related to new presentation of inflammatory arhthropathy such as RA.     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 pseudogout.    - If IV still in place, can give Solumedrol 20mg IV x 1 dose. If not, start on Prednisone 20mg for total 5 day course  - Pt to f/u with rheumatologist in 3 days- rheum team will email outpt provider with information from ER visit.     will sign off- Please recall if needed

## 2018-05-26 NOTE — ED PROVIDER NOTE - CHPI ED SYMPTOMS POS
gradual onset, radiating (from distal right forearm to medial right elbow) right hand pain/swelling/INFLAMMATION/JOINT SWELLING/PAIN/EDEMA

## 2018-05-26 NOTE — CONSULT NOTE ADULT - SUBJECTIVE AND OBJECTIVE BOX
ESTRELLA GARCIA  [id number]    HISTORY OF PRESENT ILLNESS:  67yoM w/ multiple medical issues p/w R wrist pain x 2 days.    Pt states he has arthritis of his hands for many years but developed pain and swelling of R wrist 2 days ago. His mother had RA so he was concerned about whether he also could have RA and came to ED. Pt has appt with rheumatologist w/ Dr. Aftab Grigsby in 3 days.   Denies trauma or recent falls, no recent travel. No fevers/chills or other joint pains. Has never had pain like this before. No reported hx of gout.   Pt reports diet heavy in red meats but denies any heavy use of alcohol or seafood intake recently.     PAST MEDICAL & SURGICAL HISTORY:  Arthritis  Hypercholesterolemia  Stenosis of carotid artery, unspecified laterality  Aortic valve disease  Amaurosis fugax: &quot;every once in awhile my right eye goes out for about a minute&quot;  Sleep apnea  Venous Insufficiency  Microscopic Hematuria  OA (Osteoarthritis)  Obesity  Varicose Vein: B/L lower extremities  Spinal Stenosis  HTN (Hypertension)  Pilonidal Abscess  Kidney Stone  COPD (Chronic Obstructive Pulmonary Disease)  S/P reconstruction procedure: &quot;my left foot I had no arch&quot;-12/2015  H/O cataract removal with insertion of prosthetic lens: right-9/7/2004  H/O umbilical hernia repair: x2-2011, 2016  H/O aortic valve replacement: 2015  S/P colon resection: and umbilical hernia repair-6/12/2017  H/O total knee replacement, right: 2011  History of Lt Total Knee Replacement: 4/2011  S/P Cystoscopy: 2010  History of Nasal Septoplasty: 1979  Skin Tag Excision: Right Leg-2010  S/P Right Inguinal Hernia Repair: 1979  S/P Cataract Surgery: Right - with lens placement  Tibialis Posterior Tendonitis: Repair B/L revision left 2016  Plantar Tendonitis: Repair- B L  Renal Calculi: lithotripsy  I & D of Pilonidal cyst  S/P Cardiac Cath  History of Appendectomy      Review of Systems:  Gen:  No fevers/chills, weight loss  CVS: No chest pain/palpitations  Resp: No SOB/wheezing  GI: No N/V/C/D/abdominal pain  MSK: +R wrist pain/swelling   Skin: No new rashes  Neuro: No headaches    MEDICATIONS  (STANDING):    MEDICATIONS  (PRN):      Allergies    No Known Allergies    Intolerances    codeine (Nausea)      PERTINENT MEDICATION HISTORY:    SOCIAL HISTORY: lives w wife, no toxic habits  OCCUPATION: works in [employer]   TRAVEL HISTORY: no recent travel     FAMILY HISTORY:  Family history of lung cancer (Mother)  Family history of COPD (chronic obstructive pulmonary disease) (Father)      Vital Signs Last 24 Hrs  T(C): 36.9 (26 May 2018 11:57), Max: 36.9 (26 May 2018 09:01)  T(F): 98.4 (26 May 2018 11:57), Max: 98.4 (26 May 2018 09:01)  HR: 88 (26 May 2018 11:57) (85 - 88)  BP: 141/85 (26 May 2018 11:57) (141/85 - 156/108)  BP(mean): --  RR: 17 (26 May 2018 11:57) (17 - 18)  SpO2: 97% (26 May 2018 11:57) (96% - 97%)    Physical Exam:  General: No apparent distress, sitting in chair   MSK:  Shoulders: wnl  Elbows: wnl  Wrists: R wrist warm, erythematous, mildly tender to palpation with mild discomfort over dorsal surface of hand as well. Limited flexion/extension . L wrist wnl  MCPs: wnl  PIPs: wnl  DIPs: wnl   Hips: wnl  Knees: wnl   Ankle: wnl  Neuro: AAOx3  Skin: no visible rashes    LABS:                        12.2   8.36  )-----------( 255      ( 26 May 2018 11:35 )             38.7     05-26    140  |  103  |  11  ----------------------------<  117<H>  4.1   |  23  |  0.90    Ca    8.6      26 May 2018 11:35    TPro  7.1  /  Alb  3.7  /  TBili  0.8  /  DBili  x   /  AST  12  /  ALT  11  /  AlkPhos  94  05-26          RADIOLOGY & ADDITIONAL STUDIES:  < from: Xray Wrist 3 Views, Right (05.26.18 @ 10:38) >  IMPRESSION:    Soft tissue swelling over the dorsum of the wrist with no subcutaneous   emphysema or periosteal reaction of the adjacent bones. Osteoarthritis of   the DIP joints, PIP joints of the 1st digit and carpometacarpal joint.   Periarticular osteopenia.     No acute fractures or dislocations. The carpal bones are in alignment.    < end of copied text >

## 2018-05-26 NOTE — ED PROVIDER NOTE - MUSCULOSKELETAL [+], MLM
JOINT PAIN/gradual onset, radiating (from distal right forearm to medial right elbow) right hand pain/swelling

## 2018-05-26 NOTE — ED PROVIDER NOTE - NOTES
N/A, N/A at 1:05.  Call back at 1:28pm from OR RN to state resident is scrubbed into a case and will call back when he is done.

## 2018-05-26 NOTE — ED ADULT NURSE NOTE - CHIEF COMPLAINT QUOTE
Pt. c/o right wrist pain radiating to elbow x 2 days. h/o Rheumatoid arthritis in b/l hands. Denies tingling/numbness to extremity. Denies any trauma to area. Unable to get appt. with rheumatologist until July. Took Tylenol for pain with no relief.

## 2018-05-26 NOTE — CONSULT NOTE ADULT - ATTENDING COMMENTS
patient seen and examined by me personally   case reviewed/discussed in detail  no evidence septic or systemic d/o  can d/c home with steroids  pt is scheduled for NPA with our [place] colleague Dr. Rivera     agree with assessment and plan as above

## 2018-05-26 NOTE — ED PROVIDER NOTE - OBJECTIVE STATEMENT
68 y/o M pt with PMHx as documented, arrives to the ED c/o gradual onset, radiating (from distal right forearm to medial right elbow) right hand pain/swelling for 2 days. Pt reports that he has a "documented hx of RA," as per EMR, however he then states that he has never been officially dx with RA. Pt notes that he has told medical professionals that he has RA, secondary to genetic predisposition. FHx of RA. Pt states no inciting factors. No hx of recent traumas, injuries, cuts, or abrasions. Tylenol taken, to no relief. Denies fever, chills, HA, CP, or any other complaints. NKDA. Intolerant to Codeine

## 2018-05-26 NOTE — ED PROVIDER NOTE - MEDICAL DECISION MAKING DETAILS
68 y/o M pt with right hand swelling. Possible Arthritis vs infection. Obtain labs, including SRP, ESR and VBG. Give Analgesia PRN, obtain XR and reassess.

## 2018-05-26 NOTE — ED PROVIDER NOTE - UPPER EXTREMITY EXAM, RIGHT
decreased ROM of right hand secondary to edema, with mild erythema and calor. No discrete lesions. Cap. refill less than 2 seconds. radial pulses 2+, Diffused TTP without pinpoint bony TTP of right hand. Sensation intact/LIMITED ROM/TENDERNESS/JOINT SWELLING/SWELLING

## 2018-05-27 LAB
SPECIMEN SOURCE: SIGNIFICANT CHANGE UP
SPECIMEN SOURCE: SIGNIFICANT CHANGE UP

## 2018-05-31 LAB
BACTERIA BLD CULT: SIGNIFICANT CHANGE UP
BACTERIA BLD CULT: SIGNIFICANT CHANGE UP

## 2018-06-26 NOTE — H&P PST ADULT - BLOOD TRANSFUSION, PREVIOUS, PROFILE
06/26/2018  Pauly Adams is a 38 y.o., female.    Anesthesia Evaluation    I have reviewed the Patient Summary Reports.     I have reviewed the Medications.     Review of Systems  Anesthesia Hx:  History of prior surgery of interest to airway management or planning:   Social:  Non-Smoker, Social Alcohol Use    Psych:   Psychiatric History depression          Physical Exam  General:  Well nourished    Airway/Jaw/Neck:  Airway Findings: Mouth Opening: Normal Tongue: Normal  General Airway Assessment: Adult  Mallampati: III  Improves to II with phonation.  TM Distance: Normal, at least 6 cm  Jaw/Neck Findings:  Neck ROM: Normal ROM       Chest/Lungs:  Chest/Lungs Findings: Normal Respiratory Rate     Heart/Vascular:  Heart Findings: Rate: Normal        Mental Status:  Mental Status Findings:  Alert and Oriented         Anesthesia Plan  Type of Anesthesia, risks & benefits discussed:  Anesthesia Type:  general  Patient's Preference: General   Intra-op Monitoring Plan: standard ASA monitors  Intra-op Monitoring Plan Comments:   Post Op Pain Control Plan: IV/PO Opioids PRN  Post Op Pain Control Plan Comments:   Induction:   IV  Beta Blocker:  Patient is not currently on a Beta-Blocker (No further documentation required).       Informed Consent: Patient understands risks and agrees with Anesthesia plan.  Questions answered. Anesthesia consent signed with patient.  ASA Score: 3     Day of Surgery Review of History & Physical:    H&P update referred to the surgeon.     Anesthesia Plan Notes: NPO confirmed.   No history of anesthesia problems.         Ready For Surgery From Anesthesia Perspective.        6/20/17/yes

## 2018-10-02 PROBLEM — E78.00 PURE HYPERCHOLESTEROLEMIA, UNSPECIFIED: Chronic | Status: ACTIVE | Noted: 2017-05-30

## 2018-10-02 PROBLEM — M19.90 UNSPECIFIED OSTEOARTHRITIS, UNSPECIFIED SITE: Chronic | Status: ACTIVE | Noted: 2017-05-30

## 2018-10-09 ENCOUNTER — APPOINTMENT (OUTPATIENT)
Dept: PULMONOLOGY | Facility: CLINIC | Age: 68
End: 2018-10-09
Payer: COMMERCIAL

## 2018-10-09 VITALS
HEART RATE: 94 BPM | SYSTOLIC BLOOD PRESSURE: 144 MMHG | WEIGHT: 250 LBS | TEMPERATURE: 98.7 F | DIASTOLIC BLOOD PRESSURE: 90 MMHG | BODY MASS INDEX: 35.79 KG/M2 | OXYGEN SATURATION: 94 % | RESPIRATION RATE: 16 BRPM | HEIGHT: 70 IN

## 2018-10-09 DIAGNOSIS — C18.9 MALIGNANT NEOPLASM OF COLON, UNSPECIFIED: ICD-10-CM

## 2018-10-09 LAB — GLUCOSE BLDC GLUCOMTR-MCNC: 12.7

## 2018-10-09 PROCEDURE — 94727 GAS DIL/WSHOT DETER LNG VOL: CPT

## 2018-10-09 PROCEDURE — 88738 HGB QUANT TRANSCUTANEOUS: CPT

## 2018-10-09 PROCEDURE — 99214 OFFICE O/P EST MOD 30 MIN: CPT | Mod: 24

## 2018-10-09 PROCEDURE — 82962 GLUCOSE BLOOD TEST: CPT

## 2018-10-09 PROCEDURE — 94729 DIFFUSING CAPACITY: CPT

## 2018-10-09 PROCEDURE — 71046 X-RAY EXAM CHEST 2 VIEWS: CPT

## 2018-10-09 PROCEDURE — 94060 EVALUATION OF WHEEZING: CPT

## 2018-10-09 RX ORDER — METHYLPREDNISOLONE 4 MG/1
4 TABLET ORAL
Qty: 1 | Refills: 1 | Status: DISCONTINUED | COMMUNITY
Start: 2018-05-07 | End: 2018-10-09

## 2018-10-12 LAB — DEPRECATED D DIMER PPP IA-ACNC: 209 NG/ML DDU

## 2018-10-23 ENCOUNTER — APPOINTMENT (OUTPATIENT)
Dept: PULMONOLOGY | Facility: CLINIC | Age: 68
End: 2018-10-23
Payer: COMMERCIAL

## 2018-10-24 ENCOUNTER — APPOINTMENT (OUTPATIENT)
Dept: PULMONOLOGY | Facility: CLINIC | Age: 68
End: 2018-10-24
Payer: COMMERCIAL

## 2018-10-24 VITALS
WEIGHT: 250 LBS | BODY MASS INDEX: 35.79 KG/M2 | SYSTOLIC BLOOD PRESSURE: 153 MMHG | DIASTOLIC BLOOD PRESSURE: 104 MMHG | HEIGHT: 70 IN | OXYGEN SATURATION: 92 % | RESPIRATION RATE: 16 BRPM | HEART RATE: 84 BPM | TEMPERATURE: 98.4 F

## 2018-10-24 PROCEDURE — 71046 X-RAY EXAM CHEST 2 VIEWS: CPT

## 2018-10-24 PROCEDURE — 99213 OFFICE O/P EST LOW 20 MIN: CPT | Mod: 25

## 2018-11-28 ENCOUNTER — APPOINTMENT (OUTPATIENT)
Dept: PULMONOLOGY | Facility: CLINIC | Age: 68
End: 2018-11-28
Payer: COMMERCIAL

## 2018-11-28 PROCEDURE — 95800 SLP STDY UNATTENDED: CPT

## 2018-11-30 PROCEDURE — 95800 SLP STDY UNATTENDED: CPT | Mod: 52

## 2018-12-02 ENCOUNTER — FORM ENCOUNTER (OUTPATIENT)
Age: 68
End: 2018-12-02

## 2018-12-03 PROCEDURE — 95800 SLP STDY UNATTENDED: CPT | Mod: 52

## 2018-12-11 ENCOUNTER — APPOINTMENT (OUTPATIENT)
Dept: PULMONOLOGY | Facility: CLINIC | Age: 68
End: 2018-12-11
Payer: COMMERCIAL

## 2018-12-11 VITALS
RESPIRATION RATE: 16 BRPM | TEMPERATURE: 97.8 F | DIASTOLIC BLOOD PRESSURE: 87 MMHG | HEART RATE: 90 BPM | SYSTOLIC BLOOD PRESSURE: 161 MMHG | OXYGEN SATURATION: 96 %

## 2018-12-11 PROCEDURE — 99213 OFFICE O/P EST LOW 20 MIN: CPT

## 2019-01-02 ENCOUNTER — FORM ENCOUNTER (OUTPATIENT)
Age: 69
End: 2019-01-02

## 2019-02-19 ENCOUNTER — APPOINTMENT (OUTPATIENT)
Dept: PULMONOLOGY | Facility: CLINIC | Age: 69
End: 2019-02-19
Payer: COMMERCIAL

## 2019-02-19 VITALS
TEMPERATURE: 98.5 F | WEIGHT: 250 LBS | RESPIRATION RATE: 16 BRPM | HEART RATE: 84 BPM | HEIGHT: 70 IN | SYSTOLIC BLOOD PRESSURE: 167 MMHG | OXYGEN SATURATION: 96 % | DIASTOLIC BLOOD PRESSURE: 101 MMHG | BODY MASS INDEX: 35.79 KG/M2

## 2019-02-19 PROCEDURE — 99213 OFFICE O/P EST LOW 20 MIN: CPT

## 2019-02-20 NOTE — ASSESSMENT
[FreeTextEntry1] : to start Remsed 8-15 auto set with a Remsed air fit 20 large full face mask\par \par encouraged patient to call office if has any problems. will bring machine in for compliance testing in 2 months

## 2019-02-20 NOTE — PHYSICAL EXAM
[General Appearance - Well Developed] : well developed [General Appearance - Well Nourished] : well nourished [Normal Conjunctiva] : the conjunctiva exhibited no abnormalities [Normal Oropharynx] : normal oropharynx [Lungs Percussion] : the lungs were normal to percussion [Abdomen Soft] : soft [Abdomen Tenderness] : non-tender [Abdomen Mass (___ Cm)] : no abdominal mass palpated [Nail Clubbing] : no clubbing of the fingernails [Cyanosis, Localized] : no localized cyanosis [Petechial Hemorrhages (___cm)] : no petechial hemorrhages [] : no ischemic changes [FreeTextEntry1] : 1 plus bilat rhonchi and wheeze.  [FreeTextEntry2] : Varicose Veins

## 2019-02-26 NOTE — ASU PATIENT PROFILE, ADULT - TEACHING/LEARNING RELIGIOUS CONSIDERATIONS
Holiness considerations/Zoroastrianism Multiple fine Ak forehead and cutaneous upper lip.  Left cheek several actinic keratosis also.\\n\\nLast year used Aldara. She states can’t tell if helped. Didn’t come back for RV appt. I told her my impression today is probably did not get much response or is developing more actinic keratosis in short time. \\n\\nDiscussed cryosurgery. Concerned about hypopigmented scars bc type I skin with freckles. However did give her this option.\\n\\nShe used effudex in past, doesn’t want again\\n\\nLong discussion re PDT and she plans to proceed, want to check insurance. Agrees to PDT or RV for cryosurgery within 3 mo. Detail Level: Simple

## 2019-03-12 ENCOUNTER — APPOINTMENT (OUTPATIENT)
Dept: PULMONOLOGY | Facility: CLINIC | Age: 69
End: 2019-03-12

## 2019-06-13 ENCOUNTER — MEDICATION RENEWAL (OUTPATIENT)
Age: 69
End: 2019-06-13

## 2019-06-14 ENCOUNTER — OTHER (OUTPATIENT)
Age: 69
End: 2019-06-14

## 2019-06-21 ENCOUNTER — OTHER (OUTPATIENT)
Age: 69
End: 2019-06-21

## 2019-06-21 RX ORDER — CEFUROXIME AXETIL 500 MG/1
500 TABLET ORAL
Qty: 14 | Refills: 0 | Status: DISCONTINUED | COMMUNITY
Start: 2018-10-24 | End: 2019-06-21

## 2019-08-01 ENCOUNTER — TRANSCRIPTION ENCOUNTER (OUTPATIENT)
Age: 69
End: 2019-08-01

## 2019-08-02 ENCOUNTER — INPATIENT (INPATIENT)
Facility: HOSPITAL | Age: 69
LOS: 9 days | Discharge: NURSING, REHAB OR LONG-TERM CARE | DRG: 337 | End: 2019-08-12
Attending: SURGERY | Admitting: SURGERY
Payer: COMMERCIAL

## 2019-08-02 ENCOUNTER — RESULT REVIEW (OUTPATIENT)
Age: 69
End: 2019-08-02

## 2019-08-02 VITALS
DIASTOLIC BLOOD PRESSURE: 124 MMHG | RESPIRATION RATE: 18 BRPM | HEART RATE: 98 BPM | OXYGEN SATURATION: 96 % | WEIGHT: 250 LBS | HEIGHT: 70 IN | TEMPERATURE: 98 F | SYSTOLIC BLOOD PRESSURE: 201 MMHG

## 2019-08-02 DIAGNOSIS — Z98.890 OTHER SPECIFIED POSTPROCEDURAL STATES: Chronic | ICD-10-CM

## 2019-08-02 DIAGNOSIS — Z95.2 PRESENCE OF PROSTHETIC HEART VALVE: Chronic | ICD-10-CM

## 2019-08-02 DIAGNOSIS — Z90.49 ACQUIRED ABSENCE OF OTHER SPECIFIED PARTS OF DIGESTIVE TRACT: Chronic | ICD-10-CM

## 2019-08-02 DIAGNOSIS — Z96.651 PRESENCE OF RIGHT ARTIFICIAL KNEE JOINT: Chronic | ICD-10-CM

## 2019-08-02 DIAGNOSIS — Z98.49 CATARACT EXTRACTION STATUS, UNSPECIFIED EYE: Chronic | ICD-10-CM

## 2019-08-02 DIAGNOSIS — K56.609 UNSPECIFIED INTESTINAL OBSTRUCTION, UNSPECIFIED AS TO PARTIAL VERSUS COMPLETE OBSTRUCTION: ICD-10-CM

## 2019-08-02 LAB
ALBUMIN SERPL ELPH-MCNC: 4.4 G/DL — SIGNIFICANT CHANGE UP (ref 3.3–5)
ALP SERPL-CCNC: 104 U/L — SIGNIFICANT CHANGE UP (ref 40–120)
ALT FLD-CCNC: 20 U/L — SIGNIFICANT CHANGE UP (ref 10–45)
ANION GAP SERPL CALC-SCNC: 17 MMOL/L — SIGNIFICANT CHANGE UP (ref 5–17)
ANION GAP SERPL CALC-SCNC: 18 MMOL/L — HIGH (ref 5–17)
APTT BLD: 24.2 SEC — LOW (ref 27.5–36.3)
AST SERPL-CCNC: 37 U/L — SIGNIFICANT CHANGE UP (ref 10–40)
BASOPHILS # BLD AUTO: 0 K/UL — SIGNIFICANT CHANGE UP (ref 0–0.2)
BILIRUB SERPL-MCNC: 1 MG/DL — SIGNIFICANT CHANGE UP (ref 0.2–1.2)
BLD GP AB SCN SERPL QL: NEGATIVE — SIGNIFICANT CHANGE UP
BUN SERPL-MCNC: 14 MG/DL — SIGNIFICANT CHANGE UP (ref 7–23)
BUN SERPL-MCNC: 14 MG/DL — SIGNIFICANT CHANGE UP (ref 7–23)
CALCIUM SERPL-MCNC: 10.5 MG/DL — SIGNIFICANT CHANGE UP (ref 8.4–10.5)
CALCIUM SERPL-MCNC: 10.8 MG/DL — HIGH (ref 8.4–10.5)
CHLORIDE SERPL-SCNC: 98 MMOL/L — SIGNIFICANT CHANGE UP (ref 96–108)
CHLORIDE SERPL-SCNC: 99 MMOL/L — SIGNIFICANT CHANGE UP (ref 96–108)
CO2 SERPL-SCNC: 23 MMOL/L — SIGNIFICANT CHANGE UP (ref 22–31)
CO2 SERPL-SCNC: 25 MMOL/L — SIGNIFICANT CHANGE UP (ref 22–31)
CREAT SERPL-MCNC: 0.76 MG/DL — SIGNIFICANT CHANGE UP (ref 0.5–1.3)
CREAT SERPL-MCNC: 0.81 MG/DL — SIGNIFICANT CHANGE UP (ref 0.5–1.3)
EOSINOPHIL # BLD AUTO: 0 K/UL — SIGNIFICANT CHANGE UP (ref 0–0.5)
GAS PNL BLDA: SIGNIFICANT CHANGE UP
GLUCOSE SERPL-MCNC: 144 MG/DL — HIGH (ref 70–99)
GLUCOSE SERPL-MCNC: 192 MG/DL — HIGH (ref 70–99)
HCT VFR BLD CALC: 47.7 % — SIGNIFICANT CHANGE UP (ref 39–50)
HGB BLD-MCNC: 16.1 G/DL — SIGNIFICANT CHANGE UP (ref 13–17)
INR BLD: 1.05 RATIO — SIGNIFICANT CHANGE UP (ref 0.88–1.16)
LACTATE BLDV-MCNC: 3.2 MMOL/L — HIGH (ref 0.7–2)
LACTATE BLDV-MCNC: 3.3 MMOL/L — HIGH (ref 0.7–2)
LIDOCAIN IGE QN: 19 U/L — SIGNIFICANT CHANGE UP (ref 7–60)
LYMPHOCYTES # BLD AUTO: 0.8 K/UL — LOW (ref 1–3.3)
LYMPHOCYTES # BLD AUTO: 4 % — LOW (ref 13–44)
MCHC RBC-ENTMCNC: 28.9 PG — SIGNIFICANT CHANGE UP (ref 27–34)
MCHC RBC-ENTMCNC: 33.6 GM/DL — SIGNIFICANT CHANGE UP (ref 32–36)
MCV RBC AUTO: 85.9 FL — SIGNIFICANT CHANGE UP (ref 80–100)
MONOCYTES # BLD AUTO: 0.4 K/UL — SIGNIFICANT CHANGE UP (ref 0–0.9)
MONOCYTES NFR BLD AUTO: 3 % — SIGNIFICANT CHANGE UP (ref 2–14)
NEUTROPHILS # BLD AUTO: 13.7 K/UL — HIGH (ref 1.8–7.4)
NEUTROPHILS NFR BLD AUTO: 93 % — HIGH (ref 43–77)
PLAT MORPH BLD: NORMAL — SIGNIFICANT CHANGE UP
PLATELET # BLD AUTO: 219 K/UL — SIGNIFICANT CHANGE UP (ref 150–400)
POTASSIUM SERPL-MCNC: 4.6 MMOL/L — SIGNIFICANT CHANGE UP (ref 3.5–5.3)
POTASSIUM SERPL-MCNC: 5.8 MMOL/L — HIGH (ref 3.5–5.3)
POTASSIUM SERPL-SCNC: 4.6 MMOL/L — SIGNIFICANT CHANGE UP (ref 3.5–5.3)
POTASSIUM SERPL-SCNC: 5.8 MMOL/L — HIGH (ref 3.5–5.3)
PROT SERPL-MCNC: 8.4 G/DL — HIGH (ref 6–8.3)
PROTHROM AB SERPL-ACNC: 12 SEC — SIGNIFICANT CHANGE UP (ref 10–12.9)
RBC # BLD: 5.56 M/UL — SIGNIFICANT CHANGE UP (ref 4.2–5.8)
RBC # FLD: 13.5 % — SIGNIFICANT CHANGE UP (ref 10.3–14.5)
RBC BLD AUTO: NORMAL — SIGNIFICANT CHANGE UP
RH IG SCN BLD-IMP: POSITIVE — SIGNIFICANT CHANGE UP
RH IG SCN BLD-IMP: POSITIVE — SIGNIFICANT CHANGE UP
SODIUM SERPL-SCNC: 138 MMOL/L — SIGNIFICANT CHANGE UP (ref 135–145)
SODIUM SERPL-SCNC: 142 MMOL/L — SIGNIFICANT CHANGE UP (ref 135–145)
WBC # BLD: 14.8 K/UL — HIGH (ref 3.8–10.5)
WBC # FLD AUTO: 14.8 K/UL — HIGH (ref 3.8–10.5)

## 2019-08-02 PROCEDURE — 93010 ELECTROCARDIOGRAM REPORT: CPT

## 2019-08-02 PROCEDURE — 74177 CT ABD & PELVIS W/CONTRAST: CPT | Mod: 26

## 2019-08-02 PROCEDURE — 88302 TISSUE EXAM BY PATHOLOGIST: CPT | Mod: 26

## 2019-08-02 PROCEDURE — 99285 EMERGENCY DEPT VISIT HI MDM: CPT | Mod: GC

## 2019-08-02 RX ORDER — HYDROMORPHONE HYDROCHLORIDE 2 MG/ML
30 INJECTION INTRAMUSCULAR; INTRAVENOUS; SUBCUTANEOUS
Refills: 0 | Status: DISCONTINUED | OUTPATIENT
Start: 2019-08-02 | End: 2019-08-06

## 2019-08-02 RX ORDER — MORPHINE SULFATE 50 MG/1
4 CAPSULE, EXTENDED RELEASE ORAL ONCE
Refills: 0 | Status: DISCONTINUED | OUTPATIENT
Start: 2019-08-02 | End: 2019-08-02

## 2019-08-02 RX ORDER — HYDROMORPHONE HYDROCHLORIDE 2 MG/ML
0.5 INJECTION INTRAMUSCULAR; INTRAVENOUS; SUBCUTANEOUS
Refills: 0 | Status: DISCONTINUED | OUTPATIENT
Start: 2019-08-02 | End: 2019-08-02

## 2019-08-02 RX ORDER — TOBRAMYCIN 0.3 %
1 DROPS OPHTHALMIC (EYE) ONCE
Refills: 0 | Status: COMPLETED | OUTPATIENT
Start: 2019-08-02 | End: 2019-08-02

## 2019-08-02 RX ORDER — SODIUM CHLORIDE 9 MG/ML
1000 INJECTION, SOLUTION INTRAVENOUS
Refills: 0 | Status: DISCONTINUED | OUTPATIENT
Start: 2019-08-02 | End: 2019-08-05

## 2019-08-02 RX ORDER — DEXTROSE 50 % IN WATER 50 %
15 SYRINGE (ML) INTRAVENOUS ONCE
Refills: 0 | Status: DISCONTINUED | OUTPATIENT
Start: 2019-08-02 | End: 2019-08-05

## 2019-08-02 RX ORDER — DEXTROSE 50 % IN WATER 50 %
25 SYRINGE (ML) INTRAVENOUS ONCE
Refills: 0 | Status: DISCONTINUED | OUTPATIENT
Start: 2019-08-02 | End: 2019-08-05

## 2019-08-02 RX ORDER — ENOXAPARIN SODIUM 100 MG/ML
40 INJECTION SUBCUTANEOUS DAILY
Refills: 0 | Status: DISCONTINUED | OUTPATIENT
Start: 2019-08-02 | End: 2019-08-12

## 2019-08-02 RX ORDER — DEXTROSE 50 % IN WATER 50 %
12.5 SYRINGE (ML) INTRAVENOUS ONCE
Refills: 0 | Status: DISCONTINUED | OUTPATIENT
Start: 2019-08-02 | End: 2019-08-05

## 2019-08-02 RX ORDER — ONDANSETRON 8 MG/1
4 TABLET, FILM COATED ORAL ONCE
Refills: 0 | Status: COMPLETED | OUTPATIENT
Start: 2019-08-02 | End: 2019-08-02

## 2019-08-02 RX ORDER — ACETAMINOPHEN 500 MG
2 TABLET ORAL
Qty: 0 | Refills: 0 | DISCHARGE

## 2019-08-02 RX ORDER — INSULIN LISPRO 100/ML
VIAL (ML) SUBCUTANEOUS AT BEDTIME
Refills: 0 | Status: DISCONTINUED | OUTPATIENT
Start: 2019-08-02 | End: 2019-08-04

## 2019-08-02 RX ORDER — SODIUM CHLORIDE 9 MG/ML
1000 INJECTION, SOLUTION INTRAVENOUS
Refills: 0 | Status: DISCONTINUED | OUTPATIENT
Start: 2019-08-02 | End: 2019-08-02

## 2019-08-02 RX ORDER — TOBRAMYCIN 0.3 %
1 DROPS OPHTHALMIC (EYE) EVERY 4 HOURS
Refills: 0 | Status: COMPLETED | OUTPATIENT
Start: 2019-08-02 | End: 2019-08-03

## 2019-08-02 RX ORDER — ACETAMINOPHEN 500 MG
1000 TABLET ORAL EVERY 6 HOURS
Refills: 0 | Status: COMPLETED | OUTPATIENT
Start: 2019-08-02 | End: 2019-08-03

## 2019-08-02 RX ORDER — TOBRAMYCIN 0.3 %
DROPS OPHTHALMIC (EYE)
Refills: 0 | Status: COMPLETED | OUTPATIENT
Start: 2019-08-02 | End: 2019-08-03

## 2019-08-02 RX ORDER — GLUCAGON INJECTION, SOLUTION 0.5 MG/.1ML
1 INJECTION, SOLUTION SUBCUTANEOUS ONCE
Refills: 0 | Status: DISCONTINUED | OUTPATIENT
Start: 2019-08-02 | End: 2019-08-05

## 2019-08-02 RX ORDER — LABETALOL HCL 100 MG
20 TABLET ORAL ONCE
Refills: 0 | Status: COMPLETED | OUTPATIENT
Start: 2019-08-02 | End: 2019-08-02

## 2019-08-02 RX ORDER — SODIUM CHLORIDE 9 MG/ML
1000 INJECTION INTRAMUSCULAR; INTRAVENOUS; SUBCUTANEOUS ONCE
Refills: 0 | Status: COMPLETED | OUTPATIENT
Start: 2019-08-02 | End: 2019-08-02

## 2019-08-02 RX ORDER — ONDANSETRON 8 MG/1
4 TABLET, FILM COATED ORAL EVERY 6 HOURS
Refills: 0 | Status: DISCONTINUED | OUTPATIENT
Start: 2019-08-02 | End: 2019-08-06

## 2019-08-02 RX ORDER — HYDROMORPHONE HYDROCHLORIDE 2 MG/ML
0.5 INJECTION INTRAMUSCULAR; INTRAVENOUS; SUBCUTANEOUS
Refills: 0 | Status: DISCONTINUED | OUTPATIENT
Start: 2019-08-02 | End: 2019-08-06

## 2019-08-02 RX ORDER — HYDROMORPHONE HYDROCHLORIDE 2 MG/ML
0.5 INJECTION INTRAMUSCULAR; INTRAVENOUS; SUBCUTANEOUS EVERY 6 HOURS
Refills: 0 | Status: DISCONTINUED | OUTPATIENT
Start: 2019-08-02 | End: 2019-08-06

## 2019-08-02 RX ORDER — IPRATROPIUM/ALBUTEROL SULFATE 18-103MCG
3 AEROSOL WITH ADAPTER (GRAM) INHALATION EVERY 6 HOURS
Refills: 0 | Status: DISCONTINUED | OUTPATIENT
Start: 2019-08-02 | End: 2019-08-12

## 2019-08-02 RX ORDER — INSULIN LISPRO 100/ML
VIAL (ML) SUBCUTANEOUS
Refills: 0 | Status: DISCONTINUED | OUTPATIENT
Start: 2019-08-02 | End: 2019-08-04

## 2019-08-02 RX ORDER — SODIUM CHLORIDE 9 MG/ML
1000 INJECTION, SOLUTION INTRAVENOUS
Refills: 0 | Status: DISCONTINUED | OUTPATIENT
Start: 2019-08-02 | End: 2019-08-03

## 2019-08-02 RX ORDER — KETOROLAC TROMETHAMINE 30 MG/ML
15 SYRINGE (ML) INJECTION EVERY 6 HOURS
Refills: 0 | Status: DISCONTINUED | OUTPATIENT
Start: 2019-08-02 | End: 2019-08-03

## 2019-08-02 RX ORDER — LABETALOL HCL 100 MG
10 TABLET ORAL ONCE
Refills: 0 | Status: COMPLETED | OUTPATIENT
Start: 2019-08-02 | End: 2019-08-02

## 2019-08-02 RX ORDER — NALOXONE HYDROCHLORIDE 4 MG/.1ML
0.1 SPRAY NASAL
Refills: 0 | Status: DISCONTINUED | OUTPATIENT
Start: 2019-08-02 | End: 2019-08-06

## 2019-08-02 RX ORDER — ONDANSETRON 8 MG/1
4 TABLET, FILM COATED ORAL ONCE
Refills: 0 | Status: DISCONTINUED | OUTPATIENT
Start: 2019-08-02 | End: 2019-08-02

## 2019-08-02 RX ADMIN — ONDANSETRON 4 MILLIGRAM(S): 8 TABLET, FILM COATED ORAL at 05:45

## 2019-08-02 RX ADMIN — ONDANSETRON 4 MILLIGRAM(S): 8 TABLET, FILM COATED ORAL at 08:02

## 2019-08-02 RX ADMIN — Medication 1000 MILLIGRAM(S): at 22:20

## 2019-08-02 RX ADMIN — MORPHINE SULFATE 4 MILLIGRAM(S): 50 CAPSULE, EXTENDED RELEASE ORAL at 08:10

## 2019-08-02 RX ADMIN — Medication 20 MILLIGRAM(S): at 17:40

## 2019-08-02 RX ADMIN — MORPHINE SULFATE 4 MILLIGRAM(S): 50 CAPSULE, EXTENDED RELEASE ORAL at 05:50

## 2019-08-02 RX ADMIN — HYDROMORPHONE HYDROCHLORIDE 0.5 MILLIGRAM(S): 2 INJECTION INTRAMUSCULAR; INTRAVENOUS; SUBCUTANEOUS at 18:07

## 2019-08-02 RX ADMIN — Medication 1: at 18:00

## 2019-08-02 RX ADMIN — HYDROMORPHONE HYDROCHLORIDE 30 MILLILITER(S): 2 INJECTION INTRAMUSCULAR; INTRAVENOUS; SUBCUTANEOUS at 19:59

## 2019-08-02 RX ADMIN — SODIUM CHLORIDE 1000 MILLILITER(S): 9 INJECTION INTRAMUSCULAR; INTRAVENOUS; SUBCUTANEOUS at 05:46

## 2019-08-02 RX ADMIN — SODIUM CHLORIDE 1000 MILLILITER(S): 9 INJECTION INTRAMUSCULAR; INTRAVENOUS; SUBCUTANEOUS at 09:19

## 2019-08-02 RX ADMIN — Medication 1 DROP(S): at 21:51

## 2019-08-02 RX ADMIN — Medication 15 MILLIGRAM(S): at 23:53

## 2019-08-02 RX ADMIN — Medication 15 MILLIGRAM(S): at 17:44

## 2019-08-02 RX ADMIN — Medication 10 MILLIGRAM(S): at 08:02

## 2019-08-02 RX ADMIN — Medication 3 MILLILITER(S): at 18:45

## 2019-08-02 RX ADMIN — HYDROMORPHONE HYDROCHLORIDE 30 MILLILITER(S): 2 INJECTION INTRAMUSCULAR; INTRAVENOUS; SUBCUTANEOUS at 18:09

## 2019-08-02 RX ADMIN — Medication 1 DROP(S): at 17:47

## 2019-08-02 RX ADMIN — Medication 1 DROP(S): at 17:46

## 2019-08-02 RX ADMIN — Medication 400 MILLIGRAM(S): at 21:51

## 2019-08-02 RX ADMIN — MORPHINE SULFATE 4 MILLIGRAM(S): 50 CAPSULE, EXTENDED RELEASE ORAL at 08:02

## 2019-08-02 RX ADMIN — SODIUM CHLORIDE 125 MILLILITER(S): 9 INJECTION, SOLUTION INTRAVENOUS at 18:14

## 2019-08-02 RX ADMIN — ENOXAPARIN SODIUM 40 MILLIGRAM(S): 100 INJECTION SUBCUTANEOUS at 23:53

## 2019-08-02 RX ADMIN — HYDROMORPHONE HYDROCHLORIDE 0.5 MILLIGRAM(S): 2 INJECTION INTRAMUSCULAR; INTRAVENOUS; SUBCUTANEOUS at 17:58

## 2019-08-02 RX ADMIN — MORPHINE SULFATE 4 MILLIGRAM(S): 50 CAPSULE, EXTENDED RELEASE ORAL at 07:07

## 2019-08-02 RX ADMIN — Medication 15 MILLIGRAM(S): at 18:00

## 2019-08-02 NOTE — H&P ADULT - ATTENDING COMMENTS
Patient seen and examined.    Labs and imaging reviewed. Previous operative notes reviewed.     OR for exploratory laparotomy, hernia repair, possible bowel resection.  Risks, benefits and alternatives discussed with the patient at length.

## 2019-08-02 NOTE — ED PROVIDER NOTE - NS ED ROS FT
Gen: Denies fever  CV: Denies chest pain  Skin: Denies rash  Resp: Denies SOB  GI: Reports abdominal pain, nausea and vomiting   Msk: Denies LE edema, reports varicose veins   : Denies dysuria or hematuria   Neuro: Denies weakness

## 2019-08-02 NOTE — ED PROVIDER NOTE - PROGRESS NOTE DETAILS
Jazmin Rosen MD PGY-2 pt signed out to me pending CT read. CAlled by radiology that patient has high manohar SBO which radiology believes is 2/2 to ventral hernia, with 2 transition points and associated edema. On examination, patient has ventral hernia which I was unable to completely reduce myself. Had associated pain but no overlying skin changes. Surgery paged immediately. Family at bedside and patient notified. Will redose pain medication and antiemetic. Patient states he does not want an NG tube "unless you put me out" because he had a bad experience in the past. Jazmin Rosen MD PGY-2 pt seen by surgery, will admit to Dr. Heber Rodríguez Jazmin Rosen MD PGY-2 pt signed out to me pending CT read. Called by radiology that patient has high grade SBO which radiology believes is 2/2 to ventral hernia, with 2 transition points and associated edema. On examination, patient has ventral hernia which I was unable to completely reduce myself. Had associated pain but no overlying skin changes. Surgery paged immediately. Family at bedside and patient notified. Will redose pain medication and antiemetic. Patient states he does not want an NG tube "unless you put me out" because he had a bad experience in the past.

## 2019-08-02 NOTE — ED PROVIDER NOTE - CLINICAL SUMMARY MEDICAL DECISION MAKING FREE TEXT BOX
69 yo M w/ pmh of HTN, aortic valve replacement, HLD, abdominal hernia presenting with progressively worsening abdominal pain, nausea and vomiting since yesterday afternoon. Pt 69 yo M w/ pmh of HTN, aortic valve replacement, HLD, abdominal hernia presenting with progressively worsening abdominal pain, nausea and vomiting since yesterday afternoon. Currently has a large abdominal hernia, cannot remember his last BM and has a Hx of multiple abdominal surgeries. DDx SBO vs incarcerated hernia. Will obtain labs, CT. anti-emetics and pain management, reassess.

## 2019-08-02 NOTE — H&P ADULT - NSICDXPASTMEDICALHX_GEN_ALL_CORE_FT
PAST MEDICAL HISTORY:  Amaurosis fugax "every once in awhile my right eye goes out for about a minute"    Aortic valve disease     Arthritis     COPD (Chronic Obstructive Pulmonary Disease)     HTN (Hypertension)     Hypercholesterolemia     Kidney Stone     Microscopic Hematuria     OA (Osteoarthritis)     Obesity     Pilonidal Abscess     Sleep apnea     Spinal Stenosis     Stenosis of carotid artery, unspecified laterality     Varicose Vein B/L lower extremities    Venous Insufficiency

## 2019-08-02 NOTE — PRE-ANESTHESIA EVALUATION ADULT - NSANTHPMHFT_GEN_ALL_CORE
69 yo M w/ PMHx of HLD, HTN, hernia aortic valve replacement, h/o colom resection for rectosigmoid ulcer,B/L knee replacement presents to ED from home c/o n/v and abdominal pain x1 day. Pt states symptoms began yesterday afternoon, have progressively worsened. Pt states he is unsure when last bowel movement was, abdomen soft, not distended. States has had decreased PO intake today d/t symptoms.

## 2019-08-02 NOTE — H&P ADULT - ASSESSMENT
68M with SBO 2/2 incarcerated ventral hernia, concerns for ischemic bowel given elevated lactate and history.

## 2019-08-02 NOTE — H&P ADULT - HISTORY OF PRESENT ILLNESS
68M with one day of abdominal pain, started yesterday a/w decreased GI function (minimal flatus), decreased appetite (did have some chicken last night for dinner), and nausea. States he had intense abdominal pain relieved by the medications he received while in the ED. Currently denies HA, CP, SOB, N/V/F/C.    Remote history of appendectomy and right inguinal hernia repair  LAR 6/12/17 with diverting loop ileostomy for rectosigmoid CA by Dr. Castillo at [facility]  Reversal of ileostomy 9/18/17    Last colonoscopy 2018 - grossly normal per patient (performed by Dr. Castillo)    Patient is adamantly refusing NGT decompression (states that he had multiple failed attempts last time by attending physician). I explained in depth that this is dangerous and that he is at high risk of aspiration if undergoing general anesthesia. The patient understands and remains defiant in refusing NGT placement.

## 2019-08-02 NOTE — PRE-ANESTHESIA EVALUATION ADULT - NSRADCARDRESULTSFT_GEN_ALL_CORE
abdomen 8/2/19: High-grade, complex small bowel obstruction with transition points in a   ventral hernia sac. Thickened loops of small bowel with significant   mesenteric edema is noted. Ischemic bowel cannot be excluded.

## 2019-08-02 NOTE — H&P ADULT - PROBLEM SELECTOR PLAN 1
1. Admit to surgery, Dr. Rodríguez  2. OR booked for exploratory laparotomy, repair of ventral hernia, and possible bowel resection  3. Again attempted to place NGT and patient refused.  4. Discussed patient with Dr. Rodríguez. Currently being moved from ED to OR.

## 2019-08-02 NOTE — BRIEF OPERATIVE NOTE - NSICDXBRIEFPROCEDURE_GEN_ALL_CORE_FT
PROCEDURES:  Repair of ventral hernia without using mesh 02-Aug-2019 17:36:57  Nilay Wallis  Exploratory laparotomy 02-Aug-2019 17:36:43  Nilay Wallis

## 2019-08-02 NOTE — ED ADULT NURSE NOTE - CHPI ED NUR SYMPTOMS NEG
no blood in stool/no burning urination/no hematuria/no diarrhea/no dysuria/no chills/no fever/no abdominal distension

## 2019-08-02 NOTE — H&P ADULT - NSHPPHYSICALEXAM_GEN_ALL_CORE
AAO in moderate distress, with increasing distress and shaking when discussing history of NGT placement  Lungs with mild end expiratory wheezing. Good effort  Heart tachycardic, no murmurs  Abdomen softly distended, minimal tenderness on this exam (patient recently received morphine). Ventral hernia with abdominal contents palpated. Irreducible.

## 2019-08-02 NOTE — ED PROVIDER NOTE - OBJECTIVE STATEMENT
69 yo M w/ pmh of HTN, aortic valve replacement, HLD, abdominal hernia presenting with progressively worsening abdominal pain, nausea and vomiting since yesterday afternoon. 69 yo M w/ pmh of HTN, aortic valve replacement, HLD, abdominal hernia presenting with progressively worsening abdominal pain, nausea and vomiting since yesterday afternoon. Pt reports onset of constant diffuse abdominal pain with nausea and dry heaving. 3 episodes of emesis upon arrival to the hospital. Hx of multiple abdominal surgeries. Currently has a large supraumbilical hernia. Unsure of when his last bowel movement was. Denies similar pt in the past. Denies fevers, CP, SOB, diarrhea, dysuria or hematuria. 69 yo M w/ pmh of HTN, aortic valve replacement, HLD, abdominal hernia and bowel resection for mass presenting with progressively worsening abdominal pain, nausea and vomiting since yesterday afternoon. Pt reports onset of constant diffuse abdominal pain with nausea and dry heaving. 3 episodes of emesis upon arrival to the hospital. Hx of multiple abdominal surgeries. Currently has a large supraumbilical hernia. Unsure of when his last bowel movement was. Denies similar pt in the past. Denies fevers, CP, SOB, diarrhea, dysuria or hematuria.  Surgeon: Dr. Castillo 69 yo M w/ pmh of HTN, aortic valve replacement, HLD, abdominal hernia and bowel resection for mass presenting with progressively worsening abdominal pain, nausea and vomiting since yesterday afternoon. Pt reports onset of constant diffuse abdominal pain with nausea and dry heaving. 3 episodes of emesis upon arrival to the hospital. Hx of multiple abdominal surgeries. Currently has a large supraumbilical hernia. Unsure of when his last bowel movement was. Denies similar pt in the past. Denies fevers, CP, SOB, diarrhea, dysuria or hematuria.  Surgeon: Dr. Castillo ([facility])

## 2019-08-02 NOTE — BRIEF OPERATIVE NOTE - OPERATION/FINDINGS
small bowel incarcerate in ventral hernia however bowel was viable. After extensive PIERRE, we repaired ventral hernia primarily. GI consulted intraop for placement of NGT

## 2019-08-02 NOTE — ED PROVIDER NOTE - PHYSICAL EXAMINATION
Gen: pt appears very uncomfortable laying on his side actively dry heaving throughout exam.   HEENT: EOMI, mucous membranes moist  CV: RRR, +S1/S2, no M/R/G  Resp: CTAB, no W/R/R  GI: Obese abdomen, large supraumbilical hernia present with tenderness to palpation. +BS  MSK: no LE edema. b/l varicose veins   Neuro: A&Ox4, following commands, moving all four extremities spontaneously

## 2019-08-02 NOTE — ED PROVIDER NOTE - ATTENDING CONTRIBUTION TO CARE
Afebrile. Awake and Alert. Lungs CTA. Heart RRR. Abdomen soft, large ventral hernia, reducible, with non-focal TTP, no rebound or guarding. CN II-XII grossly intact. Moves all extremities without lateralization.    CT a/p r/o SBO

## 2019-08-02 NOTE — H&P ADULT - NSICDXPASTSURGICALHX_GEN_ALL_CORE_FT
PAST SURGICAL HISTORY:  H/O aortic valve replacement 2015    H/O cataract removal with insertion of prosthetic lens right-9/7/2004    H/O total knee replacement, right 2011    H/O umbilical hernia repair x2-2011, 2016    History of Appendectomy     History of Lt Total Knee Replacement 4/2011    History of Nasal Septoplasty 1979    I & D of Pilonidal cyst     Plantar Tendonitis Repair- B L    Renal Calculi lithotripsy    S/P Cardiac Cath     S/P Cataract Surgery Right - with lens placement    S/P colon resection and umbilical hernia repair-6/12/2017    S/P Cystoscopy 2010    S/P reconstruction procedure "my left foot I had no arch"-12/2015    S/P Right Inguinal Hernia Repair 1979    Skin Tag Excision Right Leg-2010    Tibialis Posterior Tendonitis Repair B/L revision left 2016

## 2019-08-02 NOTE — CHART NOTE - NSCHARTNOTEFT_GEN_A_CORE
STATUS POST:  Exploratory laparotomy, PIERRE, primary repair of ventral hernia     POST OPERATIVE DAY #: 0    Vital Signs Last 24 Hrs  T(C): 36.8 (03 Aug 2019 01:28), Max: 37.2 (02 Aug 2019 10:17)  T(F): 98.2 (03 Aug 2019 01:28), Max: 99 (02 Aug 2019 10:17)  HR: 76 (02 Aug 2019 22:41) (63 - 102)  BP: 120/76 (03 Aug 2019 01:28) (120/76 - 203/98)  BP(mean): 116 (02 Aug 2019 19:30) (105 - 142)  RR: 18 (03 Aug 2019 01:28) (15 - 20)  SpO2: 95% (03 Aug 2019 01:28) (92% - 100%)      SUBJECTIVE:     Pt seen and examined at bedside. States feels ok overall. Some pain, well controlled with medication. Denies cp, sob, nausea, vomiting, fever, chills. Complaining of right eye pain, stating that he believes his cornea go scratched. Otherwise no complaints. Confirmed use of cpap intermittently at home for SAMMY.      General Appearance:  NAD  Chest: Equal expansion bilaterally   CV: Pulse regular presently  Abdomen: Soft, mildly distended, appropriately tender to palpation diffusely. RLQ, LLQ drains in place with sanguinous drainage L>R.  Dressings clean and dry and intact. Midline incision covered by aquacel dressing.       I&O's Summary    02 Aug 2019 07:01  -  03 Aug 2019 01:58  --------------------------------------------------------  IN: 250 mL / OUT: 625 mL / NET: -375 mL      I&O's Detail    02 Aug 2019 07:01  -  03 Aug 2019 01:58  --------------------------------------------------------  IN:    lactated ringers.: 250 mL  Total IN: 250 mL    OUT:    Bulb: 10 mL    Bulb: 5 mL    Indwelling Catheter - Urethral: 610 mL  Total OUT: 625 mL    Total NET: -375 mL          MEDICATIONS  (STANDING):  acetaminophen  IVPB .. 1000 milliGRAM(s) IV Intermittent every 6 hours  ALBUTerol/ipratropium for Nebulization 3 milliLiter(s) Nebulizer every 6 hours  dextrose 5%. 1000 milliLiter(s) (50 mL/Hr) IV Continuous <Continuous>  dextrose 50% Injectable 12.5 Gram(s) IV Push once  dextrose 50% Injectable 25 Gram(s) IV Push once  dextrose 50% Injectable 25 Gram(s) IV Push once  enoxaparin Injectable 40 milliGRAM(s) SubCutaneous daily  HYDROmorphone PCA (1 mG/mL) 30 milliLiter(s) PCA Continuous PCA Continuous  insulin lispro (HumaLOG) corrective regimen sliding scale   SubCutaneous three times a day before meals  insulin lispro (HumaLOG) corrective regimen sliding scale   SubCutaneous at bedtime  ketorolac   Injectable 15 milliGRAM(s) IV Push every 6 hours  lactated ringers. 1000 milliLiter(s) (125 mL/Hr) IV Continuous <Continuous>  tobramycin 0.3% Ophthalmic Solution      tobramycin 0.3% Ophthalmic Solution 1 Drop(s) Right EYE every 4 hours    MEDICATIONS  (PRN):  dextrose 40% Gel 15 Gram(s) Oral once PRN Blood Glucose LESS THAN 70 milliGRAM(s)/deciliter  glucagon  Injectable 1 milliGRAM(s) IntraMuscular once PRN Glucose LESS THAN 70 milligrams/deciliter  HYDROmorphone  Injectable 0.5 milliGRAM(s) IV Push every 6 hours PRN Severe Pain (7 - 10)  HYDROmorphone PCA (1 mG/mL) Rescue Clinician Bolus 0.5 milliGRAM(s) IV Push every 15 minutes PRN for Pain Scale GREATER THAN 6  naloxone Injectable 0.1 milliGRAM(s) IV Push every 3 minutes PRN For ANY of the following changes in patient status:  A. RR LESS THAN 10 breaths per minute, B. Oxygen saturation LESS THAN 90%, C. Sedation score of 6  ondansetron Injectable 4 milliGRAM(s) IV Push every 6 hours PRN Nausea      LABS:                        16.1   14.8  )-----------( 219      ( 02 Aug 2019 05:47 )             47.7     08-02    142  |  99  |  14  ----------------------------<  144<H>  4.6   |  25  |  0.81    Ca    10.5      02 Aug 2019 07:15    TPro  8.4<H>  /  Alb  4.4  /  TBili  1.0  /  DBili  x   /  AST  37  /  ALT  20  /  AlkPhos  104  08-02    PT/INR - ( 02 Aug 2019 07:15 )   PT: 12.0 sec;   INR: 1.05 ratio         PTT - ( 02 Aug 2019 07:15 )  PTT:24.2 sec    Assessment & Plan:     68M with hx of   LAR 6/12/17 with diverting loop ileostomy for rectosigmoid CA by Dr. Castillo at [facility]  Reversal of ileostomy 9/18/17one day of abdominal pain, started yesterday a/w decreased GI function (minimal flatus), decreased appetite (did have some chicken last night for dinner), and nausea. States he had intense abdominal pain relieved by the medications he received while in the ED. Now s/p  Currently denies HA, CP, SOB, N/V/F/C.      - f/u UOP  - dvt ppx   - dilauded PCA   - NPO/IVF

## 2019-08-02 NOTE — ED ADULT NURSE NOTE - OBJECTIVE STATEMENT
69 yo M w/ PMHx of HLD, HTN, hernia aortic valve replacement, B/L knee replacement presents to ED from home c/o n/v and abdominal pain x1 day. Pt states symptoms began yesterday afternoon, have progressively worsened. Pt states he is unsure when last bowel movement was, abdomen soft, not distended. States has had decreased PO intake today d/t symptoms. Denies taking medication prior to arrival for symptoms. Pt denies any CP, SOB, fever, chills, urinary complaints, diarrhea, HA, dizziness, weakness. Pt A&Ox4, lungs CTA, +central pulses. Abdomen soft, not tender, not distended. Ambulating w/ steady gait, safety and comfort maintained, no acute distress noted at this time.

## 2019-08-02 NOTE — ED ADULT NURSE REASSESSMENT NOTE - NS ED NURSE REASSESS COMMENT FT1
Patient was resting in stretcher and was updated on plan of care by ED Resident and reports pain has improved since arrival, but he is still having abdominal pain and would like more pain medication.  ED Resident aware.

## 2019-08-03 LAB
ANION GAP SERPL CALC-SCNC: 19 MMOL/L — HIGH (ref 5–17)
BUN SERPL-MCNC: 21 MG/DL — SIGNIFICANT CHANGE UP (ref 7–23)
CALCIUM SERPL-MCNC: 8.5 MG/DL — SIGNIFICANT CHANGE UP (ref 8.4–10.5)
CHLORIDE SERPL-SCNC: 99 MMOL/L — SIGNIFICANT CHANGE UP (ref 96–108)
CO2 SERPL-SCNC: 22 MMOL/L — SIGNIFICANT CHANGE UP (ref 22–31)
CREAT SERPL-MCNC: 1.17 MG/DL — SIGNIFICANT CHANGE UP (ref 0.5–1.3)
GLUCOSE SERPL-MCNC: 134 MG/DL — HIGH (ref 70–99)
HBA1C BLD-MCNC: 6.6 % — HIGH (ref 4–5.6)
HCT VFR BLD CALC: 41 % — SIGNIFICANT CHANGE UP (ref 39–50)
HCV AB S/CO SERPL IA: 0.06 S/CO — SIGNIFICANT CHANGE UP (ref 0–0.99)
HCV AB SERPL-IMP: SIGNIFICANT CHANGE UP
HGB BLD-MCNC: 12.2 G/DL — LOW (ref 13–17)
MAGNESIUM SERPL-MCNC: 1.9 MG/DL — SIGNIFICANT CHANGE UP (ref 1.6–2.6)
MCHC RBC-ENTMCNC: 27 PG — SIGNIFICANT CHANGE UP (ref 27–34)
MCHC RBC-ENTMCNC: 29.8 GM/DL — LOW (ref 32–36)
MCV RBC AUTO: 90.7 FL — SIGNIFICANT CHANGE UP (ref 80–100)
PHOSPHATE SERPL-MCNC: 5.8 MG/DL — HIGH (ref 2.5–4.5)
PLATELET # BLD AUTO: 163 K/UL — SIGNIFICANT CHANGE UP (ref 150–400)
POTASSIUM SERPL-MCNC: 4.5 MMOL/L — SIGNIFICANT CHANGE UP (ref 3.5–5.3)
POTASSIUM SERPL-SCNC: 4.5 MMOL/L — SIGNIFICANT CHANGE UP (ref 3.5–5.3)
RBC # BLD: 4.52 M/UL — SIGNIFICANT CHANGE UP (ref 4.2–5.8)
RBC # FLD: 14.7 % — HIGH (ref 10.3–14.5)
SODIUM SERPL-SCNC: 140 MMOL/L — SIGNIFICANT CHANGE UP (ref 135–145)
WBC # BLD: 8.89 K/UL — SIGNIFICANT CHANGE UP (ref 3.8–10.5)
WBC # FLD AUTO: 8.89 K/UL — SIGNIFICANT CHANGE UP (ref 3.8–10.5)

## 2019-08-03 PROCEDURE — 71045 X-RAY EXAM CHEST 1 VIEW: CPT | Mod: 26,76

## 2019-08-03 RX ORDER — LABETALOL HCL 100 MG
100 TABLET ORAL ONCE
Refills: 0 | Status: DISCONTINUED | OUTPATIENT
Start: 2019-08-03 | End: 2019-08-03

## 2019-08-03 RX ORDER — LABETALOL HCL 100 MG
10 TABLET ORAL ONCE
Refills: 0 | Status: COMPLETED | OUTPATIENT
Start: 2019-08-03 | End: 2019-08-03

## 2019-08-03 RX ORDER — SODIUM CHLORIDE 9 MG/ML
1000 INJECTION, SOLUTION INTRAVENOUS
Refills: 0 | Status: DISCONTINUED | OUTPATIENT
Start: 2019-08-03 | End: 2019-08-06

## 2019-08-03 RX ADMIN — Medication 1000 MILLIGRAM(S): at 17:18

## 2019-08-03 RX ADMIN — Medication 1 DROP(S): at 17:31

## 2019-08-03 RX ADMIN — Medication 400 MILLIGRAM(S): at 10:37

## 2019-08-03 RX ADMIN — Medication 1000 MILLIGRAM(S): at 05:38

## 2019-08-03 RX ADMIN — Medication 1 DROP(S): at 13:51

## 2019-08-03 RX ADMIN — Medication 3 MILLILITER(S): at 13:50

## 2019-08-03 RX ADMIN — Medication 15 MILLIGRAM(S): at 05:38

## 2019-08-03 RX ADMIN — Medication 1 DROP(S): at 05:09

## 2019-08-03 RX ADMIN — Medication 15 MILLIGRAM(S): at 00:23

## 2019-08-03 RX ADMIN — Medication 1 DROP(S): at 10:37

## 2019-08-03 RX ADMIN — Medication 3 MILLILITER(S): at 17:31

## 2019-08-03 RX ADMIN — Medication 15 MILLIGRAM(S): at 13:50

## 2019-08-03 RX ADMIN — Medication 1 DROP(S): at 00:57

## 2019-08-03 RX ADMIN — Medication 3 MILLILITER(S): at 23:22

## 2019-08-03 RX ADMIN — ENOXAPARIN SODIUM 40 MILLIGRAM(S): 100 INJECTION SUBCUTANEOUS at 17:30

## 2019-08-03 RX ADMIN — Medication 10 MILLIGRAM(S): at 19:55

## 2019-08-03 RX ADMIN — Medication 400 MILLIGRAM(S): at 17:18

## 2019-08-03 RX ADMIN — Medication 15 MILLIGRAM(S): at 13:51

## 2019-08-03 RX ADMIN — Medication 400 MILLIGRAM(S): at 05:08

## 2019-08-03 RX ADMIN — Medication 15 MILLIGRAM(S): at 05:08

## 2019-08-03 RX ADMIN — Medication 1 DROP(S): at 23:21

## 2019-08-03 RX ADMIN — Medication 1000 MILLIGRAM(S): at 10:38

## 2019-08-03 RX ADMIN — HYDROMORPHONE HYDROCHLORIDE 30 MILLILITER(S): 2 INJECTION INTRAMUSCULAR; INTRAVENOUS; SUBCUTANEOUS at 19:38

## 2019-08-03 NOTE — PROGRESS NOTE ADULT - ASSESSMENT
Jason Mcmahon is a 69yo M on POD 1 s/p repair of ventral hernia. His NGT is not flushing this morning. Chief resident attempted maneuvering it but still not flushing. It was pulled back slightly and repeat x-ray again shows the tip in the stomach with some redundancy. Adjusting the tube is exquisitely uncomfortable for the patient.    - d/c stephen, due to void by 5pm  - NGT to suction  - NPO

## 2019-08-03 NOTE — PROGRESS NOTE ADULT - SUBJECTIVE AND OBJECTIVE BOX
Day __ of Anesthesia Pain Management Service    SUBJECTIVE: Patient is doing well with IV PCA    Pain Scale Score:	[X] Refer to charted pain scores    THERAPY:    [ ] IV PCA Morphine		[ ] 5 mg/mL	[ ] 1 mg/mL  [X] IV PCA Hydromorphone	[ ] 5 mg/mL	[X] 1 mg/mL  [ ] IV PCA Fentanyl		[ ] 50 micrograms/mL    Demand dose: 0.2 mg     Lockout: 6 minutes   Continuous Rate: 0 mg/hr  4 Hour Limit: 4 mg    MEDICATIONS  (STANDING):  acetaminophen  IVPB .. 1000 milliGRAM(s) IV Intermittent every 6 hours  ALBUTerol/ipratropium for Nebulization 3 milliLiter(s) Nebulizer every 6 hours  dextrose 5%. 1000 milliLiter(s) (50 mL/Hr) IV Continuous <Continuous>  dextrose 50% Injectable 12.5 Gram(s) IV Push once  dextrose 50% Injectable 25 Gram(s) IV Push once  dextrose 50% Injectable 25 Gram(s) IV Push once  enoxaparin Injectable 40 milliGRAM(s) SubCutaneous daily  HYDROmorphone PCA (1 mG/mL) 30 milliLiter(s) PCA Continuous PCA Continuous  insulin lispro (HumaLOG) corrective regimen sliding scale   SubCutaneous three times a day before meals  insulin lispro (HumaLOG) corrective regimen sliding scale   SubCutaneous at bedtime  ketorolac   Injectable 15 milliGRAM(s) IV Push every 6 hours  lactated ringers. 1000 milliLiter(s) (125 mL/Hr) IV Continuous <Continuous>  tobramycin 0.3% Ophthalmic Solution      tobramycin 0.3% Ophthalmic Solution 1 Drop(s) Right EYE every 4 hours    MEDICATIONS  (PRN):  dextrose 40% Gel 15 Gram(s) Oral once PRN Blood Glucose LESS THAN 70 milliGRAM(s)/deciliter  glucagon  Injectable 1 milliGRAM(s) IntraMuscular once PRN Glucose LESS THAN 70 milligrams/deciliter  HYDROmorphone  Injectable 0.5 milliGRAM(s) IV Push every 6 hours PRN Severe Pain (7 - 10)  HYDROmorphone PCA (1 mG/mL) Rescue Clinician Bolus 0.5 milliGRAM(s) IV Push every 15 minutes PRN for Pain Scale GREATER THAN 6  naloxone Injectable 0.1 milliGRAM(s) IV Push every 3 minutes PRN For ANY of the following changes in patient status:  A. RR LESS THAN 10 breaths per minute, B. Oxygen saturation LESS THAN 90%, C. Sedation score of 6  ondansetron Injectable 4 milliGRAM(s) IV Push every 6 hours PRN Nausea      OBJECTIVE:    Sedation Score:	[ X] Alert	[ ] Drowsy 	[ ] Arousable	[ ] Asleep	[ ] Unresponsive    Side Effects:	[X ] None	[ ] Nausea	[ ] Vomiting	[ ] Pruritus  		[ ] Other:    Vital Signs Last 24 Hrs  T(C): 36.9 (03 Aug 2019 08:54), Max: 37.2 (02 Aug 2019 10:17)  T(F): 98.4 (03 Aug 2019 08:54), Max: 99 (02 Aug 2019 10:17)  HR: 76 (03 Aug 2019 09:13) (63 - 87)  BP: 142/76 (03 Aug 2019 08:54) (120/76 - 203/98)  BP(mean): 116 (02 Aug 2019 19:30) (105 - 142)  RR: 18 (03 Aug 2019 08:54) (15 - 20)  SpO2: 97% (03 Aug 2019 09:13) (92% - 100%)    ASSESSMENT/ PLAN    Therapy to  be:               [X] Continued   [ ] Discontinued   [ ] Changed to PRN Analgesics    Documentation and Verification of current medications:   [X] Done	[ ] Not done, not eligible    Comments:

## 2019-08-03 NOTE — PROGRESS NOTE ADULT - SUBJECTIVE AND OBJECTIVE BOX
Surgery Red Team Daily Progress Note       --------------------------------------------------------------------------------------------------------------------  SUBJECTIVE / 24H EVENTS  - Patient seen and examined on morning rounds.   - pt s/p procedure yesterday evening  - endorses right eye pain    OBJECTIVE:    VITAL SIGNS:  T(C): 36.9 (08-03-19 @ 08:54), Max: 36.9 (08-03-19 @ 08:54)  HR: 76 (08-03-19 @ 09:13) (63 - 87)  BP: 142/76 (08-03-19 @ 08:54) (120/76 - 203/98)  RR: 18 (08-03-19 @ 08:54) (15 - 18)  SpO2: 97% (08-03-19 @ 09:13) (92% - 100%)  Daily     Daily   POCT Blood Glucose.: 127 mg/dL (08-03-19 @ 09:54)  POCT Blood Glucose.: 152 mg/dL (08-02-19 @ 22:33)  POCT Blood Glucose.: 195 mg/dL (08-02-19 @ 18:02)      PHYSICAL EXAM:  Gen: NAD  LS: Respirations unlabored.  Card: RRR.  GI: Soft. Appropriately tender. obese abdomen, baseline size, per patient. Incisions c/d/i. Bilateral drains putting out sanguinous fluid  Ext: Warm, well perfused      08-02-19 @ 07:01  -  08-03-19 @ 07:00  --------------------------------------------------------  IN:    lactated ringers.: 250 mL  Total IN: 250 mL    OUT:    Bulb: 10 mL    Bulb: 5 mL    Indwelling Catheter - Urethral: 810 mL  Total OUT: 825 mL    Total NET: -575 mL      08-03-19 @ 07:01  -  08-03-19 @ 10:26  --------------------------------------------------------  IN:  Total IN: 0 mL    OUT:    Bulb: 50 mL    Bulb: 30 mL    Indwelling Catheter - Urethral: 100 mL  Total OUT: 180 mL    Total NET: -180 mL          LAB VALUES:  08-03    140  |  99  |  21  ----------------------------<  134<H>  4.5   |  22  |  1.17    Ca    8.5      03 Aug 2019 07:24  Phos  5.8     08-03  Mg     1.9     08-03    TPro  8.4<H>  /  Alb  4.4  /  TBili  1.0  /  DBili  x   /  AST  37  /  ALT  20  /  AlkPhos  104  08-02                               16.1   14.8  )-----------( 219      ( 02 Aug 2019 05:47 )             47.7     LIVER FUNCTIONS - ( 02 Aug 2019 05:47 )  Alb: 4.4 g/dL / Pro: 8.4 g/dL / ALK PHOS: 104 U/L / ALT: 20 U/L / AST: 37 U/L / GGT: x           PT/INR - ( 02 Aug 2019 07:15 )   PT: 12.0 sec;   INR: 1.05 ratio         PTT - ( 02 Aug 2019 07:15 )  PTT:24.2 sec  ABG - ( 02 Aug 2019 15:15 )  pH, Arterial: 7.39  pH, Blood: x     /  pCO2: 43    /  pO2: 114   / HCO3: 25    / Base Excess: .7    /  SaO2: 98                        MICROBIOLOGY:      RADIOLOGY:  PACS Image: Image(s) Available (08-03-19 @ 06:57)        MEDICATIONS  (STANDING):  acetaminophen  IVPB .. 1000 milliGRAM(s) IV Intermittent every 6 hours  ALBUTerol/ipratropium for Nebulization 3 milliLiter(s) Nebulizer every 6 hours  dextrose 5%. 1000 milliLiter(s) (50 mL/Hr) IV Continuous <Continuous>  dextrose 50% Injectable 12.5 Gram(s) IV Push once  dextrose 50% Injectable 25 Gram(s) IV Push once  dextrose 50% Injectable 25 Gram(s) IV Push once  enoxaparin Injectable 40 milliGRAM(s) SubCutaneous daily  HYDROmorphone PCA (1 mG/mL) 30 milliLiter(s) PCA Continuous PCA Continuous  insulin lispro (HumaLOG) corrective regimen sliding scale   SubCutaneous three times a day before meals  insulin lispro (HumaLOG) corrective regimen sliding scale   SubCutaneous at bedtime  ketorolac   Injectable 15 milliGRAM(s) IV Push every 6 hours  lactated ringers. 1000 milliLiter(s) (125 mL/Hr) IV Continuous <Continuous>  tobramycin 0.3% Ophthalmic Solution      tobramycin 0.3% Ophthalmic Solution 1 Drop(s) Right EYE every 4 hours    MEDICATIONS  (PRN):  dextrose 40% Gel 15 Gram(s) Oral once PRN Blood Glucose LESS THAN 70 milliGRAM(s)/deciliter  glucagon  Injectable 1 milliGRAM(s) IntraMuscular once PRN Glucose LESS THAN 70 milligrams/deciliter  HYDROmorphone  Injectable 0.5 milliGRAM(s) IV Push every 6 hours PRN Severe Pain (7 - 10)  HYDROmorphone PCA (1 mG/mL) Rescue Clinician Bolus 0.5 milliGRAM(s) IV Push every 15 minutes PRN for Pain Scale GREATER THAN 6  naloxone Injectable 0.1 milliGRAM(s) IV Push every 3 minutes PRN For ANY of the following changes in patient status:  A. RR LESS THAN 10 breaths per minute, B. Oxygen saturation LESS THAN 90%, C. Sedation score of 6  ondansetron Injectable 4 milliGRAM(s) IV Push every 6 hours PRN Nausea

## 2019-08-04 LAB
ANION GAP SERPL CALC-SCNC: 10 MMOL/L — SIGNIFICANT CHANGE UP (ref 5–17)
BUN SERPL-MCNC: 21 MG/DL — SIGNIFICANT CHANGE UP (ref 7–23)
CALCIUM SERPL-MCNC: 8.2 MG/DL — LOW (ref 8.4–10.5)
CHLORIDE SERPL-SCNC: 103 MMOL/L — SIGNIFICANT CHANGE UP (ref 96–108)
CO2 SERPL-SCNC: 28 MMOL/L — SIGNIFICANT CHANGE UP (ref 22–31)
CREAT SERPL-MCNC: 1.13 MG/DL — SIGNIFICANT CHANGE UP (ref 0.5–1.3)
GLUCOSE SERPL-MCNC: 156 MG/DL — HIGH (ref 70–99)
HCT VFR BLD CALC: 36.9 % — LOW (ref 39–50)
HGB BLD-MCNC: 11.2 G/DL — LOW (ref 13–17)
MAGNESIUM SERPL-MCNC: 2.3 MG/DL — SIGNIFICANT CHANGE UP (ref 1.6–2.6)
MCHC RBC-ENTMCNC: 27.3 PG — SIGNIFICANT CHANGE UP (ref 27–34)
MCHC RBC-ENTMCNC: 30.4 GM/DL — LOW (ref 32–36)
MCV RBC AUTO: 90 FL — SIGNIFICANT CHANGE UP (ref 80–100)
PHOSPHATE SERPL-MCNC: 2.5 MG/DL — SIGNIFICANT CHANGE UP (ref 2.5–4.5)
PLATELET # BLD AUTO: 168 K/UL — SIGNIFICANT CHANGE UP (ref 150–400)
POTASSIUM SERPL-MCNC: 4.4 MMOL/L — SIGNIFICANT CHANGE UP (ref 3.5–5.3)
POTASSIUM SERPL-SCNC: 4.4 MMOL/L — SIGNIFICANT CHANGE UP (ref 3.5–5.3)
RBC # BLD: 4.1 M/UL — LOW (ref 4.2–5.8)
RBC # FLD: 15.1 % — HIGH (ref 10.3–14.5)
SODIUM SERPL-SCNC: 141 MMOL/L — SIGNIFICANT CHANGE UP (ref 135–145)
WBC # BLD: 8.1 K/UL — SIGNIFICANT CHANGE UP (ref 3.8–10.5)
WBC # FLD AUTO: 8.1 K/UL — SIGNIFICANT CHANGE UP (ref 3.8–10.5)

## 2019-08-04 PROCEDURE — 74250 X-RAY XM SM INT 1CNTRST STD: CPT | Mod: 26

## 2019-08-04 RX ORDER — ERYTHROMYCIN BASE 5 MG/GRAM
1 OINTMENT (GRAM) OPHTHALMIC (EYE)
Refills: 0 | Status: DISCONTINUED | OUTPATIENT
Start: 2019-08-04 | End: 2019-08-06

## 2019-08-04 RX ORDER — INSULIN LISPRO 100/ML
VIAL (ML) SUBCUTANEOUS EVERY 6 HOURS
Refills: 0 | Status: DISCONTINUED | OUTPATIENT
Start: 2019-08-04 | End: 2019-08-05

## 2019-08-04 RX ORDER — INSULIN LISPRO 100/ML
VIAL (ML) SUBCUTANEOUS AT BEDTIME
Refills: 0 | Status: DISCONTINUED | OUTPATIENT
Start: 2019-08-04 | End: 2019-08-05

## 2019-08-04 RX ORDER — OFLOXACIN 0.3 %
1 DROPS OPHTHALMIC (EYE)
Refills: 0 | Status: DISCONTINUED | OUTPATIENT
Start: 2019-08-04 | End: 2019-08-12

## 2019-08-04 RX ADMIN — Medication 3 MILLILITER(S): at 12:26

## 2019-08-04 RX ADMIN — Medication 3 MILLILITER(S): at 06:18

## 2019-08-04 RX ADMIN — HYDROMORPHONE HYDROCHLORIDE 30 MILLILITER(S): 2 INJECTION INTRAMUSCULAR; INTRAVENOUS; SUBCUTANEOUS at 19:15

## 2019-08-04 RX ADMIN — Medication 1 DROP(S): at 18:41

## 2019-08-04 RX ADMIN — HYDROMORPHONE HYDROCHLORIDE 30 MILLILITER(S): 2 INJECTION INTRAMUSCULAR; INTRAVENOUS; SUBCUTANEOUS at 07:15

## 2019-08-04 RX ADMIN — Medication 1 APPLICATION(S): at 22:06

## 2019-08-04 RX ADMIN — ENOXAPARIN SODIUM 40 MILLIGRAM(S): 100 INJECTION SUBCUTANEOUS at 12:26

## 2019-08-04 NOTE — PROGRESS NOTE ADULT - ASSESSMENT
Jason Lisandro is a 67yo M on POD 1 s/p repair of ventral hernia. NGT not flushing. PT denies nausea. Will proceed with small bowel series to assess bowel function. He continues endorsing right eye pain. Will get in touch with anesthesiology colleagues to evaluate and manage.    - small bowel series  - remain NPO w/ mIVF  - encourage OOB Jason Lisandro is a 69yo M on POD 1 s/p repair of ventral hernia. NGT not flushing. PT denies nausea. Will proceed with small bowel series to assess bowel function. He continues endorsing right eye pain. Will get in touch with anesthesiology colleagues to evaluate and manage.    - small bowel series  - remain NPO w/ mIVF  - encourage OOB  - ophthalmology on board for corneal abrasion. Appreciate recs

## 2019-08-04 NOTE — PROGRESS NOTE ADULT - SUBJECTIVE AND OBJECTIVE BOX
Surgery Red Team Daily Progress Note       --------------------------------------------------------------------------------------------------------------------  SUBJECTIVE / 24H EVENTS  - Patient seen and examined on morning rounds.   - endorses right eye pain  - pulled NGT slightly while asleep. RN unsure of how far it came out. It is still taped in place  - endorses flatus this AM  - hypertensive to 178 systolic overnight. Came down to 137 with labetalol x1     OBJECTIVE:    Vital Signs Last 24 Hrs  T(C): 36.6 (04 Aug 2019 06:10), Max: 37.2 (03 Aug 2019 13:52)  T(F): 97.9 (04 Aug 2019 06:10), Max: 98.9 (03 Aug 2019 13:52)  HR: 82 (04 Aug 2019 08:58) (73 - 92)  BP: 162/93 (04 Aug 2019 06:10) (137/76 - 178/93)  BP(mean): --  RR: 18 (04 Aug 2019 06:10) (18 - 18)  SpO2: 96% (04 Aug 2019 08:58) (92% - 98%)      CAPILLARY BLOOD GLUCOSE      POCT Blood Glucose.: 147 mg/dL (04 Aug 2019 08:35)  POCT Blood Glucose.: 149 mg/dL (03 Aug 2019 21:22)  POCT Blood Glucose.: 146 mg/dL (03 Aug 2019 18:37)  POCT Blood Glucose.: 135 mg/dL (03 Aug 2019 12:42)      PHYSICAL EXAM:  Gen: NAD  LS: Respirations unlabored.  Card: RRR.  GI: Soft. Appropriately tender. obese abdomen, baseline size, per patient. Incisions c/d/i. Bilateral drains putting out serosanguinous fluid  Ext: Warm, well perfused      I&O's Detail    03 Aug 2019 07:01  -  04 Aug 2019 07:00  --------------------------------------------------------  IN:    dextrose 5% + sodium chloride 0.45%.: 1500 mL    lactated ringers.: 1300 mL  Total IN: 2800 mL    OUT:    Bulb: 120 mL    Bulb: 110 mL    Indwelling Catheter - Urethral: 350 mL    Voided: 1200 mL  Total OUT: 1780 mL    Total NET: 1020 mL                LAB VALUES:                          11.2   8.10  )-----------( 168      ( 04 Aug 2019 08:13 )             36.9     08-04    141  |  103  |  21  ----------------------------<  156<H>  4.4   |  28  |  1.13    Ca    8.2<L>      04 Aug 2019 06:45  Phos  2.5     08-04  Mg     2.3     08-04          MICROBIOLOGY:      RADIOLOGY:        MEDICATIONS  (STANDING):  acetaminophen  IVPB .. 1000 milliGRAM(s) IV Intermittent every 6 hours  ALBUTerol/ipratropium for Nebulization 3 milliLiter(s) Nebulizer every 6 hours  dextrose 5%. 1000 milliLiter(s) (50 mL/Hr) IV Continuous <Continuous>  dextrose 50% Injectable 12.5 Gram(s) IV Push once  dextrose 50% Injectable 25 Gram(s) IV Push once  dextrose 50% Injectable 25 Gram(s) IV Push once  enoxaparin Injectable 40 milliGRAM(s) SubCutaneous daily  HYDROmorphone PCA (1 mG/mL) 30 milliLiter(s) PCA Continuous PCA Continuous  insulin lispro (HumaLOG) corrective regimen sliding scale   SubCutaneous three times a day before meals  insulin lispro (HumaLOG) corrective regimen sliding scale   SubCutaneous at bedtime  ketorolac   Injectable 15 milliGRAM(s) IV Push every 6 hours  lactated ringers. 1000 milliLiter(s) (125 mL/Hr) IV Continuous <Continuous>  tobramycin 0.3% Ophthalmic Solution      tobramycin 0.3% Ophthalmic Solution 1 Drop(s) Right EYE every 4 hours    MEDICATIONS  (PRN):  dextrose 40% Gel 15 Gram(s) Oral once PRN Blood Glucose LESS THAN 70 milliGRAM(s)/deciliter  glucagon  Injectable 1 milliGRAM(s) IntraMuscular once PRN Glucose LESS THAN 70 milligrams/deciliter  HYDROmorphone  Injectable 0.5 milliGRAM(s) IV Push every 6 hours PRN Severe Pain (7 - 10)  HYDROmorphone PCA (1 mG/mL) Rescue Clinician Bolus 0.5 milliGRAM(s) IV Push every 15 minutes PRN for Pain Scale GREATER THAN 6  naloxone Injectable 0.1 milliGRAM(s) IV Push every 3 minutes PRN For ANY of the following changes in patient status:  A. RR LESS THAN 10 breaths per minute, B. Oxygen saturation LESS THAN 90%, C. Sedation score of 6  ondansetron Injectable 4 milliGRAM(s) IV Push every 6 hours PRN Nausea

## 2019-08-04 NOTE — PROGRESS NOTE ADULT - SUBJECTIVE AND OBJECTIVE BOX
Day _2_ of Anesthesia Pain Management Service    SUBJECTIVE: Patient is doing well with IV PCA    Pain Scale Score:	[X] Refer to charted pain scores    THERAPY:    [ ] IV PCA Morphine		[ ] 5 mg/mL	[ ] 1 mg/mL  [X] IV PCA Hydromorphone	[ ] 5 mg/mL	[X] 1 mg/mL  [ ] IV PCA Fentanyl		[ ] 50 micrograms/mL    Demand dose: 0.2 mg     Lockout: 6 minutes   Continuous Rate: 0 mg/hr  4 Hour Limit: 4 mg    MEDICATIONS  (STANDING):  ALBUTerol/ipratropium for Nebulization 3 milliLiter(s) Nebulizer every 6 hours  dextrose 5% + sodium chloride 0.45%. 1000 milliLiter(s) (125 mL/Hr) IV Continuous <Continuous>  dextrose 5%. 1000 milliLiter(s) (50 mL/Hr) IV Continuous <Continuous>  dextrose 50% Injectable 12.5 Gram(s) IV Push once  dextrose 50% Injectable 25 Gram(s) IV Push once  dextrose 50% Injectable 25 Gram(s) IV Push once  enoxaparin Injectable 40 milliGRAM(s) SubCutaneous daily  HYDROmorphone PCA (1 mG/mL) 30 milliLiter(s) PCA Continuous PCA Continuous  insulin lispro (HumaLOG) corrective regimen sliding scale   SubCutaneous every 6 hours  insulin lispro (HumaLOG) corrective regimen sliding scale   SubCutaneous at bedtime    MEDICATIONS  (PRN):  dextrose 40% Gel 15 Gram(s) Oral once PRN Blood Glucose LESS THAN 70 milliGRAM(s)/deciliter  glucagon  Injectable 1 milliGRAM(s) IntraMuscular once PRN Glucose LESS THAN 70 milligrams/deciliter  HYDROmorphone  Injectable 0.5 milliGRAM(s) IV Push every 6 hours PRN Severe Pain (7 - 10)  HYDROmorphone PCA (1 mG/mL) Rescue Clinician Bolus 0.5 milliGRAM(s) IV Push every 15 minutes PRN for Pain Scale GREATER THAN 6  naloxone Injectable 0.1 milliGRAM(s) IV Push every 3 minutes PRN For ANY of the following changes in patient status:  A. RR LESS THAN 10 breaths per minute, B. Oxygen saturation LESS THAN 90%, C. Sedation score of 6  ondansetron Injectable 4 milliGRAM(s) IV Push every 6 hours PRN Nausea      OBJECTIVE:    Sedation Score:	[ X] Alert	[ ] Drowsy 	[ ] Arousable	[ ] Asleep	[ ] Unresponsive    Side Effects:	[X ] None	[ ] Nausea	[ ] Vomiting	[ ] Pruritus  		[ ] Other:    Vital Signs Last 24 Hrs  T(C): 37.1 (04 Aug 2019 10:01), Max: 37.2 (03 Aug 2019 13:52)  T(F): 98.7 (04 Aug 2019 10:01), Max: 98.9 (03 Aug 2019 13:52)  HR: 87 (04 Aug 2019 10:01) (73 - 92)  BP: 166/90 (04 Aug 2019 10:01) (137/76 - 178/93)  BP(mean): --  RR: 18 (04 Aug 2019 10:01) (18 - 18)  SpO2: 94% (04 Aug 2019 10:01) (92% - 98%)    ASSESSMENT/ PLAN    Therapy to  be:               [X] Continued   [ ] Discontinued   [ ] Changed to PRN Analgesics    Documentation and Verification of current medications:   [X] Done	[ ] Not done, not eligible    Comments:

## 2019-08-05 LAB
ANION GAP SERPL CALC-SCNC: 12 MMOL/L — SIGNIFICANT CHANGE UP (ref 5–17)
BUN SERPL-MCNC: 10 MG/DL — SIGNIFICANT CHANGE UP (ref 7–23)
CALCIUM SERPL-MCNC: 8.2 MG/DL — LOW (ref 8.4–10.5)
CHLORIDE SERPL-SCNC: 101 MMOL/L — SIGNIFICANT CHANGE UP (ref 96–108)
CO2 SERPL-SCNC: 28 MMOL/L — SIGNIFICANT CHANGE UP (ref 22–31)
CREAT SERPL-MCNC: 0.77 MG/DL — SIGNIFICANT CHANGE UP (ref 0.5–1.3)
GLUCOSE SERPL-MCNC: 138 MG/DL — HIGH (ref 70–99)
HCT VFR BLD CALC: 37.3 % — LOW (ref 39–50)
HGB BLD-MCNC: 11.4 G/DL — LOW (ref 13–17)
MAGNESIUM SERPL-MCNC: 2.4 MG/DL — SIGNIFICANT CHANGE UP (ref 1.6–2.6)
MCHC RBC-ENTMCNC: 27.2 PG — SIGNIFICANT CHANGE UP (ref 27–34)
MCHC RBC-ENTMCNC: 30.6 GM/DL — LOW (ref 32–36)
MCV RBC AUTO: 89 FL — SIGNIFICANT CHANGE UP (ref 80–100)
PHOSPHATE SERPL-MCNC: 1.4 MG/DL — LOW (ref 2.5–4.5)
PLATELET # BLD AUTO: 163 K/UL — SIGNIFICANT CHANGE UP (ref 150–400)
POTASSIUM SERPL-MCNC: 3.9 MMOL/L — SIGNIFICANT CHANGE UP (ref 3.5–5.3)
POTASSIUM SERPL-SCNC: 3.9 MMOL/L — SIGNIFICANT CHANGE UP (ref 3.5–5.3)
RBC # BLD: 4.19 M/UL — LOW (ref 4.2–5.8)
RBC # FLD: 14.8 % — HIGH (ref 10.3–14.5)
SODIUM SERPL-SCNC: 141 MMOL/L — SIGNIFICANT CHANGE UP (ref 135–145)
WBC # BLD: 9.8 K/UL — SIGNIFICANT CHANGE UP (ref 3.8–10.5)
WBC # FLD AUTO: 9.8 K/UL — SIGNIFICANT CHANGE UP (ref 3.8–10.5)

## 2019-08-05 PROCEDURE — 74250 X-RAY XM SM INT 1CNTRST STD: CPT | Mod: 26

## 2019-08-05 PROCEDURE — 71045 X-RAY EXAM CHEST 1 VIEW: CPT | Mod: 26

## 2019-08-05 RX ORDER — SODIUM CHLORIDE 9 MG/ML
1000 INJECTION, SOLUTION INTRAVENOUS
Refills: 0 | Status: DISCONTINUED | OUTPATIENT
Start: 2019-08-05 | End: 2019-08-12

## 2019-08-05 RX ORDER — AMLODIPINE BESYLATE 2.5 MG/1
5 TABLET ORAL DAILY
Refills: 0 | Status: DISCONTINUED | OUTPATIENT
Start: 2019-08-05 | End: 2019-08-12

## 2019-08-05 RX ORDER — POTASSIUM PHOSPHATE, MONOBASIC POTASSIUM PHOSPHATE, DIBASIC 236; 224 MG/ML; MG/ML
30 INJECTION, SOLUTION INTRAVENOUS ONCE
Refills: 0 | Status: DISCONTINUED | OUTPATIENT
Start: 2019-08-05 | End: 2019-08-05

## 2019-08-05 RX ORDER — GLUCAGON INJECTION, SOLUTION 0.5 MG/.1ML
1 INJECTION, SOLUTION SUBCUTANEOUS ONCE
Refills: 0 | Status: DISCONTINUED | OUTPATIENT
Start: 2019-08-05 | End: 2019-08-12

## 2019-08-05 RX ORDER — GLUCAGON INJECTION, SOLUTION 0.5 MG/.1ML
1 INJECTION, SOLUTION SUBCUTANEOUS ONCE
Refills: 0 | Status: DISCONTINUED | OUTPATIENT
Start: 2019-08-05 | End: 2019-08-05

## 2019-08-05 RX ORDER — DEXTROSE 50 % IN WATER 50 %
25 SYRINGE (ML) INTRAVENOUS ONCE
Refills: 0 | Status: DISCONTINUED | OUTPATIENT
Start: 2019-08-05 | End: 2019-08-12

## 2019-08-05 RX ORDER — DEXTROSE 50 % IN WATER 50 %
15 SYRINGE (ML) INTRAVENOUS ONCE
Refills: 0 | Status: DISCONTINUED | OUTPATIENT
Start: 2019-08-05 | End: 2019-08-05

## 2019-08-05 RX ORDER — DEXTROSE 50 % IN WATER 50 %
12.5 SYRINGE (ML) INTRAVENOUS ONCE
Refills: 0 | Status: DISCONTINUED | OUTPATIENT
Start: 2019-08-05 | End: 2019-08-05

## 2019-08-05 RX ORDER — DEXTROSE 50 % IN WATER 50 %
12.5 SYRINGE (ML) INTRAVENOUS ONCE
Refills: 0 | Status: DISCONTINUED | OUTPATIENT
Start: 2019-08-05 | End: 2019-08-12

## 2019-08-05 RX ORDER — POTASSIUM PHOSPHATE, MONOBASIC POTASSIUM PHOSPHATE, DIBASIC 236; 224 MG/ML; MG/ML
15 INJECTION, SOLUTION INTRAVENOUS ONCE
Refills: 0 | Status: COMPLETED | OUTPATIENT
Start: 2019-08-05 | End: 2019-08-05

## 2019-08-05 RX ORDER — DEXTROSE 50 % IN WATER 50 %
25 SYRINGE (ML) INTRAVENOUS ONCE
Refills: 0 | Status: DISCONTINUED | OUTPATIENT
Start: 2019-08-05 | End: 2019-08-05

## 2019-08-05 RX ORDER — INSULIN LISPRO 100/ML
VIAL (ML) SUBCUTANEOUS
Refills: 0 | Status: DISCONTINUED | OUTPATIENT
Start: 2019-08-05 | End: 2019-08-12

## 2019-08-05 RX ORDER — SODIUM CHLORIDE 9 MG/ML
1000 INJECTION, SOLUTION INTRAVENOUS
Refills: 0 | Status: DISCONTINUED | OUTPATIENT
Start: 2019-08-05 | End: 2019-08-05

## 2019-08-05 RX ORDER — DEXTROSE 50 % IN WATER 50 %
15 SYRINGE (ML) INTRAVENOUS ONCE
Refills: 0 | Status: DISCONTINUED | OUTPATIENT
Start: 2019-08-05 | End: 2019-08-12

## 2019-08-05 RX ADMIN — Medication 3 MILLILITER(S): at 06:45

## 2019-08-05 RX ADMIN — Medication 1 DROP(S): at 00:30

## 2019-08-05 RX ADMIN — HYDROMORPHONE HYDROCHLORIDE 30 MILLILITER(S): 2 INJECTION INTRAMUSCULAR; INTRAVENOUS; SUBCUTANEOUS at 07:29

## 2019-08-05 RX ADMIN — Medication 1 DROP(S): at 23:19

## 2019-08-05 RX ADMIN — Medication 3 MILLILITER(S): at 17:17

## 2019-08-05 RX ADMIN — Medication 1 DROP(S): at 05:42

## 2019-08-05 RX ADMIN — Medication 3 MILLILITER(S): at 00:31

## 2019-08-05 RX ADMIN — SODIUM CHLORIDE 125 MILLILITER(S): 9 INJECTION, SOLUTION INTRAVENOUS at 09:50

## 2019-08-05 RX ADMIN — Medication 1 DROP(S): at 12:20

## 2019-08-05 RX ADMIN — Medication 1 APPLICATION(S): at 09:48

## 2019-08-05 RX ADMIN — POTASSIUM PHOSPHATE, MONOBASIC POTASSIUM PHOSPHATE, DIBASIC 62.5 MILLIMOLE(S): 236; 224 INJECTION, SOLUTION INTRAVENOUS at 13:48

## 2019-08-05 RX ADMIN — Medication 3 MILLILITER(S): at 23:19

## 2019-08-05 RX ADMIN — Medication 1 APPLICATION(S): at 17:19

## 2019-08-05 RX ADMIN — HYDROMORPHONE HYDROCHLORIDE 30 MILLILITER(S): 2 INJECTION INTRAMUSCULAR; INTRAVENOUS; SUBCUTANEOUS at 19:07

## 2019-08-05 RX ADMIN — ENOXAPARIN SODIUM 40 MILLIGRAM(S): 100 INJECTION SUBCUTANEOUS at 12:19

## 2019-08-05 RX ADMIN — AMLODIPINE BESYLATE 5 MILLIGRAM(S): 2.5 TABLET ORAL at 14:25

## 2019-08-05 RX ADMIN — Medication 1 DROP(S): at 17:17

## 2019-08-05 RX ADMIN — Medication 3 MILLILITER(S): at 12:18

## 2019-08-05 NOTE — PROVIDER CONTACT NOTE (OTHER) - ASSESSMENT
Anxious, restless A&Ox4. VSS Cont. Pox monitoring already in progress. ML dressing CDI B/L abd LICHA drains with small drainage present. Lungs Rhonchi; pt spiting out phlem s/p NGT removal.

## 2019-08-05 NOTE — CONSULT NOTE ADULT - ASSESSMENT
1. Corneal abrasion OD  POD #1. Measuring 4x3 mm and located centrally. No opacity.   - Ocuflox QID (to be placed before ointment applied)  - Erythromycin ointment TID  - Ophthalmology will follow    S/D/W/ Dr. Colbert

## 2019-08-05 NOTE — PROGRESS NOTE ADULT - SUBJECTIVE AND OBJECTIVE BOX
Day 3 of Anesthesia Pain Management Service    SUBJECTIVE: I'm doing ok    Pain Scale Score:	[X] Refer to charted pain scores    THERAPY:    [ ] IV PCA Morphine		[ ] 5 mg/mL	[ ] 1 mg/mL  [X] IV PCA Hydromorphone	[ ] 5 mg/mL	[X] 1 mg/mL  [ ] IV PCA Fentanyl		[ ] 50 micrograms/mL    Demand dose: 0.2 mg     Lockout: 6 minutes   Continuous Rate: 0 mg/hr  4 Hour Limit: 4 mg    MEDICATIONS  (STANDING):  ALBUTerol/ipratropium for Nebulization 3 milliLiter(s) Nebulizer every 6 hours  amLODIPine   Tablet 5 milliGRAM(s) Oral daily  dextrose 5% + sodium chloride 0.45%. 1000 milliLiter(s) (125 mL/Hr) IV Continuous <Continuous>  dextrose 5%. 1000 milliLiter(s) (50 mL/Hr) IV Continuous <Continuous>  dextrose 50% Injectable 12.5 Gram(s) IV Push once  dextrose 50% Injectable 25 Gram(s) IV Push once  dextrose 50% Injectable 25 Gram(s) IV Push once  enoxaparin Injectable 40 milliGRAM(s) SubCutaneous daily  erythromycin   Ointment 1 Application(s) Right EYE two times a day  HYDROmorphone PCA (1 mG/mL) 30 milliLiter(s) PCA Continuous PCA Continuous  insulin lispro (HumaLOG) corrective regimen sliding scale   SubCutaneous every 6 hours  insulin lispro (HumaLOG) corrective regimen sliding scale   SubCutaneous at bedtime  ofloxacin 0.3% Solution 1 Drop(s) Right EYE four times a day    MEDICATIONS  (PRN):  dextrose 40% Gel 15 Gram(s) Oral once PRN Blood Glucose LESS THAN 70 milliGRAM(s)/deciliter  glucagon  Injectable 1 milliGRAM(s) IntraMuscular once PRN Glucose LESS THAN 70 milligrams/deciliter  HYDROmorphone  Injectable 0.5 milliGRAM(s) IV Push every 6 hours PRN Severe Pain (7 - 10)  HYDROmorphone PCA (1 mG/mL) Rescue Clinician Bolus 0.5 milliGRAM(s) IV Push every 15 minutes PRN for Pain Scale GREATER THAN 6  naloxone Injectable 0.1 milliGRAM(s) IV Push every 3 minutes PRN For ANY of the following changes in patient status:  A. RR LESS THAN 10 breaths per minute, B. Oxygen saturation LESS THAN 90%, C. Sedation score of 6  ondansetron Injectable 4 milliGRAM(s) IV Push every 6 hours PRN Nausea      OBJECTIVE:    Sedation Score:	[ X] Alert 	[ ] Drowsy 	[ ] Arousable	[ ] Asleep	[ ] Unresponsive    Side Effects:	[X ] None	[ ] Nausea	[ ] Vomiting	[ ] Pruritus  		[ ] Other:    Vital Signs Last 24 Hrs  T(C): 36.8 (05 Aug 2019 08:20), Max: 37.2 (04 Aug 2019 17:23)  T(F): 98.2 (05 Aug 2019 08:20), Max: 98.9 (04 Aug 2019 17:23)  HR: 74 (05 Aug 2019 08:20) (73 - 87)  BP: 158/95 (05 Aug 2019 08:20) (158/95 - 174/97)  BP(mean): --  RR: 18 (05 Aug 2019 08:20) (18 - 18)  SpO2: 94% (05 Aug 2019 08:20) (92% - 97%)    ASSESSMENT/ PLAN    Therapy to  be:               [X] Continued   [ ] Discontinued   [ ] Changed to PRN Analgesics    Documentation and Verification of current medications:   [X] Done	[ ] Not done, not eligible    Comments: Using 01-x/hr. Reeducated to use.

## 2019-08-05 NOTE — CONSULT NOTE ADULT - ATTENDING COMMENTS
I have not examined patient but agree with findings based on resident note above. Patient to follow up with [facility] ophthalmology within 1 week of discharge.

## 2019-08-05 NOTE — CONSULT NOTE ADULT - SUBJECTIVE AND OBJECTIVE BOX
[facility] DEPARTMENT OF OPHTHALMOLOGY - INITIAL ADULT CONSULT  -----------------------------------------------------------------------------------------------------------------  Marlena Melendrez MD PGY-2  Pager: [phone]/[facility]: [id number]  -----------------------------------------------------------------------------------------------------------------    HPI: 69 y/o male with HTN admitted for ventral hernia repair POD 1. The patient complains of eye pain that has been present since surgery, no change in severity. No change in vision. No flashes/floaters or diplopia.     PAST MEDICAL & SURGICAL HISTORY:  Arthritis  Hypercholesterolemia  Stenosis of carotid artery, unspecified laterality  Aortic valve disease  Amaurosis fugax: "every once in awhile my right eye goes out for about a minute"  Sleep apnea  Venous Insufficiency  Microscopic Hematuria  OA (Osteoarthritis)  Obesity  Varicose Vein: B/L lower extremities  Spinal Stenosis  HTN (Hypertension)  COPD (Chronic Obstructive Pulmonary Disease)  H/O cataract removal with insertion of prosthetic lens: right-9/7/2004  H/O umbilical hernia repair: x2-2011, 2016  H/O aortic valve replacement: 2015  S/P colon resection: and umbilical hernia repair-6/12/2017  H/O total knee replacement, right: 2011  History of Lt Total Knee Replacement: 4/2011  History of Nasal Septoplasty: 1979  S/P Right Inguinal Hernia Repair: 1979  History of Appendectomy    Past Ocular History: Cataract surgery OD in 2004    FAMILY HISTORY:  No family history of blindness or macular degeneration    Social History: Denies alcohol or smoking    Ophthalmic Medications: None    MEDICATIONS  (STANDING):  ALBUTerol/ipratropium for Nebulization 3 milliLiter(s) Nebulizer every 6 hours  dextrose 5% + sodium chloride 0.45%. 1000 milliLiter(s) (125 mL/Hr) IV Continuous <Continuous>  dextrose 5%. 1000 milliLiter(s) (50 mL/Hr) IV Continuous <Continuous>  dextrose 50% Injectable 12.5 Gram(s) IV Push once  dextrose 50% Injectable 25 Gram(s) IV Push once  dextrose 50% Injectable 25 Gram(s) IV Push once  enoxaparin Injectable 40 milliGRAM(s) SubCutaneous daily  HYDROmorphone PCA (1 mG/mL) 30 milliLiter(s) PCA Continuous PCA Continuous  insulin lispro (HumaLOG) corrective regimen sliding scale   SubCutaneous every 6 hours  insulin lispro (HumaLOG) corrective regimen sliding scale   SubCutaneous at bedtime    MEDICATIONS  (PRN):  dextrose 40% Gel 15 Gram(s) Oral once PRN Blood Glucose LESS THAN 70 milliGRAM(s)/deciliter  glucagon  Injectable 1 milliGRAM(s) IntraMuscular once PRN Glucose LESS THAN 70 milligrams/deciliter  HYDROmorphone  Injectable 0.5 milliGRAM(s) IV Push every 6 hours PRN Severe Pain (7 - 10)  HYDROmorphone PCA (1 mG/mL) Rescue Clinician Bolus 0.5 milliGRAM(s) IV Push every 15 minutes PRN for Pain Scale GREATER THAN 6  naloxone Injectable 0.1 milliGRAM(s) IV Push every 3 minutes PRN For ANY of the following changes in patient status:  A. RR LESS THAN 10 breaths per minute, B. Oxygen saturation LESS THAN 90%, C. Sedation score of 6  ondansetron Injectable 4 milliGRAM(s) IV Push every 6 hours PRN Nausea    Allergies & Intolerances:   codeine (Nausea)    Review of Systems:  Constitutional: No fever, chills  Eyes: No blurry vision, flashes, floaters, FBS, erythema, discharge, double vision, OU  Neuro: No tremors  Cardiovascular: No chest pain, palpitations  Respiratory: No SOB, no cough  GI: Endorsing abdominal pain  : No dysuria  Skin: no rash  Psych: no depression  Endocrine: no polyuria, polydipsia  Heme/lymph: no swelling    VITALS: T(C): 36.7 (08-04-19 @ 21:10)  T(F): 98 (08-04-19 @ 21:10), Max: 98.9 (08-04-19 @ 17:23)  HR: 77 (08-04-19 @ 23:17) (77 - 87)  BP: 167/88 (08-04-19 @ 21:10) (145/79 - 167/88)  RR:  (18 - 18)  SpO2:  (92% - 97%)    Ophthalmology Exam:  Visual acuity (sc): 20/70 and improves with PH to 20/50 OD, 20/50 and improves with PH to 20/25 OS   Pupils: PERRL OU, no APD  Ttono: 14 OU  Extraocular movements (EOMs): Full OU, no pain, no diplopia  OD pain relieved with Proparacaine    Pen Light Exam (PLE)  External: Flat OU  Lids/Lashes/Lacrimal Ducts: Flat OU    Sclera/Conjunctiva: W+Q OU  Cornea: No opacity OU, corneal epithelial defect located centrally and measuring 4x3 mm OS  Anterior Chamber: D+F OU    Iris: Flat OU  Lens: PCIOL OD, NS OS    Fundus Exam: dilated with 1% tropicamide and 2.5% phenylephrine  Approval obtained from primary team for dilation  Patient aware that pupils can remained dilated for at least 4-6 hours  Exam performed with 20D lens    Vitreous: wnl OU  Disc, cup/disc: sharp and pink, 0.4 OU  Macula: wnl OU  Vessels: wnl OU  Periphery: wnl OU

## 2019-08-05 NOTE — PROGRESS NOTE ADULT - ASSESSMENT
67yo Male POD 3 s/p ex lap with primary repair of ventral hernia. Small bowel series from yesterday evening showed contrast migration to colon some time between 4th and 9th hour films. Pt with flatus this am.    - d/c NGT  - start clears  - restart home amlodipine  - monitor bowel function  - consider social work vs psych consult for persistent anxiety

## 2019-08-05 NOTE — PROGRESS NOTE ADULT - SUBJECTIVE AND OBJECTIVE BOX
Surgery Red Team Daily Progress Note     69yo Male POD 3 s/p ex lap with primary repair of ventral hernia.     --------------------------------------------------------------------------------------------------------------------  SUBJECTIVE / 24H EVENTS  - Patient seen and examined on morning rounds.   - Pt anxious and listless overnight 2/2 to discomfort from NGT  - hypertensive to 170s early this AM      OBJECTIVE:    VITAL SIGNS:  T(C): 36.8 (08-05-19 @ 08:20), Max: 37.2 (08-04-19 @ 17:23)  HR: 74 (08-05-19 @ 08:20) (73 - 87)  BP: 158/95 (08-05-19 @ 08:20) (158/95 - 174/97)  RR: 18 (08-05-19 @ 08:20) (18 - 18)  SpO2: 94% (08-05-19 @ 08:20) (92% - 97%)  Daily     Daily   POCT Blood Glucose.: 132 mg/dL (08-05-19 @ 05:39)  POCT Blood Glucose.: 136 mg/dL (08-04-19 @ 23:52)  POCT Blood Glucose.: 131 mg/dL (08-04-19 @ 22:02)      PHYSICAL EXAM:  Gen: NAD  LS: Respirations unlabored. CTA b/l  Card: RRR. No m/r/g.   GI: Soft. Nontender. Nondistended. BS+.  Ext: Warm, well perfused      08-04-19 @ 07:01  -  08-05-19 @ 07:00  --------------------------------------------------------  IN:    dextrose 5% + sodium chloride 0.45%.: 1500 mL  Total IN: 1500 mL    OUT:    Bulb: 70 mL    Bulb: 100 mL    Voided: 1400 mL  Total OUT: 1570 mL    Total NET: -70 mL      08-05-19 @ 07:01  -  08-05-19 @ 08:40  --------------------------------------------------------  IN:  Total IN: 0 mL    OUT:    Bulb: 15 mL    Voided: 250 mL  Total OUT: 265 mL    Total NET: -265 mL          LAB VALUES:  08-05    141  |  101  |  10  ----------------------------<  138<H>  3.9   |  28  |  0.77    Ca    8.2<L>      05 Aug 2019 06:54  Phos  1.4     08-05  Mg     2.4     08-05                                 11.4   9.80  )-----------( 163      ( 05 Aug 2019 07:54 )             37.3                   MICROBIOLOGY:      RADIOLOGY:        MEDICATIONS  (STANDING):  ALBUTerol/ipratropium for Nebulization 3 milliLiter(s) Nebulizer every 6 hours  amLODIPine   Tablet 5 milliGRAM(s) Oral daily  dextrose 5% + sodium chloride 0.45%. 1000 milliLiter(s) (125 mL/Hr) IV Continuous <Continuous>  dextrose 5%. 1000 milliLiter(s) (50 mL/Hr) IV Continuous <Continuous>  dextrose 50% Injectable 12.5 Gram(s) IV Push once  dextrose 50% Injectable 25 Gram(s) IV Push once  dextrose 50% Injectable 25 Gram(s) IV Push once  enoxaparin Injectable 40 milliGRAM(s) SubCutaneous daily  erythromycin   Ointment 1 Application(s) Right EYE two times a day  HYDROmorphone PCA (1 mG/mL) 30 milliLiter(s) PCA Continuous PCA Continuous  insulin lispro (HumaLOG) corrective regimen sliding scale   SubCutaneous every 6 hours  insulin lispro (HumaLOG) corrective regimen sliding scale   SubCutaneous at bedtime  ofloxacin 0.3% Solution 1 Drop(s) Right EYE four times a day    MEDICATIONS  (PRN):  dextrose 40% Gel 15 Gram(s) Oral once PRN Blood Glucose LESS THAN 70 milliGRAM(s)/deciliter  glucagon  Injectable 1 milliGRAM(s) IntraMuscular once PRN Glucose LESS THAN 70 milligrams/deciliter  HYDROmorphone  Injectable 0.5 milliGRAM(s) IV Push every 6 hours PRN Severe Pain (7 - 10)  HYDROmorphone PCA (1 mG/mL) Rescue Clinician Bolus 0.5 milliGRAM(s) IV Push every 15 minutes PRN for Pain Scale GREATER THAN 6  naloxone Injectable 0.1 milliGRAM(s) IV Push every 3 minutes PRN For ANY of the following changes in patient status:  A. RR LESS THAN 10 breaths per minute, B. Oxygen saturation LESS THAN 90%, C. Sedation score of 6  ondansetron Injectable 4 milliGRAM(s) IV Push every 6 hours PRN Nausea

## 2019-08-06 LAB
ANION GAP SERPL CALC-SCNC: 12 MMOL/L — SIGNIFICANT CHANGE UP (ref 5–17)
BUN SERPL-MCNC: 8 MG/DL — SIGNIFICANT CHANGE UP (ref 7–23)
CALCIUM SERPL-MCNC: 8.9 MG/DL — SIGNIFICANT CHANGE UP (ref 8.4–10.5)
CHLORIDE SERPL-SCNC: 102 MMOL/L — SIGNIFICANT CHANGE UP (ref 96–108)
CO2 SERPL-SCNC: 27 MMOL/L — SIGNIFICANT CHANGE UP (ref 22–31)
CREAT SERPL-MCNC: 0.67 MG/DL — SIGNIFICANT CHANGE UP (ref 0.5–1.3)
GLUCOSE SERPL-MCNC: 129 MG/DL — HIGH (ref 70–99)
HCT VFR BLD CALC: 40.4 % — SIGNIFICANT CHANGE UP (ref 39–50)
HGB BLD-MCNC: 12.6 G/DL — LOW (ref 13–17)
MAGNESIUM SERPL-MCNC: 2.4 MG/DL — SIGNIFICANT CHANGE UP (ref 1.6–2.6)
MCHC RBC-ENTMCNC: 28.1 PG — SIGNIFICANT CHANGE UP (ref 27–34)
MCHC RBC-ENTMCNC: 31.2 GM/DL — LOW (ref 32–36)
MCV RBC AUTO: 90 FL — SIGNIFICANT CHANGE UP (ref 80–100)
PHOSPHATE SERPL-MCNC: 1.7 MG/DL — LOW (ref 2.5–4.5)
PLATELET # BLD AUTO: 212 K/UL — SIGNIFICANT CHANGE UP (ref 150–400)
POTASSIUM SERPL-MCNC: 3.8 MMOL/L — SIGNIFICANT CHANGE UP (ref 3.5–5.3)
POTASSIUM SERPL-SCNC: 3.8 MMOL/L — SIGNIFICANT CHANGE UP (ref 3.5–5.3)
RBC # BLD: 4.49 M/UL — SIGNIFICANT CHANGE UP (ref 4.2–5.8)
RBC # FLD: 14.8 % — HIGH (ref 10.3–14.5)
SODIUM SERPL-SCNC: 141 MMOL/L — SIGNIFICANT CHANGE UP (ref 135–145)
WBC # BLD: 9.46 K/UL — SIGNIFICANT CHANGE UP (ref 3.8–10.5)
WBC # FLD AUTO: 9.46 K/UL — SIGNIFICANT CHANGE UP (ref 3.8–10.5)

## 2019-08-06 PROCEDURE — 99233 SBSQ HOSP IP/OBS HIGH 50: CPT

## 2019-08-06 PROCEDURE — 71045 X-RAY EXAM CHEST 1 VIEW: CPT | Mod: 26

## 2019-08-06 RX ORDER — CLONAZEPAM 1 MG
0.5 TABLET ORAL DAILY
Refills: 0 | Status: DISCONTINUED | OUTPATIENT
Start: 2019-08-06 | End: 2019-08-12

## 2019-08-06 RX ORDER — HYDRALAZINE HCL 50 MG
5 TABLET ORAL ONCE
Refills: 0 | Status: COMPLETED | OUTPATIENT
Start: 2019-08-06 | End: 2019-08-06

## 2019-08-06 RX ORDER — LOSARTAN POTASSIUM 100 MG/1
100 TABLET, FILM COATED ORAL DAILY
Refills: 0 | Status: DISCONTINUED | OUTPATIENT
Start: 2019-08-06 | End: 2019-08-12

## 2019-08-06 RX ORDER — POTASSIUM PHOSPHATE, MONOBASIC POTASSIUM PHOSPHATE, DIBASIC 236; 224 MG/ML; MG/ML
15 INJECTION, SOLUTION INTRAVENOUS ONCE
Refills: 0 | Status: COMPLETED | OUTPATIENT
Start: 2019-08-06 | End: 2019-08-06

## 2019-08-06 RX ORDER — SIMVASTATIN 20 MG/1
20 TABLET, FILM COATED ORAL AT BEDTIME
Refills: 0 | Status: DISCONTINUED | OUTPATIENT
Start: 2019-08-06 | End: 2019-08-12

## 2019-08-06 RX ORDER — ACETAMINOPHEN 500 MG
650 TABLET ORAL EVERY 6 HOURS
Refills: 0 | Status: DISCONTINUED | OUTPATIENT
Start: 2019-08-06 | End: 2019-08-12

## 2019-08-06 RX ORDER — PANTOPRAZOLE SODIUM 20 MG/1
40 TABLET, DELAYED RELEASE ORAL
Refills: 0 | Status: DISCONTINUED | OUTPATIENT
Start: 2019-08-06 | End: 2019-08-12

## 2019-08-06 RX ORDER — ERYTHROMYCIN BASE 5 MG/GRAM
1 OINTMENT (GRAM) OPHTHALMIC (EYE) EVERY 4 HOURS
Refills: 0 | Status: DISCONTINUED | OUTPATIENT
Start: 2019-08-06 | End: 2019-08-12

## 2019-08-06 RX ADMIN — SIMVASTATIN 20 MILLIGRAM(S): 20 TABLET, FILM COATED ORAL at 20:15

## 2019-08-06 RX ADMIN — Medication 1 DROP(S): at 20:15

## 2019-08-06 RX ADMIN — POTASSIUM PHOSPHATE, MONOBASIC POTASSIUM PHOSPHATE, DIBASIC 62.5 MILLIMOLE(S): 236; 224 INJECTION, SOLUTION INTRAVENOUS at 11:53

## 2019-08-06 RX ADMIN — Medication 1 APPLICATION(S): at 05:36

## 2019-08-06 RX ADMIN — Medication 1 APPLICATION(S): at 17:44

## 2019-08-06 RX ADMIN — Medication 1 DROP(S): at 11:51

## 2019-08-06 RX ADMIN — Medication 3 MILLILITER(S): at 05:36

## 2019-08-06 RX ADMIN — Medication 0.5 MILLIGRAM(S): at 20:15

## 2019-08-06 RX ADMIN — Medication 5 MILLIGRAM(S): at 15:48

## 2019-08-06 RX ADMIN — AMLODIPINE BESYLATE 5 MILLIGRAM(S): 2.5 TABLET ORAL at 05:35

## 2019-08-06 RX ADMIN — Medication 3 MILLILITER(S): at 17:42

## 2019-08-06 RX ADMIN — Medication 1 DROP(S): at 18:26

## 2019-08-06 RX ADMIN — ENOXAPARIN SODIUM 40 MILLIGRAM(S): 100 INJECTION SUBCUTANEOUS at 11:49

## 2019-08-06 RX ADMIN — LOSARTAN POTASSIUM 100 MILLIGRAM(S): 100 TABLET, FILM COATED ORAL at 10:27

## 2019-08-06 RX ADMIN — Medication 1 DROP(S): at 17:45

## 2019-08-06 RX ADMIN — HYDROMORPHONE HYDROCHLORIDE 30 MILLILITER(S): 2 INJECTION INTRAMUSCULAR; INTRAVENOUS; SUBCUTANEOUS at 07:03

## 2019-08-06 RX ADMIN — Medication 3 MILLILITER(S): at 11:49

## 2019-08-06 RX ADMIN — Medication 1 DROP(S): at 05:36

## 2019-08-06 NOTE — PHYSICAL THERAPY INITIAL EVALUATION ADULT - PERTINENT HX OF CURRENT PROBLEM, REHAB EVAL
68M with one day of abdominal pain, started yesterday a/w decreased GI function (minimal flatus), decreased appetite (did have some chicken last night for dinner), and nausea. 68M with one day of abdominal pain, started yesterday a/w decreased GI function (minimal flatus), decreased appetite (did have some chicken last night for dinner), and nausea. CT AP (8/2): high-grade, complex SBO with transition points in a ventral hernia sac

## 2019-08-06 NOTE — PHYSICAL THERAPY INITIAL EVALUATION ADULT - PLANNED THERAPY INTERVENTIONS, PT EVAL
gait training/GOAL: Pt will negotiate 3 stairs with 1 HR and step to pattern with min A in 2 weeks./balance training/transfer training/bed mobility training

## 2019-08-06 NOTE — CHART NOTE - NSCHARTNOTEFT_GEN_A_CORE
Patient is a 68y old  Male who presents with a chief complaint of Abdominal pain (06 Aug 2019 10:47).     Subjective: Notified by RN that patient appears to be short of breath.   Patient seen and evaluated at the bedside. Speaking in full sentences with no difficulties. States that he does not feel short of breath. Denies chest pain, palpations, weakness, lethargy and abdominal pain. Continues to endorse dizziness when he gets up out of bed.     Vital Signs Last 24 Hrs  T(C): 36.5 (06 Aug 2019 09:31), Max: 37.1 (05 Aug 2019 14:18)  T(F): 97.7 (06 Aug 2019 09:31), Max: 98.8 (05 Aug 2019 14:18)  HR: 80 (06 Aug 2019 10:35) (70 - 82)  BP: 169/89 (06 Aug 2019 09:31) (159/96 - 183/111)  BP(mean): --  RR: 20 (06 Aug 2019 09:31) (20 - 20)  SpO2: 94% (06 Aug 2019 10:35) (93% - 97%)                        11.4   9.80  )-----------( 163      ( 05 Aug 2019 07:54 )             37.3     08-06    141  |  102  |  8   ----------------------------<  129<H>  3.8   |  27  |  0.67    Ca    8.9      06 Aug 2019 07:34  Phos  1.7     08-06  Mg     2.4     08-06        Physical Exam:   General: WN/WD NAD, laying down in bed  Neurology: A&Ox3, nonfocal, NOLAN x 4  Respiratory: shallow breathing, 2L nasal canula, IS: 750-1000 ml  Abdominal: soft, obese, midline incision dressing c/d/i, bilateral LICHA's with serosanguineous output.   MSK: No edema    A/P  69yo Male POD 4 s/p ex lap with primary repair of ventral hernia.   Shortness of breath- patient with a history of COPD on Breo and ProAir    - STAT Chest X-ray    - encourage incentive spirometer use  - encourage out of bed  - encourage head of bed elevation     Dizziness  - D/C IV PCA  - po pain management     Tiesha Mercer PA-C   [id number]

## 2019-08-06 NOTE — PROGRESS NOTE ADULT - SUBJECTIVE AND OBJECTIVE BOX
Pain Management Attending Addendum    SUBJECTIVE: Patient doing well with IV PCA    Therapy:    [X] IV PCA         [ ] PRN Analgesics    OBJECTIVE:   [X] Pain appropriately controlled    [ ] Other:    Side Effects:  [X] None	             [ ] Nausea              [ ] Pruritis                	[ ] Other:    ASSESSMENT/PLAN: Continue current therapy    Comments:  Py seems dyspneic in bed but is Saturating well. Concern relayed to the surgical team for possible work up.

## 2019-08-06 NOTE — PROVIDER CONTACT NOTE (OTHER) - ACTION/TREATMENT ORDERED:
hydralazine iv given
HHN given on EMAR. CXR ordered.
MD made aware, to assess xray and call back. No further interventions at this time. Will continue to monitor for patient safety.

## 2019-08-06 NOTE — PROGRESS NOTE ADULT - ASSESSMENT
68M with COPD presents with SBO 2/2 incarcerated ventral hernia, POD#4 s/p ex-lap, primary ventral hernia repair    Pt seen and examined with Dr. Rodríguez  - LRD  - monitor breathing, c/w COPD home meds and supplemental O2   - d/c Aquacel Ag dressing tomorrow (8/7)  - OOB & amb with assistance  - encourage head of bed elevation and incentive spirometry  - consider Klonopin or Psych eval for anxiety  - PCA d/c'd, PO pain control

## 2019-08-06 NOTE — PHYSICAL THERAPY INITIAL EVALUATION ADULT - GENERAL OBSERVATIONS, REHAB EVAL
Pt received semi-supine in bed, (+) 3L NCO2, LICHA drain x2, PCA pump, continuous pulse ox, NAD. Pt alert, anxious, agreeable to PT eval.

## 2019-08-06 NOTE — PHYSICAL THERAPY INITIAL EVALUATION ADULT - DIAGNOSIS, PT EVAL
Decr. functional mobility 2/2 decr. strength, balance, endurance; general deconditioning s/p surgery

## 2019-08-06 NOTE — PROGRESS NOTE ADULT - SUBJECTIVE AND OBJECTIVE BOX
S: Pt seen and examined at bedside. Reports improvement of symptoms    AAO x 3, lethargic    Ophthalmology Exam:  Visual acuity (sc): 20/70 OD, 20/50 OS   Pupils: PERRL OU, no APD  Extraocular movements (EOMs): Full OU, no pain, no diplopia    Pen Light Exam (PLE)  External: Flat OU  Lids/Lashes/Lacrimal Ducts: Flat OU    Sclera/Conjunctiva: W+Q OU  Cornea: No infiltrate OU, corneal epithelial defect located centrally and measuring 6x4 mm, scattered filaments OD, 2+ PEE OS  Anterior Chamber: D+F OU    Iris: Flat OU  Lens: PCIOL OD, NS OS      Assessment/Plan  Post op corneal abrasion OD, moderate to severe dry eye OD>OS. Measuring 6x4 mm and located centrally. No infiltrate.   - Ocuflox QID (to be placed before ointment )  - Erythromycin ointment TID  - Ophthalmology will follow    S/D/W/ Dr. Campbell (attending) S: Pt seen and examined at bedside. Reports improvement of symptoms    AAO x 3, lethargic    Ophthalmology Exam:  Visual acuity (sc): 20/70 OD, 20/50 OS   Pupils: PERRL OU, no APD  Extraocular movements (EOMs): Full OU, no pain, no diplopia    Pen Light Exam (PLE)  External: Flat OU  Lids/Lashes/Lacrimal Ducts: Flat OU    Sclera/Conjunctiva: W+Q OU  Cornea: No infiltrate OU, corneal epithelial defect located centrally and measuring 6x4 mm, scattered filaments OD, 2+ PEE OS  Anterior Chamber: D+F OU    Iris: Flat OU  Lens: PCIOL OD, NS OS      Assessment/Plan  Post op corneal abrasion OD, moderate to severe dry eye OD>OS. Measuring 6x4 mm and located centrally. No infiltrate.   - Ocuflox QID (to be placed before ointment )  - Erythromycin ointment and Lacrilube alternate q2h  - Ophthalmology will follow    S/D/W/ Dr. Campbell (attending) S: Pt seen and examined at bedside. Reports improvement of symptoms, but still with scratchy sensation.      AAO x 3, lethargic    Ophthalmology Exam:  Visual acuity (sc): 20/70 OD, 20/50 OS   Pupils: PERRL OU, no APD  Extraocular movements (EOMs): Full OU, no pain, no diplopia    Pen Light Exam (PLE)  External: Flat OU  Lids/Lashes/Lacrimal Ducts: Flat OU    Sclera/Conjunctiva: W+Q OU  Cornea: No infiltrate OU, corneal epithelial defect located centrally and measuring 6x4 mm, scattered filaments OD, 2+ PEE OS  Anterior Chamber: D+F OU    Iris: Flat OU  Lens: PCIOL OD, NS OS      Assessment/Plan  Post op corneal abrasion OD, filamentary keratopathy OD moderate to severe dry eye OD>OS. Abrasion measuring 6x4 mm and located centrally. No infiltrate.  Cotton swab used to remove some of the filaments which denuded some of the corneal epithelium.    - Ocuflox QID (to be placed before ointment ) OD  - Erythromycin ointment and Lacrilube alternate q2h  - Ophthalmology will follow  - findings and plan discussed with patient, wife, and primary team    S/D/W/ Dr. Campbell (attending)

## 2019-08-06 NOTE — PROGRESS NOTE ADULT - ASSESSMENT
69yo Male POD 4 s/p ex lap with primary repair of ventral hernia.     - advance to low residual diet for lunch   - restart home medications  - transition to PO pain medications   - monitor bowel function  - PT evaluation for discharge     Tiesha Mercer PA-C   [id number]

## 2019-08-06 NOTE — PHYSICAL THERAPY INITIAL EVALUATION ADULT - ADDITIONAL COMMENTS
Pt lives with spouse in co-op with 3 stairs to enter (+) HR, no stairs within. 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 Pt owns cane, rolling walker, shower chair.

## 2019-08-06 NOTE — PROGRESS NOTE ADULT - SUBJECTIVE AND OBJECTIVE BOX
SURGERY DAILY PROGRESS NOTE: RED SURGERY        SUBJECTIVE/ROS: Patient seen and evaluated at the bedside this morning. No acute overnight events. Took the CPAP off in the middle of the night because it was beeping. Endorses some dizziness when getting up out of bed. Tolerating a clear liquid diet. Denies nausea and vomiting. Endorses flatus, no bowel movement.   Denies nausea, vomiting, chest pain, shortness of breath       OBJECTIVE:    Vital Signs Last 24 Hrs  T(C): 36.5 (06 Aug 2019 09:31), Max: 37.1 (05 Aug 2019 14:18)  T(F): 97.7 (06 Aug 2019 09:31), Max: 98.8 (05 Aug 2019 14:18)  HR: 80 (06 Aug 2019 10:35) (70 - 82)  BP: 169/89 (06 Aug 2019 09:31) (159/96 - 183/111)  BP(mean): --  RR: 20 (06 Aug 2019 09:31) (20 - 20)  SpO2: 94% (06 Aug 2019 10:35) (93% - 97%)  I&O's Detail    05 Aug 2019 07:01  -  06 Aug 2019 07:00  --------------------------------------------------------  IN:    dextrose 5% + sodium chloride 0.45%.: 1800 mL    Oral Fluid: 360 mL  Total IN: 2160 mL    OUT:    Bulb: 20 mL    Bulb: 105 mL    Voided: 1600 mL  Total OUT: 1725 mL    Total NET: 435 mL      06 Aug 2019 07:01  -  06 Aug 2019 10:48  --------------------------------------------------------  IN:    Oral Fluid: 240 mL  Total IN: 240 mL    OUT:    Bulb: 3 mL    Bulb: 3 mL    Voided: 300 mL  Total OUT: 306 mL    Total NET: -66 mL        Daily     Daily   MEDICATIONS  (STANDING):  ALBUTerol/ipratropium for Nebulization 3 milliLiter(s) Nebulizer every 6 hours  amLODIPine   Tablet 5 milliGRAM(s) Oral daily  dextrose 5%. 1000 milliLiter(s) (50 mL/Hr) IV Continuous <Continuous>  dextrose 50% Injectable 12.5 Gram(s) IV Push once  dextrose 50% Injectable 25 Gram(s) IV Push once  dextrose 50% Injectable 25 Gram(s) IV Push once  enoxaparin Injectable 40 milliGRAM(s) SubCutaneous daily  erythromycin   Ointment 1 Application(s) Right EYE two times a day  insulin lispro (HumaLOG) corrective regimen sliding scale   SubCutaneous three times a day before meals  losartan 100 milliGRAM(s) Oral daily  ofloxacin 0.3% Solution 1 Drop(s) Right EYE four times a day    MEDICATIONS  (PRN):  dextrose 40% Gel 15 Gram(s) Oral once PRN Blood Glucose LESS THAN 70 milliGRAM(s)/deciliter  glucagon  Injectable 1 milliGRAM(s) IntraMuscular once PRN Glucose LESS THAN 70 milligrams/deciliter  HYDROmorphone  Injectable 0.5 milliGRAM(s) IV Push every 6 hours PRN Severe Pain (7 - 10)  ondansetron Injectable 4 milliGRAM(s) IV Push every 6 hours PRN Nausea      LABS:                        11.4   9.80  )-----------( 163      ( 05 Aug 2019 07:54 )             37.3     08-06    141  |  102  |  8   ----------------------------<  129<H>  3.8   |  27  |  0.67    Ca    8.9      06 Aug 2019 07:34  Phos  1.7     08-06  Mg     2.4     08-06        PHYSICAL EXAM:  Gen: NAD  Respiratory: non labored breathing on 2L nasal canula     GI: Soft. Nontender. Nondistended. BS+.  Ext: Warm, well perfused, no edema SURGERY DAILY PROGRESS NOTE: RED SURGERY        SUBJECTIVE/ROS: Patient seen and evaluated at the bedside this morning. No acute overnight events. Took the CPAP off in the middle of the night because it was beeping. Endorses some dizziness when getting up out of bed. Tolerating a clear liquid diet. Denies nausea and vomiting. Endorses flatus, no bowel movement.   Denies nausea, vomiting, chest pain, shortness of breath       OBJECTIVE:    Vital Signs Last 24 Hrs  T(C): 36.5 (06 Aug 2019 09:31), Max: 37.1 (05 Aug 2019 14:18)  T(F): 97.7 (06 Aug 2019 09:31), Max: 98.8 (05 Aug 2019 14:18)  HR: 80 (06 Aug 2019 10:35) (70 - 82)  BP: 169/89 (06 Aug 2019 09:31) (159/96 - 183/111)  BP(mean): --  RR: 20 (06 Aug 2019 09:31) (20 - 20)  SpO2: 94% (06 Aug 2019 10:35) (93% - 97%)  I&O's Detail    05 Aug 2019 07:01  -  06 Aug 2019 07:00  --------------------------------------------------------  IN:    dextrose 5% + sodium chloride 0.45%.: 1800 mL    Oral Fluid: 360 mL  Total IN: 2160 mL    OUT:    Bulb: 20 mL    Bulb: 105 mL    Voided: 1600 mL  Total OUT: 1725 mL    Total NET: 435 mL      06 Aug 2019 07:01  -  06 Aug 2019 10:48  --------------------------------------------------------  IN:    Oral Fluid: 240 mL  Total IN: 240 mL    OUT:    Bulb: 3 mL    Bulb: 3 mL    Voided: 300 mL  Total OUT: 306 mL    Total NET: -66 mL        Daily     Daily   MEDICATIONS  (STANDING):  ALBUTerol/ipratropium for Nebulization 3 milliLiter(s) Nebulizer every 6 hours  amLODIPine   Tablet 5 milliGRAM(s) Oral daily  dextrose 5%. 1000 milliLiter(s) (50 mL/Hr) IV Continuous <Continuous>  dextrose 50% Injectable 12.5 Gram(s) IV Push once  dextrose 50% Injectable 25 Gram(s) IV Push once  dextrose 50% Injectable 25 Gram(s) IV Push once  enoxaparin Injectable 40 milliGRAM(s) SubCutaneous daily  erythromycin   Ointment 1 Application(s) Right EYE two times a day  insulin lispro (HumaLOG) corrective regimen sliding scale   SubCutaneous three times a day before meals  losartan 100 milliGRAM(s) Oral daily  ofloxacin 0.3% Solution 1 Drop(s) Right EYE four times a day    MEDICATIONS  (PRN):  dextrose 40% Gel 15 Gram(s) Oral once PRN Blood Glucose LESS THAN 70 milliGRAM(s)/deciliter  glucagon  Injectable 1 milliGRAM(s) IntraMuscular once PRN Glucose LESS THAN 70 milligrams/deciliter  HYDROmorphone  Injectable 0.5 milliGRAM(s) IV Push every 6 hours PRN Severe Pain (7 - 10)  ondansetron Injectable 4 milliGRAM(s) IV Push every 6 hours PRN Nausea      LABS:                        11.4   9.80  )-----------( 163      ( 05 Aug 2019 07:54 )             37.3     08-06    141  |  102  |  8   ----------------------------<  129<H>  3.8   |  27  |  0.67    Ca    8.9      06 Aug 2019 07:34  Phos  1.7     08-06  Mg     2.4     08-06        PHYSICAL EXAM:  Gen: NAD  Respiratory: non labored breathing on 2L nasal canula     Abdominal: soft, obese, midline incision dressing c/d/i, bilateral LICHA's with serosanguineous output.   Ext: Warm, well perfused, no edema

## 2019-08-06 NOTE — PROGRESS NOTE ADULT - SUBJECTIVE AND OBJECTIVE BOX
INTERVAL HPI/OVERNIGHT EVENTS: Pt seen and examined. Pt passing flatus. Shortness of breath, very nervous/anxious about current condition. CXR normal.     STATUS POST:  ex lap, primary repair ventral hernia    POST OPERATIVE DAY #: 4    MEDICATIONS  (STANDING):  ALBUTerol/ipratropium for Nebulization 3 milliLiter(s) Nebulizer every 6 hours  amLODIPine   Tablet 5 milliGRAM(s) Oral daily  artificial tears (preservative free) Ophthalmic Solution 1 Drop(s) Both EYES every 4 hours  dextrose 5%. 1000 milliLiter(s) (50 mL/Hr) IV Continuous <Continuous>  dextrose 50% Injectable 12.5 Gram(s) IV Push once  dextrose 50% Injectable 25 Gram(s) IV Push once  dextrose 50% Injectable 25 Gram(s) IV Push once  enoxaparin Injectable 40 milliGRAM(s) SubCutaneous daily  erythromycin   Ointment 1 Application(s) Right EYE two times a day  insulin lispro (HumaLOG) corrective regimen sliding scale   SubCutaneous three times a day before meals  losartan 100 milliGRAM(s) Oral daily  ofloxacin 0.3% Solution 1 Drop(s) Right EYE four times a day  pantoprazole    Tablet 40 milliGRAM(s) Oral before breakfast  simvastatin 20 milliGRAM(s) Oral at bedtime    MEDICATIONS  (PRN):  acetaminophen   Tablet .. 650 milliGRAM(s) Oral every 6 hours PRN Mild Pain (1 - 3), Moderate Pain (4 - 6), Severe Pain (7 - 10)  dextrose 40% Gel 15 Gram(s) Oral once PRN Blood Glucose LESS THAN 70 milliGRAM(s)/deciliter  glucagon  Injectable 1 milliGRAM(s) IntraMuscular once PRN Glucose LESS THAN 70 milligrams/deciliter      Vital Signs Last 24 Hrs  T(C): 36.8 (06 Aug 2019 16:44), Max: 36.9 (06 Aug 2019 14:52)  T(F): 98.3 (06 Aug 2019 16:44), Max: 98.4 (06 Aug 2019 14:52)  HR: 82 (06 Aug 2019 16:44) (70 - 82)  BP: 166/99 (06 Aug 2019 16:44) (159/96 - 183/111)  BP(mean): --  RR: 19 (06 Aug 2019 16:44) (19 - 20)  SpO2: 92% (06 Aug 2019 16:44) (92% - 97%)    PHYSICAL EXAM:      Constitutional: NAD    Respiratory: increased WOB, O2 via NC at 2L    Gastrointestinal: Abd soft, distended, ATTP. Aquacel Ag dressing in place, c/d/i. Drains with SSF.          I&O's Detail    05 Aug 2019 07:01  -  06 Aug 2019 07:00  --------------------------------------------------------  IN:    dextrose 5% + sodium chloride 0.45%.: 1800 mL    Oral Fluid: 360 mL  Total IN: 2160 mL    OUT:    Bulb: 20 mL    Bulb: 105 mL    Voided: 1600 mL  Total OUT: 1725 mL    Total NET: 435 mL      06 Aug 2019 07:01  -  06 Aug 2019 17:10  --------------------------------------------------------  IN:    Oral Fluid: 240 mL    Solution: 250 mL  Total IN: 490 mL    OUT:    Bulb: 3 mL    Bulb: 3 mL    Voided: 650 mL  Total OUT: 656 mL    Total NET: -166 mL          LABS:                        12.6   9.46  )-----------( 212      ( 06 Aug 2019 10:41 )             40.4     08-06    141  |  102  |  8   ----------------------------<  129<H>  3.8   |  27  |  0.67    Ca    8.9      06 Aug 2019 07:34  Phos  1.7     08-06  Mg     2.4     08-06            RADIOLOGY & ADDITIONAL STUDIES:      EXAM:  XR CHEST PORTABLE URGENT 1V                            PROCEDURE DATE:  08/06/2019            INTERPRETATION:  CLINICAL INFORMATION: Shortness of breath.    TECHNIQUE: Portable AP radiograph of the chest.    COMPARISON: Portable AP of the chest 8/5/2019.    FINDINGS: Shallow inspiration.    The lungs are grossly clear.. No pleural effusion or pneumothorax.    Heart size is within normal limits.    Right rib fixation device in place    IMPRESSION:     No focal consolidation.        SARAH BETH AREVALO M.D., RESIDENT RADIOLOGIST  This document has been electronically signed.  YAMINI CR M.D., ATTENDING RADIOLOGIST  This document has been electronically signed. Aug  6 2019 11:12AM

## 2019-08-06 NOTE — PROGRESS NOTE ADULT - SUBJECTIVE AND OBJECTIVE BOX
Day 1 of Anesthesia Pain Management Service    SUBJECTIVE: Doing well    Pain Scale Score:	[X] Refer to charted pain scores    THERAPY:    [ ] IV PCA Morphine		        [ ] 5 mg/mL	[ ] 1 mg/mL  [X] IV PCA Hydromorphone	[ ] 5 mg/mL	[X] 1 mg/mL  [ ] IV PCA Fentanyl		        [ ] 50 micrograms/mL    Demand dose: 0.2 mg     Lockout: 6 minutes   Continuous Rate: 0 mg/hr  4 Hour Limit: 4 mg    MEDICATIONS  (STANDING):  ALBUTerol/ipratropium for Nebulization 3 milliLiter(s) Nebulizer every 6 hours  amLODIPine   Tablet 5 milliGRAM(s) Oral daily  dextrose 5%. 1000 milliLiter(s) (50 mL/Hr) IV Continuous <Continuous>  dextrose 50% Injectable 12.5 Gram(s) IV Push once  dextrose 50% Injectable 25 Gram(s) IV Push once  dextrose 50% Injectable 25 Gram(s) IV Push once  enoxaparin Injectable 40 milliGRAM(s) SubCutaneous daily  erythromycin   Ointment 1 Application(s) Right EYE two times a day  insulin lispro (HumaLOG) corrective regimen sliding scale   SubCutaneous three times a day before meals  losartan 100 milliGRAM(s) Oral daily  ofloxacin 0.3% Solution 1 Drop(s) Right EYE four times a day  potassium phosphate IVPB 15 milliMole(s) IV Intermittent once    MEDICATIONS  (PRN):  dextrose 40% Gel 15 Gram(s) Oral once PRN Blood Glucose LESS THAN 70 milliGRAM(s)/deciliter  glucagon  Injectable 1 milliGRAM(s) IntraMuscular once PRN Glucose LESS THAN 70 milligrams/deciliter  HYDROmorphone  Injectable 0.5 milliGRAM(s) IV Push every 6 hours PRN Severe Pain (7 - 10)  ondansetron Injectable 4 milliGRAM(s) IV Push every 6 hours PRN Nausea      OBJECTIVE:    Sedation Score:	[ X] Alert	 [ ] Drowsy 	[ ] Arousable	[ ] Asleep	[ ] Unresponsive    Side Effects:	[X ] None	[ ] Nausea	[ ] Vomiting	[ ] Pruritus  		[ ] Other:    Vital Signs Last 24 Hrs  T(C): 36.5 (06 Aug 2019 09:31), Max: 37.1 (05 Aug 2019 14:18)  T(F): 97.7 (06 Aug 2019 09:31), Max: 98.8 (05 Aug 2019 14:18)  HR: 81 (06 Aug 2019 11:07) (70 - 82)  BP: 178/98 (06 Aug 2019 11:07) (159/96 - 183/111)  BP(mean): --  RR: 20 (06 Aug 2019 09:31) (20 - 20)  SpO2: 95% (06 Aug 2019 11:07) (93% - 97%)    ASSESSMENT/ PLAN    Therapy to  be:               [  ] Continued   [X ] Discontinued   [ X] Changed to PRN Analgesics    Documentation and Verification of current medications:   [X] Done	[ ] Not done, not eligible    Comments: PCA D\C'd by surgical service. Appears short of breath while in bed. Surgical service aware. CXR ordered. Incentive spirometry and activity encouraged.

## 2019-08-07 LAB
ANION GAP SERPL CALC-SCNC: 11 MMOL/L — SIGNIFICANT CHANGE UP (ref 5–17)
BUN SERPL-MCNC: 10 MG/DL — SIGNIFICANT CHANGE UP (ref 7–23)
CALCIUM SERPL-MCNC: 8.8 MG/DL — SIGNIFICANT CHANGE UP (ref 8.4–10.5)
CHLORIDE SERPL-SCNC: 103 MMOL/L — SIGNIFICANT CHANGE UP (ref 96–108)
CO2 SERPL-SCNC: 26 MMOL/L — SIGNIFICANT CHANGE UP (ref 22–31)
CREAT SERPL-MCNC: 0.68 MG/DL — SIGNIFICANT CHANGE UP (ref 0.5–1.3)
GLUCOSE SERPL-MCNC: 115 MG/DL — HIGH (ref 70–99)
HCT VFR BLD CALC: 39.7 % — SIGNIFICANT CHANGE UP (ref 39–50)
HGB BLD-MCNC: 12.5 G/DL — LOW (ref 13–17)
MAGNESIUM SERPL-MCNC: 2.1 MG/DL — SIGNIFICANT CHANGE UP (ref 1.6–2.6)
MCHC RBC-ENTMCNC: 27.4 PG — SIGNIFICANT CHANGE UP (ref 27–34)
MCHC RBC-ENTMCNC: 31.5 GM/DL — LOW (ref 32–36)
MCV RBC AUTO: 86.9 FL — SIGNIFICANT CHANGE UP (ref 80–100)
PHOSPHATE SERPL-MCNC: 2.5 MG/DL — SIGNIFICANT CHANGE UP (ref 2.5–4.5)
PLATELET # BLD AUTO: 209 K/UL — SIGNIFICANT CHANGE UP (ref 150–400)
POTASSIUM SERPL-MCNC: 3.9 MMOL/L — SIGNIFICANT CHANGE UP (ref 3.5–5.3)
POTASSIUM SERPL-SCNC: 3.9 MMOL/L — SIGNIFICANT CHANGE UP (ref 3.5–5.3)
RBC # BLD: 4.57 M/UL — SIGNIFICANT CHANGE UP (ref 4.2–5.8)
RBC # FLD: 14.7 % — HIGH (ref 10.3–14.5)
SODIUM SERPL-SCNC: 140 MMOL/L — SIGNIFICANT CHANGE UP (ref 135–145)
WBC # BLD: 7.91 K/UL — SIGNIFICANT CHANGE UP (ref 3.8–10.5)
WBC # FLD AUTO: 7.91 K/UL — SIGNIFICANT CHANGE UP (ref 3.8–10.5)

## 2019-08-07 PROCEDURE — 73660 X-RAY EXAM OF TOE(S): CPT | Mod: 26,RT

## 2019-08-07 PROCEDURE — 99232 SBSQ HOSP IP/OBS MODERATE 35: CPT

## 2019-08-07 RX ADMIN — Medication 1 DROP(S): at 01:54

## 2019-08-07 RX ADMIN — Medication 1 APPLICATION(S): at 16:29

## 2019-08-07 RX ADMIN — Medication 3 MILLILITER(S): at 06:18

## 2019-08-07 RX ADMIN — Medication 1 APPLICATION(S): at 21:32

## 2019-08-07 RX ADMIN — PANTOPRAZOLE SODIUM 40 MILLIGRAM(S): 20 TABLET, DELAYED RELEASE ORAL at 06:17

## 2019-08-07 RX ADMIN — Medication 3 MILLILITER(S): at 12:35

## 2019-08-07 RX ADMIN — Medication 1 DROP(S): at 18:05

## 2019-08-07 RX ADMIN — Medication 1 APPLICATION(S): at 06:16

## 2019-08-07 RX ADMIN — Medication 650 MILLIGRAM(S): at 19:10

## 2019-08-07 RX ADMIN — ENOXAPARIN SODIUM 40 MILLIGRAM(S): 100 INJECTION SUBCUTANEOUS at 12:34

## 2019-08-07 RX ADMIN — Medication 1 DROP(S): at 18:06

## 2019-08-07 RX ADMIN — Medication 1 APPLICATION(S): at 14:11

## 2019-08-07 RX ADMIN — Medication 1 DROP(S): at 21:32

## 2019-08-07 RX ADMIN — Medication 1 DROP(S): at 06:18

## 2019-08-07 RX ADMIN — Medication 1 APPLICATION(S): at 06:18

## 2019-08-07 RX ADMIN — Medication 1 APPLICATION(S): at 18:06

## 2019-08-07 RX ADMIN — LOSARTAN POTASSIUM 100 MILLIGRAM(S): 100 TABLET, FILM COATED ORAL at 06:17

## 2019-08-07 RX ADMIN — Medication 1 APPLICATION(S): at 12:35

## 2019-08-07 RX ADMIN — Medication 3 MILLILITER(S): at 18:54

## 2019-08-07 RX ADMIN — Medication 1 DROP(S): at 14:12

## 2019-08-07 RX ADMIN — Medication 1 DROP(S): at 10:40

## 2019-08-07 RX ADMIN — SIMVASTATIN 20 MILLIGRAM(S): 20 TABLET, FILM COATED ORAL at 21:32

## 2019-08-07 RX ADMIN — Medication 1 APPLICATION(S): at 01:54

## 2019-08-07 RX ADMIN — Medication 650 MILLIGRAM(S): at 18:54

## 2019-08-07 RX ADMIN — Medication 1 DROP(S): at 12:33

## 2019-08-07 RX ADMIN — Medication 1 APPLICATION(S): at 09:58

## 2019-08-07 RX ADMIN — AMLODIPINE BESYLATE 5 MILLIGRAM(S): 2.5 TABLET ORAL at 06:17

## 2019-08-07 RX ADMIN — Medication 0.5 MILLIGRAM(S): at 12:38

## 2019-08-07 NOTE — CONSULT NOTE ADULT - SUBJECTIVE AND OBJECTIVE BOX
Podiatry pager #: [phone] ([place])/ [phone] ([facility])    Patient is a 68y old  Male who presents with a chief complaint of Abdominal pain (07 Aug 2019 16:25)      HPI:  68M with one day of abdominal pain, started yesterday a/w decreased GI function (minimal flatus), decreased appetite (did have some chicken last night for dinner), and nausea. States he had intense abdominal pain relieved by the medications he received while in the ED. Currently denies HA, CP, SOB, N/V/F/C.    Remote history of appendectomy and right inguinal hernia repair  LAR 6/12/17 with diverting loop ileostomy for rectosigmoid CA by Dr. Castillo at [facility]  Reversal of ileostomy 9/18/17    Last colonoscopy 2018 - grossly normal per patient (performed by Dr. Castillo)    Patient is adamantly refusing NGT decompression (states that he had multiple failed attempts last time by attending physician). I explained in depth that this is dangerous and that he is at high risk of aspiration if undergoing general anesthesia. The patient understands and remains defiant in refusing NGT placement. (02 Aug 2019 09:52)      PAST MEDICAL & SURGICAL HISTORY:  Arthritis  Hypercholesterolemia  Stenosis of carotid artery, unspecified laterality  Aortic valve disease  Amaurosis fugax: &quot;every once in awhile my right eye goes out for about a minute&quot;  Sleep apnea  Venous Insufficiency  Microscopic Hematuria  OA (Osteoarthritis)  Obesity  Varicose Vein: B/L lower extremities  Spinal Stenosis  HTN (Hypertension)  Pilonidal Abscess  Kidney Stone  COPD (Chronic Obstructive Pulmonary Disease)  S/P reconstruction procedure: &quot;my left foot I had no arch&quot;-12/2015  H/O cataract removal with insertion of prosthetic lens: right-9/7/2004  H/O umbilical hernia repair: x2-2011, 2016  H/O aortic valve replacement: 2015  S/P colon resection: and umbilical hernia repair-6/12/2017  H/O total knee replacement, right: 2011  History of Lt Total Knee Replacement: 4/2011  S/P Cystoscopy: 2010  History of Nasal Septoplasty: 1979  Skin Tag Excision: Right Leg-2010  S/P Right Inguinal Hernia Repair: 1979  S/P Cataract Surgery: Right - with lens placement  Tibialis Posterior Tendonitis: Repair B/L revision left 2016  Plantar Tendonitis: Repair- B L  Renal Calculi: lithotripsy  I & D of Pilonidal cyst  S/P Cardiac Cath  History of Appendectomy      MEDICATIONS  (STANDING):  ALBUTerol/ipratropium for Nebulization 3 milliLiter(s) Nebulizer every 6 hours  amLODIPine   Tablet 5 milliGRAM(s) Oral daily  artificial tears (preservative free) Ophthalmic Solution 1 Drop(s) Both EYES every 4 hours  clonazePAM  Tablet 0.5 milliGRAM(s) Oral daily  dextrose 5%. 1000 milliLiter(s) (50 mL/Hr) IV Continuous <Continuous>  dextrose 50% Injectable 12.5 Gram(s) IV Push once  dextrose 50% Injectable 25 Gram(s) IV Push once  dextrose 50% Injectable 25 Gram(s) IV Push once  enoxaparin Injectable 40 milliGRAM(s) SubCutaneous daily  erythromycin   Ointment 1 Application(s) Right EYE every 4 hours  insulin lispro (HumaLOG) corrective regimen sliding scale   SubCutaneous three times a day before meals  losartan 100 milliGRAM(s) Oral daily  ofloxacin 0.3% Solution 1 Drop(s) Right EYE four times a day  pantoprazole    Tablet 40 milliGRAM(s) Oral before breakfast  petrolatum Ophthalmic Ointment 1 Application(s) Right EYE every 4 hours  simvastatin 20 milliGRAM(s) Oral at bedtime    MEDICATIONS  (PRN):  acetaminophen   Tablet .. 650 milliGRAM(s) Oral every 6 hours PRN Mild Pain (1 - 3), Moderate Pain (4 - 6), Severe Pain (7 - 10)  dextrose 40% Gel 15 Gram(s) Oral once PRN Blood Glucose LESS THAN 70 milliGRAM(s)/deciliter  glucagon  Injectable 1 milliGRAM(s) IntraMuscular once PRN Glucose LESS THAN 70 milligrams/deciliter      Allergies    No Known Allergies    Intolerances    codeine (Nausea)      VITALS:    Vital Signs Last 24 Hrs  T(C): 36.7 (07 Aug 2019 16:53), Max: 36.9 (07 Aug 2019 09:20)  T(F): 98.1 (07 Aug 2019 16:53), Max: 98.5 (07 Aug 2019 13:47)  HR: 77 (07 Aug 2019 16:53) (67 - 81)  BP: 153/94 (07 Aug 2019 16:53) (149/81 - 161/93)  BP(mean): --  RR: 18 (07 Aug 2019 16:53) (18 - 20)  SpO2: 93% (07 Aug 2019 16:53) (93% - 99%)    LABS:                          12.5   7.91  )-----------( 209      ( 07 Aug 2019 08:36 )             39.7       08-07    140  |  103  |  10  ----------------------------<  115<H>  3.9   |  26  |  0.68    Ca    8.8      07 Aug 2019 06:20  Phos  2.5     08-07  Mg     2.1     08-07        CAPILLARY BLOOD GLUCOSE      POCT Blood Glucose.: 103 mg/dL (07 Aug 2019 17:04)  POCT Blood Glucose.: 116 mg/dL (07 Aug 2019 12:07)  POCT Blood Glucose.: 107 mg/dL (07 Aug 2019 09:56)  POCT Blood Glucose.: 125 mg/dL (06 Aug 2019 21:45)          LOWER EXTREMITY PHYSICAL EXAM:    Vascular: DP/PT 2/4 B/L, CFT <3 seconds B/L, Temperature gradient warm to cool B/L  Neuro: Epicritic sensation intact to the level of toes B/L  Musculoskeletal/Ortho: b/l flatfoot, s/p L flatfoot recon in 2016   Skin: R hallux swollen and ecchymotic w/ blistering at proximal nail fold s/p crush injury (oxygen tank fell on foot), R hallucal nail fungal however subungual hematoma present underneath and loosening of nail from nail bed.      RADIOLOGY & ADDITIONAL STUDIES:    < from: Xray Toes, Right Foot (08.07.19 @ 11:38) >    EXAM:  TOES(S) RIGHT FOOT                            PROCEDURE DATE:  08/07/2019            INTERPRETATION:  CLINICAL INFORMATION: Right big toe trauma    COMPARISON:  Right foot radiographs 12/27/7.    TECHNIQUE:   3 views of the right toes    FINDINGS/  IMPRESSION:     Acute transverse nondisplaced fracture of the distal phalanx of the great   toe. No intra-articular extension of the fracture is seen. Associated   soft tissue swelling. No dislocation. Degenerative changes of the first   tarsometatarsal and first metatarsophalangeal joints with the joint space   narrowing and osteophyte formation.      These findings were discussed with Mrs. GIOVANNY AREVALO at 8/7/2019 12:03 PM   by Dr. Alarcon of Radiology with read back confirmation.                    JAQUI ALARCON M.D., RADIOLOGY RESIDENT  This document has been electronically signed.  BRANDAN SOTO M.D., ATTENDING RADIOLOGIST  This document has been electronically signed. Aug  7 2019 12:49PM                < end of copied text >

## 2019-08-07 NOTE — PROGRESS NOTE ADULT - ATTENDING COMMENTS
Patient seen and examined.    Continue NGT  Continue NPO and IV fluids  D/C indwelling stephen  encourage out of bed and ambulate  await more GI function
I have interviewed and examined the patient and reviewed the residents note including the history, exam, assessment, and plan.  I agree with the residents assessment and plan.    Post op corneal abrasion OD, filamentary keratopathy OD moderate to severe dry eye OD>OS. Abrasion measuring 6x4 mm and located centrally. No infiltrate.  Cotton swab used to remove some of the filaments which denuded some of the corneal epithelium.    - Ocuflox QID (to be placed before ointment ) OD  - Erythromycin ointment and Lacrilube alternate q2h  - Ophthalmology will follow  - findings and plan discussed with patient, wife, and primary team    Alina Campbell MD
Patient seen and examined. Minimal flatus reported.    Small bowel series today  Continue NPO and IV fluids
I have interviewed and examined the patient and reviewed the residents note including the history, exam, assessment, and plan.  I agree with the residents assessment and plan.    Post op corneal abrasion OD, moderate to severe dry eye OD>OS.  No presence of filaments today and corneal abrasion smaller on exam.  - Ocuflox QID (to be placed before ointment ) OS  - Erythromycin ointment and Lacrilube alternate q2h OS  - Ophthalmology will follow  - patient needs outpatient follow-up for resolution- discussed with primary team that pt will need follow up appt 8/9/19 at [address] [place] [address].  They will help arrange from rehab.  - findings and plan discussed with patient and primary team    Alina Campbell MD

## 2019-08-07 NOTE — PROGRESS NOTE ADULT - SUBJECTIVE AND OBJECTIVE BOX
SURGERY DAILY PROGRESS NOTE: RED      SUBJECTIVE/ROS: Patient seen and evaluated this AM on rounds. States that he had a bowel movement yesterday. While in the bathroom the oxygen tank fell on his right great toe and he endorses pain and ecchymosis. Yesterday patient with SOB, CXR normal. CPAP overnight. Tolerating a regular low fiber diet. Endorses continued passing of flatus.   Denies nausea, vomiting, chest pain, shortness of breath       OBJECTIVE:    Vital Signs Last 24 Hrs  T(C): 36.8 (07 Aug 2019 04:42), Max: 36.9 (06 Aug 2019 14:52)  T(F): 98.3 (07 Aug 2019 04:42), Max: 98.4 (06 Aug 2019 14:52)  HR: 77 (07 Aug 2019 06:40) (67 - 82)  BP: 149/81 (07 Aug 2019 04:42) (149/81 - 178/98)  BP(mean): --  RR: 20 (07 Aug 2019 04:42) (19 - 20)  SpO2: 97% (07 Aug 2019 06:40) (92% - 97%)  I&O's Detail    06 Aug 2019 07:01  -  07 Aug 2019 07:00  --------------------------------------------------------  IN:    Oral Fluid: 440 mL    Solution: 250 mL  Total IN: 690 mL    OUT:    Bulb: 3 mL    Bulb: 48 mL    Voided: 1850 mL  Total OUT: 1901 mL    Total NET: -1211 mL      07 Aug 2019 07:01  -  07 Aug 2019 08:32  --------------------------------------------------------  IN:  Total IN: 0 mL    OUT:    Voided: 200 mL  Total OUT: 200 mL    Total NET: -200 mL        Daily     Daily   MEDICATIONS  (STANDING):  ALBUTerol/ipratropium for Nebulization 3 milliLiter(s) Nebulizer every 6 hours  amLODIPine   Tablet 5 milliGRAM(s) Oral daily  artificial tears (preservative free) Ophthalmic Solution 1 Drop(s) Both EYES every 4 hours  clonazePAM  Tablet 0.5 milliGRAM(s) Oral daily  dextrose 5%. 1000 milliLiter(s) (50 mL/Hr) IV Continuous <Continuous>  dextrose 50% Injectable 12.5 Gram(s) IV Push once  dextrose 50% Injectable 25 Gram(s) IV Push once  dextrose 50% Injectable 25 Gram(s) IV Push once  enoxaparin Injectable 40 milliGRAM(s) SubCutaneous daily  erythromycin   Ointment 1 Application(s) Right EYE every 4 hours  insulin lispro (HumaLOG) corrective regimen sliding scale   SubCutaneous three times a day before meals  losartan 100 milliGRAM(s) Oral daily  ofloxacin 0.3% Solution 1 Drop(s) Right EYE four times a day  pantoprazole    Tablet 40 milliGRAM(s) Oral before breakfast  petrolatum Ophthalmic Ointment 1 Application(s) Right EYE every 4 hours  simvastatin 20 milliGRAM(s) Oral at bedtime    MEDICATIONS  (PRN):  acetaminophen   Tablet .. 650 milliGRAM(s) Oral every 6 hours PRN Mild Pain (1 - 3), Moderate Pain (4 - 6), Severe Pain (7 - 10)  dextrose 40% Gel 15 Gram(s) Oral once PRN Blood Glucose LESS THAN 70 milliGRAM(s)/deciliter  glucagon  Injectable 1 milliGRAM(s) IntraMuscular once PRN Glucose LESS THAN 70 milligrams/deciliter      LABS:                        12.6   9.46  )-----------( 212      ( 06 Aug 2019 10:41 )             40.4     08-07    140  |  103  |  10  ----------------------------<  115<H>  3.9   |  26  |  0.68    Ca    8.8      07 Aug 2019 06:20  Phos  2.5     08-07  Mg     2.1     08-07        PHYSICAL EXAM:  Gen: NAD  Respiratory: non labored breathing on 2L nasal canula     Abdominal: soft, obese, midline incision dressing c/d/i, bilateral LICHA's with serosanguineous output, surgical aqualcel dressing taken down, incision c/d/i  Ext: Warm, well perfused, no edema

## 2019-08-07 NOTE — PROGRESS NOTE ADULT - SUBJECTIVE AND OBJECTIVE BOX
S: Pt seen and examined at bedside. Reports improvement of symptoms, denies pain but     AAO x 3, lethargic    Ophthalmology Exam:  Visual acuity (sc): 20/70 OD, 20/50 OS   Pupils: PERRL OU, no APD  Extraocular movements (EOMs): Full OU, no pain, no diplopia    Pen Light Exam (PLE)  External: Flat OU  Lids/Lashes/Lacrimal Ducts: Flat OU    Sclera/Conjunctiva: W+Q OU  Cornea: No infiltrate OU, corneal epithelial defect located centrally and measuring 3x1 mm, scattered filaments OD, 1-2+ PEE OS  Anterior Chamber: D+F OU    Iris: Flat OU  Lens: PCIOL OD, NS OS      Assessment/Plan  Post op corneal abrasion OD, moderate to severe dry eye OD>OS. Improving  - Ocuflox QID (to be placed before ointment )  - Erythromycin ointment and Lacrilube alternate q2h  - Ophthalmology will follow    S/D/W/ Dr. Campbell (attending) S: Pt seen and examined at bedside. Reports improvement of symptoms, denies pain but     AAO x 3, lethargic    Ophthalmology Exam:  Visual acuity (sc): 20/70 OD, 20/50 OS   Pupils: PERRL OU, no APD  Extraocular movements (EOMs): Full OU, no pain, no diplopia    Pen Light Exam (PLE)  External: Flat OU  Lids/Lashes/Lacrimal Ducts: Flat OU    Sclera/Conjunctiva: W+Q OU  Cornea: No infiltrate OU, corneal epithelial defect located centrally and measuring 3x1 mm, scattered filaments OD, 1-2+ PEE OS  Anterior Chamber: D+F OU    Iris: Flat OU  Lens: PCIOL OD, NS OS      Assessment/Plan  Post op corneal abrasion OD, moderate to severe dry eye OD>OS. Improving  - Ocuflox QID (to be placed before ointment )  - Erythromycin ointment and Lacrilube alternate q2h  - Ophthalmology will follow  - patient needs outpatient follow-up for resolution    S/D/W/ Dr. Campbell (attending) S: Pt seen and examined at bedside. Reports improvement of symptoms, denies pain but has some foreign body sensation OS    AAO x 3, lethargic    Ophthalmology Exam:  Visual acuity (sc): 20/70 OD, 20/50 OS   Pupils: PERRL OU, no APD  Extraocular movements (EOMs): Full OU, no pain, no diplopia    Pen Light Exam (PLE)  External: Flat OU  Lids/Lashes/Lacrimal Ducts: Flat OU    Sclera/Conjunctiva: W+Q OU  Cornea: No infiltrate OU, corneal epithelial defect located centrally and measuring 3x1 mm, scattered filaments OD, 1-2+ PEE OS  Anterior Chamber: D+F OU    Iris: Flat OU  Lens: PCIOL OD, NS OS      Assessment/Plan  Post op corneal abrasion OD, moderate to severe dry eye OD>OS. Improving  - Ocuflox QID (to be placed before ointment )  - Erythromycin ointment and Lacrilube alternate q2h  - Ophthalmology will follow  - patient needs outpatient follow-up for resolution    S/D/W/ Dr. Campbell (attending) S: Pt seen and examined at bedside. Reports improvement of symptoms, denies pain but has some foreign body sensation OS    AAO x 3, mood and affect appropriate    Ophthalmology Exam:  Visual acuity (sc): 20/70 OD, 20/50 OS   Pupils: PERRL OU, no APD  Extraocular movements (EOMs): Full OU, no pain, no diplopia    Pen Light Exam (PLE)  External: Flat OU  Lids/Lashes/Lacrimal Ducts: Flat OU    Sclera/Conjunctiva: W+Q OU  Cornea: No infiltrate OU, corneal epithelial defect located centrally and measuring 3x1 mm OD (improved), 1-2+ PEE OS  Anterior Chamber: D+F OU    Iris: Flat OU  Lens: PCIOL OD, NS OS      Assessment/Plan  Post op corneal abrasion OD, moderate to severe dry eye OD>OS.  No presence of filaments today and corneal abrasion smaller on exam.  - Ocuflox QID (to be placed before ointment ) OS  - Erythromycin ointment and Lacrilube alternate q2h OS  - Ophthalmology will follow  - patient needs outpatient follow-up for resolution- discussed with primary team that pt will need follow up appt 8/9/19 at [address] [place] [address].  They will help arrange from rehab.  - findings and plan discussed with patient and primary team    S/D/W/ Dr. Campbell (attending)

## 2019-08-07 NOTE — PROGRESS NOTE ADULT - ASSESSMENT
67yo Male POD 5 s/p ex lap with primary repair of ventral hernia.     -c/w LRD  -c/w home medications  -c/w PO pain medications   - monitor bowel function  - monitor LICHA output  - Klonopin added yesterday for anxiety   - PT evaluation for discharge- [facility] Mer[facility] PA-C   [id number] 69yo Male POD 5 s/p ex lap with primary repair of ventral hernia.     -c/w LRD  -c/w home medications  -c/w PO pain medications   - monitor bowel function  - monitor LICHA output  - Klonopin added yesterday for anxiety   - f/u optho  - f/u xray and podiatry   - PT evaluation for discharge- [facility] Mercer PA-C   [id number]

## 2019-08-07 NOTE — CONSULT NOTE ADULT - ASSESSMENT
69 y/o M w/ R hallux distal phalanx fracture and subungual hematoma 2/2 crush injury  -Pt seen and evaluated  -X-ray showing nondisplaced transverse fx of hallux distal phalanx  -R hallux swollen and ecchymotic w/ blistering at proximal nail fold s/p crush injury  -Subungual hematoma of R hallucal nail with loosening of nail from nail bed  -Consent obtained for bedside nail avulsion  -Local block of 12cc 1% lidocaine plain given into R hallux  -R hallucal nail removed using sterile suture removal kit and fine suture kit  -Nail bed was flushed w/ copious amounts of sterile saline and assessed for nail bed lacs- no lacs were present, no probe to bone  -Dressed nail bed with xeroform, surgicel, and dry sterile dressing  -Pt does not need abx from podiatry standpoint- no concern for infection   -Ordered surgical shoe  -Leave foot dressing clean, dry, and intact until follow up  -Follow up with Dr. Diamante Connolly within 1 week of discharge. Call [phone] ([place]) or [phone] ([place]) to make an appointment.  -WBAT in surgical shoe, with weight to the heel  -Discussed w/ attending

## 2019-08-08 ENCOUNTER — TRANSCRIPTION ENCOUNTER (OUTPATIENT)
Age: 69
End: 2019-08-08

## 2019-08-08 LAB
ANION GAP SERPL CALC-SCNC: 14 MMOL/L — SIGNIFICANT CHANGE UP (ref 5–17)
BUN SERPL-MCNC: 14 MG/DL — SIGNIFICANT CHANGE UP (ref 7–23)
CALCIUM SERPL-MCNC: 8.7 MG/DL — SIGNIFICANT CHANGE UP (ref 8.4–10.5)
CHLORIDE SERPL-SCNC: 100 MMOL/L — SIGNIFICANT CHANGE UP (ref 96–108)
CO2 SERPL-SCNC: 26 MMOL/L — SIGNIFICANT CHANGE UP (ref 22–31)
CREAT SERPL-MCNC: 0.82 MG/DL — SIGNIFICANT CHANGE UP (ref 0.5–1.3)
GLUCOSE SERPL-MCNC: 108 MG/DL — HIGH (ref 70–99)
HCT VFR BLD CALC: 38.1 % — LOW (ref 39–50)
HGB BLD-MCNC: 12 G/DL — LOW (ref 13–17)
MAGNESIUM SERPL-MCNC: 1.9 MG/DL — SIGNIFICANT CHANGE UP (ref 1.6–2.6)
MCHC RBC-ENTMCNC: 27.8 PG — SIGNIFICANT CHANGE UP (ref 27–34)
MCHC RBC-ENTMCNC: 31.5 GM/DL — LOW (ref 32–36)
MCV RBC AUTO: 88.4 FL — SIGNIFICANT CHANGE UP (ref 80–100)
PHOSPHATE SERPL-MCNC: 3.3 MG/DL — SIGNIFICANT CHANGE UP (ref 2.5–4.5)
PLATELET # BLD AUTO: 196 K/UL — SIGNIFICANT CHANGE UP (ref 150–400)
POTASSIUM SERPL-MCNC: 3.9 MMOL/L — SIGNIFICANT CHANGE UP (ref 3.5–5.3)
POTASSIUM SERPL-SCNC: 3.9 MMOL/L — SIGNIFICANT CHANGE UP (ref 3.5–5.3)
RBC # BLD: 4.31 M/UL — SIGNIFICANT CHANGE UP (ref 4.2–5.8)
RBC # FLD: 14.7 % — HIGH (ref 10.3–14.5)
SODIUM SERPL-SCNC: 140 MMOL/L — SIGNIFICANT CHANGE UP (ref 135–145)
WBC # BLD: 7.63 K/UL — SIGNIFICANT CHANGE UP (ref 3.8–10.5)
WBC # FLD AUTO: 7.63 K/UL — SIGNIFICANT CHANGE UP (ref 3.8–10.5)

## 2019-08-08 RX ORDER — ERYTHROMYCIN BASE 5 MG/GRAM
1 OINTMENT (GRAM) OPHTHALMIC (EYE)
Qty: 0 | Refills: 0 | DISCHARGE
Start: 2019-08-08

## 2019-08-08 RX ORDER — OFLOXACIN 0.3 %
1 DROPS OPHTHALMIC (EYE)
Qty: 1 | Refills: 0
Start: 2019-08-08 | End: 2019-08-14

## 2019-08-08 RX ORDER — ACETAMINOPHEN 500 MG
1000 TABLET ORAL ONCE
Refills: 0 | Status: COMPLETED | OUTPATIENT
Start: 2019-08-08 | End: 2019-08-08

## 2019-08-08 RX ORDER — ERYTHROMYCIN BASE 5 MG/GRAM
1 OINTMENT (GRAM) OPHTHALMIC (EYE)
Qty: 1 | Refills: 0
Start: 2019-08-08 | End: 2019-08-14

## 2019-08-08 RX ADMIN — ENOXAPARIN SODIUM 40 MILLIGRAM(S): 100 INJECTION SUBCUTANEOUS at 13:02

## 2019-08-08 RX ADMIN — Medication 3 MILLILITER(S): at 00:19

## 2019-08-08 RX ADMIN — Medication 1 DROP(S): at 00:20

## 2019-08-08 RX ADMIN — Medication 400 MILLIGRAM(S): at 01:07

## 2019-08-08 RX ADMIN — Medication 1 APPLICATION(S): at 21:45

## 2019-08-08 RX ADMIN — Medication 1 DROP(S): at 01:13

## 2019-08-08 RX ADMIN — Medication 1 APPLICATION(S): at 11:00

## 2019-08-08 RX ADMIN — Medication 1 APPLICATION(S): at 01:13

## 2019-08-08 RX ADMIN — Medication 3 MILLILITER(S): at 13:00

## 2019-08-08 RX ADMIN — Medication 1 DROP(S): at 05:21

## 2019-08-08 RX ADMIN — Medication 1 DROP(S): at 13:00

## 2019-08-08 RX ADMIN — Medication 1000 MILLIGRAM(S): at 17:15

## 2019-08-08 RX ADMIN — Medication 1 DROP(S): at 15:08

## 2019-08-08 RX ADMIN — Medication 1 APPLICATION(S): at 01:14

## 2019-08-08 RX ADMIN — Medication 400 MILLIGRAM(S): at 16:54

## 2019-08-08 RX ADMIN — Medication 3 MILLILITER(S): at 05:26

## 2019-08-08 RX ADMIN — Medication 3 MILLILITER(S): at 21:44

## 2019-08-08 RX ADMIN — Medication 1 DROP(S): at 09:30

## 2019-08-08 RX ADMIN — Medication 1 APPLICATION(S): at 09:30

## 2019-08-08 RX ADMIN — Medication 1 APPLICATION(S): at 05:21

## 2019-08-08 RX ADMIN — Medication 1 DROP(S): at 05:22

## 2019-08-08 RX ADMIN — Medication 0.5 MILLIGRAM(S): at 13:09

## 2019-08-08 RX ADMIN — Medication 1 APPLICATION(S): at 16:59

## 2019-08-08 RX ADMIN — AMLODIPINE BESYLATE 5 MILLIGRAM(S): 2.5 TABLET ORAL at 05:21

## 2019-08-08 RX ADMIN — Medication 1 APPLICATION(S): at 21:41

## 2019-08-08 RX ADMIN — Medication 1 DROP(S): at 17:27

## 2019-08-08 RX ADMIN — SIMVASTATIN 20 MILLIGRAM(S): 20 TABLET, FILM COATED ORAL at 21:40

## 2019-08-08 RX ADMIN — Medication 1 APPLICATION(S): at 15:08

## 2019-08-08 RX ADMIN — Medication 1 APPLICATION(S): at 00:19

## 2019-08-08 RX ADMIN — PANTOPRAZOLE SODIUM 40 MILLIGRAM(S): 20 TABLET, DELAYED RELEASE ORAL at 09:30

## 2019-08-08 RX ADMIN — Medication 1 APPLICATION(S): at 17:28

## 2019-08-08 RX ADMIN — LOSARTAN POTASSIUM 100 MILLIGRAM(S): 100 TABLET, FILM COATED ORAL at 05:26

## 2019-08-08 RX ADMIN — Medication 1 DROP(S): at 21:41

## 2019-08-08 RX ADMIN — Medication 1 APPLICATION(S): at 13:01

## 2019-08-08 RX ADMIN — Medication 1: at 17:26

## 2019-08-08 RX ADMIN — Medication 1 APPLICATION(S): at 05:22

## 2019-08-08 RX ADMIN — Medication 3 MILLILITER(S): at 17:00

## 2019-08-08 NOTE — PROGRESS NOTE ADULT - SUBJECTIVE AND OBJECTIVE BOX
SURGERY DAILY PROGRESS NOTE: RED      SUBJECTIVE/ROS: Patient seen and evaluated this AM on rounds.   - no acute events overnight  - no complaints this morning       OBJECTIVE:    Vital Signs Last 24 Hrs  T(C): 36.8 (08 Aug 2019 09:32), Max: 37.1 (08 Aug 2019 06:34)  T(F): 98.3 (08 Aug 2019 09:32), Max: 98.7 (08 Aug 2019 06:34)  HR: 81 (08 Aug 2019 09:32) (73 - 99)  BP: 161/59 (08 Aug 2019 09:32) (153/94 - 161/59)  BP(mean): --  RR: 18 (08 Aug 2019 09:32) (18 - 20)  SpO2: 99% (08 Aug 2019 09:32) (93% - 99%)    I&O's Detail    07 Aug 2019 07:01  -  08 Aug 2019 07:00  --------------------------------------------------------  IN:    Oral Fluid: 500 mL    Solution: 100 mL  Total IN: 600 mL    OUT:    Bulb: 35 mL    Bulb: 23 mL    Estimated Blood Loss: 20 mL    Voided: 1325 mL  Total OUT: 1403 mL    Total NET: -803 mL      08 Aug 2019 07:01  -  08 Aug 2019 11:46  --------------------------------------------------------  IN:  Total IN: 0 mL    OUT:    Bulb: 8 mL  Total OUT: 8 mL    Total NET: -8 mL            MEDICATIONS  (STANDING):  ALBUTerol/ipratropium for Nebulization 3 milliLiter(s) Nebulizer every 6 hours  amLODIPine   Tablet 5 milliGRAM(s) Oral daily  artificial tears (preservative free) Ophthalmic Solution 1 Drop(s) Both EYES every 4 hours  clonazePAM  Tablet 0.5 milliGRAM(s) Oral daily  dextrose 5%. 1000 milliLiter(s) (50 mL/Hr) IV Continuous <Continuous>  dextrose 50% Injectable 12.5 Gram(s) IV Push once  dextrose 50% Injectable 25 Gram(s) IV Push once  dextrose 50% Injectable 25 Gram(s) IV Push once  enoxaparin Injectable 40 milliGRAM(s) SubCutaneous daily  erythromycin   Ointment 1 Application(s) Right EYE every 4 hours  insulin lispro (HumaLOG) corrective regimen sliding scale   SubCutaneous three times a day before meals  losartan 100 milliGRAM(s) Oral daily  ofloxacin 0.3% Solution 1 Drop(s) Right EYE four times a day  pantoprazole    Tablet 40 milliGRAM(s) Oral before breakfast  petrolatum Ophthalmic Ointment 1 Application(s) Right EYE every 4 hours  simvastatin 20 milliGRAM(s) Oral at bedtime    MEDICATIONS  (PRN):  acetaminophen   Tablet .. 650 milliGRAM(s) Oral every 6 hours PRN Mild Pain (1 - 3), Moderate Pain (4 - 6), Severe Pain (7 - 10)  dextrose 40% Gel 15 Gram(s) Oral once PRN Blood Glucose LESS THAN 70 milliGRAM(s)/deciliter  glucagon  Injectable 1 milliGRAM(s) IntraMuscular once PRN Glucose LESS THAN 70 milligrams/deciliter      LABS:                                   12.0   7.63  )-----------( 196      ( 08 Aug 2019 09:28 )             38.1     08-08    140  |  100  |  14  ----------------------------<  108<H>  3.9   |  26  |  0.82    Ca    8.7      08 Aug 2019 07:34  Phos  3.3     08-08  Mg     1.9     08-08          PHYSICAL EXAM:  Gen: NAD  Respiratory: non labored breathing on 2L nasal canula     Abdominal: soft, obese, midline incision nonerythematous and without discharge, bilateral LICHA's with serosanguineous output  Ext: Warm, well perfused, no edema

## 2019-08-08 NOTE — DISCHARGE NOTE PROVIDER - NSDCFUADDAPPT_GEN_ALL_CORE_FT
Podiatry: please follow up within 1 week at either location  Dr. Diamante Connolly  [phone] ([place])  [phone] ([place])    Ophthalmology: Please follow up as soon as you can with your ophthalmologist or with [facility].

## 2019-08-08 NOTE — DISCHARGE NOTE PROVIDER - NSDCFUADDINST_GEN_ALL_CORE_FT
You may shower and allow soapy water to run down the incision You may shower and allow soapy water to run down the incision and drain site. Pat dry when finished. Please care for your remaining drain as instructed. Your cast should stay in place until your follow-up with podiatry. Please call to schedule this appointment as soon as possible. You may shower and allow soapy water to run down the incision and drain site. Pat dry when finished. Please care for your remaining drain as instructed. Do not soak in tubs, pools, lakes, or other bodies of water for 4 weeks. Your cast should stay in place until your follow-up with podiatry. Please call to schedule this appointment as soon as possible. Please also schedule a follow-up appointment with ophthalmology within 1-3 days after being discharged.

## 2019-08-08 NOTE — DISCHARGE NOTE PROVIDER - CARE PROVIDER_API CALL
Heber Rodríguez)  Surgery  [address] [facility], [address]  [place] [address]  Phone: [phone]  Fax: [phone]  Follow Up Time: 2 weeks

## 2019-08-08 NOTE — DISCHARGE NOTE PROVIDER - HOSPITAL COURSE
ESTRELLA GARCIA is a 68y Male who was admitted to [facility] for Intestinal obstruction on 8/2/19. He underwent primary repair of ventral hernia without mesh on hospital day 1. The procedure was well-tolerated and he was discharged from the PACU to the surgical floor. Pt endorsed right eye pain during his post-op check. He was treated with one day of erythromycin ointment and analgesic eye drops. On hospital day 2, his stephen was removed and he voided spontaneously. His NG tube was noted to have no output and was interrogated with irrigation and imaging. It was found to be kinked within the stomach. The kink remained despite manipulation. It remained in place due to exquisite pt discomfort on manipulation. Additionally, pt denied nausea despite the fact it was not draining. He endorsed flatus. Small bowel series completed overnight showed migration of contrast from stomach to colon over several hours. On hospital day 3, his NGT was removed and he was started on a clear liquid diet. Pt was evaluated by ophthalmology and diagnosed with a corneal abrasion to the right eye. On hospital day 4, his diet was advanced to low residual diet. He was noted to be short of breath. IV PCA was discontinued. Chest x-ray was ordered and was negative for acute pulmonary process. Shortness of breath resolved with supplemental oxygen at 2 liters by nasal cannula. That evening, he dropped his oxygen tank on his right foot while in the bathroom. His right great toe suffered a nondisplaced fracture and was casted by podiatry service. Clonazepam was initiated for persistent anxiety with good effect. On hospital day 6 he was deemed ready for discharge to in-patient rehab facility. At time of discharge, pt was tolerating a low fiber diet, voiding/stooling spontaneously, ambulating, and pain was well-controlled. Patient and family felt ready for discharge. ESTRELLA GARCIA is a 68y Male who was admitted to [facility] for Intestinal obstruction on 8/2/19. He underwent primary repair of ventral hernia without mesh on hospital day 1. The procedure was well-tolerated and he was discharged from the PACU to the surgical floor. Pt endorsed right eye pain during his post-op check. He was treated with one day of erythromycin ointment and analgesic eye drops. On hospital day 2, his stephen was removed and he voided spontaneously. His NG tube was noted to have no output and was interrogated with irrigation and imaging. It was found to be kinked within the stomach. The kink remained despite manipulation. It remained in place due to exquisite pt discomfort on manipulation. Additionally, pt denied nausea despite the fact it was not draining. He endorsed flatus. Small bowel series completed overnight showed migration of contrast from stomach to colon over several hours. On hospital day 3, his NGT was removed and he was started on a clear liquid diet. Pt was evaluated by ophthalmology and diagnosed with a corneal abrasion to the right eye. On hospital day 4, his diet was advanced to low residual diet. He was noted to be short of breath. IV PCA was discontinued. Chest x-ray was ordered and was negative for acute pulmonary process. Shortness of breath resolved with supplemental oxygen at 2 liters by nasal cannula. That evening, he dropped his oxygen tank on his right foot while in the bathroom. His right great toe suffered a nondisplaced fracture and was casted by podiatry service. Clonazepam was initiated for persistent anxiety with good effect. On hospital day 6 he was deemed ready for discharge to in-patient rehab facility. Insurance initially rejected rehab but after appeal patient apporved for [facility]. At time of discharge, pt was tolerating a low fiber diet, voiding/stooling spontaneously, ambulating, and pain was well-controlled. Patient and family felt ready for discharge.

## 2019-08-08 NOTE — DISCHARGE NOTE PROVIDER - NSDCCPCAREPLAN_GEN_ALL_CORE_FT
PRINCIPAL DISCHARGE DIAGNOSIS  Diagnosis: S/P repair of ventral hernia  Assessment and Plan of Treatment: follow up as instructed      SECONDARY DISCHARGE DIAGNOSES  Diagnosis: Corneal abrasion  Assessment and Plan of Treatment: Apply eye drops as prescribed and follow up as instructed. PRINCIPAL DISCHARGE DIAGNOSIS  Diagnosis: S/P repair of ventral hernia  Assessment and Plan of Treatment: diet as tolerated   You are being discharged with LICHA drain. Empty and record daily as taught. Please bring record of output to your follow up appointment. Notify  if drain color changes to green or brown.  follow up with Dr. Rodríguez in 1 week   notify Dr. Rodríguez if develop fever, chills, worsening abdominal pain         SECONDARY DISCHARGE DIAGNOSES  Diagnosis: History of foot fracture  Assessment and Plan of Treatment: Leave foot dressing clean, dry, and intact until follow up  -Follow up with Dr. Diamante Connolly within 1 week of discharge. Call [phone] ([place]) or [phone] ([place]) to make an appointment.  -Weight bearing as tolerated in surgical shoe, with weight to the heel      Diagnosis: Corneal abrasion  Assessment and Plan of Treatment: Apply eye drops as prescribed and follow up as instructed. PRINCIPAL DISCHARGE DIAGNOSIS  Diagnosis: S/P repair of ventral hernia  Assessment and Plan of Treatment: diet as tolerated   You are being discharged with LICHA drain. Empty and record daily as taught. Please bring record of output to your follow up appointment. Notify  if drain color changes to green or brown.  follow up with Dr. Rodríguez in 1 week   notify Dr. Rodríguez if develop fever, chills, worsening abdominal pain         SECONDARY DISCHARGE DIAGNOSES  Diagnosis: History of foot fracture  Assessment and Plan of Treatment: Leave foot dressing clean, dry, and intact until follow up  -Follow up with Dr. Diamante Connolly within 1 week of discharge. Call [phone] ([place]) or [phone] ([place]) to make an appointment.  -Weight bearing as tolerated in surgical shoe, with weight to the heel      Diagnosis: Corneal abrasion  Assessment and Plan of Treatment: Apply eye drops as prescribed and follow up as instructed.  Erythromycin ointment and Lacrilube alternate every 2 hours both eyes   - patient needs outpatient follow-up for resolution- pt will need follow up appt at [address] [place] [address]. please call [phone] to schedule an appointment in 1 week

## 2019-08-08 NOTE — PROGRESS NOTE ADULT - ASSESSMENT
67yo Male POD 5 s/p ex lap with primary repair of ventral hernia.     -c/w LRD  -c/w home medications  -c/w PO pain medications   - monitor bowel function  - Right LICHA drain d/c'ed   - appreciate podiatry recs  - awaiting authorization for [facility], otherwise ready for discharge    [facility]  [id number]

## 2019-08-08 NOTE — DISCHARGE NOTE PROVIDER - NSFOLLOWUPCLINICS_GEN_ALL_ED_FT
[facility]  Ophthalmology  [address] [place] [address] [address]  [place] [address]  Phone: [phone]  Fax:   Follow Up Time: 1-3 Days

## 2019-08-08 NOTE — PROGRESS NOTE ADULT - SUBJECTIVE AND OBJECTIVE BOX
Podiatry pager #: [phone] ([place])/ [phone] ([facility])    Patient is a 68y old  Male who presents with a chief complaint of Abdominal pain (07 Aug 2019 16:25)      HPI:  68M with one day of abdominal pain, started yesterday a/w decreased GI function (minimal flatus), decreased appetite (did have some chicken last night for dinner), and nausea. States he had intense abdominal pain relieved by the medications he received while in the ED. Currently denies HA, CP, SOB, N/V/F/C.    Remote history of appendectomy and right inguinal hernia repair  LAR 6/12/17 with diverting loop ileostomy for rectosigmoid CA by Dr. Castillo at [facility]  Reversal of ileostomy 9/18/17    Last colonoscopy 2018 - grossly normal per patient (performed by Dr. Castillo)    Patient is adamantly refusing NGT decompression (states that he had multiple failed attempts last time by attending physician). I explained in depth that this is dangerous and that he is at high risk of aspiration if undergoing general anesthesia. The patient understands and remains defiant in refusing NGT placement. (02 Aug 2019 09:52)      PAST MEDICAL & SURGICAL HISTORY:  Arthritis  Hypercholesterolemia  Stenosis of carotid artery, unspecified laterality  Aortic valve disease  Amaurosis fugax: &quot;every once in awhile my right eye goes out for about a minute&quot;  Sleep apnea  Venous Insufficiency  Microscopic Hematuria  OA (Osteoarthritis)  Obesity  Varicose Vein: B/L lower extremities  Spinal Stenosis  HTN (Hypertension)  Pilonidal Abscess  Kidney Stone  COPD (Chronic Obstructive Pulmonary Disease)  S/P reconstruction procedure: &quot;my left foot I had no arch&quot;-12/2015  H/O cataract removal with insertion of prosthetic lens: right-9/7/2004  H/O umbilical hernia repair: x2-2011, 2016  H/O aortic valve replacement: 2015  S/P colon resection: and umbilical hernia repair-6/12/2017  H/O total knee replacement, right: 2011  History of Lt Total Knee Replacement: 4/2011  S/P Cystoscopy: 2010  History of Nasal Septoplasty: 1979  Skin Tag Excision: Right Leg-2010  S/P Right Inguinal Hernia Repair: 1979  S/P Cataract Surgery: Right - with lens placement  Tibialis Posterior Tendonitis: Repair B/L revision left 2016  Plantar Tendonitis: Repair- B L  Renal Calculi: lithotripsy  I & D of Pilonidal cyst  S/P Cardiac Cath  History of Appendectomy      MEDICATIONS  (STANDING):  ALBUTerol/ipratropium for Nebulization 3 milliLiter(s) Nebulizer every 6 hours  amLODIPine   Tablet 5 milliGRAM(s) Oral daily  artificial tears (preservative free) Ophthalmic Solution 1 Drop(s) Both EYES every 4 hours  clonazePAM  Tablet 0.5 milliGRAM(s) Oral daily  dextrose 5%. 1000 milliLiter(s) (50 mL/Hr) IV Continuous <Continuous>  dextrose 50% Injectable 12.5 Gram(s) IV Push once  dextrose 50% Injectable 25 Gram(s) IV Push once  dextrose 50% Injectable 25 Gram(s) IV Push once  enoxaparin Injectable 40 milliGRAM(s) SubCutaneous daily  erythromycin   Ointment 1 Application(s) Right EYE every 4 hours  insulin lispro (HumaLOG) corrective regimen sliding scale   SubCutaneous three times a day before meals  losartan 100 milliGRAM(s) Oral daily  ofloxacin 0.3% Solution 1 Drop(s) Right EYE four times a day  pantoprazole    Tablet 40 milliGRAM(s) Oral before breakfast  petrolatum Ophthalmic Ointment 1 Application(s) Right EYE every 4 hours  simvastatin 20 milliGRAM(s) Oral at bedtime    MEDICATIONS  (PRN):  acetaminophen   Tablet .. 650 milliGRAM(s) Oral every 6 hours PRN Mild Pain (1 - 3), Moderate Pain (4 - 6), Severe Pain (7 - 10)  dextrose 40% Gel 15 Gram(s) Oral once PRN Blood Glucose LESS THAN 70 milliGRAM(s)/deciliter  glucagon  Injectable 1 milliGRAM(s) IntraMuscular once PRN Glucose LESS THAN 70 milligrams/deciliter      Allergies    No Known Allergies    Intolerances    codeine (Nausea)      VITALS:    Vital Signs Last 24 Hrs  T(C): 36.7 (07 Aug 2019 16:53), Max: 36.9 (07 Aug 2019 09:20)  T(F): 98.1 (07 Aug 2019 16:53), Max: 98.5 (07 Aug 2019 13:47)  HR: 77 (07 Aug 2019 16:53) (67 - 81)  BP: 153/94 (07 Aug 2019 16:53) (149/81 - 161/93)  BP(mean): --  RR: 18 (07 Aug 2019 16:53) (18 - 20)  SpO2: 93% (07 Aug 2019 16:53) (93% - 99%)    LABS:                          12.5   7.91  )-----------( 209      ( 07 Aug 2019 08:36 )             39.7       08-07    140  |  103  |  10  ----------------------------<  115<H>  3.9   |  26  |  0.68    Ca    8.8      07 Aug 2019 06:20  Phos  2.5     08-07  Mg     2.1     08-07        CAPILLARY BLOOD GLUCOSE      POCT Blood Glucose.: 103 mg/dL (07 Aug 2019 17:04)  POCT Blood Glucose.: 116 mg/dL (07 Aug 2019 12:07)  POCT Blood Glucose.: 107 mg/dL (07 Aug 2019 09:56)  POCT Blood Glucose.: 125 mg/dL (06 Aug 2019 21:45)          LOWER EXTREMITY PHYSICAL EXAM:    Vascular: DP/PT 2/4 B/L, CFT <3 seconds B/L, Temperature gradient warm to cool B/L  Neuro: Epicritic sensation intact to the level of toes B/L  Musculoskeletal/Ortho: b/l flatfoot, s/p L flatfoot recon in 2016   Skin: R hallux swollen and ecchymotic w/ blistering at proximal nail fold s/p crush injury (oxygen tank fell on foot), R hallucal nail fungal however subungual hematoma present underneath and loosening of nail from nail bed.      RADIOLOGY & ADDITIONAL STUDIES:    < from: Xray Toes, Right Foot (08.07.19 @ 11:38) >    EXAM:  TOES(S) RIGHT FOOT                            PROCEDURE DATE:  08/07/2019            INTERPRETATION:  CLINICAL INFORMATION: Right big toe trauma    COMPARISON:  Right foot radiographs 12/27/7.    TECHNIQUE:   3 views of the right toes    FINDINGS/  IMPRESSION:     Acute transverse nondisplaced fracture of the distal phalanx of the great   toe. No intra-articular extension of the fracture is seen. Associated   soft tissue swelling. No dislocation. Degenerative changes of the first   tarsometatarsal and first metatarsophalangeal joints with the joint space   narrowing and osteophyte formation.      These findings were discussed with Mrs. GIOVANNY AREVALO at 8/7/2019 12:03 PM   by Dr. Alarcon of Radiology with read back confirmation.                    JAQUI ALARCON M.D., RADIOLOGY RESIDENT  This document has been electronically signed.  BRANDAN SOTO M.D., ATTENDING RADIOLOGIST  This document has been electronically signed. Aug  7 2019 12:49PM                < end of copied text >

## 2019-08-09 RX ORDER — ACETAMINOPHEN 500 MG
1000 TABLET ORAL ONCE
Refills: 0 | Status: COMPLETED | OUTPATIENT
Start: 2019-08-09 | End: 2019-08-09

## 2019-08-09 RX ADMIN — AMLODIPINE BESYLATE 5 MILLIGRAM(S): 2.5 TABLET ORAL at 04:46

## 2019-08-09 RX ADMIN — Medication 1 APPLICATION(S): at 01:47

## 2019-08-09 RX ADMIN — Medication 1 DROP(S): at 17:42

## 2019-08-09 RX ADMIN — Medication 1 DROP(S): at 21:24

## 2019-08-09 RX ADMIN — LOSARTAN POTASSIUM 100 MILLIGRAM(S): 100 TABLET, FILM COATED ORAL at 04:46

## 2019-08-09 RX ADMIN — Medication 1 DROP(S): at 04:47

## 2019-08-09 RX ADMIN — Medication 1 DROP(S): at 13:54

## 2019-08-09 RX ADMIN — Medication 1 APPLICATION(S): at 19:29

## 2019-08-09 RX ADMIN — Medication 1 DROP(S): at 04:46

## 2019-08-09 RX ADMIN — Medication 1 DROP(S): at 01:49

## 2019-08-09 RX ADMIN — Medication 650 MILLIGRAM(S): at 04:45

## 2019-08-09 RX ADMIN — Medication 1 APPLICATION(S): at 17:42

## 2019-08-09 RX ADMIN — Medication 1 APPLICATION(S): at 09:21

## 2019-08-09 RX ADMIN — Medication 400 MILLIGRAM(S): at 22:01

## 2019-08-09 RX ADMIN — Medication 3 MILLILITER(S): at 12:20

## 2019-08-09 RX ADMIN — PANTOPRAZOLE SODIUM 40 MILLIGRAM(S): 20 TABLET, DELAYED RELEASE ORAL at 04:46

## 2019-08-09 RX ADMIN — Medication 1 APPLICATION(S): at 09:19

## 2019-08-09 RX ADMIN — Medication 0.5 MILLIGRAM(S): at 12:29

## 2019-08-09 RX ADMIN — Medication 1 DROP(S): at 09:18

## 2019-08-09 RX ADMIN — Medication 1 DROP(S): at 12:21

## 2019-08-09 RX ADMIN — Medication 3 MILLILITER(S): at 17:41

## 2019-08-09 RX ADMIN — Medication 1 APPLICATION(S): at 21:24

## 2019-08-09 RX ADMIN — Medication 1 APPLICATION(S): at 04:47

## 2019-08-09 RX ADMIN — Medication 650 MILLIGRAM(S): at 21:24

## 2019-08-09 RX ADMIN — Medication 650 MILLIGRAM(S): at 22:03

## 2019-08-09 RX ADMIN — Medication 1 DROP(S): at 01:47

## 2019-08-09 RX ADMIN — Medication 1 APPLICATION(S): at 12:21

## 2019-08-09 RX ADMIN — Medication 1 APPLICATION(S): at 04:46

## 2019-08-09 RX ADMIN — ENOXAPARIN SODIUM 40 MILLIGRAM(S): 100 INJECTION SUBCUTANEOUS at 12:20

## 2019-08-09 RX ADMIN — Medication 650 MILLIGRAM(S): at 05:20

## 2019-08-09 RX ADMIN — Medication 1 APPLICATION(S): at 13:56

## 2019-08-09 RX ADMIN — SIMVASTATIN 20 MILLIGRAM(S): 20 TABLET, FILM COATED ORAL at 21:23

## 2019-08-09 RX ADMIN — Medication 1 APPLICATION(S): at 17:43

## 2019-08-09 NOTE — PROGRESS NOTE ADULT - SUBJECTIVE AND OBJECTIVE BOX
[facility] NUTRITION SUPPORT--  Attending/ PA FOLLOW UP NOTE      24 hour events/subjective: Reports flatus and BM. Denies nausea nor vomiting nor abdominal pain. Tolerating diet, Reports toe feeling better        PAST HISTORY  --------------------------------------------------------------------------------  No significant changes to PMH, PSH, FHx, SHx, unless otherwise noted    ALLERGIES & MEDICATIONS  --------------------------------------------------------------------------------  Allergies    No Known Allergies    Intolerances    codeine (Nausea)    Standing Inpatient Medications  ALBUTerol/ipratropium for Nebulization 3 milliLiter(s) Nebulizer every 6 hours  amLODIPine   Tablet 5 milliGRAM(s) Oral daily  artificial tears (preservative free) Ophthalmic Solution 1 Drop(s) Both EYES every 4 hours  clonazePAM  Tablet 0.5 milliGRAM(s) Oral daily  dextrose 5%. 1000 milliLiter(s) IV Continuous <Continuous>  dextrose 50% Injectable 12.5 Gram(s) IV Push once  dextrose 50% Injectable 25 Gram(s) IV Push once  dextrose 50% Injectable 25 Gram(s) IV Push once  enoxaparin Injectable 40 milliGRAM(s) SubCutaneous daily  erythromycin   Ointment 1 Application(s) Right EYE every 4 hours  insulin lispro (HumaLOG) corrective regimen sliding scale   SubCutaneous three times a day before meals  losartan 100 milliGRAM(s) Oral daily  ofloxacin 0.3% Solution 1 Drop(s) Right EYE four times a day  pantoprazole    Tablet 40 milliGRAM(s) Oral before breakfast  petrolatum Ophthalmic Ointment 1 Application(s) Right EYE every 4 hours  simvastatin 20 milliGRAM(s) Oral at bedtime    PRN Inpatient Medications  acetaminophen   Tablet .. 650 milliGRAM(s) Oral every 6 hours PRN  dextrose 40% Gel 15 Gram(s) Oral once PRN  glucagon  Injectable 1 milliGRAM(s) IntraMuscular once PRN      REVIEW OF SYSTEMS  --------------------------------------------------------------------------------  Gen: as per HPI  Skin: No rashes  Head/Eyes/Ears/Mouth: No headache;No sore throat  Respiratory: No dyspnea, cough,   CV: No chest pain, PND, orthopnea  GI: as per HPI  : No increased frequency, dysuria, hematuria, nocturia  MSK: No joint pain/swelling; no back pain; no edema  Neuro: No dizziness/lightheadedness, weakness, seizures, numbness, tingling  Psych: No significant nervousness, anxiety, stress, depression    All other systems were reviewed and are negative, except as noted.      LABS/STUDIES  --------------------------------------------------------------------------------              12.0   7.63  >-----------<  196      [08-08-19 @ 09:28]              38.1     140  |  100  |  14  ----------------------------<  108      [08-08-19 @ 07:34]  3.9   |  26  |  0.82        Ca     8.7     [08-08-19 @ 07:34]      Mg     1.9     [08-08-19 @ 07:34]      Phos  3.3     [08-08-19 @ 07:34]              08-08-19 @ 07:01  -  08-09-19 @ 07:00  --------------------------------------------------------  IN: 800 mL / OUT: 938 mL / NET: -138 mL        VITALS/PHYSICAL EXAM  --------------------------------------------------------------------------------  T(C): 36.6 (08-09-19 @ 05:17), Max: 37.2 (08-08-19 @ 12:49)  HR: 78 (08-09-19 @ 07:06) (72 - 99)  BP: 158/89 (08-09-19 @ 05:17) (126/78 - 161/89)  RR: 18 (08-09-19 @ 05:17) (18 - 18)  SpO2: 93% (08-09-19 @ 07:06) (93% - 99%)  Wt(kg): --    Physical Exam:      Gen: NAD  Respiratory: non labored breathing on 2L nasal canula     Abdominal: soft, obese, midline incision dressing c/d/i, L LICHA's with serosanguineous output, surgical aqualcel dressing taken down, incision c/d/i. R LICHA SOI dressing changed (R LICHA removed yeterday)  Ext: R foot wrapped in ACE

## 2019-08-09 NOTE — PROGRESS NOTE ADULT - SUBJECTIVE AND OBJECTIVE BOX
S: Pt seen and examined at bedside. Reports improvement of symptoms, denies pain but has some foreign body sensation OS    AAO x 3, mood and affect appropriate    Ophthalmology Exam:  Visual acuity (sc): 20/40 OU  Pupils: PERRL OU, no APD  Extraocular movements (EOMs): Full OU, no pain, no diplopia    Pen Light Exam (PLE)  External: Flat OU  Lids/Lashes/Lacrimal Ducts: Flat OU    Sclera/Conjunctiva: W+Q OU  Cornea: No infiltrate OU, 1-2+ PEE OS  Anterior Chamber: D+F OU    Iris: Flat OU  Lens: PCIOL OD, NS OS      Assessment/Plan  Post op corneal abrasion OD, moderate to severe dry eye OD>OS.  One filaments today OD and corneal abrasion resolved  - can stop Ocuflox QID OS  - Erythromycin ointment and Lacrilube alternate q2h OS  - patient needs outpatient follow-up for resolution- discussed with primary team that pt will need follow up appt at [address] [place] [address].  They will help arrange from rehab.  - findings and plan discussed with patient and primary team    D/W Dr. Diego (attending)

## 2019-08-09 NOTE — PROGRESS NOTE ADULT - ASSESSMENT
69yo Male POD 7 s/p ex lap with primary repair of ventral hernia.     -c/w LRD  -c/w home medications  -c/w PO pain medications   - monitor bowel function  - Right LICHA drain d/c'ed yesterday  - appreciate podiatry recs  - awaiting authorization for INGRID, otherwise ready for discharge    Red Surgery  [id number]

## 2019-08-10 RX ORDER — ACETAMINOPHEN 500 MG
1000 TABLET ORAL ONCE
Refills: 0 | Status: COMPLETED | OUTPATIENT
Start: 2019-08-10 | End: 2019-08-11

## 2019-08-10 RX ADMIN — Medication 1 APPLICATION(S): at 02:44

## 2019-08-10 RX ADMIN — Medication 1 DROP(S): at 05:14

## 2019-08-10 RX ADMIN — Medication 1 DROP(S): at 17:52

## 2019-08-10 RX ADMIN — Medication 1 DROP(S): at 22:20

## 2019-08-10 RX ADMIN — Medication 1 APPLICATION(S): at 09:29

## 2019-08-10 RX ADMIN — Medication 1 APPLICATION(S): at 19:32

## 2019-08-10 RX ADMIN — Medication 1000 MILLIGRAM(S): at 02:20

## 2019-08-10 RX ADMIN — LOSARTAN POTASSIUM 100 MILLIGRAM(S): 100 TABLET, FILM COATED ORAL at 05:15

## 2019-08-10 RX ADMIN — Medication 1 DROP(S): at 14:39

## 2019-08-10 RX ADMIN — Medication 1 DROP(S): at 02:44

## 2019-08-10 RX ADMIN — SIMVASTATIN 20 MILLIGRAM(S): 20 TABLET, FILM COATED ORAL at 22:20

## 2019-08-10 RX ADMIN — Medication 1 APPLICATION(S): at 13:20

## 2019-08-10 RX ADMIN — ENOXAPARIN SODIUM 40 MILLIGRAM(S): 100 INJECTION SUBCUTANEOUS at 11:43

## 2019-08-10 RX ADMIN — Medication 1 APPLICATION(S): at 17:50

## 2019-08-10 RX ADMIN — Medication 1 APPLICATION(S): at 09:30

## 2019-08-10 RX ADMIN — Medication 0.5 MILLIGRAM(S): at 11:42

## 2019-08-10 RX ADMIN — Medication 1 DROP(S): at 01:41

## 2019-08-10 RX ADMIN — PANTOPRAZOLE SODIUM 40 MILLIGRAM(S): 20 TABLET, DELAYED RELEASE ORAL at 05:14

## 2019-08-10 RX ADMIN — AMLODIPINE BESYLATE 5 MILLIGRAM(S): 2.5 TABLET ORAL at 05:15

## 2019-08-10 RX ADMIN — Medication 1 APPLICATION(S): at 05:00

## 2019-08-10 RX ADMIN — Medication 1 DROP(S): at 17:51

## 2019-08-10 RX ADMIN — Medication 3 MILLILITER(S): at 05:15

## 2019-08-10 RX ADMIN — Medication 3 MILLILITER(S): at 01:42

## 2019-08-10 RX ADMIN — Medication 1 DROP(S): at 09:29

## 2019-08-10 RX ADMIN — Medication 3 MILLILITER(S): at 17:51

## 2019-08-10 RX ADMIN — Medication 1 APPLICATION(S): at 01:41

## 2019-08-10 RX ADMIN — Medication 1 APPLICATION(S): at 17:52

## 2019-08-10 RX ADMIN — Medication 1 APPLICATION(S): at 11:44

## 2019-08-10 RX ADMIN — Medication 1 APPLICATION(S): at 05:15

## 2019-08-10 RX ADMIN — Medication 1 DROP(S): at 11:43

## 2019-08-10 RX ADMIN — Medication 1 APPLICATION(S): at 22:20

## 2019-08-10 RX ADMIN — Medication 3 MILLILITER(S): at 11:43

## 2019-08-10 NOTE — PROGRESS NOTE ADULT - ASSESSMENT
67yo Male POD 8 s/p ex lap with primary repair of ventral hernia. Awaiting transfer to rehab facility, pending appeal of initial pre-auth denial.    - continue with current diet given Hgb A1c of 6.6  - awaiting call from rehab facility on Monday      Red Surgery  [id number]

## 2019-08-10 NOTE — PROGRESS NOTE ADULT - SUBJECTIVE AND OBJECTIVE BOX
Surgery Red Team Daily Progress Note     67yo Male    --------------------------------------------------------------------------------------------------------------------  SUBJECTIVE / 24H EVENTS  - Patient seen and examined on morning rounds.   - No acute events overnight.  - Pt frustrated with diabetic diet, says he is not diabetic and does not use insulin  - Pt also expresses frustration with being denied transfer to rehab facility. Says he has been there before but had to appeal his initial denial then as well.    OBJECTIVE:    VITAL SIGNS:  T(C): 36.7 (08-10-19 @ 09:11), Max: 37.1 (08-10-19 @ 00:33)  HR: 70 (08-10-19 @ 09:11) (70 - 84)  BP: 148/92 (08-10-19 @ 09:11) (123/73 - 156/94)  RR: 18 (08-10-19 @ 09:11) (18 - 18)  SpO2: 95% (08-10-19 @ 09:11) (92% - 97%)  Daily     Daily   POCT Blood Glucose.: 124 mg/dL (08-10-19 @ 09:23)  POCT Blood Glucose.: 121 mg/dL (08-09-19 @ 21:08)  POCT Blood Glucose.: 111 mg/dL (08-09-19 @ 18:13)      PHYSICAL EXAM:  Gen: NAD  LS: Respirations unlabored. O2 sats at 89% on 2L NC  GI: Obese abdomen. Soft. Nontender. Nondistended. Incisions c/d/i. LICHA with sanguinous output  Ext: Warm, well perfused      08-09-19 @ 07:01  -  08-10-19 @ 07:00  --------------------------------------------------------  IN:    Oral Fluid: 560 mL    Solution: 100 mL  Total IN: 660 mL    OUT:    Bulb: 35 mL    Voided: 1400 mL  Total OUT: 1435 mL    Total NET: -775 mL      08-10-19 @ 07:01  -  08-10-19 @ 09:53  --------------------------------------------------------  IN:    Oral Fluid: 240 mL  Total IN: 240 mL    OUT:    Bulb: 3 mL    Voided: 200 mL  Total OUT: 203 mL    Total NET: 37 mL          LAB VALUES:                             MICROBIOLOGY:      RADIOLOGY:        MEDICATIONS  (STANDING):  ALBUTerol/ipratropium for Nebulization 3 milliLiter(s) Nebulizer every 6 hours  amLODIPine   Tablet 5 milliGRAM(s) Oral daily  artificial tears (preservative free) Ophthalmic Solution 1 Drop(s) Both EYES every 4 hours  clonazePAM  Tablet 0.5 milliGRAM(s) Oral daily  dextrose 5%. 1000 milliLiter(s) (50 mL/Hr) IV Continuous <Continuous>  dextrose 50% Injectable 12.5 Gram(s) IV Push once  dextrose 50% Injectable 25 Gram(s) IV Push once  dextrose 50% Injectable 25 Gram(s) IV Push once  enoxaparin Injectable 40 milliGRAM(s) SubCutaneous daily  erythromycin   Ointment 1 Application(s) Right EYE every 4 hours  insulin lispro (HumaLOG) corrective regimen sliding scale   SubCutaneous three times a day before meals  losartan 100 milliGRAM(s) Oral daily  ofloxacin 0.3% Solution 1 Drop(s) Right EYE four times a day  pantoprazole    Tablet 40 milliGRAM(s) Oral before breakfast  petrolatum Ophthalmic Ointment 1 Application(s) Right EYE every 4 hours  simvastatin 20 milliGRAM(s) Oral at bedtime    MEDICATIONS  (PRN):  acetaminophen   Tablet .. 650 milliGRAM(s) Oral every 6 hours PRN Mild Pain (1 - 3), Moderate Pain (4 - 6), Severe Pain (7 - 10)  dextrose 40% Gel 15 Gram(s) Oral once PRN Blood Glucose LESS THAN 70 milliGRAM(s)/deciliter  glucagon  Injectable 1 milliGRAM(s) IntraMuscular once PRN Glucose LESS THAN 70 milligrams/deciliter

## 2019-08-11 RX ADMIN — Medication 1000 MILLIGRAM(S): at 01:20

## 2019-08-11 RX ADMIN — Medication 0.5 MILLIGRAM(S): at 13:13

## 2019-08-11 RX ADMIN — Medication 3 MILLILITER(S): at 00:51

## 2019-08-11 RX ADMIN — AMLODIPINE BESYLATE 5 MILLIGRAM(S): 2.5 TABLET ORAL at 05:25

## 2019-08-11 RX ADMIN — Medication 1 APPLICATION(S): at 18:26

## 2019-08-11 RX ADMIN — Medication 1 APPLICATION(S): at 23:13

## 2019-08-11 RX ADMIN — Medication 1 APPLICATION(S): at 00:52

## 2019-08-11 RX ADMIN — Medication 1 APPLICATION(S): at 13:07

## 2019-08-11 RX ADMIN — PANTOPRAZOLE SODIUM 40 MILLIGRAM(S): 20 TABLET, DELAYED RELEASE ORAL at 05:25

## 2019-08-11 RX ADMIN — Medication 1 DROP(S): at 23:13

## 2019-08-11 RX ADMIN — Medication 1 DROP(S): at 18:26

## 2019-08-11 RX ADMIN — SIMVASTATIN 20 MILLIGRAM(S): 20 TABLET, FILM COATED ORAL at 21:24

## 2019-08-11 RX ADMIN — Medication 1 DROP(S): at 13:10

## 2019-08-11 RX ADMIN — Medication 3 MILLILITER(S): at 05:25

## 2019-08-11 RX ADMIN — Medication 1 DROP(S): at 10:09

## 2019-08-11 RX ADMIN — Medication 1 DROP(S): at 18:25

## 2019-08-11 RX ADMIN — Medication 3 MILLILITER(S): at 18:23

## 2019-08-11 RX ADMIN — Medication 1 DROP(S): at 05:26

## 2019-08-11 RX ADMIN — Medication 3 MILLILITER(S): at 23:13

## 2019-08-11 RX ADMIN — Medication 400 MILLIGRAM(S): at 00:51

## 2019-08-11 RX ADMIN — Medication 1 DROP(S): at 15:24

## 2019-08-11 RX ADMIN — LOSARTAN POTASSIUM 100 MILLIGRAM(S): 100 TABLET, FILM COATED ORAL at 05:25

## 2019-08-11 RX ADMIN — Medication 3 MILLILITER(S): at 13:04

## 2019-08-11 RX ADMIN — Medication 1 APPLICATION(S): at 18:27

## 2019-08-11 RX ADMIN — ENOXAPARIN SODIUM 40 MILLIGRAM(S): 100 INJECTION SUBCUTANEOUS at 13:05

## 2019-08-11 RX ADMIN — Medication 1 APPLICATION(S): at 21:25

## 2019-08-11 RX ADMIN — Medication 1 APPLICATION(S): at 13:06

## 2019-08-11 RX ADMIN — Medication 1 APPLICATION(S): at 19:41

## 2019-08-11 RX ADMIN — Medication 1 APPLICATION(S): at 13:04

## 2019-08-11 RX ADMIN — Medication 1 APPLICATION(S): at 05:25

## 2019-08-11 RX ADMIN — Medication 1 DROP(S): at 00:52

## 2019-08-11 RX ADMIN — Medication 1 APPLICATION(S): at 03:39

## 2019-08-11 RX ADMIN — Medication 1 DROP(S): at 21:25

## 2019-08-11 NOTE — PROGRESS NOTE ADULT - ASSESSMENT
69yo Male POD 9 s/p ex lap with primary repair of ventral hernia. Awaiting transfer to rehab facility, pending appeal of initial pre-auth denial.    - continue with current diet  - awaiting call from rehab facility on Monday      Red Surgery  [id number]

## 2019-08-11 NOTE — PROGRESS NOTE ADULT - SUBJECTIVE AND OBJECTIVE BOX
Surgery Red Team Daily Progress Note     67yo Male    --------------------------------------------------------------------------------------------------------------------  SUBJECTIVE / 24H EVENTS  - Patient seen and examined on morning rounds.   - No acute events overnight.  - Pt again expressing frustration with preauth process. States that he would like to stay here for rehab    OBJECTIVE:    Vital Signs Last 24 Hrs  T(C): 36.7 (11 Aug 2019 05:15), Max: 37.1 (10 Aug 2019 14:15)  T(F): 98 (11 Aug 2019 05:15), Max: 98.7 (10 Aug 2019 14:15)  HR: 73 (11 Aug 2019 05:15) (70 - 79)  BP: 154/78 (11 Aug 2019 05:15) (117/80 - 162/91)  BP(mean): --  RR: 18 (11 Aug 2019 05:15) (18 - 19)  SpO2: 94% (11 Aug 2019 05:15) (92% - 95%)      PHYSICAL EXAM:  Gen: NAD  LS: Respirations unlabored.  GI: Obese abdomen. Soft. Nontender. Nondistended. Incisions c/d/i. LICHA with sanguinous output  Ext: Warm, well perfused, Right foot wrapped with large toe in splint, dressings c/d/i      I&O's Detail    10 Aug 2019 07:01  -  11 Aug 2019 07:00  --------------------------------------------------------  IN:    Oral Fluid: 480 mL    Solution: 100 mL  Total IN: 580 mL    OUT:    Bulb: 3 mL    Voided: 550 mL  Total OUT: 553 mL    Total NET: 27 mL                LAB VALUES:                             MICROBIOLOGY:      RADIOLOGY:        MEDICATIONS  (STANDING):  ALBUTerol/ipratropium for Nebulization 3 milliLiter(s) Nebulizer every 6 hours  amLODIPine   Tablet 5 milliGRAM(s) Oral daily  artificial tears (preservative free) Ophthalmic Solution 1 Drop(s) Both EYES every 4 hours  clonazePAM  Tablet 0.5 milliGRAM(s) Oral daily  dextrose 5%. 1000 milliLiter(s) (50 mL/Hr) IV Continuous <Continuous>  dextrose 50% Injectable 12.5 Gram(s) IV Push once  dextrose 50% Injectable 25 Gram(s) IV Push once  dextrose 50% Injectable 25 Gram(s) IV Push once  enoxaparin Injectable 40 milliGRAM(s) SubCutaneous daily  erythromycin   Ointment 1 Application(s) Right EYE every 4 hours  insulin lispro (HumaLOG) corrective regimen sliding scale   SubCutaneous three times a day before meals  losartan 100 milliGRAM(s) Oral daily  ofloxacin 0.3% Solution 1 Drop(s) Right EYE four times a day  pantoprazole    Tablet 40 milliGRAM(s) Oral before breakfast  petrolatum Ophthalmic Ointment 1 Application(s) Right EYE every 4 hours  simvastatin 20 milliGRAM(s) Oral at bedtime    MEDICATIONS  (PRN):  acetaminophen   Tablet .. 650 milliGRAM(s) Oral every 6 hours PRN Mild Pain (1 - 3), Moderate Pain (4 - 6), Severe Pain (7 - 10)  dextrose 40% Gel 15 Gram(s) Oral once PRN Blood Glucose LESS THAN 70 milliGRAM(s)/deciliter  glucagon  Injectable 1 milliGRAM(s) IntraMuscular once PRN Glucose LESS THAN 70 milligrams/deciliter

## 2019-08-12 ENCOUNTER — TRANSCRIPTION ENCOUNTER (OUTPATIENT)
Age: 69
End: 2019-08-12

## 2019-08-12 VITALS
HEART RATE: 81 BPM | SYSTOLIC BLOOD PRESSURE: 121 MMHG | DIASTOLIC BLOOD PRESSURE: 76 MMHG | RESPIRATION RATE: 18 BRPM | OXYGEN SATURATION: 93 % | TEMPERATURE: 98 F

## 2019-08-12 PROCEDURE — 85610 PROTHROMBIN TIME: CPT

## 2019-08-12 PROCEDURE — 83605 ASSAY OF LACTIC ACID: CPT

## 2019-08-12 PROCEDURE — 83690 ASSAY OF LIPASE: CPT

## 2019-08-12 PROCEDURE — 88302 TISSUE EXAM BY PATHOLOGIST: CPT

## 2019-08-12 PROCEDURE — 83735 ASSAY OF MAGNESIUM: CPT

## 2019-08-12 PROCEDURE — 96376 TX/PRO/DX INJ SAME DRUG ADON: CPT

## 2019-08-12 PROCEDURE — 82947 ASSAY GLUCOSE BLOOD QUANT: CPT

## 2019-08-12 PROCEDURE — 84132 ASSAY OF SERUM POTASSIUM: CPT

## 2019-08-12 PROCEDURE — 82803 BLOOD GASES ANY COMBINATION: CPT

## 2019-08-12 PROCEDURE — 84295 ASSAY OF SERUM SODIUM: CPT

## 2019-08-12 PROCEDURE — 82962 GLUCOSE BLOOD TEST: CPT

## 2019-08-12 PROCEDURE — 94640 AIRWAY INHALATION TREATMENT: CPT

## 2019-08-12 PROCEDURE — 86850 RBC ANTIBODY SCREEN: CPT

## 2019-08-12 PROCEDURE — 80048 BASIC METABOLIC PNL TOTAL CA: CPT

## 2019-08-12 PROCEDURE — 85730 THROMBOPLASTIN TIME PARTIAL: CPT

## 2019-08-12 PROCEDURE — 86901 BLOOD TYPING SEROLOGIC RH(D): CPT

## 2019-08-12 PROCEDURE — 94660 CPAP INITIATION&MGMT: CPT

## 2019-08-12 PROCEDURE — 86900 BLOOD TYPING SEROLOGIC ABO: CPT

## 2019-08-12 PROCEDURE — 86803 HEPATITIS C AB TEST: CPT

## 2019-08-12 PROCEDURE — P9041: CPT

## 2019-08-12 PROCEDURE — 85014 HEMATOCRIT: CPT

## 2019-08-12 PROCEDURE — 74177 CT ABD & PELVIS W/CONTRAST: CPT

## 2019-08-12 PROCEDURE — 80053 COMPREHEN METABOLIC PANEL: CPT

## 2019-08-12 PROCEDURE — 85027 COMPLETE CBC AUTOMATED: CPT

## 2019-08-12 PROCEDURE — 97110 THERAPEUTIC EXERCISES: CPT

## 2019-08-12 PROCEDURE — 82330 ASSAY OF CALCIUM: CPT

## 2019-08-12 PROCEDURE — 96374 THER/PROPH/DIAG INJ IV PUSH: CPT | Mod: XU

## 2019-08-12 PROCEDURE — 71045 X-RAY EXAM CHEST 1 VIEW: CPT

## 2019-08-12 PROCEDURE — 93005 ELECTROCARDIOGRAM TRACING: CPT

## 2019-08-12 PROCEDURE — 97162 PT EVAL MOD COMPLEX 30 MIN: CPT

## 2019-08-12 PROCEDURE — 83036 HEMOGLOBIN GLYCOSYLATED A1C: CPT

## 2019-08-12 PROCEDURE — 73660 X-RAY EXAM OF TOE(S): CPT

## 2019-08-12 PROCEDURE — 82565 ASSAY OF CREATININE: CPT

## 2019-08-12 PROCEDURE — 74250 X-RAY XM SM INT 1CNTRST STD: CPT

## 2019-08-12 PROCEDURE — 96375 TX/PRO/DX INJ NEW DRUG ADDON: CPT

## 2019-08-12 PROCEDURE — 99285 EMERGENCY DEPT VISIT HI MDM: CPT | Mod: 25

## 2019-08-12 PROCEDURE — 84100 ASSAY OF PHOSPHORUS: CPT

## 2019-08-12 PROCEDURE — 82435 ASSAY OF BLOOD CHLORIDE: CPT

## 2019-08-12 PROCEDURE — 97116 GAIT TRAINING THERAPY: CPT

## 2019-08-12 RX ADMIN — Medication 1 DROP(S): at 18:30

## 2019-08-12 RX ADMIN — Medication 1 DROP(S): at 06:14

## 2019-08-12 RX ADMIN — Medication 1 DROP(S): at 13:11

## 2019-08-12 RX ADMIN — Medication 1 APPLICATION(S): at 11:30

## 2019-08-12 RX ADMIN — Medication 1 APPLICATION(S): at 11:31

## 2019-08-12 RX ADMIN — Medication 1 APPLICATION(S): at 08:29

## 2019-08-12 RX ADMIN — Medication 1 DROP(S): at 11:29

## 2019-08-12 RX ADMIN — LOSARTAN POTASSIUM 100 MILLIGRAM(S): 100 TABLET, FILM COATED ORAL at 06:14

## 2019-08-12 RX ADMIN — Medication 3 MILLILITER(S): at 11:34

## 2019-08-12 RX ADMIN — AMLODIPINE BESYLATE 5 MILLIGRAM(S): 2.5 TABLET ORAL at 06:14

## 2019-08-12 RX ADMIN — Medication 3 MILLILITER(S): at 18:29

## 2019-08-12 RX ADMIN — ENOXAPARIN SODIUM 40 MILLIGRAM(S): 100 INJECTION SUBCUTANEOUS at 11:34

## 2019-08-12 RX ADMIN — Medication 1 APPLICATION(S): at 04:23

## 2019-08-12 RX ADMIN — Medication 1 APPLICATION(S): at 06:14

## 2019-08-12 RX ADMIN — Medication 1 APPLICATION(S): at 13:11

## 2019-08-12 RX ADMIN — Medication 1 APPLICATION(S): at 18:30

## 2019-08-12 RX ADMIN — Medication 3 MILLILITER(S): at 06:13

## 2019-08-12 RX ADMIN — PANTOPRAZOLE SODIUM 40 MILLIGRAM(S): 20 TABLET, DELAYED RELEASE ORAL at 06:14

## 2019-08-12 RX ADMIN — Medication 1 DROP(S): at 11:31

## 2019-08-12 RX ADMIN — Medication 1 DROP(S): at 02:15

## 2019-08-12 RX ADMIN — Medication 1 APPLICATION(S): at 02:15

## 2019-08-12 NOTE — PROGRESS NOTE ADULT - SUBJECTIVE AND OBJECTIVE BOX
Surgery Red Team Daily Progress Note     67yo Male    --------------------------------------------------------------------------------------------------------------------  SUBJECTIVE / 24H EVENTS  - Patient seen and examined on morning rounds.   - No acute events overnight.  - Pt reports feeling much better and ready to leave    OBJECTIVE:    Vital Signs Last 24 Hrs  T(C): 36.7 (12 Aug 2019 05:03), Max: 37.2 (11 Aug 2019 13:58)  T(F): 98 (12 Aug 2019 05:03), Max: 98.9 (11 Aug 2019 13:58)  HR: 70 (12 Aug 2019 05:03) (66 - 72)  BP: 124/76 (12 Aug 2019 05:03) (118/77 - 159/91)  BP(mean): --  RR: 18 (12 Aug 2019 05:03) (18 - 18)  SpO2: 93% (12 Aug 2019 05:03) (92% - 95%)    PHYSICAL EXAM:  Gen: NAD  LS: Respirations unlabored.  GI: Obese abdomen. Soft. Nontender. Nondistended. Incisions c/d/i. LICHA with sanguinous output  Ext: Warm, well perfused, Right foot wrapped with large toe in splint, dressings c/d/i      I&O's Detail    11 Aug 2019 07:01  -  12 Aug 2019 07:00  --------------------------------------------------------  IN:    Oral Fluid: 800 mL  Total IN: 800 mL    OUT:    Bulb: 43 mL    Voided: 1050 mL  Total OUT: 1093 mL    Total NET: -293 mL        LAB VALUES:        MICROBIOLOGY:      RADIOLOGY:        MEDICATIONS  (STANDING):  ALBUTerol/ipratropium for Nebulization 3 milliLiter(s) Nebulizer every 6 hours  amLODIPine   Tablet 5 milliGRAM(s) Oral daily  artificial tears (preservative free) Ophthalmic Solution 1 Drop(s) Both EYES every 4 hours  clonazePAM  Tablet 0.5 milliGRAM(s) Oral daily  dextrose 5%. 1000 milliLiter(s) (50 mL/Hr) IV Continuous <Continuous>  dextrose 50% Injectable 12.5 Gram(s) IV Push once  dextrose 50% Injectable 25 Gram(s) IV Push once  dextrose 50% Injectable 25 Gram(s) IV Push once  enoxaparin Injectable 40 milliGRAM(s) SubCutaneous daily  erythromycin   Ointment 1 Application(s) Right EYE every 4 hours  insulin lispro (HumaLOG) corrective regimen sliding scale   SubCutaneous three times a day before meals  losartan 100 milliGRAM(s) Oral daily  ofloxacin 0.3% Solution 1 Drop(s) Right EYE four times a day  pantoprazole    Tablet 40 milliGRAM(s) Oral before breakfast  petrolatum Ophthalmic Ointment 1 Application(s) Right EYE every 4 hours  simvastatin 20 milliGRAM(s) Oral at bedtime    MEDICATIONS  (PRN):  acetaminophen   Tablet .. 650 milliGRAM(s) Oral every 6 hours PRN Mild Pain (1 - 3), Moderate Pain (4 - 6), Severe Pain (7 - 10)  dextrose 40% Gel 15 Gram(s) Oral once PRN Blood Glucose LESS THAN 70 milliGRAM(s)/deciliter  glucagon  Injectable 1 milliGRAM(s) IntraMuscular once PRN Glucose LESS THAN 70 milligrams/deciliter Surgery Red Team Daily Progress Note       SUBJECTIVE / 24H EVENTS  - Patient seen and examined on morning rounds.   - No acute events overnight.  - Pt reports feeling well this am     OBJECTIVE:    Vital Signs Last 24 Hrs  T(C): 36.7 (12 Aug 2019 05:03), Max: 37.2 (11 Aug 2019 13:58)  T(F): 98 (12 Aug 2019 05:03), Max: 98.9 (11 Aug 2019 13:58)  HR: 70 (12 Aug 2019 05:03) (66 - 72)  BP: 124/76 (12 Aug 2019 05:03) (118/77 - 159/91)  BP(mean): --  RR: 18 (12 Aug 2019 05:03) (18 - 18)  SpO2: 93% (12 Aug 2019 05:03) (92% - 95%)    PHYSICAL EXAM:  Gen: NAD  LS: Respirations unlabored.  GI: Obese abdomen. Soft. Nontender. Nondistended. Incisions c/d/i. LICHA with serous output  Ext: Warm, well perfused, Right foot wrapped with large toe in splint, dressings c/d/i      I&O's Detail    11 Aug 2019 07:01  -  12 Aug 2019 07:00  --------------------------------------------------------  IN:    Oral Fluid: 800 mL  Total IN: 800 mL    OUT:    Bulb: 43 mL    Voided: 1050 mL  Total OUT: 1093 mL    Total NET: -293 mL        MEDICATIONS  (STANDING):  ALBUTerol/ipratropium for Nebulization 3 milliLiter(s) Nebulizer every 6 hours  amLODIPine   Tablet 5 milliGRAM(s) Oral daily  artificial tears (preservative free) Ophthalmic Solution 1 Drop(s) Both EYES every 4 hours  clonazePAM  Tablet 0.5 milliGRAM(s) Oral daily  dextrose 5%. 1000 milliLiter(s) (50 mL/Hr) IV Continuous <Continuous>  dextrose 50% Injectable 12.5 Gram(s) IV Push once  dextrose 50% Injectable 25 Gram(s) IV Push once  dextrose 50% Injectable 25 Gram(s) IV Push once  enoxaparin Injectable 40 milliGRAM(s) SubCutaneous daily  erythromycin   Ointment 1 Application(s) Right EYE every 4 hours  insulin lispro (HumaLOG) corrective regimen sliding scale   SubCutaneous three times a day before meals  losartan 100 milliGRAM(s) Oral daily  ofloxacin 0.3% Solution 1 Drop(s) Right EYE four times a day  pantoprazole    Tablet 40 milliGRAM(s) Oral before breakfast  petrolatum Ophthalmic Ointment 1 Application(s) Right EYE every 4 hours  simvastatin 20 milliGRAM(s) Oral at bedtime    MEDICATIONS  (PRN):  acetaminophen   Tablet .. 650 milliGRAM(s) Oral every 6 hours PRN Mild Pain (1 - 3), Moderate Pain (4 - 6), Severe Pain (7 - 10)  dextrose 40% Gel 15 Gram(s) Oral once PRN Blood Glucose LESS THAN 70 milliGRAM(s)/deciliter  glucagon  Injectable 1 milliGRAM(s) IntraMuscular once PRN Glucose LESS THAN 70 milligrams/deciliter

## 2019-08-12 NOTE — DISCHARGE NOTE NURSING/CASE MANAGEMENT/SOCIAL WORK - NSDCFUADDAPPT_GEN_ALL_CORE_FT
Podiatry: please follow up within 1 week at either location  Dr. Diamante Connolly  [phone] ([place])  [phone] ([place])    Ophthalmology: Please follow up as soon as you can with your ophthalmologist or with [facility] Ophthalmology.

## 2019-08-12 NOTE — PROGRESS NOTE ADULT - ASSESSMENT
69 y/o M w/ R hallux distal phalanx fracture and subungual hematoma 2/2 crush injury  - pt seen and evaluated   -X-ray - nondisplaced transverse fx of hallux distal phalanx  -5d s/p right hallux nail avulsion - no open lesions, fracture closed  -Xeroform placed on nail bed, 4x4 gauze, edenilson. Keep dressing intact until first follow up visit  -Pt does not need abx from podiatry standpoint- no concern for infection   -Cleared to WBAT in sx shoe    Follow up with Dr. Diamante Connolly within 1 week of discharge. Call [phone] ([place]) or [phone] ([place]) to make an appointment.

## 2019-08-12 NOTE — PROGRESS NOTE ADULT - ASSESSMENT
69yo Male POD 10 s/p ex lap with primary repair of ventral hernia. Awaiting transfer to rehab facility, pending appeal of initial pre-auth denial.    - continue with current diet  - awaiting call from rehab facility  - f/u       Red Surgery  [id number] 69yo Male POD 10 s/p ex lap with primary repair of ventral hernia. Awaiting transfer to rehab facility, pending appeal of initial pre-auth denial.    - continue with current diet  - dispo pending insurance auth for rehab   - f/u       Red Surgery  [id number]

## 2019-08-12 NOTE — PROGRESS NOTE ADULT - SUBJECTIVE AND OBJECTIVE BOX
Podiatry pager #: [phone] ([place])/ [phone] ([facility])    Patient is a 68y old  Male who presents with a chief complaint of Abdominal pain (12 Aug 2019 07:43)       INTERVAL HPI/OVERNIGHT EVENTS:  Patient seen and evaluated at bedside.  Pt is resting comfortable in NAD. Denies N/V/F/C.     Allergies    No Known Allergies    Intolerances    codeine (Nausea)      Vital Signs Last 24 Hrs  T(C): 36.9 (12 Aug 2019 14:19), Max: 36.9 (11 Aug 2019 17:14)  T(F): 98.5 (12 Aug 2019 14:19), Max: 98.5 (12 Aug 2019 14:19)  HR: 81 (12 Aug 2019 14:19) (66 - 81)  BP: 121/76 (12 Aug 2019 14:19) (121/76 - 152/86)  BP(mean): --  RR: 18 (12 Aug 2019 14:19) (18 - 18)  SpO2: 93% (12 Aug 2019 14:19) (91% - 95%)    LABS:              CAPILLARY BLOOD GLUCOSE      POCT Blood Glucose.: 134 mg/dL (12 Aug 2019 13:35)  POCT Blood Glucose.: 124 mg/dL (12 Aug 2019 08:33)  POCT Blood Glucose.: 110 mg/dL (11 Aug 2019 18:32)      Lower Extremity Physical Exam:    Vascular: DP/PT 2/4 B/L, CFT <3 seconds B/L, Temperature gradient warm to cool B/L  Neuro: Epicritic sensation intact to the level of toes B/L  Musculoskeletal/Ortho: b/l flatfoot, s/p L flatfoot recon in 2016   Erythema noted around the borders of right hallux, hallux Nail bed wound bed is fibrogranular   RADIOLOGY & ADDITIONAL TESTS:  < from: Xray Toes, Right Foot (08.07.19 @ 11:38) >    EXAM:  TOES(S) RIGHT FOOT                            PROCEDURE DATE:  08/07/2019            INTERPRETATION:  CLINICAL INFORMATION: Right big toe trauma    COMPARISON:  Right foot radiographs 12/27/7.    TECHNIQUE:   3 views of the right toes    FINDINGS/  IMPRESSION:     Acute transverse nondisplaced fracture of the distal phalanx of the great   toe. No intra-articular extension of the fracture is seen. Associated   soft tissue swelling. No dislocation. Degenerative changes of the first   tarsometatarsal and first metatarsophalangeal joints with the joint space   narrowing and osteophyte formation.      These findings were discussed with PA, Mrs. BALTAZAR at 8/7/2019 12:03 PM   by Dr. Kwan of Radiology with read back confirmation.                    JAQUI KWAN M.D., RADIOLOGY RESIDENT  This document has been electronically signed.  BRANDAN SOTO M.D., ATTENDING RADIOLOGIST  This document has been electronically signed. Aug  7 2019 12:49PM

## 2019-08-12 NOTE — PROGRESS NOTE ADULT - REASON FOR ADMISSION
Abdominal pain

## 2019-08-12 NOTE — DISCHARGE NOTE NURSING/CASE MANAGEMENT/SOCIAL WORK - NSDCDPATPORTLINK_GEN_ALL_CORE
You can access the [facility] Patient Portal, offered by [facility], by registering with the following website: [email or web address]

## 2019-08-12 NOTE — PROGRESS NOTE ADULT - PROVIDER SPECIALTY LIST ADULT
Anesthesia
Colorectal Surgery
Ophthalmology
Podiatry
Surgery
Surgery
Podiatry
Colorectal Surgery
Surgery

## 2019-09-17 ENCOUNTER — APPOINTMENT (OUTPATIENT)
Dept: PULMONOLOGY | Facility: CLINIC | Age: 69
End: 2019-09-17
Payer: COMMERCIAL

## 2019-09-17 VITALS
DIASTOLIC BLOOD PRESSURE: 100 MMHG | SYSTOLIC BLOOD PRESSURE: 158 MMHG | RESPIRATION RATE: 16 BRPM | OXYGEN SATURATION: 96 % | HEART RATE: 84 BPM

## 2019-09-17 DIAGNOSIS — R07.89 OTHER CHEST PAIN: ICD-10-CM

## 2019-09-17 PROCEDURE — 99214 OFFICE O/P EST MOD 30 MIN: CPT | Mod: 25

## 2019-09-17 PROCEDURE — 71046 X-RAY EXAM CHEST 2 VIEWS: CPT

## 2019-09-17 PROCEDURE — 94060 EVALUATION OF WHEEZING: CPT

## 2019-09-17 RX ORDER — AZITHROMYCIN 250 MG/1
250 TABLET, FILM COATED ORAL
Qty: 6 | Refills: 1 | Status: DISCONTINUED | COMMUNITY
Start: 2019-06-13 | End: 2019-09-17

## 2019-09-17 RX ORDER — METHYLPREDNISOLONE 4 MG/1
4 TABLET ORAL
Qty: 1 | Refills: 0 | Status: DISCONTINUED | COMMUNITY
Start: 2019-06-14 | End: 2019-09-17

## 2019-09-17 RX ORDER — CYCLOBENZAPRINE HYDROCHLORIDE 10 MG/1
10 TABLET, FILM COATED ORAL 3 TIMES DAILY
Qty: 90 | Refills: 0 | Status: DISCONTINUED | COMMUNITY
Start: 2018-05-07 | End: 2019-09-17

## 2019-09-17 RX ORDER — PREDNISONE 10 MG/1
10 TABLET ORAL
Qty: 18 | Refills: 0 | Status: DISCONTINUED | COMMUNITY
Start: 2018-10-24 | End: 2019-09-17

## 2019-09-17 NOTE — DISCUSSION/SUMMARY
[FreeTextEntry1] : Severe SAMMY needs better compliance. Discussed.\par OAD with increased symptoms and mild decrease in flow rates.\par Restrictive component related to body habitus\par Chest heaviness and SOB. R/o cardiac contribution.

## 2019-09-17 NOTE — REASON FOR VISIT
[Acute] : an acute visit [Sleep Apnea] : sleep apnea [FreeTextEntry2] : chest heaviness and Sob with any walking/2 block

## 2019-09-17 NOTE — ASSESSMENT
[FreeTextEntry1] : \par Change CPAP 8-15  and encourage to use nightly\par Restart bronchodilator therapy with Breo and Spiriva.\par Gave samples of Trelegy pending obtaining above.\par For cardio evaluation tomorrow.\par F/u 4-6 weeks sooner PRN.

## 2019-09-17 NOTE — CONSULT LETTER
[Dear  ___] : Dear ~HASEEB, [Consult Letter:] : I had the pleasure of evaluating your patient, [unfilled]. [Sincerely,] : Sincerely, [FreeTextEntry2] : Yahir Oreilly MD\par  [FreeTextEntry3] : Carlos Romano MD FCCP\par

## 2019-09-17 NOTE — HISTORY OF PRESENT ILLNESS
[FreeTextEntry1] : Had hernia umbilical repair emergently early august and right fx toe post op with o2 tank fell on foot, then fx left foot walking dog , now in boot left foot. \par Started  with chest  heaviness about 3 weeks ago and also SOB with walking.\par No significant cough.\par Occ. wheeze. 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

## 2019-09-17 NOTE — PHYSICAL EXAM
[General Appearance - Well Nourished] : well nourished [General Appearance - Well Developed] : well developed [Normal Conjunctiva] : the conjunctiva exhibited no abnormalities [Normal Oropharynx] : normal oropharynx [Lungs Percussion] : the lungs were normal to percussion [Abdomen Soft] : soft [Abdomen Mass (___ Cm)] : no abdominal mass palpated [Abdomen Tenderness] : non-tender [Cyanosis, Localized] : no localized cyanosis [Nail Clubbing] : no clubbing of the fingernails [Petechial Hemorrhages (___cm)] : no petechial hemorrhages [] : no ischemic changes [FreeTextEntry1] : 1 plus bilat rhonchi and wheeze.  [FreeTextEntry2] : Varicose Veins

## 2019-09-24 ENCOUNTER — FORM ENCOUNTER (OUTPATIENT)
Age: 69
End: 2019-09-24

## 2019-09-25 ENCOUNTER — APPOINTMENT (OUTPATIENT)
Dept: CT IMAGING | Facility: IMAGING CENTER | Age: 69
End: 2019-09-25
Payer: COMMERCIAL

## 2019-09-25 ENCOUNTER — OUTPATIENT (OUTPATIENT)
Dept: OUTPATIENT SERVICES | Facility: HOSPITAL | Age: 69
LOS: 1 days | End: 2019-09-25
Payer: COMMERCIAL

## 2019-09-25 DIAGNOSIS — Z96.651 PRESENCE OF RIGHT ARTIFICIAL KNEE JOINT: Chronic | ICD-10-CM

## 2019-09-25 DIAGNOSIS — Z95.2 PRESENCE OF PROSTHETIC HEART VALVE: Chronic | ICD-10-CM

## 2019-09-25 DIAGNOSIS — Z98.890 OTHER SPECIFIED POSTPROCEDURAL STATES: Chronic | ICD-10-CM

## 2019-09-25 DIAGNOSIS — R93.89 ABNORMAL FINDINGS ON DIAGNOSTIC IMAGING OF OTHER SPECIFIED BODY STRUCTURES: ICD-10-CM

## 2019-09-25 DIAGNOSIS — Z98.49 CATARACT EXTRACTION STATUS, UNSPECIFIED EYE: Chronic | ICD-10-CM

## 2019-09-25 DIAGNOSIS — Z90.49 ACQUIRED ABSENCE OF OTHER SPECIFIED PARTS OF DIGESTIVE TRACT: Chronic | ICD-10-CM

## 2019-09-25 PROCEDURE — 71250 CT THORAX DX C-: CPT

## 2019-09-25 PROCEDURE — 71250 CT THORAX DX C-: CPT | Mod: 26

## 2019-10-11 ENCOUNTER — OTHER (OUTPATIENT)
Age: 69
End: 2019-10-11

## 2019-10-21 ENCOUNTER — FORM ENCOUNTER (OUTPATIENT)
Age: 69
End: 2019-10-21

## 2019-10-22 ENCOUNTER — APPOINTMENT (OUTPATIENT)
Dept: PULMONOLOGY | Facility: CLINIC | Age: 69
End: 2019-10-22
Payer: COMMERCIAL

## 2019-10-22 VITALS
RESPIRATION RATE: 15 BRPM | WEIGHT: 250 LBS | HEIGHT: 70 IN | TEMPERATURE: 98.5 F | HEART RATE: 71 BPM | OXYGEN SATURATION: 96 % | BODY MASS INDEX: 35.79 KG/M2 | DIASTOLIC BLOOD PRESSURE: 103 MMHG | SYSTOLIC BLOOD PRESSURE: 154 MMHG

## 2019-10-22 VITALS — DIASTOLIC BLOOD PRESSURE: 86 MMHG | SYSTOLIC BLOOD PRESSURE: 144 MMHG

## 2019-10-22 DIAGNOSIS — Z87.09 PERSONAL HISTORY OF OTHER DISEASES OF THE RESPIRATORY SYSTEM: ICD-10-CM

## 2019-10-22 DIAGNOSIS — E74.39 OTHER DISORDERS OF INTESTINAL CARBOHYDRATE ABSORPTION: ICD-10-CM

## 2019-10-22 LAB
GLUCOSE BLDC GLUCOMTR-MCNC: 101
HBA1C MFR BLD HPLC: 6.3

## 2019-10-22 PROCEDURE — 94060 EVALUATION OF WHEEZING: CPT

## 2019-10-22 PROCEDURE — 99214 OFFICE O/P EST MOD 30 MIN: CPT | Mod: 25

## 2019-10-22 PROCEDURE — 71046 X-RAY EXAM CHEST 2 VIEWS: CPT

## 2019-10-22 PROCEDURE — 82962 GLUCOSE BLOOD TEST: CPT

## 2019-10-22 PROCEDURE — 83036 HEMOGLOBIN GLYCOSYLATED A1C: CPT | Mod: QW

## 2019-10-22 RX ORDER — FLUTICASONE PROPIONATE AND SALMETEROL 50; 500 UG/1; UG/1
500-50 POWDER RESPIRATORY (INHALATION)
Refills: 0 | Status: DISCONTINUED | COMMUNITY
Start: 2019-09-17 | End: 2019-10-22

## 2019-10-22 RX ORDER — AZITHROMYCIN 250 MG/1
250 TABLET, FILM COATED ORAL
Qty: 1 | Refills: 1 | Status: DISCONTINUED | COMMUNITY
Start: 2019-10-11 | End: 2019-10-22

## 2019-10-22 NOTE — PROCEDURE
[FreeTextEntry1] : 10/22/2019 \par PA and lateral chest radiograph reveals Cardiomegaly and a tortuous aorta. There is possible mild pleural disease on the right. He is status post bowel surgery. There are no acute infiltrates and no definitive nodules. Bony structures are intact he is kyphotic.\par No definitive change compared to prior chest radiograph of October 24, 2018\par \par \par CPAP data downloaded and reviewed.\par \par 10/22/2019\par Pulmonary function testing\par There is a moderate ventilatory impairment in a combined obstructive and restrictive pattern. There is no significant bronchodilator response. \par decrease in flow rates\par

## 2019-10-22 NOTE — ASSESSMENT
[FreeTextEntry1] : \par After treatment go back to using cpap nightly\par Restart bronchodilator therapy with Breo and Spiriva.\par Gave samples of Trelegy pending obtaining above.\par Course of Prednisone and Ceftin\par Follow BS/ ADA diet. \par F/u few weeks sooner PRN\par \par

## 2019-10-22 NOTE — PHYSICAL EXAM
[General Appearance - Well Developed] : well developed [General Appearance - Well Nourished] : well nourished [Normal Conjunctiva] : the conjunctiva exhibited no abnormalities [Normal Oropharynx] : normal oropharynx [Lungs Percussion] : the lungs were normal to percussion [Abdomen Soft] : soft [Abdomen Tenderness] : non-tender [Abdomen Mass (___ Cm)] : no abdominal mass palpated [Petechial Hemorrhages (___cm)] : no petechial hemorrhages [Cyanosis, Localized] : no localized cyanosis [Nail Clubbing] : no clubbing of the fingernails [] : no ischemic changes [FreeTextEntry2] : Varicose Veins [FreeTextEntry1] : overweight

## 2019-10-22 NOTE — DISCUSSION/SUMMARY
[FreeTextEntry1] : Severe SAMMY needs better compliance. Discussed.\par OAD with increased symptoms and mild decrease in flow rates.\par Restrictive component related to body habitus\par Component of symptoms related to upper airway congestion\par .

## 2019-10-22 NOTE — CONSULT LETTER
[Dear  ___] : Dear ~HASEEB, [Consult Letter:] : I had the pleasure of evaluating your patient, [unfilled]. [Sincerely,] : Sincerely, [FreeTextEntry3] : Carlos Romano MD FCCP\par  [FreeTextEntry2] : Yahir Oreilly MD\par

## 2019-10-22 NOTE — REASON FOR VISIT
[Acute] : an acute visit [Sleep Apnea] : sleep apnea [FreeTextEntry2] : cough and Sob with any walking/2 block for past 2 weeks

## 2019-10-22 NOTE — HISTORY OF PRESENT ILLNESS
[FreeTextEntry1] : Was trying to use cpap but started coughing and cant use it \par \par yellow green mucus nose and chest. Feels more upper airway.\par Ran out of Breo and Spiriva x 1 day\par going to see the cardiologist tomorrow has a Hx of pericarditis 2015 after aortic valve\par \par did go see cardiologist . b/p high because didn’t have bp med for 2 days\par \par \par

## 2019-11-26 NOTE — PHYSICAL THERAPY INITIAL EVALUATION ADULT - LEVEL OF INDEPENDENCE: SCOOT/BRIDGE, REHAB EVAL
-- DO NOT REPLY / DO NOT REPLY ALL --  -- Message is from the [facility]--    Caller is requesting an appointment - at a sooner time than what was available.       PCP unavailable for post hospital follow up    Reason for Visit: clearance for surgery    Is the patient currently scheduled? no   Caller Information       Type Contact Phone    11/26/2019 08:48 AM Phone (Incoming) Lee Mckinley (Self)           Alternative phone number: none    Turnaround time given to caller:   \"This message will be sent to [state Provider's name]. The clinical team will fulfill your request as soon as they review your message.\"   minimum assist (75% patients effort)

## 2019-12-27 ENCOUNTER — EMERGENCY (EMERGENCY)
Facility: HOSPITAL | Age: 69
LOS: 1 days | Discharge: HOME | End: 2019-12-27
Attending: EMERGENCY MEDICINE
Payer: COMMERCIAL

## 2019-12-27 VITALS
TEMPERATURE: 98 F | HEART RATE: 70 BPM | OXYGEN SATURATION: 95 % | DIASTOLIC BLOOD PRESSURE: 95 MMHG | RESPIRATION RATE: 16 BRPM | SYSTOLIC BLOOD PRESSURE: 166 MMHG

## 2019-12-27 VITALS
RESPIRATION RATE: 20 BRPM | DIASTOLIC BLOOD PRESSURE: 120 MMHG | TEMPERATURE: 98 F | HEART RATE: 81 BPM | HEIGHT: 70 IN | OXYGEN SATURATION: 94 % | WEIGHT: 255.07 LBS | SYSTOLIC BLOOD PRESSURE: 189 MMHG

## 2019-12-27 DIAGNOSIS — Z98.890 OTHER SPECIFIED POSTPROCEDURAL STATES: Chronic | ICD-10-CM

## 2019-12-27 DIAGNOSIS — Z90.49 ACQUIRED ABSENCE OF OTHER SPECIFIED PARTS OF DIGESTIVE TRACT: Chronic | ICD-10-CM

## 2019-12-27 DIAGNOSIS — Z95.2 PRESENCE OF PROSTHETIC HEART VALVE: Chronic | ICD-10-CM

## 2019-12-27 DIAGNOSIS — Z98.49 CATARACT EXTRACTION STATUS, UNSPECIFIED EYE: Chronic | ICD-10-CM

## 2019-12-27 DIAGNOSIS — Z96.651 PRESENCE OF RIGHT ARTIFICIAL KNEE JOINT: Chronic | ICD-10-CM

## 2019-12-27 LAB
ALBUMIN SERPL ELPH-MCNC: 4.1 G/DL — SIGNIFICANT CHANGE UP (ref 3.3–5)
ALP SERPL-CCNC: 102 U/L — SIGNIFICANT CHANGE UP (ref 40–120)
ALT FLD-CCNC: 12 U/L — SIGNIFICANT CHANGE UP (ref 10–45)
ANION GAP SERPL CALC-SCNC: 14 MMOL/L — SIGNIFICANT CHANGE UP (ref 5–17)
AST SERPL-CCNC: 12 U/L — SIGNIFICANT CHANGE UP (ref 10–40)
BASOPHILS # BLD AUTO: 0.02 K/UL — SIGNIFICANT CHANGE UP (ref 0–0.2)
BASOPHILS NFR BLD AUTO: 0.2 % — SIGNIFICANT CHANGE UP (ref 0–2)
BILIRUB SERPL-MCNC: 0.9 MG/DL — SIGNIFICANT CHANGE UP (ref 0.2–1.2)
BUN SERPL-MCNC: 20 MG/DL — SIGNIFICANT CHANGE UP (ref 7–23)
CALCIUM SERPL-MCNC: 9 MG/DL — SIGNIFICANT CHANGE UP (ref 8.4–10.5)
CHLORIDE SERPL-SCNC: 101 MMOL/L — SIGNIFICANT CHANGE UP (ref 96–108)
CO2 SERPL-SCNC: 25 MMOL/L — SIGNIFICANT CHANGE UP (ref 22–31)
CREAT SERPL-MCNC: 1.08 MG/DL — SIGNIFICANT CHANGE UP (ref 0.5–1.3)
EOSINOPHIL # BLD AUTO: 0.08 K/UL — SIGNIFICANT CHANGE UP (ref 0–0.5)
EOSINOPHIL NFR BLD AUTO: 0.9 % — SIGNIFICANT CHANGE UP (ref 0–6)
GAS PNL BLDV: SIGNIFICANT CHANGE UP
GLUCOSE SERPL-MCNC: 124 MG/DL — HIGH (ref 70–99)
HCT VFR BLD CALC: 40.4 % — SIGNIFICANT CHANGE UP (ref 39–50)
HGB BLD-MCNC: 12.8 G/DL — LOW (ref 13–17)
IMM GRANULOCYTES NFR BLD AUTO: 0.3 % — SIGNIFICANT CHANGE UP (ref 0–1.5)
LYMPHOCYTES # BLD AUTO: 1.06 K/UL — SIGNIFICANT CHANGE UP (ref 1–3.3)
LYMPHOCYTES # BLD AUTO: 11.3 % — LOW (ref 13–44)
MAGNESIUM SERPL-MCNC: 1.7 MG/DL — SIGNIFICANT CHANGE UP (ref 1.6–2.6)
MCHC RBC-ENTMCNC: 26.4 PG — LOW (ref 27–34)
MCHC RBC-ENTMCNC: 31.7 GM/DL — LOW (ref 32–36)
MCV RBC AUTO: 83.5 FL — SIGNIFICANT CHANGE UP (ref 80–100)
MONOCYTES # BLD AUTO: 0.83 K/UL — SIGNIFICANT CHANGE UP (ref 0–0.9)
MONOCYTES NFR BLD AUTO: 8.8 % — SIGNIFICANT CHANGE UP (ref 2–14)
NEUTROPHILS # BLD AUTO: 7.37 K/UL — SIGNIFICANT CHANGE UP (ref 1.8–7.4)
NEUTROPHILS NFR BLD AUTO: 78.5 % — HIGH (ref 43–77)
NRBC # BLD: 0 /100 WBCS — SIGNIFICANT CHANGE UP (ref 0–0)
PHOSPHATE SERPL-MCNC: 3.7 MG/DL — SIGNIFICANT CHANGE UP (ref 2.5–4.5)
PLATELET # BLD AUTO: 213 K/UL — SIGNIFICANT CHANGE UP (ref 150–400)
POTASSIUM SERPL-MCNC: 3.8 MMOL/L — SIGNIFICANT CHANGE UP (ref 3.5–5.3)
POTASSIUM SERPL-SCNC: 3.8 MMOL/L — SIGNIFICANT CHANGE UP (ref 3.5–5.3)
PROT SERPL-MCNC: 7.3 G/DL — SIGNIFICANT CHANGE UP (ref 6–8.3)
RBC # BLD: 4.84 M/UL — SIGNIFICANT CHANGE UP (ref 4.2–5.8)
RBC # FLD: 14.3 % — SIGNIFICANT CHANGE UP (ref 10.3–14.5)
SODIUM SERPL-SCNC: 140 MMOL/L — SIGNIFICANT CHANGE UP (ref 135–145)
WBC # BLD: 9.39 K/UL — SIGNIFICANT CHANGE UP (ref 3.8–10.5)
WBC # FLD AUTO: 9.39 K/UL — SIGNIFICANT CHANGE UP (ref 3.8–10.5)

## 2019-12-27 PROCEDURE — 84295 ASSAY OF SERUM SODIUM: CPT

## 2019-12-27 PROCEDURE — 99284 EMERGENCY DEPT VISIT MOD MDM: CPT | Mod: GC

## 2019-12-27 PROCEDURE — 83605 ASSAY OF LACTIC ACID: CPT

## 2019-12-27 PROCEDURE — 82435 ASSAY OF BLOOD CHLORIDE: CPT

## 2019-12-27 PROCEDURE — 85014 HEMATOCRIT: CPT

## 2019-12-27 PROCEDURE — 83735 ASSAY OF MAGNESIUM: CPT

## 2019-12-27 PROCEDURE — 99284 EMERGENCY DEPT VISIT MOD MDM: CPT

## 2019-12-27 PROCEDURE — 73110 X-RAY EXAM OF WRIST: CPT | Mod: 26,LT

## 2019-12-27 PROCEDURE — 82330 ASSAY OF CALCIUM: CPT

## 2019-12-27 PROCEDURE — 82803 BLOOD GASES ANY COMBINATION: CPT

## 2019-12-27 PROCEDURE — 73130 X-RAY EXAM OF HAND: CPT | Mod: 26,LT

## 2019-12-27 PROCEDURE — 80053 COMPREHEN METABOLIC PANEL: CPT

## 2019-12-27 PROCEDURE — 85027 COMPLETE CBC AUTOMATED: CPT

## 2019-12-27 PROCEDURE — 73110 X-RAY EXAM OF WRIST: CPT

## 2019-12-27 PROCEDURE — 84132 ASSAY OF SERUM POTASSIUM: CPT

## 2019-12-27 PROCEDURE — 82947 ASSAY GLUCOSE BLOOD QUANT: CPT

## 2019-12-27 PROCEDURE — 73130 X-RAY EXAM OF HAND: CPT

## 2019-12-27 PROCEDURE — 84100 ASSAY OF PHOSPHORUS: CPT

## 2019-12-27 RX ORDER — ACETAMINOPHEN 500 MG
975 TABLET ORAL ONCE
Refills: 0 | Status: COMPLETED | OUTPATIENT
Start: 2019-12-27 | End: 2019-12-27

## 2019-12-27 RX ORDER — IBUPROFEN 200 MG
1 TABLET ORAL
Qty: 20 | Refills: 0
Start: 2019-12-27

## 2019-12-27 RX ORDER — IBUPROFEN 200 MG
600 TABLET ORAL ONCE
Refills: 0 | Status: COMPLETED | OUTPATIENT
Start: 2019-12-27 | End: 2019-12-27

## 2019-12-27 RX ADMIN — Medication 975 MILLIGRAM(S): at 04:00

## 2019-12-27 RX ADMIN — Medication 600 MILLIGRAM(S): at 04:47

## 2019-12-27 RX ADMIN — Medication 100 MILLIGRAM(S): at 04:47

## 2019-12-27 NOTE — ED ADULT NURSE NOTE - CHPI ED NUR SYMPTOMS NEG
no nausea/no vomiting/no tingling/no chills/no weakness/no decreased eating/drinking/no dizziness/no fever

## 2019-12-27 NOTE — ED PROVIDER NOTE - NSFOLLOWUPINSTRUCTIONS_ED_ALL_ED_FT
Stay well hydrated.  Take antibiotic as prescribed.  Take Motrin or other anti-inflammatory as directed as needed for pain.   Follow-up with your rheumatologist within 1-2 days  Bring a copy of your results with you  Return to an ER for worsening symptoms or any other concerns.

## 2019-12-27 NOTE — ED PROVIDER NOTE - PATIENT PORTAL LINK FT
You can access the FollowMyHealth Patient Portal offered by [facility] by registering at the following website: [email or web address]. By joining [facility]’s FollowMyHealth portal, you will also be able to view your health information using other applications (apps) compatible with our system.

## 2019-12-27 NOTE — ED ADULT NURSE NOTE - OBJECTIVE STATEMENT
68 yo M w/ PMHx of aortic valve disease (replaced), OA, COPD, HTN, HLD, sleep apnea, carotid artery stenosis, gout presents to ED via waiting room from home c/o L hand pain/swelling x1 day. Pt states symptoms began spontaneously, denies injuries/falls which may have caused symptoms. Increased pain w/ movement. No obvious deformities. Minor warmth/redness noted to L hand. Pt states pain was radiating up to forearm, took ibuprofen prior to ED arrival which resolved pain in wrist/forearm, pain in hand still present. Pt reports he has not been med compliant over past 2 days. Noted to be hypertensive in ED, denies HA/n/v/dizziness/lightheadedness/weakness/change in vision. PERRL. Gross neuro exam intact. Pt denies any CP, SOB, N/V, fever, chills, urinary complaints, constipation, diarrhea, HA, dizziness, weakness. Pt A&Ox4, lungs CTA, +central pulses. Abdomen soft, not tender, not distended. Ambulating w/ steady gait, safety and comfort maintained, no acute distress noted at this time.

## 2019-12-27 NOTE — ED PROVIDER NOTE - OBJECTIVE STATEMENT
68 y/o M w/ PMH of HTN, COPD, HLD, OA, SAMMY, venous insufficiency presenting w/ L hand pain. Pt reports L hand began to swell in during the day. Got progressively worse. No recent trauma to the area. No recent falls. Pain localized to top portion of hand and wrist. Described as soreness. Pt had prior issue w/ his L hand, believes it may have been gout. Pt took ibuprofen today which provided minimal relief. Denies fevers, chills, headache, dizziness, blurred vision, chest pain, cough, shortness of breath, abdominal pain, n/v/d/c, urinary symptoms, rash.

## 2019-12-27 NOTE — ED PROVIDER NOTE - ATTENDING CONTRIBUTION TO CARE
attending Angelaack: 69yM R-hand dominant h/o gout, HTN, COPD, HLD, OA, SAMMY, venous insufficiency p/w atraumatic L hand pain x 1 day. Assoc with swelling and redness. Reports ?similar to prior episodes of gout. Moderate pain relief with NSAIDs earlier today. Gout vs cellulitis. Will obtain labs, xrays, pain control, likely abx

## 2019-12-27 NOTE — ED PROVIDER NOTE - PMH
Amaurosis fugax  "every once in awhile my right eye goes out for about a minute"  Aortic valve disease    Arthritis    COPD (Chronic Obstructive Pulmonary Disease)    HTN (Hypertension)    Hypercholesterolemia    Kidney Stone    Microscopic Hematuria    OA (Osteoarthritis)    Obesity    Pilonidal Abscess    Sleep apnea    Spinal Stenosis    Stenosis of carotid artery, unspecified laterality    Varicose Vein  B/L lower extremities  Venous Insufficiency negative...

## 2019-12-27 NOTE — ED ADULT NURSE NOTE - NSIMPLEMENTINTERV_GEN_ALL_ED
Implemented All Fall Risk Interventions:  [place] to call system. Call bell, personal items and telephone within reach. Instruct patient to call for assistance. Room bathroom lighting operational. Non-slip footwear when patient is off stretcher. Physically safe environment: no spills, clutter or unnecessary equipment. Stretcher in lowest position, wheels locked, appropriate side rails in place. Provide visual cue, wrist band, yellow gown, etc. Monitor gait and stability. Monitor for mental status changes and reorient to person, place, and time. Review medications for side effects contributing to fall risk. Reinforce activity limits and safety measures with patient and family.

## 2020-01-28 NOTE — ED PROVIDER NOTE - ENDOCRINE [-], MLM
no excessive urination/no excessive thirst
GOAL: Patient will increase static/dynamic sitting/standing balance by 1/2 grade to facilitate increased ability to perform ADLs and functional mobility

## 2020-02-27 ENCOUNTER — APPOINTMENT (OUTPATIENT)
Dept: RADIOLOGY | Facility: IMAGING CENTER | Age: 70
End: 2020-02-27
Payer: COMMERCIAL

## 2020-02-27 ENCOUNTER — OUTPATIENT (OUTPATIENT)
Dept: OUTPATIENT SERVICES | Facility: HOSPITAL | Age: 70
LOS: 1 days | End: 2020-02-27
Payer: COMMERCIAL

## 2020-02-27 DIAGNOSIS — Z98.890 OTHER SPECIFIED POSTPROCEDURAL STATES: Chronic | ICD-10-CM

## 2020-02-27 DIAGNOSIS — Z00.8 ENCOUNTER FOR OTHER GENERAL EXAMINATION: ICD-10-CM

## 2020-02-27 DIAGNOSIS — Z96.651 PRESENCE OF RIGHT ARTIFICIAL KNEE JOINT: Chronic | ICD-10-CM

## 2020-02-27 DIAGNOSIS — Z90.49 ACQUIRED ABSENCE OF OTHER SPECIFIED PARTS OF DIGESTIVE TRACT: Chronic | ICD-10-CM

## 2020-02-27 DIAGNOSIS — Z95.2 PRESENCE OF PROSTHETIC HEART VALVE: Chronic | ICD-10-CM

## 2020-02-27 DIAGNOSIS — Z98.49 CATARACT EXTRACTION STATUS, UNSPECIFIED EYE: Chronic | ICD-10-CM

## 2020-02-27 PROCEDURE — 72070 X-RAY EXAM THORAC SPINE 2VWS: CPT

## 2020-02-27 PROCEDURE — 72050 X-RAY EXAM NECK SPINE 4/5VWS: CPT

## 2020-02-27 PROCEDURE — 72070 X-RAY EXAM THORAC SPINE 2VWS: CPT | Mod: 26

## 2020-02-27 PROCEDURE — 72050 X-RAY EXAM NECK SPINE 4/5VWS: CPT | Mod: 26

## 2020-04-14 ENCOUNTER — APPOINTMENT (OUTPATIENT)
Dept: PULMONOLOGY | Facility: CLINIC | Age: 70
End: 2020-04-14
Payer: COMMERCIAL

## 2020-04-14 PROCEDURE — 99213 OFFICE O/P EST LOW 20 MIN: CPT | Mod: 95

## 2020-04-14 NOTE — ASSESSMENT
[FreeTextEntry1] : Retry CPAP\par IF pers. difficulty will order titration study\par Will call in 6-8 weeks.

## 2020-04-14 NOTE — DISCUSSION/SUMMARY
[FreeTextEntry1] : SAMMY severe with moderate desaturations\par Dry mouth ? etiology. Using full face mask. Denies nasal congestion or leak

## 2020-04-14 NOTE — HISTORY OF PRESENT ILLNESS
[Home] : at home, [unfilled] , at the time of the visit. [Medical Office: ([facility])___] : at the medical office located in  [TextBox_4] : This visit was provided via telehealth using real-time 2-way audio visual technology. The patient, ESTRELLA GARCIA , was located at home, [phone] [place] [address] [address]\[place] [address]  at the time of the visit.\par The provider, Carlos Romano, was located at office [address] [place] [address] at the time of the visit. \par \par  The patient, Mr. ESTRELLA GARCIA  and Physician Carlos Martinez participated in the telehealth encounter.\par \par Verbal consent obtained by  from patient\par Having a hard time using the cpap machine because of dry mouth . Did have a lab study which showed severe conchis with moderate desaturations\par \par Denies nasal congestion.

## 2020-09-18 ENCOUNTER — APPOINTMENT (OUTPATIENT)
Dept: NEUROLOGY | Facility: CLINIC | Age: 70
End: 2020-09-18
Payer: COMMERCIAL

## 2020-09-18 VITALS — DIASTOLIC BLOOD PRESSURE: 98 MMHG | SYSTOLIC BLOOD PRESSURE: 172 MMHG | HEART RATE: 78 BPM

## 2020-09-18 VITALS
WEIGHT: 258 LBS | HEART RATE: 76 BPM | HEIGHT: 70 IN | BODY MASS INDEX: 36.94 KG/M2 | SYSTOLIC BLOOD PRESSURE: 174 MMHG | DIASTOLIC BLOOD PRESSURE: 102 MMHG

## 2020-09-18 VITALS — TEMPERATURE: 97.6 F

## 2020-09-18 DIAGNOSIS — R42 DIZZINESS AND GIDDINESS: ICD-10-CM

## 2020-09-18 DIAGNOSIS — R11.0 NAUSEA: ICD-10-CM

## 2020-09-18 PROCEDURE — 99205 OFFICE O/P NEW HI 60 MIN: CPT

## 2020-09-18 RX ORDER — AMLODIPINE BESYLATE 10 MG/1
10 TABLET ORAL DAILY
Refills: 0 | Status: ACTIVE | COMMUNITY

## 2020-09-18 RX ORDER — VALSARTAN 320 MG/1
320 TABLET, COATED ORAL DAILY
Refills: 0 | Status: ACTIVE | COMMUNITY

## 2020-09-18 RX ORDER — ASPIRIN 81 MG
81 TABLET, DELAYED RELEASE (ENTERIC COATED) ORAL
Refills: 0 | Status: ACTIVE | COMMUNITY

## 2020-09-18 NOTE — DISCUSSION/SUMMARY
[FreeTextEntry1] : Patient with 5 days of dizziness, worse with certain movements and changes in position with nausea but no other focal neurologic symptoms.\par \par DDX: BPPV, orthostasis, vs inner ear pathology, or vestibulopathy \par \par 1) Obtain MRI IAC with contrast and MRI brain with FIESTA protocol\par \par 2. TCD and carotid dopplers to assess blood flow\par \par 3. WIll consider vestibular therapy. and may use zofran for nausea as needed \par \par Follow up after the above completed

## 2020-09-18 NOTE — PHYSICAL EXAM
[General Appearance - Alert] : alert [Oriented To Time, Place, And Person] : oriented to person, place, and time [Person] : oriented to person [Place] : oriented to place [Time] : oriented to time [Cranial Nerves Optic (II)] : visual acuity intact bilaterally,  visual fields full to confrontation, pupils equal round and reactive to light [Cranial Nerves Oculomotor (III)] : extraocular motion intact [Cranial Nerves Trigeminal (V)] : facial sensation intact symmetrically [Cranial Nerves Facial (VII)] : face symmetrical [Cranial Nerves Vestibulocochlear (VIII)] : hearing was intact bilaterally [Cranial Nerves Glossopharyngeal (IX)] : tongue and palate midline [Cranial Nerves Accessory (XI - Cranial And Spinal)] : head turning and shoulder shrug symmetric [Cranial Nerves Hypoglossal (XII)] : there was no tongue deviation with protrusion [Motor Tone] : muscle tone was normal in all four extremities [Motor Strength] : muscle strength was normal in all four extremities [Involuntary Movements] : no involuntary movements were seen [No Muscle Atrophy] : normal bulk in all four extremities [Motor Handedness Right-Handed] : the patient is right hand dominant [Paresis Pronator Drift Right-Sided] : no pronator drift on the right [Paresis Pronator Drift Left-Sided] : no pronator drift on the left [Sensation Tactile Decrease] : light touch was intact [Romberg's Sign] : a positive Romberg's sign was present [Limited Balance] : the patient's balance was impaired [Past-pointing] : there was no past-pointing [Tremor] : no tremor present [Coordination - Dysmetria Impaired Finger-to-Nose Bilateral] : not present [2+] : Brachioradialis left 2+ [0] : Ankle jerk left 0 [Plantar Reflex Right Only] : normal on the right [Plantar Reflex Left Only] : normal on the left [FreeTextEntry7] : left horizontal was worse on testing then right canal  [PERRL With Normal Accommodation] : pupils were equal in size, round, reactive to light, with normal accommodation [Extraocular Movements] : extraocular movements were intact [Neck Appearance] : the appearance of the neck was normal [] : no rash [FreeTextEntry1] : dry skin

## 2020-09-18 NOTE — HISTORY OF PRESENT ILLNESS
[FreeTextEntry1] : Patient reports that since Sunday he has been feeling dizzy and worse with changes in position for example going from seated position to standing. He has had these symptoms in the past intermittently but they have been more persistent since Sunday. \par He denied any recent infection or inner ear issues. No tinnitus \par \par He  went to [facility] for chest pain and radiation down the left arm. He had an extensive workup including CT head, CTA of head and neck and MRI brain with contrast.\par \par He denies any ear pain, hearing loss or visual changes or numbness or tingling.\par He is not currently taking anything for the vertigo when it occurs.

## 2020-09-18 NOTE — REVIEW OF SYSTEMS
[Fever] : no fever [Chills] : no chills [Feeling Tired] : feeling tired [Sleep Disturbances] : sleep disturbances [As Noted in HPI] : as noted in HPI [Abnormal Sensation] : no abnormal sensation [Dizziness] : dizziness [Vertigo] : vertigo [Migraine Headache] : no migraine headache [Tension Headache] : no tension-type headache [Difficulty Walking] : difficulty walking [Negative] : Heme/Lymph

## 2020-09-18 NOTE — DATA REVIEWED
[de-identified] : From April 2020 showed a tiny focus of chronic hemosiderin in the left posterior temporal lobe.\par Asymmetric thickening of the cisternal segment of the left trigeminal nerve

## 2020-10-26 ENCOUNTER — APPOINTMENT (OUTPATIENT)
Dept: NEUROLOGY | Facility: CLINIC | Age: 70
End: 2020-10-26
Payer: COMMERCIAL

## 2020-10-26 PROCEDURE — 93880 EXTRACRANIAL BILAT STUDY: CPT

## 2020-10-26 PROCEDURE — 99072 ADDL SUPL MATRL&STAF TM PHE: CPT

## 2020-11-03 ENCOUNTER — APPOINTMENT (OUTPATIENT)
Dept: PULMONOLOGY | Facility: CLINIC | Age: 70
End: 2020-11-03
Payer: COMMERCIAL

## 2020-11-03 VITALS — DIASTOLIC BLOOD PRESSURE: 90 MMHG | SYSTOLIC BLOOD PRESSURE: 160 MMHG

## 2020-11-03 DIAGNOSIS — Z20.828 CONTACT WITH AND (SUSPECTED) EXPOSURE TO OTHER VIRAL COMMUNICABLE DISEASES: ICD-10-CM

## 2020-11-03 DIAGNOSIS — Z23 ENCOUNTER FOR IMMUNIZATION: ICD-10-CM

## 2020-11-03 PROCEDURE — 99072 ADDL SUPL MATRL&STAF TM PHE: CPT

## 2020-11-03 PROCEDURE — 99214 OFFICE O/P EST MOD 30 MIN: CPT | Mod: 25

## 2020-11-03 PROCEDURE — G0008: CPT

## 2020-11-03 PROCEDURE — 90662 IIV NO PRSV INCREASED AG IM: CPT

## 2020-11-03 PROCEDURE — 71046 X-RAY EXAM CHEST 2 VIEWS: CPT

## 2020-11-03 RX ORDER — SODIUM PICOSULFATE, MAGNESIUM OXIDE, AND ANHYDROUS CITRIC ACID 10; 3.5; 12 MG/160ML; G/160ML; G/160ML
10-3.5-12 MG-GM LIQUID ORAL
Qty: 320 | Refills: 0 | Status: DISCONTINUED | COMMUNITY
Start: 2020-06-24 | End: 2020-11-03

## 2020-11-03 RX ORDER — PREDNISONE 10 MG/1
10 TABLET ORAL
Qty: 18 | Refills: 0 | Status: DISCONTINUED | COMMUNITY
Start: 2019-10-22 | End: 2020-11-03

## 2020-11-03 RX ORDER — CEFUROXIME AXETIL 500 MG/1
500 TABLET ORAL
Qty: 14 | Refills: 0 | Status: DISCONTINUED | COMMUNITY
Start: 2019-10-22 | End: 2020-11-03

## 2020-11-03 RX ORDER — FLUTICASONE FUROATE AND VILANTEROL TRIFENATATE 200; 25 UG/1; UG/1
200-25 POWDER RESPIRATORY (INHALATION) DAILY
Qty: 1 | Refills: 5 | Status: DISCONTINUED | COMMUNITY
Start: 2019-09-17 | End: 2020-11-03

## 2020-11-03 RX ORDER — ROSUVASTATIN CALCIUM 20 MG/1
20 TABLET, FILM COATED ORAL
Qty: 90 | Refills: 0 | Status: ACTIVE | COMMUNITY
Start: 2020-05-19

## 2020-11-04 PROBLEM — Z23 ENCOUNTER FOR IMMUNIZATION: Status: ACTIVE | Noted: 2020-11-03

## 2020-11-04 NOTE — DISCUSSION/SUMMARY
[FreeTextEntry1] : Severe SAMMY needs better compliance. Discussed. changed setting 10-20 cm gave full face sample\par OAD with increased symptoms and mild decrease in flow rates.\par Restrictive component related to body habitus\par Exacerbation of airways disease.\par .

## 2020-11-04 NOTE — HISTORY OF PRESENT ILLNESS
[Never] : never [TextBox_4] : Cant tolerate cpap not using , was suppose to go for titration study hasn’t gone had full face mask\par \par Having sob with any lifting or walking or bend over for months , moderate cough with mucus clear, some wheeze\par \par ran out out of trelegy too expensive  now on spiriva 2 puffs daily and proair daily\par \par very stressed with COVID.\par Has gained wt.\par Multiple medical problems\par \par \par seeing neuro for dizzy spells, going to get Doppler of carotid and cranial MRI\par \par

## 2020-11-04 NOTE — ASSESSMENT
[FreeTextEntry1] : Restart Trelegy.\par After treatment go back to using cpap nightly gave forma xl forma full face\par Gave samples of Trelegy \par PRN Beta Agonist.\par For pulmonary function testing and follow-up next week.\par . \par Will need titration study\par \par r/t DR Oreilly for f/u bp , was seen last month\par \par

## 2020-11-04 NOTE — PHYSICAL EXAM
[General Appearance - Well Developed] : well developed [General Appearance - Well Nourished] : well nourished [Normal Conjunctiva] : the conjunctiva exhibited no abnormalities [Normal Oropharynx] : normal oropharynx [Lungs Percussion] : the lungs were normal to percussion [Abdomen Soft] : soft [Abdomen Tenderness] : non-tender [Abdomen Mass (___ Cm)] : no abdominal mass palpated [Nail Clubbing] : no clubbing of the fingernails [Cyanosis, Localized] : no localized cyanosis [Petechial Hemorrhages (___cm)] : no petechial hemorrhages [] : no ischemic changes [Normal S1, S2] : normal s1, s2 [Oriented x3] : oriented x3 [Normal Mood] : normal mood [Normal Affect] : normal affect [TextBox_2] : sob [TextBox_68] : bilaterally slight expiratory wheeze [TextBox_99] : using cane [TextBox_132] : hpi [FreeTextEntry1] : 1 plus bilat rhonchi and wheeze.  [FreeTextEntry2] : Varicose Veins

## 2020-11-04 NOTE — PROCEDURE
[FreeTextEntry1] : \par \par CPAP data downloaded and reviewed.\par \par 10/22/2019\par Pulmonary function testing\par There is a moderate ventilatory impairment in a combined obstructive and restrictive pattern. There is no significant bronchodilator response. \par decrease in flow rates\par

## 2020-11-10 ENCOUNTER — APPOINTMENT (OUTPATIENT)
Dept: PULMONOLOGY | Facility: CLINIC | Age: 70
End: 2020-11-10

## 2020-11-11 ENCOUNTER — APPOINTMENT (OUTPATIENT)
Dept: MRI IMAGING | Facility: CLINIC | Age: 70
End: 2020-11-11

## 2020-11-12 ENCOUNTER — APPOINTMENT (OUTPATIENT)
Dept: NEUROLOGY | Facility: CLINIC | Age: 70
End: 2020-11-12
Payer: COMMERCIAL

## 2020-11-12 VITALS — TEMPERATURE: 94.1 F

## 2020-11-12 PROCEDURE — 93886 INTRACRANIAL COMPLETE STUDY: CPT

## 2020-11-12 PROCEDURE — 93892 TCD EMBOLI DETECT W/O INJ: CPT

## 2020-11-12 PROCEDURE — 99072 ADDL SUPL MATRL&STAF TM PHE: CPT

## 2020-11-20 LAB — SARS-COV-2 N GENE NPH QL NAA+PROBE: NOT DETECTED

## 2020-11-24 ENCOUNTER — APPOINTMENT (OUTPATIENT)
Dept: PULMONOLOGY | Facility: CLINIC | Age: 70
End: 2020-11-24
Payer: COMMERCIAL

## 2020-11-24 VITALS
RESPIRATION RATE: 15 BRPM | OXYGEN SATURATION: 95 % | BODY MASS INDEX: 37.94 KG/M2 | HEIGHT: 70 IN | WEIGHT: 265 LBS | DIASTOLIC BLOOD PRESSURE: 101 MMHG | TEMPERATURE: 97.2 F | SYSTOLIC BLOOD PRESSURE: 155 MMHG | HEART RATE: 75 BPM

## 2020-11-24 LAB — POCT - HEMOGLOBIN (HGB), QUANTITATIVE, TRANSCUTANEOUS: 12

## 2020-11-24 PROCEDURE — 88738 HGB QUANT TRANSCUTANEOUS: CPT

## 2020-11-24 PROCEDURE — 99213 OFFICE O/P EST LOW 20 MIN: CPT | Mod: 25

## 2020-11-24 PROCEDURE — 94729 DIFFUSING CAPACITY: CPT

## 2020-11-24 PROCEDURE — 94010 BREATHING CAPACITY TEST: CPT

## 2020-11-24 PROCEDURE — 94727 GAS DIL/WSHOT DETER LNG VOL: CPT

## 2020-11-24 PROCEDURE — ZZZZZ: CPT

## 2020-11-25 NOTE — DISCUSSION/SUMMARY
[FreeTextEntry1] : Severe SAMMY needs better compliance. Discussed. changed setting 11-20, cont full face\par OAD with increased symptoms and mild decrease in flow rates.\par Restrictive component related to body habitus and weight\par Herniation right lung.\par .\par .

## 2020-11-25 NOTE — PROCEDURE
[FreeTextEntry1] : \par \par CPAP data downloaded and reviewed. improved usage\par \par 11/24/2020\par Pulmonary function testing\par There is a moderate ventilatory impairment in a restrictive pattern There is a mild reduction in lung volume. There is elevation in the RV/TLC ratio indicative of possible air trapping. There is a mild diffusion impairment. Corrects to normal with lung volume correction \par

## 2020-11-25 NOTE — HISTORY OF PRESENT ILLNESS
[Never] : never [TextBox_4] : Cant tolerate cpap not using , was suppose to go for titration study hasn’t gone had full face mask\par better with trelegy\par  proair daily\par \par very stressed with COVID.\par Has gained wt.\par Multiple medical problems\par \par still to get MRI brain\par had carotids\par Needs to see cardiologist for b/p  didn’t take today\par \par

## 2020-11-25 NOTE — PHYSICAL EXAM
[Normal S1, S2] : normal s1, s2 [Oriented x3] : oriented x3 [Normal Mood] : normal mood [Normal Affect] : normal affect [General Appearance - Well Developed] : well developed [General Appearance - Well Nourished] : well nourished [Normal Conjunctiva] : the conjunctiva exhibited no abnormalities [Normal Oropharynx] : normal oropharynx [Lungs Percussion] : the lungs were normal to percussion [Abdomen Soft] : soft [Abdomen Tenderness] : non-tender [Abdomen Mass (___ Cm)] : no abdominal mass palpated [Nail Clubbing] : no clubbing of the fingernails [Cyanosis, Localized] : no localized cyanosis [Petechial Hemorrhages (___cm)] : no petechial hemorrhages [] : no ischemic changes [TextBox_2] : sob [TextBox_68] : bilaterally slight expiratory wheeze [TextBox_99] : using cane [TextBox_132] : hpi [FreeTextEntry1] : 1 plus bilat rhonchi and wheeze.  [FreeTextEntry2] : Varicose Veins

## 2020-11-25 NOTE — ASSESSMENT
[FreeTextEntry1] : Continue CPAP present settings. Improved\par Needs diet and wt. loss.  David and urged\par Continue Trelegy\par \par r/t Dr Oreilly for f/u bp , was seen last month\par \par

## 2020-12-07 ENCOUNTER — APPOINTMENT (OUTPATIENT)
Dept: MRI IMAGING | Facility: CLINIC | Age: 70
End: 2020-12-07
Payer: COMMERCIAL

## 2020-12-07 ENCOUNTER — OUTPATIENT (OUTPATIENT)
Dept: OUTPATIENT SERVICES | Facility: HOSPITAL | Age: 70
LOS: 1 days | End: 2020-12-07
Payer: COMMERCIAL

## 2020-12-07 DIAGNOSIS — Z98.890 OTHER SPECIFIED POSTPROCEDURAL STATES: Chronic | ICD-10-CM

## 2020-12-07 DIAGNOSIS — Z96.651 PRESENCE OF RIGHT ARTIFICIAL KNEE JOINT: Chronic | ICD-10-CM

## 2020-12-07 DIAGNOSIS — Z95.2 PRESENCE OF PROSTHETIC HEART VALVE: Chronic | ICD-10-CM

## 2020-12-07 DIAGNOSIS — Z00.8 ENCOUNTER FOR OTHER GENERAL EXAMINATION: ICD-10-CM

## 2020-12-07 DIAGNOSIS — Z90.49 ACQUIRED ABSENCE OF OTHER SPECIFIED PARTS OF DIGESTIVE TRACT: Chronic | ICD-10-CM

## 2020-12-07 DIAGNOSIS — Z98.49 CATARACT EXTRACTION STATUS, UNSPECIFIED EYE: Chronic | ICD-10-CM

## 2020-12-07 PROCEDURE — 70553 MRI BRAIN STEM W/O & W/DYE: CPT

## 2020-12-07 PROCEDURE — A9585: CPT

## 2020-12-07 PROCEDURE — 70553 MRI BRAIN STEM W/O & W/DYE: CPT | Mod: 26

## 2020-12-22 ENCOUNTER — APPOINTMENT (OUTPATIENT)
Dept: NEUROLOGY | Facility: CLINIC | Age: 70
End: 2020-12-22
Payer: COMMERCIAL

## 2020-12-22 VITALS
WEIGHT: 270 LBS | HEART RATE: 81 BPM | DIASTOLIC BLOOD PRESSURE: 92 MMHG | BODY MASS INDEX: 38.65 KG/M2 | HEIGHT: 70 IN | SYSTOLIC BLOOD PRESSURE: 160 MMHG

## 2020-12-22 DIAGNOSIS — R26.89 OTHER ABNORMALITIES OF GAIT AND MOBILITY: ICD-10-CM

## 2020-12-22 DIAGNOSIS — G91.2 (IDIOPATHIC) NORMAL PRESSURE HYDROCEPHALUS: ICD-10-CM

## 2020-12-22 DIAGNOSIS — H83.2X9 LABYRINTHINE DYSFUNCTION, UNSPECIFIED EAR: ICD-10-CM

## 2020-12-22 PROCEDURE — 99072 ADDL SUPL MATRL&STAF TM PHE: CPT

## 2020-12-22 PROCEDURE — 99215 OFFICE O/P EST HI 40 MIN: CPT

## 2020-12-22 NOTE — HISTORY OF PRESENT ILLNESS
[FreeTextEntry1] : Patient reports that since last visit he continues to have intermittent dizziness and poor balance. He denies any falls or urine incontinence.\par He has been able to work. He denies any other new neurologic symptoms. \par No nausea or vomiting or weakness or numbness. \par He completed his TCD and CD and MRI brain.

## 2020-12-22 NOTE — DISCUSSION/SUMMARY
[FreeTextEntry1] : 1. 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 Continue ASA and BP meds and statin for secondary stroke prevention \par \par 4. Follow up in April to reassess

## 2020-12-22 NOTE — PHYSICAL EXAM
[General Appearance - Alert] : alert [Oriented To Time, Place, And Person] : oriented to person, place, and time [Person] : oriented to person [Place] : oriented to place [Time] : oriented to time [Cranial Nerves Optic (II)] : visual acuity intact bilaterally,  visual fields full to confrontation, pupils equal round and reactive to light [Cranial Nerves Oculomotor (III)] : extraocular motion intact [Cranial Nerves Trigeminal (V)] : facial sensation intact symmetrically [Cranial Nerves Facial (VII)] : face symmetrical [Cranial Nerves Vestibulocochlear (VIII)] : hearing was intact bilaterally [Cranial Nerves Glossopharyngeal (IX)] : tongue and palate midline [Cranial Nerves Accessory (XI - Cranial And Spinal)] : head turning and shoulder shrug symmetric [Cranial Nerves Hypoglossal (XII)] : there was no tongue deviation with protrusion [Motor Tone] : muscle tone was normal in all four extremities [Motor Strength] : muscle strength was normal in all four extremities [Involuntary Movements] : no involuntary movements were seen [No Muscle Atrophy] : normal bulk in all four extremities [Motor Handedness Right-Handed] : the patient is right hand dominant [Paresis Pronator Drift Right-Sided] : no pronator drift on the right [Paresis Pronator Drift Left-Sided] : no pronator drift on the left [Sensation Tactile Decrease] : light touch was intact [Romberg's Sign] : a positive Romberg's sign was present [Limited Balance] : the patient's balance was impaired [Past-pointing] : there was no past-pointing [Tremor] : no tremor present [Coordination - Dysmetria Impaired Finger-to-Nose Bilateral] : not present [2+] : Brachioradialis left 2+ [0] : Ankle jerk left 0 [Plantar Reflex Right Only] : normal on the right [Plantar Reflex Left Only] : normal on the left [FreeTextEntry7] : left horizontal was worse on testing than right canal  [FreeTextEntry8] : Poor balance and gait and uses cane to ambulate [Extraocular Movements] : extraocular movements were intact [Full Visual Field] : full visual field [Neck Appearance] : the appearance of the neck was normal [FreeTextEntry1] : left foot and leg externally rotated  [] : no rash [Skin Lesions] : no skin lesions

## 2020-12-22 NOTE — DATA REVIEWED
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

## 2020-12-22 NOTE — REVIEW OF SYSTEMS
[Fever] : no fever [Chills] : no chills [Feeling Tired] : feeling tired [As Noted in HPI] : as noted in HPI [Poor Coordination] : poor coordination [Dizziness] : dizziness [Lightheadedness] : lightheadedness [Joint Pain] : joint pain [Negative] : Heme/Lymph

## 2021-02-23 ENCOUNTER — APPOINTMENT (OUTPATIENT)
Dept: PULMONOLOGY | Facility: CLINIC | Age: 71
End: 2021-02-23

## 2021-03-11 ENCOUNTER — INPATIENT (INPATIENT)
Facility: HOSPITAL | Age: 71
LOS: 0 days | Discharge: HOME | End: 2021-03-12
Attending: INTERNAL MEDICINE | Admitting: INTERNAL MEDICINE
Payer: COMMERCIAL

## 2021-03-11 VITALS
TEMPERATURE: 98 F | OXYGEN SATURATION: 96 % | WEIGHT: 259.93 LBS | SYSTOLIC BLOOD PRESSURE: 196 MMHG | RESPIRATION RATE: 20 BRPM | HEART RATE: 79 BPM | DIASTOLIC BLOOD PRESSURE: 107 MMHG | HEIGHT: 70 IN

## 2021-03-11 DIAGNOSIS — Z98.890 OTHER SPECIFIED POSTPROCEDURAL STATES: Chronic | ICD-10-CM

## 2021-03-11 DIAGNOSIS — Z96.651 PRESENCE OF RIGHT ARTIFICIAL KNEE JOINT: Chronic | ICD-10-CM

## 2021-03-11 DIAGNOSIS — Z95.2 PRESENCE OF PROSTHETIC HEART VALVE: Chronic | ICD-10-CM

## 2021-03-11 DIAGNOSIS — Z90.49 ACQUIRED ABSENCE OF OTHER SPECIFIED PARTS OF DIGESTIVE TRACT: Chronic | ICD-10-CM

## 2021-03-11 DIAGNOSIS — Z98.49 CATARACT EXTRACTION STATUS, UNSPECIFIED EYE: Chronic | ICD-10-CM

## 2021-03-11 LAB
ALBUMIN SERPL ELPH-MCNC: 3.6 G/DL — SIGNIFICANT CHANGE UP (ref 3.3–5)
ALP SERPL-CCNC: 127 U/L — HIGH (ref 40–120)
ALT FLD-CCNC: 23 U/L — SIGNIFICANT CHANGE UP (ref 12–78)
ANION GAP SERPL CALC-SCNC: 7 MMOL/L — SIGNIFICANT CHANGE UP (ref 5–17)
APPEARANCE UR: CLEAR — SIGNIFICANT CHANGE UP
AST SERPL-CCNC: 8 U/L — LOW (ref 15–37)
BASE EXCESS BLDV CALC-SCNC: 1.6 MMOL/L — SIGNIFICANT CHANGE UP (ref -2–2)
BASOPHILS # BLD AUTO: 0.04 K/UL — SIGNIFICANT CHANGE UP (ref 0–0.2)
BASOPHILS NFR BLD AUTO: 0.5 % — SIGNIFICANT CHANGE UP (ref 0–2)
BILIRUB SERPL-MCNC: 0.8 MG/DL — SIGNIFICANT CHANGE UP (ref 0.2–1.2)
BILIRUB UR-MCNC: NEGATIVE — SIGNIFICANT CHANGE UP
BLD GP AB SCN SERPL QL: SIGNIFICANT CHANGE UP
BLOOD GAS COMMENTS, VENOUS: SIGNIFICANT CHANGE UP
BUN SERPL-MCNC: 16 MG/DL — SIGNIFICANT CHANGE UP (ref 7–23)
CALCIUM SERPL-MCNC: 8.5 MG/DL — SIGNIFICANT CHANGE UP (ref 8.5–10.1)
CHLORIDE SERPL-SCNC: 106 MMOL/L — SIGNIFICANT CHANGE UP (ref 96–108)
CK SERPL-CCNC: 74 U/L — SIGNIFICANT CHANGE UP (ref 26–308)
CO2 SERPL-SCNC: 28 MMOL/L — SIGNIFICANT CHANGE UP (ref 22–31)
COLOR SPEC: YELLOW — SIGNIFICANT CHANGE UP
COMMENT - URINE: SIGNIFICANT CHANGE UP
CREAT SERPL-MCNC: 1.12 MG/DL — SIGNIFICANT CHANGE UP (ref 0.5–1.3)
DIFF PNL FLD: NEGATIVE — SIGNIFICANT CHANGE UP
EOSINOPHIL # BLD AUTO: 0.12 K/UL — SIGNIFICANT CHANGE UP (ref 0–0.5)
EOSINOPHIL NFR BLD AUTO: 1.4 % — SIGNIFICANT CHANGE UP (ref 0–6)
EPI CELLS # UR: SIGNIFICANT CHANGE UP
FLUAV AG NPH QL: SIGNIFICANT CHANGE UP
FLUBV AG NPH QL: SIGNIFICANT CHANGE UP
GAS PNL BLDV: SIGNIFICANT CHANGE UP
GLUCOSE BLDC GLUCOMTR-MCNC: 125 MG/DL — HIGH (ref 70–99)
GLUCOSE SERPL-MCNC: 125 MG/DL — HIGH (ref 70–99)
GLUCOSE UR QL: NEGATIVE MG/DL — SIGNIFICANT CHANGE UP
HCO3 BLDV-SCNC: 26 MMOL/L — SIGNIFICANT CHANGE UP (ref 21–29)
HCT VFR BLD CALC: 44.6 % — SIGNIFICANT CHANGE UP (ref 39–50)
HGB BLD-MCNC: 14.2 G/DL — SIGNIFICANT CHANGE UP (ref 13–17)
IMM GRANULOCYTES NFR BLD AUTO: 0.3 % — SIGNIFICANT CHANGE UP (ref 0–1.5)
INR BLD: 1.14 RATIO — SIGNIFICANT CHANGE UP (ref 0.88–1.16)
KETONES UR-MCNC: NEGATIVE — SIGNIFICANT CHANGE UP
LEUKOCYTE ESTERASE UR-ACNC: NEGATIVE — SIGNIFICANT CHANGE UP
LYMPHOCYTES # BLD AUTO: 1.37 K/UL — SIGNIFICANT CHANGE UP (ref 1–3.3)
LYMPHOCYTES # BLD AUTO: 15.5 % — SIGNIFICANT CHANGE UP (ref 13–44)
MCHC RBC-ENTMCNC: 27 PG — SIGNIFICANT CHANGE UP (ref 27–34)
MCHC RBC-ENTMCNC: 31.8 GM/DL — LOW (ref 32–36)
MCV RBC AUTO: 84.8 FL — SIGNIFICANT CHANGE UP (ref 80–100)
MONOCYTES # BLD AUTO: 0.59 K/UL — SIGNIFICANT CHANGE UP (ref 0–0.9)
MONOCYTES NFR BLD AUTO: 6.7 % — SIGNIFICANT CHANGE UP (ref 2–14)
NEUTROPHILS # BLD AUTO: 6.71 K/UL — SIGNIFICANT CHANGE UP (ref 1.8–7.4)
NEUTROPHILS NFR BLD AUTO: 75.6 % — SIGNIFICANT CHANGE UP (ref 43–77)
NITRITE UR-MCNC: NEGATIVE — SIGNIFICANT CHANGE UP
NRBC # BLD: 0 /100 WBCS — SIGNIFICANT CHANGE UP (ref 0–0)
PCO2 BLDV: 41 MMHG — SIGNIFICANT CHANGE UP (ref 35–50)
PH BLDV: 7.41 — SIGNIFICANT CHANGE UP (ref 7.35–7.45)
PH UR: 6.5 — SIGNIFICANT CHANGE UP (ref 5–8)
PLATELET # BLD AUTO: 236 K/UL — SIGNIFICANT CHANGE UP (ref 150–400)
PO2 BLDV: 215 MMHG — HIGH (ref 25–45)
POTASSIUM SERPL-MCNC: 4 MMOL/L — SIGNIFICANT CHANGE UP (ref 3.5–5.3)
POTASSIUM SERPL-SCNC: 4 MMOL/L — SIGNIFICANT CHANGE UP (ref 3.5–5.3)
PROT SERPL-MCNC: 8 GM/DL — SIGNIFICANT CHANGE UP (ref 6–8.3)
PROT UR-MCNC: 30 MG/DL
PROTHROM AB SERPL-ACNC: 13.1 SEC — SIGNIFICANT CHANGE UP (ref 10.6–13.6)
RBC # BLD: 5.26 M/UL — SIGNIFICANT CHANGE UP (ref 4.2–5.8)
RBC # FLD: 14.4 % — SIGNIFICANT CHANGE UP (ref 10.3–14.5)
SAO2 % BLDV: 99 % — HIGH (ref 67–88)
SARS-COV-2 RNA SPEC QL NAA+PROBE: SIGNIFICANT CHANGE UP
SODIUM SERPL-SCNC: 141 MMOL/L — SIGNIFICANT CHANGE UP (ref 135–145)
SP GR SPEC: 1 — LOW (ref 1.01–1.02)
TROPONIN I SERPL-MCNC: <.015 NG/ML — SIGNIFICANT CHANGE UP (ref 0.01–0.04)
UROBILINOGEN FLD QL: NEGATIVE MG/DL — SIGNIFICANT CHANGE UP
WBC # BLD: 8.86 K/UL — SIGNIFICANT CHANGE UP (ref 3.8–10.5)
WBC # FLD AUTO: 8.86 K/UL — SIGNIFICANT CHANGE UP (ref 3.8–10.5)

## 2021-03-11 PROCEDURE — 71045 X-RAY EXAM CHEST 1 VIEW: CPT | Mod: 26

## 2021-03-11 PROCEDURE — 70496 CT ANGIOGRAPHY HEAD: CPT | Mod: 26,MA

## 2021-03-11 PROCEDURE — 93010 ELECTROCARDIOGRAM REPORT: CPT

## 2021-03-11 PROCEDURE — 0042T: CPT

## 2021-03-11 PROCEDURE — 99291 CRITICAL CARE FIRST HOUR: CPT

## 2021-03-11 PROCEDURE — 99223 1ST HOSP IP/OBS HIGH 75: CPT

## 2021-03-11 PROCEDURE — 70498 CT ANGIOGRAPHY NECK: CPT | Mod: 26,MA

## 2021-03-11 RX ORDER — LABETALOL HCL 100 MG
5 TABLET ORAL ONCE
Refills: 0 | Status: COMPLETED | OUTPATIENT
Start: 2021-03-11 | End: 2021-03-11

## 2021-03-11 RX ORDER — LABETALOL HCL 100 MG
10 TABLET ORAL ONCE
Refills: 0 | Status: COMPLETED | OUTPATIENT
Start: 2021-03-11 | End: 2021-03-11

## 2021-03-11 RX ORDER — ASPIRIN/CALCIUM CARB/MAGNESIUM 324 MG
81 TABLET ORAL DAILY
Refills: 0 | Status: DISCONTINUED | OUTPATIENT
Start: 2021-03-11 | End: 2021-03-12

## 2021-03-11 RX ORDER — ATORVASTATIN CALCIUM 80 MG/1
80 TABLET, FILM COATED ORAL AT BEDTIME
Refills: 0 | Status: DISCONTINUED | OUTPATIENT
Start: 2021-03-11 | End: 2021-03-12

## 2021-03-11 RX ORDER — METOPROLOL TARTRATE 50 MG
25 TABLET ORAL DAILY
Refills: 0 | Status: DISCONTINUED | OUTPATIENT
Start: 2021-03-11 | End: 2021-03-12

## 2021-03-11 RX ORDER — ENOXAPARIN SODIUM 100 MG/ML
40 INJECTION SUBCUTANEOUS DAILY
Refills: 0 | Status: DISCONTINUED | OUTPATIENT
Start: 2021-03-11 | End: 2021-03-12

## 2021-03-11 RX ORDER — ACETAMINOPHEN 500 MG
2 TABLET ORAL
Qty: 0 | Refills: 0 | DISCHARGE

## 2021-03-11 RX ORDER — SIMVASTATIN 20 MG/1
80 TABLET, FILM COATED ORAL AT BEDTIME
Refills: 0 | Status: DISCONTINUED | OUTPATIENT
Start: 2021-03-11 | End: 2021-03-11

## 2021-03-11 RX ADMIN — Medication 10 MILLIGRAM(S): at 10:25

## 2021-03-11 RX ADMIN — ATORVASTATIN CALCIUM 80 MILLIGRAM(S): 80 TABLET, FILM COATED ORAL at 21:37

## 2021-03-11 RX ADMIN — Medication 5 MILLIGRAM(S): at 21:39

## 2021-03-11 RX ADMIN — Medication 25 MILLIGRAM(S): at 13:03

## 2021-03-11 RX ADMIN — ENOXAPARIN SODIUM 40 MILLIGRAM(S): 100 INJECTION SUBCUTANEOUS at 13:03

## 2021-03-11 NOTE — H&P ADULT - ASSESSMENT
70 year old man        h/o HTN.  AVR 2015, Colon resection,  spinal stenosis,, copd, kidney stones,  blurry vision in    past        ventral hernia  repair.  atherosclerosis of aorta   who presents to the ER with reports of slurred speech., this  am while  at work at [employer]       around  7:30 am ,while talking to customer he was unable to speak,  and had word finding difficulty.     Currently in the ER he denies headache, vision loss, dizziness, and weakness.    s/p code stoke in er/ neuro was  called     1.    h/o   dysarthria,  on arrival,  since resolved    s/p code  stroke in ER/  ct head, normal/  neuro to f/p     on asa/ lipitor   2  HTN, sbp was  elevated abov2  200, now  is  about  160    permissive htn/  on  toprol  3,   h/o AVR,  bioprosthetic  4. prior  MRI 1/20,  left TG  schwannoma,  NPH    awiat neuro  f/o    Echo ordered   on dvt ppx        < from: CT Brain Stroke Protocol (03.11.21 @ 09:15) >  PRESSION:  -No acute intracranial hemorrhage.  -Within limits of extensive streak artifact, cannot rule out lacunar infarct involving the right grace. Consider MRI for more sensitive evaluation.  Findings discussed with Dr. Omalley in the ER at 9:22 AM 3/11/2021.  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  Enlarged lateral and third ventricle and optic recesses, lower bifrontal temporal sulci with sulcal effacement of vertex with upper lymph node corpus callosum findings can be seen with normal pressure hydrocephalus.  Progressive microvascular disease, enlarged tortuous arterial vessels, flattening medullary pyramids likely from hypertension. No restricted diffusion or shift.  Bilateral cranial nerves VII and VIII and internal auditory meati remain unremarkable  < end of copied text >               70 year old man        h/o HTN.  AVR 2015, Colon resection,  spinal stenosis,, copd, kidney stones,  blurry vision in    past        ventral hernia  repair.  atherosclerosis of aorta   who presents to the ER with reports of slurred speech., this  am while  at work at [employer]       around  7:30 am ,while talking to customer he was unable to speak,  and had word finding difficulty.     Currently in the ER he denies headache, vision loss, dizziness, and weakness.    s/p code stoke in er/ neuro was  called     1.    CVA/         h/o   dysarthria,  on arrival,  since resolved       s/p code  stroke in ER/  ct head, normal/  neuro to f/p        on asa/ lipitor   2  HTN, sbp was  elevated above   200, now  is  about  170      permissive htn/  on  toprol  3,   h/o AVR,  bioprosthetic  4.  prior  MRI 1/20,  left TG  schwannoma,  NPH/ , but pt  seems unaware  of  this        awiat neuro  f/o   5.   Obesity/  SAMMY, on nocturnal Bipap    Echo ordered     on dvt ppx      wife called/ unable  to reach    pt rather  worried about  being admitted  and  getting bills/ stating that wife dis not want sharifa  going to hosp,  due to  cost factor/ bills     < from: CT Brain Stroke Protocol (03.11.21 @ 09:15) >  PRESSION:  -No acute intracranial hemorrhage.  -Within limits of extensive streak artifact, cannot rule out lacunar infarct involving the right grace. Consider MRI for more sensitive evaluation.  Findings discussed with Dr. Omalley in the ER at 9:22 AM 3/11/2021.  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  Enlarged lateral and third ventricle and optic recesses, lower bifrontal temporal sulci with sulcal effacement of vertex with upper lymph node corpus callosum findings can be seen with normal pressure hydrocephalus.  Progressive microvascular disease, enlarged tortuous arterial vessels, flattening medullary pyramids likely from hypertension. No restricted diffusion or shift.  Bilateral cranial nerves VII and VIII and internal auditory meati remain unremarkable  < end of copied text >               70 year old man        h/o HTN.  AVR 2015, Colon resection,  spinal stenosis,, copd, kidney stones,  blurry vision in    past        ventral hernia  repair.  atherosclerosis of aorta   who presents to the ER with reports of slurred speech., this  am while  at work at [employer]       around  7:30 am ,while talking to customer he was unable to speak,  and had word finding difficulty.     Currently in the ER he denies headache, vision loss, dizziness, and weakness.    s/p code stoke in er/ neuro was  called     1.    CVA/TIA         h/o   dysarthria,  on arrival,  since resolved       s/p code  stroke in ER/  ct head, normal       neuro to f/p dr Rivera        on asa/ lipitor   2  HTN, sbp was  elevated above   200, now  is  about  170      permissive htn/  on  toprol  3,   h/o AVR,  bioprosthetic  4.  prior  MRI 1/20,  left TG  schwannoma,  NPH/ , but pt  seems unaware  of  this        awiat neuro  f/o   5.   Obesity/  SAMMY, on nocturnal Bipap    Echo ordered     on dvt ppx      wife called/ unable  to reach    pt rather  worried about  being admitted  and  getting bills/ stating that wife dis not want sharifa  going to hosp,  due to  cost factor/ bills     < from: CT Brain Stroke Protocol (03.11.21 @ 09:15) >  PRESSION:  -No acute intracranial hemorrhage.  -Within limits of extensive streak artifact, cannot rule out lacunar infarct involving the right grace. Consider MRI for more sensitive evaluation.  Findings discussed with Dr. Omalley in the ER at 9:22 AM 3/11/2021.  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  Enlarged lateral and third ventricle and optic recesses, lower bifrontal temporal sulci with sulcal effacement of vertex with upper lymph node corpus callosum findings can be seen with normal pressure hydrocephalus.  Progressive microvascular disease, enlarged tortuous arterial vessels, flattening medullary pyramids likely from hypertension. No restricted diffusion or shift.  Bilateral cranial nerves VII and VIII and internal auditory meati remain unremarkable  < end of copied text >

## 2021-03-11 NOTE — H&P ADULT - NSHPLABSRESULTS_GEN_ALL_CORE
LABS:                        14.2   8.86  )-----------( 236      ( 11 Mar 2021 09:37 )             44.6     03-11    141  |  106  |  16  ----------------------------<  125<H>  4.0   |  28  |  1.12    Ca    8.5      11 Mar 2021 09:37    TPro  8.0  /  Alb  3.6  /  TBili  0.8  /  DBili  x   /  AST  8<L>  /  ALT  23  /  AlkPhos  127<H>  03-11    PT/INR - ( 11 Mar 2021 10:34 )   PT: 13.1 sec;   INR: 1.14 ratio           CARDIAC MARKERS ( 11 Mar 2021 09:37 )  <.015 ng/mL / x     / 74 U/L / x     / x          Urinalysis Basic - ( 11 Mar 2021 10:34 )    Color: Yellow / Appearance: Clear / S.005 / pH: x  Gluc: x / Ketone: Negative  / Bili: Negative / Urobili: Negative mg/dL   Blood: x / Protein: 30 mg/dL / Nitrite: Negative   Leuk Esterase: Negative / RBC: x / WBC x   Sq Epi: x / Non Sq Epi: Occasional / Bacteria: x

## 2021-03-11 NOTE — H&P ADULT - NSHPPHYSICALEXAM_GEN_ALL_CORE
PHYSICAL EXAMINATION:  Vital Signs Last 24 Hrs  T(C): 36.6 (11 Mar 2021 09:00), Max: 36.6 (11 Mar 2021 09:00)  T(F): 97.9 (11 Mar 2021 09:00), Max: 97.9 (11 Mar 2021 09:00)  HR: 75 (11 Mar 2021 11:14) (75 - 79)  BP: 159/93 (11 Mar 2021 11:14) (159/93 - 196/107)  BP(mean): --  RR: 16 (11 Mar 2021 11:14) (16 - 20)  SpO2: 94% (11 Mar 2021 11:14) (94% - 96%)  CAPILLARY BLOOD GLUCOSE      POCT Blood Glucose.: 125 mg/dL (11 Mar 2021 09:01)        GENERAL: NAD, well-groomed,  HEAD:  atraumatic, normocephalic  EYES: sclera anicteric  ENMT: mucous membranes moist  NECK: supple, No JVD  CHEST/LUNG: clear to auscultation bilaterally;    no      rales   ,   no rhonchi,   HEART: normal S1, S2  ABDOMEN: BS+, soft, ND, NT   EXTREMITIES:    no    edema    b/l LEs  NEURO: awake, ,     moves all extremities   normal speech/ no focal deficits  SKIN: no     rash

## 2021-03-11 NOTE — ED ADULT NURSE NOTE - ED STAT RN HANDOFF DETAILS
Report given to Liliya HUNG. Patient is A&Ox4. Patient Code stroke, all labs were sent and pending orders were discussed with the RN. Patient is waiting to go up to 1D. Patient has IV access in his left AC and flushes without difficulty.

## 2021-03-11 NOTE — ED PROVIDER NOTE - OBJECTIVE STATEMENT
This patient is a 70 year old man hx of HTN who presents to the ER with reports of slurred speech.  Patient states that he got to work at 7 am.  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  Currently in the ER he denies headache, vision loss, dizziness, and weakness.

## 2021-03-11 NOTE — ED PROVIDER NOTE - CLINICAL SUMMARY MEDICAL DECISION MAKING FREE TEXT BOX
Patient with word finding difficulty and slurred speech resolved last known well less than 6 hours.  Code stroke called.  CT orders and labs placed.    NIH zero.  CT negative for ICH perfusion scan does not show deficits consistent with stroke.  Will contact on call neurologist and admit patient.

## 2021-03-11 NOTE — ED PROVIDER NOTE - CRITICAL CARE ATTENDING CONTRIBUTION TO CARE
Patient with stroke like symptoms resolved but within 6 hours code stroke called.  CT performed NIH done.  Telestroke called and will continue to communicate while waiting for CT results.

## 2021-03-11 NOTE — ED ADULT NURSE NOTE - NSFALLRSKASSESSTYPE_ED_ALL_ED
Problem(s) Oriented visit        SUBJECTIVE:                                                    Paulina Diana is a 38 year old female who presents to clinic today for the following health issues :    1. Acquired hypothyroidism  She reports concern about his thyroid medication needing adjustment as she has had some constipation, fatigue, hyperlipidemia and hypertension.    The last 5 available TSH values from [facility] [place]:  TSH   Date Value Ref Range Status   08/28/2018 12.510 (H) 0.450 - 4.500 uIU/mL Final   08/08/2017 14.990 (H) 0.450 - 4.500 uIU/mL Final   09/27/2016 4.660 (H) 0.450 - 4.500 uIU/mL Final   02/24/2016 2.050 0.450 - 4.500 uIU/mL Final   03/13/2012 21.820 (H) 0.450 - 4.500 uIU/mL Final     Any TSH results from the [facility] lab system:  TSH   Date Value Ref Range Status   01/05/2018 17.79 (H) 0.40 - 4.00 mU/L Final   12/17/2008 6.517 mcU/mL        - levothyroxine (SYNTHROID/LEVOTHROID) 175 MCG tablet; Take 1 tablet (175 mcg) by mouth daily  Dispense: 30 tablet; Refill: 11  - TSH ([facility]); Future         Problem list, Medication list, Allergies, and Medical/Social/Surgical histories reviewed in [facility] and updated as appropriate.   Additional history: as documented    ROS:  General and CV completed and negative except as noted above    Histories:   Patient Active Problem List   Diagnosis     Atypical nevus     Hypothyroidism     Indication for care in labor and delivery, antepartum     Acquired hypothyroidism     Dermatofibroma of face     Melanocytic nevus, unspecified location     Ectopic pregnancy, tubal     Past Surgical History:   Procedure Laterality Date     DILATION AND CURETTAGE SUCTION N/A 5/27/2016    Procedure: DILATION AND CURETTAGE SUCTION;  Surgeon: Natacha Goyal MD;  Location: [facility]     ENT SURGERY      septal repair     LAPAROSCOPIC EVACUATION ECTOPIC PREGNANCY Left 1/28/2018    Procedure: LAPAROSCOPIC EVACUATION ECTOPIC PREGNANCY;  LAPAROSCOPIC SINGLE SITE EVACUATION OF ECTOPIC  Telephone Encounter by Lara Espino at 03/07/17 10:47 AM     Author:  Lara Espino Service:  (none) Author Type:  Patient      Filed:  03/07/17 10:53 AM Encounter Date:  3/7/2017 Status:  Signed     :  Lara Espino (Patient )              STEPHEN TEE    Patient Age: 75 year old   Refill request by:[BN1.1T] Phone.  Caller informed to check with the pharmacy later for their refill.  If problems arise, we will contact patient.[BN1.1M]  Refill to be:[BN1.1T] Phoned to[BN1.1M] [facility] [place] [address][BN1.2T]    Medication requested to be refilled:   JANUMET  MG per tablet 1 Tab 2 (two) times daily.       hydrochlorothiazide (HYDRODIURIL) 25 MG tablet TAKE 1 TABLET BY MOUTH DAILY 30 Tab 5     pravastatin (PRAVACHOL) 40 MG tablet Take 1 Tab by mouth nightly. at bedtime 90 Tab 1[BN1.1T]     These medications[BN1.1M] may[BN1.3M] need prior authorization. Please refill as he is out of medication.   The Pharmacy had called these requests in a couple days ago, but has not received anything back.   Please return call.[BN1.1M] Message confirmed with caller.          Next and Last Visit with Provider and Department  Next visit with RACHEAL PÉREZ is on No match found  Next visit with [facility] is on No match found   Last visit with RACHEAL PÉREZ was on 12/27/2016 at  9:00 AM in [facility]  Last visit with [facility] was on 12/27/2016 at  9:00 AM in [facility]      WEIGHT AND HEIGHT: As of 03/06/2017 weight is 209.6 lbs.(95.074 kg). Height is 6' 0\"(1.829 m).   BMI is 28.42 kg/(m^2) calculated from:     Height 6' 0\" (1.829 m) as of 3/6/17     Weight 209 lb 9.6 oz (95.074 kg) as of 3/6/17      No Known Allergies  Current outpatient prescriptions       Medication  Sig Dispense Refill   • Probiotic Product (PROBIOTIC OR) Take  by mouth.     • finasteride (PROSCAR) 5 MG tablet Take 1 Tab by mouth daily. 90 Tab 1   •  PREGNANCY, LEFT SALPINGECTOMY;  Surgeon: Erum Boles MD;  Location:  OR     LAPAROSCOPIC SALPINGECTOMY Left 1/28/2018    Procedure: LAPAROSCOPIC SALPINGECTOMY;;  Surgeon: Erum Boles MD;  Location:  OR     LAPAROSCOPIC SALPINGO-OOPHORECTOMY  11/2/2013    Procedure: LAPAROSCOPIC SALPINGO-OOPHORECTOMY;  Single port laparoscopic, Evacuation of hemo-peritonium and Products of Conception;  Surgeon: Peggy Thornton MD;  Location:  OR     NOSE SURGERY      age 12       Social History   Substance Use Topics     Smoking status: Never Smoker     Smokeless tobacco: Never Used      Comment:  on occasion     Alcohol use No      Comment: rare     No family history on file.        OBJECTIVE:                                                    /76  Pulse 79  Resp 16  LMP 11/22/2017  SpO2 98%  There is no height or weight on file to calculate BMI.   APPEARANCE: = Relaxed and in no distress  Conj/Eyelids = noninjected and lids and lashes are without inflammation  PERRLA/Irises = Pupils Round Reactive to Light and Irisis without inflammation  Neck = No anterior or posterior adenopathy appreciated.  Thyroid = Not enlarged and no masses felt  Resp effort = Calm regular breathing  Breath Sounds = Good air movement with no rales or rhonchi in any lung fields  Heart Rate, Rhythm, & sounds (no Murm)  = Regular rate and rhythm with no S3, S4, or murmur appreciated.  Carotid Art's = Pulses full and equal and no bruits appreciated  Abdomen = Soft, nontender, no masses, & bowel sounds in all quadrants  Liver/Spleen = Normal span and no splenomegaly noted  Digits and Nails = FROM in all finger joints, no nail dystrophy  Ext (edema) = No pretibial edema noted or elsewhere  Musculsktl =  Strength and ROM of major joints are within normal limits  SKIN = absent significant rashes or lesions   Recent/Remote Memory = Alert and Oriented x 3  Mood/Affect = Cooperative and interested  metoprolol (LOPRESSOR) 25 MG tablet TAKE 1 TABLET BY MOUTH TWICE DAILY 180 Tab 1   • ferrous sulfate 325 (65 FE) MG tablet Take 1 Tab by mouth daily with breakfast. 30 Tab 2   • PNEUMOVAX 23 25 MCG/0.5ML injection ADM 0.5ML IM UTD  0   • FLUZONE HIGH-DOSE 0.5 ML ZOEY ADM 0.5ML IM UTD  0   • JANUMET  MG per tablet 1 Tab 2 (two) times daily.     • Multiple Vitamin (MULTI-VITAMIN OR) Take  by mouth daily. Indications: multivitamin wit iron     • pravastatin (PRAVACHOL) 40 MG tablet Take 1 Tab by mouth nightly. at bedtime 90 Tab 1   • lisinopril (PRINIVIL,ZESTRIL) 40 MG tablet Take 1 Tab by mouth daily. 90 Tab 1   • clopidogrel (PLAVIX) 75 MG tablet Take 1 Tab by mouth daily. 90 Tab 1   • hydrochlorothiazide (HYDRODIURIL) 25 MG tablet TAKE 1 TABLET BY MOUTH DAILY 30 Tab 5   • Tamsulosin HCl 0.4 MG CAPS Take 1 Cap by mouth 2 (two) times daily. 180 Cap 3   • glimepiride (AMARYL) 4 MG tablet Take 2 Tabs by mouth daily. 180 Tab 2   • CUSTOM FORMULATION -- SEE SIG Patient to test twice a day, Diabetic form filled out and faxed to [employer] Strip 2   • aspirin 81 MG tablet Take 81 mg by mouth daily.       • DIABETIC TESTING STRIP/LANCETS Tests 4 times a day; True Plus lancets needed. 150 Each PRN        ROUTING:[BN1.1T] Patient's physician/staff[BN1.1M]        PCP: Brenda Storey MD         INS: Payor: MEDICARE - ASSIGNED / Plan: *No Plan* / Product Type: *No Product type* / Note: This is the primary coverage, but no account was found for this location or the patient's primary location.   ADDRESS:  [address] [place] [address] Dr Marimar DORSEY [id number][BN1.1T]       Revision History        User Key Date/Time User Provider Type Action    > BN1.3 03/07/17 10:53 AM Lara Espino Patient  Sign     BN1.2 03/07/17 10:51 AM Lara Espino Patient       BN1.1 03/07/17 10:47 Lara Ulloa Patient      M - Manual, T - Template                 ASSESSMENT/PLAN:                                                        Paulina was seen today for results.    Diagnoses and all orders for this visit:    Acquired hypothyroidism  -     levothyroxine (SYNTHROID/LEVOTHROID) 175 MCG tablet; Take 1 tablet (175 mcg) by mouth daily  -     TSH ([facility]); Future        Work on weight loss  Regular exercise    The following health maintenance items are reviewed in Epic and correct as of today:  Health Maintenance   Topic Date Due     PHQ-2 Q1 YR  06/03/1994     HIV SCREEN (SYSTEM ASSIGNED)  06/03/2000     PAP SCREENING Q3 YR (SYSTEM ASSIGNED)  04/01/2018     INFLUENZA VACCINE (1) 09/01/2018     TETANUS IMMUNIZATION (SYSTEM ASSIGNED)  09/04/2022     COLONOSCOPY Q10 YR  10/20/2026       Ismael Ribera MD  [facility]  Family Practice  [facility]  [phone]    For any issues my office # is [phone]         Initial (On Arrival)

## 2021-03-11 NOTE — ED ADULT NURSE NOTE - OBJECTIVE STATEMENT
pt A&Ox4 with c/o of slurred speech x 2 hours ago. pt reports being at work and could not get his words out. Code stroke called, Denies headache, CP,SOB. PMH HTN, Hyperlidiemia.

## 2021-03-11 NOTE — ED PROVIDER NOTE - PROGRESS NOTE DETAILS
Telestroke Dr. Rosa made aware -  patient not a tPA candidate with resolved symptoms.  Will have to call back again when CT scan is done/resulted. Neurology called and made aware.

## 2021-03-11 NOTE — ED PROVIDER NOTE - EMS DETAILS FREE TEXT FOR MDM ADDL HISTORY OBTAINED FROM QUESTION
Patient reported word finding difficulty co-workers state patient was slurring his speech.  No deficit as per EMS arrival.  No finger stick in the field.  SBP reported to be 230s

## 2021-03-11 NOTE — ED ADULT NURSE NOTE - NSIMPLEMENTINTERV_GEN_ALL_ED
Implemented All Fall with Harm Risk Interventions:  [place] to call system. Call bell, personal items and telephone within reach. Instruct patient to call for assistance. Room bathroom lighting operational. Non-slip footwear when patient is off stretcher. Physically safe environment: no spills, clutter or unnecessary equipment. Stretcher in lowest position, wheels locked, appropriate side rails in place. Provide visual cue, wrist band, yellow gown, etc. Monitor gait and stability. Monitor for mental status changes and reorient to person, place, and time. Review medications for side effects contributing to fall risk. Reinforce activity limits and safety measures with patient and family. Provide visual clues: red socks.

## 2021-03-11 NOTE — H&P ADULT - NSICDXFAMILYHX_GEN_ALL_CORE_FT
FAMILY HISTORY:  Family history of COPD (chronic obstructive pulmonary disease)  Family history of lung cancer

## 2021-03-11 NOTE — H&P ADULT - HISTORY OF PRESENT ILLNESS
70 year old man      hx of HTN.  AVR 2015, Colon resection,  spinal stenosis,, copd, kidney stones,  blurry vision in past   who presents to the ER with reports of slurred speech.      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     Currently in the ER he denies headache, vision loss, dizziness, and weakness.    s/p code stoke in er/ neuro was  called  pt's  symptoms  have  resolved  now     70 year old man      hx of HTN.  AVR 2015,  colonic polp resection ,/ s/p  ileostomy,   spinal stenosis,, copd, kidney stones,  blurry vision in past, morbid  obesity/ SAMMY on nocturnal  cpap at home   who presents to the ER with reports of slurred speech.      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     Currently in the ER he denies headache, vision loss, dizziness, and weakness.    s/p code stoke in er/ neuro was  called  pt's  symptoms  have  resolved  now     70 year old man      hx of HTN.  AVR 2015,  colonic polp resection ,/ s/p  ileostomy,   spinal stenosis,, copd,      kidney stones,  blurry vision in past,  right rib surg,       morbid  obesity/ SAMMY on nocturnal  cpap at home   who presents to the ER with reports of slurred speech.      Patient states that he got to work at 7 am. in [employer], , and  at 7:30 am shortly after while talking to customer he was unable to speak,  and had word finding difficulty.     Currently in the ER he denies headache, vision loss, dizziness, and weakness.    s/p code stoke in er/ neuro was  called  pt's  symptoms  have  resolved  now

## 2021-03-12 ENCOUNTER — TRANSCRIPTION ENCOUNTER (OUTPATIENT)
Age: 71
End: 2021-03-12

## 2021-03-12 VITALS
OXYGEN SATURATION: 96 % | HEART RATE: 78 BPM | TEMPERATURE: 98 F | DIASTOLIC BLOOD PRESSURE: 97 MMHG | SYSTOLIC BLOOD PRESSURE: 147 MMHG | RESPIRATION RATE: 18 BRPM

## 2021-03-12 LAB
ALBUMIN SERPL ELPH-MCNC: 3.1 G/DL — LOW (ref 3.3–5)
ALP SERPL-CCNC: 104 U/L — SIGNIFICANT CHANGE UP (ref 40–120)
ALT FLD-CCNC: 19 U/L — SIGNIFICANT CHANGE UP (ref 12–78)
ANION GAP SERPL CALC-SCNC: 5 MMOL/L — SIGNIFICANT CHANGE UP (ref 5–17)
AST SERPL-CCNC: 10 U/L — LOW (ref 15–37)
BILIRUB DIRECT SERPL-MCNC: 0.16 MG/DL — SIGNIFICANT CHANGE UP (ref 0.05–0.2)
BILIRUB INDIRECT FLD-MCNC: 0.9 MG/DL — SIGNIFICANT CHANGE UP (ref 0.2–1)
BILIRUB SERPL-MCNC: 1.1 MG/DL — SIGNIFICANT CHANGE UP (ref 0.2–1.2)
BUN SERPL-MCNC: 13 MG/DL — SIGNIFICANT CHANGE UP (ref 7–23)
CALCIUM SERPL-MCNC: 8.5 MG/DL — SIGNIFICANT CHANGE UP (ref 8.5–10.1)
CHLORIDE SERPL-SCNC: 104 MMOL/L — SIGNIFICANT CHANGE UP (ref 96–108)
CHOLEST SERPL-MCNC: 194 MG/DL — SIGNIFICANT CHANGE UP
CO2 SERPL-SCNC: 31 MMOL/L — SIGNIFICANT CHANGE UP (ref 22–31)
CREAT SERPL-MCNC: 1.03 MG/DL — SIGNIFICANT CHANGE UP (ref 0.5–1.3)
GLUCOSE SERPL-MCNC: 133 MG/DL — HIGH (ref 70–99)
HCT VFR BLD CALC: 40.4 % — SIGNIFICANT CHANGE UP (ref 39–50)
HDLC SERPL-MCNC: 40 MG/DL — LOW
HGB BLD-MCNC: 12.8 G/DL — LOW (ref 13–17)
LIPID PNL WITH DIRECT LDL SERPL: 124 MG/DL — HIGH
MCHC RBC-ENTMCNC: 27 PG — SIGNIFICANT CHANGE UP (ref 27–34)
MCHC RBC-ENTMCNC: 31.7 GM/DL — LOW (ref 32–36)
MCV RBC AUTO: 85.2 FL — SIGNIFICANT CHANGE UP (ref 80–100)
NON HDL CHOLESTEROL: 153 MG/DL — HIGH
NRBC # BLD: 0 /100 WBCS — SIGNIFICANT CHANGE UP (ref 0–0)
PLATELET # BLD AUTO: 215 K/UL — SIGNIFICANT CHANGE UP (ref 150–400)
POTASSIUM SERPL-MCNC: 3.9 MMOL/L — SIGNIFICANT CHANGE UP (ref 3.5–5.3)
POTASSIUM SERPL-SCNC: 3.9 MMOL/L — SIGNIFICANT CHANGE UP (ref 3.5–5.3)
PROT SERPL-MCNC: 6.7 GM/DL — SIGNIFICANT CHANGE UP (ref 6–8.3)
RBC # BLD: 4.74 M/UL — SIGNIFICANT CHANGE UP (ref 4.2–5.8)
RBC # FLD: 14.6 % — HIGH (ref 10.3–14.5)
SARS-COV-2 IGG SERPL QL IA: NEGATIVE — SIGNIFICANT CHANGE UP
SARS-COV-2 IGM SERPL IA-ACNC: <3.8 AU/ML — SIGNIFICANT CHANGE UP
SODIUM SERPL-SCNC: 140 MMOL/L — SIGNIFICANT CHANGE UP (ref 135–145)
TRIGL SERPL-MCNC: 147 MG/DL — SIGNIFICANT CHANGE UP
WBC # BLD: 6.96 K/UL — SIGNIFICANT CHANGE UP (ref 3.8–10.5)
WBC # FLD AUTO: 6.96 K/UL — SIGNIFICANT CHANGE UP (ref 3.8–10.5)

## 2021-03-12 PROCEDURE — 99239 HOSP IP/OBS DSCHRG MGMT >30: CPT | Mod: 25

## 2021-03-12 PROCEDURE — 70551 MRI BRAIN STEM W/O DYE: CPT | Mod: 26

## 2021-03-12 PROCEDURE — 93306 TTE W/DOPPLER COMPLETE: CPT | Mod: 26

## 2021-03-12 RX ORDER — VALSARTAN 80 MG/1
320 TABLET ORAL ONCE
Refills: 0 | Status: COMPLETED | OUTPATIENT
Start: 2021-03-12 | End: 2021-03-12

## 2021-03-12 RX ORDER — VALSARTAN 80 MG/1
320 TABLET ORAL DAILY
Refills: 0 | Status: DISCONTINUED | OUTPATIENT
Start: 2021-03-13 | End: 2021-03-12

## 2021-03-12 RX ORDER — AMLODIPINE BESYLATE 2.5 MG/1
5 TABLET ORAL ONCE
Refills: 0 | Status: COMPLETED | OUTPATIENT
Start: 2021-03-12 | End: 2021-03-12

## 2021-03-12 RX ORDER — METOPROLOL TARTRATE 50 MG
1 TABLET ORAL
Qty: 30 | Refills: 0
Start: 2021-03-12 | End: 2021-04-10

## 2021-03-12 RX ORDER — HYDRALAZINE HCL 50 MG
10 TABLET ORAL EVERY 6 HOURS
Refills: 0 | Status: DISCONTINUED | OUTPATIENT
Start: 2021-03-12 | End: 2021-03-12

## 2021-03-12 RX ORDER — CLOPIDOGREL BISULFATE 75 MG/1
75 TABLET, FILM COATED ORAL DAILY
Refills: 0 | Status: DISCONTINUED | OUTPATIENT
Start: 2021-03-12 | End: 2021-03-12

## 2021-03-12 RX ORDER — AMLODIPINE BESYLATE 2.5 MG/1
5 TABLET ORAL DAILY
Refills: 0 | Status: DISCONTINUED | OUTPATIENT
Start: 2021-03-13 | End: 2021-03-12

## 2021-03-12 RX ADMIN — Medication 10 MILLIGRAM(S): at 11:41

## 2021-03-12 RX ADMIN — ENOXAPARIN SODIUM 40 MILLIGRAM(S): 100 INJECTION SUBCUTANEOUS at 11:18

## 2021-03-12 RX ADMIN — Medication 81 MILLIGRAM(S): at 11:17

## 2021-03-12 RX ADMIN — CLOPIDOGREL BISULFATE 75 MILLIGRAM(S): 75 TABLET, FILM COATED ORAL at 11:17

## 2021-03-12 RX ADMIN — AMLODIPINE BESYLATE 5 MILLIGRAM(S): 2.5 TABLET ORAL at 15:05

## 2021-03-12 RX ADMIN — VALSARTAN 320 MILLIGRAM(S): 80 TABLET ORAL at 13:59

## 2021-03-12 RX ADMIN — Medication 25 MILLIGRAM(S): at 05:30

## 2021-03-12 NOTE — DISCHARGE NOTE NURSING/CASE MANAGEMENT/SOCIAL WORK - PATIENT PORTAL LINK FT
You can access the FollowMyHealth Patient Portal offered by [facility] by registering at the following website: [email or web address]. By joining [employer]’s FollowMyHealth portal, you will also be able to view your health information using other applications (apps) compatible with our system.

## 2021-03-12 NOTE — DISCHARGE NOTE PROVIDER - NSDCMRMEDTOKEN_GEN_ALL_CORE_FT
amLODIPine 5 mg oral tablet: 1 tab(s) orally once a day  aspirin 81 mg oral tablet, chewable: 1 tab(s) orally once a day  Breo Ellipta 200 mcg-25 mcg/inh inhalation powder: 1 puff(s) inhaled once a day, As Needed  metoprolol succinate 25 mg oral tablet, extended release: 1 tab(s) orally once a day  ProAir HFA 90 mcg/inh inhalation aerosol: 2 puff(s) inhaled , As Needed  simvastatin 20 mg oral tablet: 1 tab(s) orally once a day (at bedtime)  valsartan 320 mg oral tablet: 1 tab(s) orally once a day

## 2021-03-12 NOTE — CONSULT NOTE ADULT - ASSESSMENT
70 year old right handed male on Aspirin PMH, AVR, HTN,  colonic polp resection ,/ s/p  ileostomy,   spinal stenosis,, copd, kidney stones,  blurry vision in past, morbid  obesity/ SAMMY on nocturnal  cpap here with episode of anarthia vs aphasia. Pe now back to B?L CTH left trigeminal mass thought to be schwannoma on prior MRI CTA negative. Impression concerning for TIA    Would get MRI brain without contrast   Aspirin for secondary stroke prevention at this time.  Consider adding Plavix for three weeks   Atorvastatin 80, titrate the dose according to LDL.   TTE reviewed  tele  DVT prophylaxis, Neurochecks  Permissive HTN up to 220/120 mmHg for first 24 hours after the symptom onset followed by gradual normotension.   HbA1C and LDL.   Ok for DC after MRI

## 2021-03-12 NOTE — PHYSICAL THERAPY INITIAL EVALUATION ADULT - PERTINENT HX OF CURRENT PROBLEM, REHAB EVAL
Pt is a 70-y/o, male, admitted to [facility] due to speech disturbance. Pt is working at [employer]; stated to be talking to a customer and was unable to speak and had a hard time finding words. CT of the head indicates no acute intracranial hemorrhage. MRI head indicates moderate chronic microvascular ischemic disease; chronic microhemorrhage vs small cavernoma on the L posterior temporal lobe. Pt now referred to PT for functional assessment.

## 2021-03-12 NOTE — DISCHARGE NOTE PROVIDER - CARE PROVIDER_API CALL
Darrian Rivera)  Neurology  [address], [address]  [place] [address]  Phone: [phone]  Fax: [phone]  Follow Up Time:

## 2021-03-12 NOTE — CHART NOTE - NSCHARTNOTEFT_GEN_A_CORE
To whom it may concern:     Jason Carlton 12-October-1950 has been under inpatient care at [facility] since 11-March-2021. He was discharged from the hospital 12-March-2021. Mr. Mcmahon was instructed to return to work only when he no longer has symptoms. When he returns to work without symptoms he may work without restrictions. Please make any necessary accommodations Thank you.         If there are any questions please do not hesitate to call        Cheri Finley M.D   [phone]

## 2021-03-12 NOTE — DISCHARGE NOTE PROVIDER - HOSPITAL COURSE
70 year old Obese man with hx of HTN, COPD, SAMMY (home Cpap), AVR 2015, and spinal stenosis presents to the ER with reports of slurred speech. Patient states that he got to work at 7 am. in [employer], , and  at 7:30 am shortly after while talking to customer he was unable to speak,  and had difficulty finding words.    ER course: symptoms resolved, s/p code stoke in er/ neuro was  called, Head CT: no acute changes. CTA head/neck: no significant stenosis    The patient was admitted to telemetry bed. Neurology was consulted and followed the patient. The patient underwent further workup with MRI head that failed to show any acute changes. Echocardiogram was benign. The patient's blood pressure was uncontrolled and he was started on Metoprolol. The patient is currently asymptomatic and will be discharged home and will follow up with Neurology.

## 2021-03-12 NOTE — CONSULT NOTE ADULT - REASON FOR ADMISSION
CERTIFICATE OF WORK      February 4, 2019      RE:   [address] [facility] [address]    To whom it may concern: This is to certify that Mitzi Stanley has been under Todd Renee MD's care from 2/4/2019 and is excused from work on 2/4/2019 and may return to regular work 2/5/2019.     RESTRICTIONS: None    REMARKS: None      SIGNATURE:___________________________________________,   2/4/2019  Teodora Chen RN/Haseeb Mckeon MD   [address] [facility] [address], [address]to [facility] [address]  [phone]
slurred  speech

## 2021-03-12 NOTE — PHYSICAL THERAPY INITIAL EVALUATION ADULT - ADDITIONAL COMMENTS
Lives in a condo with wife; +2 steps to enter with both rails. Works full time at [employer]. Independent with transfers and ambulates with cane.

## 2021-03-12 NOTE — PHYSICAL THERAPY INITIAL EVALUATION ADULT - GROSSLY INTACT, SENSORY
Pt in [address] with mom C/o numbness to both feet (stated to be a chronic concern since foot surgeries)

## 2021-03-12 NOTE — DISCHARGE NOTE PROVIDER - NSDCCPCAREPLAN_GEN_ALL_CORE_FT
PRINCIPAL DISCHARGE DIAGNOSIS  Diagnosis: Transient ischemic attack (TIA)  Assessment and Plan of Treatment: Speech disturbance POA, resolved now   Head CT: no acute changes   CTA head/Neck: no significant stenosis   Echocardiogram: Benign   Continue with Blood pressure control, Aspirin and Simvastatin  Follow up with Neurology with Dr. Rivera      SECONDARY DISCHARGE DIAGNOSES  Diagnosis: Hypertension  Assessment and Plan of Treatment: Uncontrolled   continue with Norvasc, Valsartan and Metoprolol   recommend starting a no salt diet  Check blood pressure daily and write in log book.   follow up with PCP for management    Diagnosis: History of COPD  Assessment and Plan of Treatment: continue with Breo Ellipta and Proair as needed    Diagnosis: SAMMY on CPAP  Assessment and Plan of Treatment: continue Cpap at night    Diagnosis: Obesity, Class II, BMI 35-39.9  Assessment and Plan of Treatment: BMI: 37  Weight loss is recommended with diet and exercise

## 2021-03-12 NOTE — CHART NOTE - NSCHARTNOTEFT_GEN_A_CORE
< from: MR Head No Cont (03.12.21 @ 10:34) >    IMPRESSION:    Moderate chronic microvascular ischemic disease.  Chronic microhemorrhage versus small cavernoma in the left posterior temporal lobe.    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            JUAN LUIS BRANHAM MD; Attending Radiologist  This document has been electronically signed. Mar 12 2021 10:49AM    < end of copied text >    Reviewed MRI, unclear nature of event. TIA r/o seizure. Ok to Discharge on Asa [address]  Recommend Follow up with his Neurologist or Dr. Rivera for further w/u

## 2021-03-14 ENCOUNTER — NON-APPOINTMENT (OUTPATIENT)
Age: 71
End: 2021-03-14

## 2021-03-17 ENCOUNTER — APPOINTMENT (OUTPATIENT)
Dept: NEUROLOGY | Facility: CLINIC | Age: 71
End: 2021-03-17
Payer: COMMERCIAL

## 2021-03-17 VITALS — HEART RATE: 67 BPM | SYSTOLIC BLOOD PRESSURE: 154 MMHG | DIASTOLIC BLOOD PRESSURE: 99 MMHG

## 2021-03-17 VITALS — TEMPERATURE: 97.2 F

## 2021-03-17 PROCEDURE — 99072 ADDL SUPL MATRL&STAF TM PHE: CPT

## 2021-03-17 PROCEDURE — 99215 OFFICE O/P EST HI 40 MIN: CPT

## 2021-03-18 DIAGNOSIS — R47.81 SLURRED SPEECH: ICD-10-CM

## 2021-03-18 DIAGNOSIS — R47.9 UNSPECIFIED SPEECH DISTURBANCES: ICD-10-CM

## 2021-03-18 DIAGNOSIS — M19.90 UNSPECIFIED OSTEOARTHRITIS, UNSPECIFIED SITE: ICD-10-CM

## 2021-03-18 DIAGNOSIS — I65.29 OCCLUSION AND STENOSIS OF UNSPECIFIED CAROTID ARTERY: ICD-10-CM

## 2021-03-18 DIAGNOSIS — Z99.89 DEPENDENCE ON OTHER ENABLING MACHINES AND DEVICES: ICD-10-CM

## 2021-03-18 DIAGNOSIS — I87.2 VENOUS INSUFFICIENCY (CHRONIC) (PERIPHERAL): ICD-10-CM

## 2021-03-18 DIAGNOSIS — I10 ESSENTIAL (PRIMARY) HYPERTENSION: ICD-10-CM

## 2021-03-18 DIAGNOSIS — I83.93 ASYMPTOMATIC VARICOSE VEINS OF BILATERAL LOWER EXTREMITIES: ICD-10-CM

## 2021-03-18 DIAGNOSIS — G45.9 TRANSIENT CEREBRAL ISCHEMIC ATTACK, UNSPECIFIED: ICD-10-CM

## 2021-03-18 DIAGNOSIS — M48.00 SPINAL STENOSIS, SITE UNSPECIFIED: ICD-10-CM

## 2021-03-18 DIAGNOSIS — E66.01 MORBID (SEVERE) OBESITY DUE TO EXCESS CALORIES: ICD-10-CM

## 2021-03-18 DIAGNOSIS — J44.9 CHRONIC OBSTRUCTIVE PULMONARY DISEASE, UNSPECIFIED: ICD-10-CM

## 2021-03-18 DIAGNOSIS — I25.10 ATHEROSCLEROTIC HEART DISEASE OF NATIVE CORONARY ARTERY WITHOUT ANGINA PECTORIS: ICD-10-CM

## 2021-03-18 DIAGNOSIS — G47.33 OBSTRUCTIVE SLEEP APNEA (ADULT) (PEDIATRIC): ICD-10-CM

## 2021-03-18 DIAGNOSIS — R31.21 ASYMPTOMATIC MICROSCOPIC HEMATURIA: ICD-10-CM

## 2021-03-18 DIAGNOSIS — E78.5 HYPERLIPIDEMIA, UNSPECIFIED: ICD-10-CM

## 2021-03-24 ENCOUNTER — NON-APPOINTMENT (OUTPATIENT)
Age: 71
End: 2021-03-24

## 2021-03-29 ENCOUNTER — NON-APPOINTMENT (OUTPATIENT)
Age: 71
End: 2021-03-29

## 2021-03-29 ENCOUNTER — APPOINTMENT (OUTPATIENT)
Dept: OPHTHALMOLOGY | Facility: CLINIC | Age: 71
End: 2021-03-29
Payer: COMMERCIAL

## 2021-03-29 PROCEDURE — 92014 COMPRE OPH EXAM EST PT 1/>: CPT

## 2021-03-29 PROCEDURE — 99072 ADDL SUPL MATRL&STAF TM PHE: CPT

## 2021-03-29 PROCEDURE — 92134 CPTRZ OPH DX IMG PST SGM RTA: CPT

## 2021-03-29 PROCEDURE — 92083 EXTENDED VISUAL FIELD XM: CPT

## 2021-04-06 ENCOUNTER — APPOINTMENT (OUTPATIENT)
Dept: DISASTER EMERGENCY | Facility: CLINIC | Age: 71
End: 2021-04-06

## 2021-04-06 DIAGNOSIS — Z01.818 ENCOUNTER FOR OTHER PREPROCEDURAL EXAMINATION: ICD-10-CM

## 2021-04-06 LAB — SARS-COV-2 N GENE NPH QL NAA+PROBE: NOT DETECTED

## 2021-04-09 ENCOUNTER — OUTPATIENT (OUTPATIENT)
Dept: OUTPATIENT SERVICES | Facility: HOSPITAL | Age: 71
LOS: 1 days | End: 2021-04-09
Payer: COMMERCIAL

## 2021-04-09 ENCOUNTER — APPOINTMENT (OUTPATIENT)
Dept: NEUROLOGY | Facility: CLINIC | Age: 71
End: 2021-04-09

## 2021-04-09 ENCOUNTER — APPOINTMENT (OUTPATIENT)
Dept: CV DIAGNOSITCS | Facility: HOSPITAL | Age: 71
End: 2021-04-09

## 2021-04-09 VITALS
SYSTOLIC BLOOD PRESSURE: 157 MMHG | RESPIRATION RATE: 18 BRPM | DIASTOLIC BLOOD PRESSURE: 82 MMHG | HEART RATE: 73 BPM | OXYGEN SATURATION: 94 %

## 2021-04-09 VITALS
DIASTOLIC BLOOD PRESSURE: 90 MMHG | SYSTOLIC BLOOD PRESSURE: 166 MMHG | WEIGHT: 270.51 LBS | OXYGEN SATURATION: 93 % | HEART RATE: 63 BPM | RESPIRATION RATE: 18 BRPM

## 2021-04-09 DIAGNOSIS — R07.9 CHEST PAIN, UNSPECIFIED: ICD-10-CM

## 2021-04-09 DIAGNOSIS — Z98.890 OTHER SPECIFIED POSTPROCEDURAL STATES: Chronic | ICD-10-CM

## 2021-04-09 DIAGNOSIS — E78.5 HYPERLIPIDEMIA, UNSPECIFIED: ICD-10-CM

## 2021-04-09 DIAGNOSIS — I10 ESSENTIAL (PRIMARY) HYPERTENSION: ICD-10-CM

## 2021-04-09 DIAGNOSIS — Z98.49 CATARACT EXTRACTION STATUS, UNSPECIFIED EYE: Chronic | ICD-10-CM

## 2021-04-09 DIAGNOSIS — Z90.49 ACQUIRED ABSENCE OF OTHER SPECIFIED PARTS OF DIGESTIVE TRACT: Chronic | ICD-10-CM

## 2021-04-09 DIAGNOSIS — Z96.651 PRESENCE OF RIGHT ARTIFICIAL KNEE JOINT: Chronic | ICD-10-CM

## 2021-04-09 DIAGNOSIS — Z95.2 PRESENCE OF PROSTHETIC HEART VALVE: Chronic | ICD-10-CM

## 2021-04-09 PROCEDURE — C1764: CPT

## 2021-04-09 PROCEDURE — 93306 TTE W/DOPPLER COMPLETE: CPT

## 2021-04-09 PROCEDURE — 33285 INSJ SUBQ CAR RHYTHM MNTR: CPT

## 2021-04-09 PROCEDURE — 93312 ECHO TRANSESOPHAGEAL: CPT

## 2021-04-09 RX ORDER — FLUTICASONE FUROATE AND VILANTEROL TRIFENATATE 100; 25 UG/1; UG/1
1 POWDER RESPIRATORY (INHALATION)
Qty: 0 | Refills: 0 | DISCHARGE

## 2021-04-09 NOTE — ASU DISCHARGE PLAN (ADULT/PEDIATRIC) - ASU DC SPECIAL INSTRUCTIONSFT
WOUND CARE:  Do NOT scrub, rub, or pick at your incision site  the glue will wear off in 5-7 days  do not get your incision wet for 3 days   AFTER 3 days you may shower:   - use mild soap and gentle warm stream, pat dry with towel  DO NOT apply lotions, powders, ointment, or perfumes to incision  wear loose clothing around incision for 7 days  f/u appointment as instructed     ACTIVITY:   resume normal activities    Follow heart healthy diet recommended by your doctor, , if you smoke STOP SMOKING ( may call [phone] for center of tobacco control if you need assistance)     ***CALL YOUR DOCTOR***  if you experience: fever, chills, body aches, or severe pain, swelling, redness, heat or yellow discharge at incision site  If you are unable to reach your doctor, you may contact Cardiology Office at [facility] at [phone]

## 2021-04-09 NOTE — ASU DISCHARGE PLAN (ADULT/PEDIATRIC) - CARE PROVIDER_API CALL
Shaina Ceron (MD; PhD)  Cardiac Electrophysiology; Cardiovascular Disease; Internal Medicine  [facility] - Dept of Cardiology, [address] [place] [address]  Phone: [phone]  Fax: [phone]  Follow Up Time: 2 weeks   Shaina Ceron (MD; PhD)  Cardiac Electrophysiology; Cardiovascular Disease; Internal Medicine  [facility] - Dept of Cardiology, [address] [place] [address]  Phone: [phone]  Fax: [phone]  Established Patient  Scheduled Appointment: 04/20/2021 11:40 AM

## 2021-04-09 NOTE — CHART NOTE - NSCHARTNOTEFT_GEN_A_CORE
ESTRELLA GARCIA  [id number]    PROCEDURE:  ILR implant      INDICATION:  CVA      ELECTROPHYSIOLOGIST(S):  Dr. Ceron  [facility]      ANESTHESIOLOGY:  local anesthetic       FINDINGS:  - Medtronic ILR implanted, sensing excellent, wound closed with 3-0 and 4-0 layers and Dermabond applied.      COMPLICATIONS:  none      RECOMMENDATIONS:  - wound check as OPT

## 2021-04-09 NOTE — ASU DISCHARGE PLAN (ADULT/PEDIATRIC) - PROVIDER TOKENS
PROVIDER:[TOKEN:[[id number]],FOLLOWUP:[2 weeks]] PROVIDER:[TOKEN:[[id number]],SCHEDULEDAPPT:[04/20/2021],SCHEDULEDAPPTTIME:[11:40 AM],ESTABLISHEDPATIENT:[T]]

## 2021-04-19 ENCOUNTER — APPOINTMENT (OUTPATIENT)
Dept: NEUROLOGY | Facility: CLINIC | Age: 71
End: 2021-04-19

## 2021-04-20 ENCOUNTER — NON-APPOINTMENT (OUTPATIENT)
Age: 71
End: 2021-04-20

## 2021-04-20 ENCOUNTER — APPOINTMENT (OUTPATIENT)
Dept: ELECTROPHYSIOLOGY | Facility: CLINIC | Age: 71
End: 2021-04-20
Payer: COMMERCIAL

## 2021-04-20 VITALS
OXYGEN SATURATION: 95 % | DIASTOLIC BLOOD PRESSURE: 83 MMHG | BODY MASS INDEX: 38.65 KG/M2 | HEIGHT: 70 IN | WEIGHT: 270 LBS | HEART RATE: 75 BPM | SYSTOLIC BLOOD PRESSURE: 144 MMHG

## 2021-04-20 DIAGNOSIS — I63.9 CEREBRAL INFARCTION, UNSPECIFIED: ICD-10-CM

## 2021-04-20 PROCEDURE — 99072 ADDL SUPL MATRL&STAF TM PHE: CPT

## 2021-04-20 PROCEDURE — 93000 ELECTROCARDIOGRAM COMPLETE: CPT

## 2021-04-21 NOTE — PATIENT PROFILE ADULT. - MEDICATION HERBAL REMEDIES, PROFILE
[Nutrition/ Exercise/ Weight Management] : nutrition, exercise, weight management [Body Image] : body image [Vitamins/Supplements] : vitamins/supplements [Sunscreen] : sunscreen [Smoking Cessation] : smoking cessation [Drugs/Alcohol] : drugs, alcohol [Breast Self Exam] : breast self exam [Bladder Hygiene] : bladder hygiene [Confidentiality] : confidentiality [STD (testing, results, tx)] : STD (testing, results, tx) [Vaccines] : vaccines no

## 2021-05-17 ENCOUNTER — APPOINTMENT (OUTPATIENT)
Dept: PULMONOLOGY | Facility: CLINIC | Age: 71
End: 2021-05-17
Payer: COMMERCIAL

## 2021-05-17 VITALS
TEMPERATURE: 98 F | HEART RATE: 65 BPM | RESPIRATION RATE: 16 BRPM | OXYGEN SATURATION: 93 % | DIASTOLIC BLOOD PRESSURE: 80 MMHG | SYSTOLIC BLOOD PRESSURE: 140 MMHG

## 2021-05-17 PROCEDURE — 99072 ADDL SUPL MATRL&STAF TM PHE: CPT

## 2021-05-17 PROCEDURE — 94618 PULMONARY STRESS TESTING: CPT

## 2021-05-17 PROCEDURE — 99214 OFFICE O/P EST MOD 30 MIN: CPT | Mod: 25

## 2021-05-17 PROCEDURE — 94010 BREATHING CAPACITY TEST: CPT

## 2021-05-17 RX ORDER — METOPROLOL SUCCINATE 25 MG/1
25 TABLET, EXTENDED RELEASE ORAL
Qty: 90 | Refills: 0 | Status: ACTIVE | COMMUNITY
Start: 2021-04-16

## 2021-05-17 RX ORDER — HYDROCHLOROTHIAZIDE 12.5 MG/1
12.5 TABLET ORAL
Qty: 60 | Refills: 0 | Status: ACTIVE | COMMUNITY
Start: 2021-03-24

## 2021-05-17 NOTE — PROCEDURE
[FreeTextEntry1] : 05/17/2021\par Pulmonary function testing\par There is a moderate ventilatory impairment in a restrictive pattern \par No significant change in flow rates.\par \par CPAP data downloaded and reviewed. improved usage\par \par \par \par  \par Report date: 3/11/2021 \par  \par  View Order\par \par \par (Report matches study selected on Patient History pane)\par \par \par   \par \par EXAM: XR CHEST PORTABLE URGENT 1V\par \par \par PROCEDURE DATE: 03/11/2021\par \par \par \par INTERPRETATION: Portable chest x-ray\par \par Indication: Stroke Code\par \par Portable chest x-ray is acquired without a previous examination for comparison.\par \par Impression: No evidence for pulmonary consolidation, pleural effusion or pneumothorax.\par \par Mild bibasilar atelectasis.\par \par The trachea is midline.\par \par The cardiac silhouette is magnified by AP technique.\par \par ORIF at the right rib.\par \par \par \par DARREN REYES MD; Attending Radiologist\par This document has been electronically signed. Mar 11 2021 12:06PM

## 2021-05-17 NOTE — ASSESSMENT
[FreeTextEntry1] : Continue CPAP present settings. Improved\par Needs diet and wt. loss. \par Continue Trelegy\par PRN Beta Agonist.\par Observation\par \par \par \par \par

## 2021-05-17 NOTE — DISCUSSION/SUMMARY
[FreeTextEntry1] : Severe SAMMY needs better compliance. Discussed. changed setting 10-20, cont full face, slight leak\par OAD with stable function.\par Restrictive component related to body habitus and weight.  No significant change in flow rates.\par Herniation right lung. Chronic.\par CASTILLO multifactorial related to weight conditioning musculoskeletal problems pulmonary disease and cardiac disease.\par .

## 2021-05-17 NOTE — PHYSICAL EXAM
[Normal S1, S2] : normal s1, s2 [Oriented x3] : oriented x3 [Normal Mood] : normal mood [Normal Affect] : normal affect [General Appearance - Well Developed] : well developed [General Appearance - Well Nourished] : well nourished [Normal Conjunctiva] : the conjunctiva exhibited no abnormalities [Normal Oropharynx] : normal oropharynx [Lungs Percussion] : the lungs were normal to percussion [Abdomen Soft] : soft [Abdomen Tenderness] : non-tender [Abdomen Mass (___ Cm)] : no abdominal mass palpated [Nail Clubbing] : no clubbing of the fingernails [Petechial Hemorrhages (___cm)] : no petechial hemorrhages [Cyanosis, Localized] : no localized cyanosis [] : no ischemic changes [TextBox_2] : sob [TextBox_68] : bilaterally slight expiratory wheeze [TextBox_99] : using cane [TextBox_132] : hpi [FreeTextEntry1] : 1 plus bilat rhonchi and wheeze.  [FreeTextEntry2] : Varicose Veins

## 2021-05-26 NOTE — ASU PATIENT PROFILE, ADULT - BLOOD AVOIDANCE/RESTRICTIONS, PROFILE
Chief Complaint   Patient presents with     RECHECK     Return CF     Medications reviewed and vital signs taken.   Gege Newby, SCOT    
none

## 2021-06-03 ENCOUNTER — APPOINTMENT (OUTPATIENT)
Dept: NEUROLOGY | Facility: CLINIC | Age: 71
End: 2021-06-03

## 2021-08-19 ENCOUNTER — APPOINTMENT (OUTPATIENT)
Dept: DISASTER EMERGENCY | Facility: CLINIC | Age: 71
End: 2021-08-19

## 2021-08-22 LAB — SARS-COV-2 N GENE NPH QL NAA+PROBE: NOT DETECTED

## 2021-08-24 ENCOUNTER — APPOINTMENT (OUTPATIENT)
Dept: PULMONOLOGY | Facility: CLINIC | Age: 71
End: 2021-08-24
Payer: MEDICARE

## 2021-08-24 VITALS
RESPIRATION RATE: 16 BRPM | SYSTOLIC BLOOD PRESSURE: 120 MMHG | DIASTOLIC BLOOD PRESSURE: 82 MMHG | HEART RATE: 99 BPM | HEIGHT: 70 IN | BODY MASS INDEX: 40.09 KG/M2 | WEIGHT: 280 LBS | OXYGEN SATURATION: 94 % | TEMPERATURE: 97.9 F

## 2021-08-24 DIAGNOSIS — R93.89 ABNORMAL FINDINGS ON DIAGNOSTIC IMAGING OF OTHER SPECIFIED BODY STRUCTURES: ICD-10-CM

## 2021-08-24 LAB — POCT - HEMOGLOBIN (HGB), QUANTITATIVE, TRANSCUTANEOUS: 13.9

## 2021-08-24 PROCEDURE — 88738 HGB QUANT TRANSCUTANEOUS: CPT

## 2021-08-24 PROCEDURE — 99214 OFFICE O/P EST MOD 30 MIN: CPT | Mod: 25

## 2021-08-24 PROCEDURE — 94727 GAS DIL/WSHOT DETER LNG VOL: CPT

## 2021-08-24 PROCEDURE — 94729 DIFFUSING CAPACITY: CPT

## 2021-08-24 PROCEDURE — ZZZZZ: CPT

## 2021-08-24 PROCEDURE — 94060 EVALUATION OF WHEEZING: CPT

## 2021-08-24 PROCEDURE — 71046 X-RAY EXAM CHEST 2 VIEWS: CPT

## 2021-08-24 RX ORDER — TIOTROPIUM BROMIDE INHALATION SPRAY 3.12 UG/1
2.5 SPRAY, METERED RESPIRATORY (INHALATION) DAILY
Qty: 1 | Refills: 5 | Status: DISCONTINUED | COMMUNITY
Start: 2019-09-17 | End: 2021-08-24

## 2021-08-24 RX ORDER — FLUTICASONE FUROATE, UMECLIDINIUM BROMIDE AND VILANTEROL TRIFENATATE 200; 62.5; 25 UG/1; UG/1; UG/1
200-62.5-25 POWDER RESPIRATORY (INHALATION)
Refills: 0 | Status: ACTIVE | COMMUNITY
Start: 2021-08-24

## 2021-08-24 NOTE — ASSESSMENT
[FreeTextEntry1] : Continue CPAP present settings. Urge compliance\par Needs diet and wt. loss. \par Continue Trelegy trial 200 samples given\par PRN Beta Agonist.\par CT Chest\par Cardiology follow-up.\par \par \par \par

## 2021-08-24 NOTE — PROCEDURE
[FreeTextEntry1] : 08/24/2021\par Pulmonary function testing\par There is a moderate ventilatory impairment in a combined obstructive and restrictive pattern. There is no significant bronchodilator response. There is a mild reduction in lung volume. There is a mild-mod diffusion impairment Corrects to normal with lung volume correction \par PFT attached relatively stable function.\par \par \par CPAP data downloaded and reviewed. Needs increased compliance. \par \par \par \par  \par \par \par \par   \par

## 2021-08-24 NOTE — HISTORY OF PRESENT ILLNESS
[Never] : never [TextBox_4] : using cpap with full face, falls asleep and forgets at times, has leak in mask\par On trelegy daily and ProAir 2-3 times a week\par Biggest c/o is worse feeling of sob, anytime with activity,  no changes rare wheeze. No cough. CASTILLO 1/2 block. \par had a minor stroke  no residual defect.\par Now retired \par Now has loop recorder\par just retired last week\par \par Got Covid vaccines\par Has gained wt.\par Multiple medical problems\par \par seeing cardiologist on Sept. 3.\par trying to loss weight, hard to do weight 282 increased. \par \par \par

## 2021-08-24 NOTE — REASON FOR VISIT
[Follow-Up] : a follow-up visit [Asthma] : asthma [Sleep Apnea] : sleep apnea [COPD] : COPD [TextBox_44] : sob

## 2021-08-24 NOTE — DISCUSSION/SUMMARY
[FreeTextEntry1] : Severe SAMMY needs better compliance. \par OAD with stable function. \par Restrictive component related to body habitus and weight.  No significant change in flow rates.\par Herniation right lung. Chronic.\par CASTILLO multifactorial related to weight conditioning musculoskeletal problems pulmonary disease and cardiac disease.  Has increased.  No clear-cut evidence of progressive pulmonary dysfunction.  May be related to weight gain.  To see cardiology.\par Abnormal CXR  ? pleural disease on right

## 2021-08-26 ENCOUNTER — OUTPATIENT (OUTPATIENT)
Dept: OUTPATIENT SERVICES | Facility: HOSPITAL | Age: 71
LOS: 1 days | End: 2021-08-26
Payer: MEDICARE

## 2021-08-26 ENCOUNTER — APPOINTMENT (OUTPATIENT)
Dept: CT IMAGING | Facility: IMAGING CENTER | Age: 71
End: 2021-08-26
Payer: MEDICARE

## 2021-08-26 DIAGNOSIS — Z98.890 OTHER SPECIFIED POSTPROCEDURAL STATES: Chronic | ICD-10-CM

## 2021-08-26 DIAGNOSIS — R93.89 ABNORMAL FINDINGS ON DIAGNOSTIC IMAGING OF OTHER SPECIFIED BODY STRUCTURES: ICD-10-CM

## 2021-08-26 DIAGNOSIS — Z98.49 CATARACT EXTRACTION STATUS, UNSPECIFIED EYE: Chronic | ICD-10-CM

## 2021-08-26 DIAGNOSIS — Z96.651 PRESENCE OF RIGHT ARTIFICIAL KNEE JOINT: Chronic | ICD-10-CM

## 2021-08-26 DIAGNOSIS — Z90.49 ACQUIRED ABSENCE OF OTHER SPECIFIED PARTS OF DIGESTIVE TRACT: Chronic | ICD-10-CM

## 2021-08-26 DIAGNOSIS — Z95.2 PRESENCE OF PROSTHETIC HEART VALVE: Chronic | ICD-10-CM

## 2021-08-26 PROCEDURE — 71250 CT THORAX DX C-: CPT | Mod: 26

## 2021-08-26 PROCEDURE — 71250 CT THORAX DX C-: CPT

## 2021-09-14 ENCOUNTER — APPOINTMENT (OUTPATIENT)
Dept: VASCULAR SURGERY | Facility: CLINIC | Age: 71
End: 2021-09-14
Payer: MEDICARE

## 2021-09-14 VITALS
WEIGHT: 269 LBS | HEART RATE: 70 BPM | TEMPERATURE: 97.8 F | SYSTOLIC BLOOD PRESSURE: 138 MMHG | HEIGHT: 70 IN | DIASTOLIC BLOOD PRESSURE: 88 MMHG | BODY MASS INDEX: 38.51 KG/M2

## 2021-09-14 DIAGNOSIS — I83.893 VARICOSE VEINS OF BILATERAL LOWER EXTREMITIES WITH OTHER COMPLICATIONS: ICD-10-CM

## 2021-09-14 DIAGNOSIS — I87.2 VENOUS INSUFFICIENCY (CHRONIC) (PERIPHERAL): ICD-10-CM

## 2021-09-14 PROCEDURE — 99214 OFFICE O/P EST MOD 30 MIN: CPT

## 2021-09-14 PROCEDURE — 93970 EXTREMITY STUDY: CPT

## 2021-09-14 NOTE — ASSESSMENT
[FreeTextEntry1] : Impression symptomatic arterial insuff , symptomatic venous insuff not yet sono evident, lymphedema , w neurogenic leg back pain component \par \par \par Plan Med Conservative management leg elevation, knee high compression stockings  20-30mm Hg (rx given), wt loss, diet control, exercise program, protective measures\par Indications risks and benefits  Odin  GSV  RFA were explained to pt who understands\par Pt wants to think about it and decide\par Pt will call w decision \par \par \par \par Varicose veins are enlarged, twisted veins. Varicose veins are caused by increased blood pressure in the veins.  The blood moves towards the heart by 1-way valves in the veins. When the valves become weakened or damaged, blood can collect in the veins and pool in your lower legs (ankles). This causes the veins to become enlarged and incompetent with reflux. Sitting or standing for long periods can cause blood to pool in the leg veins, increasing the pressure within the veins. \par Risk factors for varicose veins or venous disease may include:  obesity, older age, standing or sitting for prolonged periods of time for several years, being female, pregnancy, taking oral contraceptive pills or hormone replacement, being inactive, and/or smoking. \par The most common symptoms of varicose veins are sensations in the legs, such as a heavy feeling, burning, and/or aching. However, each individual may experience symptoms differently.  Other symptoms may include:  color changes in the skin, sores on the legs, or rash.  Severe varicose veins or venous disease may eventually produce long-term mild swelling that can result in more serious skin and tissue problems, such as ulcers and non-healing sores.\par Varicose veins and venous disease are diagnosed by a complete medical history, physical examination, and diagnostic studies for varicose veins including duplex ultrasound and color-flow imaging.  \par Medical treatment for varicose veins and venous disease include:  compression stockings, sclerotherapy, endovenous ablation and/or surgical treatment with microphlebectomy.  \par \par \par  [Arterial/Venous Disease] : arterial/venous disease [Other: _____] : [unfilled]

## 2021-09-14 NOTE — HISTORY OF PRESENT ILLNESS
[FreeTextEntry1] : c/o ellen legs and davina feet numbness and tingling worse w standing and walking davina by the end of day pt has been partially compliant w comp stockings wants to know if he has diabetic neuropathy [de-identified] : Pt c/o ellen le swelling heaviness and discomfort\par pt is partially complaint w  comp stockings

## 2021-09-14 NOTE — DISCUSSION/SUMMARY
[Arterial/Venous Disease] : arterial/venous disease [Other: _____] : [unfilled] [FreeTextEntry1] : Plan Med Conserv management leg elevation, knee high comp stockings 20-30mmHg, wt loss, diet control\par ov w carotid duplex s/o stenosis6/2014\par d/w pt re; ellen le gsv RFA in the future if sx persist also d/w pt ellen le gsv stripping and stab phleb if pt chooses to do so

## 2021-09-14 NOTE — PHYSICAL EXAM
[JVD] : no jugular venous distention  [Normal Breath Sounds] : Normal breath sounds [Right Carotid Bruit] : no bruit heard over the right carotid [Left Carotid Bruit] : no bruit heard over the left carotid [1+] : left 1+ [2+] : left 2+ [Ankle Swelling (On Exam)] : present [Ankle Swelling Bilaterally] : bilaterally  [Varicose Veins Of Lower Extremities] : bilaterally [Ankle Swelling On The Right] : mild [] : bilaterally [Ankle Swelling On The Left] : moderate [Abdomen Masses] : No abdominal masses [No HSM] : no hepatosplenomegaly [Tender] : was nontender [Stool Sample Taken] : No stool obtained  on rectal exam [No Rash or Lesion] : No rash or lesion [Alert] : alert [Oriented to Person] : oriented to person [Oriented to Place] : oriented to place [Oriented to Time] : oriented to time [Calm] : calm [de-identified] : nad [de-identified] : wml [FreeTextEntry1] : Mild  bilateral  leg venous insufficiency \par w moderate bilateral leg stasis dermatitis, hyperpigmentation in the gator area, lipodermatosclerosis, hyperkeratosis (skin thickening)\par and moderate  bilateral leg edema \par Multiple  bilateral leg small varicose  veins and spider veins  ant post medial  calf and shin \par RLE Varicose veins measuring 1-2, 2-3 mm in size on the calf /shin \par LLE Varicose veins measuring  1-2 , 2-3 mm in size on the calf /shin \par no wounds/ulcers\par  [de-identified] : wnl [de-identified] : Odin Cranial nerves 2-12 odin grossly intact [de-identified] : cooperative

## 2021-09-14 NOTE — DATA REVIEWED
[FreeTextEntry1] : 5/14/2013 Venous Duplex odin le no dvt svt RLE GSV insuff sfj to mid calf w multiple v v eins LLE GSV insuff SFJ marty mid calf w multiple v  veins\par \par 06/18/2013 Carotid Duplex odin ICA less 50%  odin ant VA flow\par \par 9/14/2021 Venous Doppler Odin LE  no acute dvt svt \par                            RLE GSV insuff from sfj to distal calf level w insuff collaterals\par                            LLE GSV insuff from sfj to distal calf level w insuff collaterals

## 2021-10-09 NOTE — ED ADULT TRIAGE NOTE - BANDS:
Physical Therapy IRP Treatment     Primary Rehabilitation Diagnosis: R BKA              SUBJECTIVE: Subjective: \" I have pain     8/10\" pt reports pointing to the R leg (10/09/21 1001)   Subjective/Objective Comments: polish virtual  [id number] , RN notified but pt has been pre medicated (10/09/21 1001)   Patient's Personal Goal: NOne stated     OBJECTIVE:  Precautions  Weight Bearing Status: Non-weight bearing right lower extremity (10/09/21 1001)  Precautions Comments: (R) BKA WC support issued and fitted with no problems.  (09/25/21 1301)       See below for current functional status overview.  See PT flowsheet for full details regarding the PT therapy provided.    ASSESSMENT:   Treatment today focused on gait training and transfer training.  Patient is demonstrating good progress supported by significant increase of ambulation distance tolerance w RW , WC follow .    Patient limited at this time by impulsive specially sitting transferring stand to chair , but improving w education .  Patient will benefit from further skilled PT  for continued training with CKC , balance , safety and gait  to help the patient meet goal of improving functional independence .     PT Identified Barriers to Discharge: stairs, new amputation, self-limiting behaviors     This patient participated in all scheduled physical therapy time with this therapist today.    Education:     Education Provided  On this date, education was provided to patient regarding diagnosis considerations pertaining to rehab, transfers, ambulation and fall prevention.  The response to education was/were: Verbalizes understanding and Needs reinforcement.      LONG TERM GOALS:    Bed Mobility Discharge Goal: Supine <> sit to Mod I. - met 10/1  Transfer Discharge Goal: WC <> bed to Mod I with LRAD. Squat pivot- not met 10/1, discontinue; bed<>chair transfer supervision  Ambulation Discharge Goal: Gait to Mod I with RW; short distances. - not met 10/1,  discontinued; hop 10ft with CGA using rolling walker  Stairs Discharge Goal: Ascend/descend 6 steps with LRAD and rail support; CS/CG assist.- not met 10/1, discontinued; family to participate in w/c bumping  Therapeutic Exercise Discharge Goal: I with HEP. - not met 10/1    PLAN:   Continue skilled PT, including the following Treatment/Interventions: Functional transfer training;Gait training;Bed mobility;Wheelchair mobility (10/09/21 1001)   PT Frequency: 5 days/week (10/09/21 1001), Frequency Comments: 60-90 minutes (10/09/21 1001)     Treatment Plan for Next Session: transfer training; RLE strength and ROM   Additional Plan Considerations: Pre-prosthetic training and education.          RECOMMENDATIONS FOR DISCHARGE:  Recommendation for Discharge: PT IL: Patient requires 24 HOUR assistance to perform mobility and/or ADLs safely     PT/OT Mobility Equipment for Discharge: TBD. (09/25/21 1301)           FUNCTIONAL DATA OVERVIEW LAST 24 HOURS  Bed Mobility   Bed Mobility  Rolling to the Right: Modified Independent (10/09/21 1001)  Supine to Sit: Modified Independent (10/09/21 1001)     Transfers  Transfers  Sit to Stand: Touching/Steadying Assistance (10/09/21 1001)  Stand to Sit: Touching/Steadying Assistance (10/09/21 1001)  Stand Pivot Transfers: Touching/Steadying Assistance (10/09/21 1001)  Assistive Device/: Gait Belt (10/09/21 1001)  Transfer Comments 1: pt given VCs and visual cues to improve safety by reaching to WC prior to sitting (10/09/21 1001)     Gait  Gait  Gait Assistance:  (CGA w WC follow ) (10/09/21 1001)  Assistive Device/: 2-wheeled walker (10/09/21 1001)  Ambulation Distance (Feet): 65 Feet (10/09/21 1001)  Ambulation Surface: Tile (10/09/21 1001)  Gait Comments 1: hopping  (10/09/21 1001)  Gait Comments 2: 65ft x1 , 80 ft x1 , 100 ft x1  (10/09/21 1001)  Gait Comments 3: cuing to ambulate within RW FERMÍN but not too in front due to safety. Cuing to improve posture  also   (10/09/21 1001)  Second Trial: Yes (10/09/21 1001),      Stairs        Wheelchair Mobility  PT Wheelchair Mobility  Type of Wheelchair: Manual (10/09/21 1001)  Wheelchair Mobility: Supervision (Supv);Minimal Assist (Min) (10/09/21 1001)  Distance (ft): 200 Feet (10/09/21 1001)  Propelling Method: Right upper;Left upper (10/09/21 1001)  Ramp:  (2x practice and min A the second time to slow down descend) (10/09/21 1001)     Balance          Other Therapeutic Interventions  Other Therapeutic Intervention: repeated STS in paralllel bars 5x1 SBA, mini squat in bars 5x2 CGA , hip ABD in the bars CKC 10x2 , seated unsupported row RTB 10x2 to improve posture     (10/09/21 1001)       At the end of the therapy session, patient is in wheelchair with the following safety measures in place: alarm system in place/re-engaged, call light within reach and equipment intact.    Patient is located in the patient room and was handed off to Nursing Assistant. Patient's family was not present.      PPE: therapist : mask and gloves; pt: mask .   Allergy;

## 2021-10-11 ENCOUNTER — APPOINTMENT (OUTPATIENT)
Dept: ORTHOPEDIC SURGERY | Facility: CLINIC | Age: 71
End: 2021-10-11

## 2021-10-18 ENCOUNTER — APPOINTMENT (OUTPATIENT)
Dept: ORTHOPEDIC SURGERY | Facility: CLINIC | Age: 71
End: 2021-10-18
Payer: MEDICARE

## 2021-10-18 VITALS
DIASTOLIC BLOOD PRESSURE: 83 MMHG | WEIGHT: 270 LBS | SYSTOLIC BLOOD PRESSURE: 143 MMHG | HEART RATE: 63 BPM | OXYGEN SATURATION: 95 % | BODY MASS INDEX: 38.65 KG/M2 | HEIGHT: 70 IN

## 2021-10-18 PROCEDURE — 72110 X-RAY EXAM L-2 SPINE 4/>VWS: CPT

## 2021-10-18 PROCEDURE — 72070 X-RAY EXAM THORAC SPINE 2VWS: CPT

## 2021-10-18 PROCEDURE — 99204 OFFICE O/P NEW MOD 45 MIN: CPT

## 2021-10-18 NOTE — ASSESSMENT
[FreeTextEntry1] : I had a lengthy discussion with the patient in regards to their treatment plan and diagnosis.  They do have objective weakness findings on my exam.  Their symptoms have persisted despite the conservative management they have attempted thus far.  As a result I would like to proceed with a lumbar MRI.  In tandem with this they should begin physical therapy/home therapy program.  The patient can take Tylenol/NSAIDs as needed for pain control if medically able to.  I will have the patient follow-up in 3 to 4 weeks for repeat clinical evaluation.  I encouraged them to follow-up sooner if their symptoms worsen or change in any way.  Please note that over 45 minutes of time was spent in care of this patient which includes previsit preparation, in person visit, post visit documentation.

## 2021-10-18 NOTE — HISTORY OF PRESENT ILLNESS
[Stable] : stable [0] : a current pain level of 0/10 [6] : an average pain level of 6/10 [Intermit.] : ~He/She~ states the symptoms seem to be intermittent [Walking] : worsened by walking [de-identified] : 71 year old male patient presents for an initial visit for Lower Back Pain.  Patient reports acute pain of 3-4 weeks to the lower back back with pain that is worse on the left side. Patient reports the pain started after persistent coughing related to a respiratory infection 3-4 weeks ago. Patient described the pain as a spasm and rates the pain 6/10 with walking. Patient denies any associated weakness or instability to the bilateral lower extremities. Patient denies any bowel or bladder issue. Patient denies pain affecting sleep at nights. Patient is unable to walk more than 1 block but does not attribute it to the lower back pain but other respiratory illnesses. \par  [Ataxia] : no ataxia [Incontinence] : no incontinence [Loss of Dexterity] : good dexterity [Urinary Ret.] : no urinary retention

## 2021-10-18 NOTE — PHYSICAL EXAM
[de-identified] : Lumbar Physical Exam\par \par Gait -antalgic gait\par \par Station - Normal\par \par Positive sagittal balance\par \par Reflexes\par Patellar - normal\par Gastroc - normal\par Clonus - No\par \par Hip Exam - Normal\par \par Straight leg raise - none\par \par Pulses - 2+ dp/pt\par \par Range of motion -reduced\par \par Sensation \par Sensation intact to light touch in L1, L2, L3, L4, L5 and S1 dermatomes bilaterally\par \par Motor\par 	IP	Quad	HS	TA	Gastroc	EHL\par Right	3+/5	5/5	5/5	5/5	5/5	5/5\par Left	5/5	5/5	5/5	5/5	5/5	5/5 [de-identified] : Thoracic radiographs\par Thoracic spondylosis noted\par Facet arthropathy noted\par \par Lumbar radiographs\par L3-L4 spondylolisthesis noted\par Severe facet degeneration\par Severe disc degeneration noted

## 2021-11-03 ENCOUNTER — APPOINTMENT (OUTPATIENT)
Dept: ORTHOPEDIC SURGERY | Facility: CLINIC | Age: 71
End: 2021-11-03

## 2021-11-09 ENCOUNTER — APPOINTMENT (OUTPATIENT)
Dept: MRI IMAGING | Facility: IMAGING CENTER | Age: 71
End: 2021-11-09
Payer: MEDICARE

## 2021-11-09 ENCOUNTER — OUTPATIENT (OUTPATIENT)
Dept: OUTPATIENT SERVICES | Facility: HOSPITAL | Age: 71
LOS: 1 days | End: 2021-11-09
Payer: MEDICARE

## 2021-11-09 DIAGNOSIS — M54.9 DORSALGIA, UNSPECIFIED: ICD-10-CM

## 2021-11-09 DIAGNOSIS — Z98.890 OTHER SPECIFIED POSTPROCEDURAL STATES: Chronic | ICD-10-CM

## 2021-11-09 DIAGNOSIS — Z96.651 PRESENCE OF RIGHT ARTIFICIAL KNEE JOINT: Chronic | ICD-10-CM

## 2021-11-09 DIAGNOSIS — Z90.49 ACQUIRED ABSENCE OF OTHER SPECIFIED PARTS OF DIGESTIVE TRACT: Chronic | ICD-10-CM

## 2021-11-09 DIAGNOSIS — Z98.49 CATARACT EXTRACTION STATUS, UNSPECIFIED EYE: Chronic | ICD-10-CM

## 2021-11-09 DIAGNOSIS — Z95.2 PRESENCE OF PROSTHETIC HEART VALVE: Chronic | ICD-10-CM

## 2021-11-09 PROCEDURE — 72148 MRI LUMBAR SPINE W/O DYE: CPT

## 2021-11-09 PROCEDURE — 72148 MRI LUMBAR SPINE W/O DYE: CPT | Mod: 26

## 2021-11-17 ENCOUNTER — APPOINTMENT (OUTPATIENT)
Dept: ORTHOPEDIC SURGERY | Facility: CLINIC | Age: 71
End: 2021-11-17
Payer: MEDICARE

## 2021-11-17 VITALS — WEIGHT: 270 LBS | BODY MASS INDEX: 38.65 KG/M2 | HEIGHT: 70 IN

## 2021-11-17 DIAGNOSIS — G45.3 AMAUROSIS FUGAX: ICD-10-CM

## 2021-11-17 DIAGNOSIS — M51.36 OTHER INTERVERTEBRAL DISC DEGENERATION, LUMBAR REGION: ICD-10-CM

## 2021-11-17 PROCEDURE — 99214 OFFICE O/P EST MOD 30 MIN: CPT

## 2021-11-17 NOTE — ASSESSMENT
[FreeTextEntry1] : I had a long discussion with the patient in regards to his treatment plan and diagnosis.  I am concerned in regards to his MRI findings that he is dealing with intrathecal pathology.  I would like him to follow-up with Dr. Iker Brown for treatment and evaluation.  A referral for him has been placed today.  I have also ordered cervical and thoracic MRIs with and without contrast to further characterize his neural axis.  He can follow-up with me in 6 to 8 weeks or as needed.  I encouraged him to reach out to me directly if at any point he has any questions in regards to his care.

## 2021-11-17 NOTE — PHYSICAL EXAM
[de-identified] : Lumbar Physical Exam\par \par Gait -antalgic gait\par \par Station - Normal\par \par Positive sagittal balance\par \par Reflexes\par Patellar - normal\par Gastroc - normal\par Clonus - No\par \par Hip Exam - Normal\par \par Straight leg raise - none\par \par Pulses - 2+ dp/pt\par \par Range of motion -reduced\par \par Sensation \par Sensation intact to light touch in L1, L2, L3, L4, L5 and S1 dermatomes bilaterally\par \par Motor\par 	IP	Quad	HS	TA	Gastroc	EHL\par Right	3+/5	5/5	5/5	5/5	5/5	5/5\par Left	5/5	5/5	5/5	5/5	5/5	5/5 [de-identified] : Thoracic radiographs\par Thoracic spondylosis noted\par Facet arthropathy noted\par \par Lumbar radiographs\par L3-L4 spondylolisthesis noted\par Severe facet degeneration\par Severe disc degeneration noted\par \par Lumbar MRI\par Multiple lesions noted within the thecal sac of the patient's lumbar spine MRI\par Radiologist evaluates this for possible schwannoma, drop metastases

## 2021-11-17 NOTE — HISTORY OF PRESENT ILLNESS
[de-identified] : This is a 71-year-old male that is here today for evaluation of his back and leg symptoms.  He denies any new issues in terms of neurologic deficits, weakness but he still dealing with significant back and leg symptoms.  He denies any bowel bladder issues.  He denies any saddle anesthesia.  This pain can be very disabling at times.  He does endorse issues with walking tolerance.

## 2021-11-29 ENCOUNTER — APPOINTMENT (OUTPATIENT)
Dept: NEUROSURGERY | Facility: CLINIC | Age: 71
End: 2021-11-29
Payer: MEDICARE

## 2021-11-29 VITALS
BODY MASS INDEX: 38.65 KG/M2 | HEIGHT: 70 IN | HEART RATE: 79 BPM | SYSTOLIC BLOOD PRESSURE: 136 MMHG | DIASTOLIC BLOOD PRESSURE: 81 MMHG | WEIGHT: 270 LBS | OXYGEN SATURATION: 94 %

## 2021-11-29 DIAGNOSIS — M54.50 LOW BACK PAIN, UNSPECIFIED: ICD-10-CM

## 2021-11-29 DIAGNOSIS — G89.29 LOW BACK PAIN, UNSPECIFIED: ICD-10-CM

## 2021-11-29 PROCEDURE — 99203 OFFICE O/P NEW LOW 30 MIN: CPT

## 2021-11-30 NOTE — PHYSICAL EXAM
[Oriented To Time, Place, And Person] : oriented to person, place, and time [Impaired Insight] : insight and judgment were intact [Cranial Nerves Optic (II)] : visual acuity intact bilaterally,  pupils equal round and reactive to light [Cranial Nerves Oculomotor (III)] : extraocular motion intact [Cranial Nerves Trigeminal (V)] : facial sensation intact symmetrically [Cranial Nerves Facial (VII)] : face symmetrical [Cranial Nerves Vestibulocochlear (VIII)] : hearing was intact bilaterally [Cranial Nerves Glossopharyngeal (IX)] : tongue and palate midline [Cranial Nerves Accessory (XI - Cranial And Spinal)] : head turning and shoulder shrug symmetric [Cranial Nerves Hypoglossal (XII)] : there was no tongue deviation with protrusion [Motor Tone] : muscle tone was normal in all four extremities [Motor Strength] : muscle strength was normal in all four extremities [Sensation Tactile Decrease] : light touch was intact [Limited Balance] : the patient's balance was impaired [2+] : Ankle jerk left 2+ [No Visual Abnormalities] : no visible abnormailities [Straight-Leg Raise Test - Left] : straight leg raise of the left leg was negative [Straight-Leg Raise Test - Right] : straight leg raise  of the right leg was negative [Able to toe walk] : the patient was able to toe walk [Able to heel walk] : the patient was able to heel walk [Sclera] : the sclera and conjunctiva were normal [Outer Ear] : the ears and nose were normal in appearance [Neck Appearance] : the appearance of the neck was normal [Heart Rate And Rhythm] : heart rate was normal and rhythm regular [Abdomen Soft] : soft [FreeTextEntry1] : Ambulates with a cane [Skin Color & Pigmentation] : normal skin color and pigmentation

## 2021-11-30 NOTE — HISTORY OF PRESENT ILLNESS
[FreeTextEntry1] : Lowerback Spasms [de-identified] : Mr. 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  He reports new onset of lower back pain x1 month.  The pain does not radiate into his legs.  He describes the pain as spasms that increases with physical activity.  At times he reports that the pain feels like a stabbing sensation.\par He has not had any conservative management including physical therapy for the pain.  He takes over-the-counter medications with moderate relief for the pain.  He denies any bowel or bladder dysfunction or any weakness in his legs.\par

## 2021-11-30 NOTE — ASSESSMENT
[FreeTextEntry1] : Mr. Jason Mcmahon is a 71-year-old man presenting with Multiple spinal tumors (schwannomatosis).\par At this time Dr. Brown recommends comprehensive brain and complete spine MRI to better assess tumor configuration and decide on plan of care.  Patient will return to office when images are completed.\par \par Patient will begin a course of gabapentin at night to help with lower back pain.\par Medication Purpose, Action, Possible interactions, Side effects as well as adverse effects explain to pt.\par Teachback Protocol implemented\par \par

## 2021-12-03 DIAGNOSIS — F40.240 CLAUSTROPHOBIA: ICD-10-CM

## 2021-12-28 ENCOUNTER — APPOINTMENT (OUTPATIENT)
Dept: PULMONOLOGY | Facility: CLINIC | Age: 71
End: 2021-12-28
Payer: MEDICARE

## 2021-12-28 ENCOUNTER — APPOINTMENT (OUTPATIENT)
Dept: PULMONOLOGY | Facility: CLINIC | Age: 71
End: 2021-12-28

## 2021-12-28 DIAGNOSIS — R05.3 CHRONIC COUGH: ICD-10-CM

## 2021-12-28 PROCEDURE — 99213 OFFICE O/P EST LOW 20 MIN: CPT | Mod: 95

## 2021-12-30 NOTE — DISCUSSION/SUMMARY
[FreeTextEntry1] : Severe SAMMY needs better compliance. \par OAD with stable function. \par Restrictive component related to body habitus and weight.  No significant change in flow rates.\par Herniation right lung. Chronic.\par persistent dry cough for past 2 weeks\par vertigo,seeing neuro\par

## 2021-12-30 NOTE — HISTORY OF PRESENT ILLNESS
[TextBox_4] : This visit was provided via telehealth using real-time 2-way audio visual technology. The patient, ESTRELLA GARCIA , was located at home, [address]\[place] [address]  at the time of the visit.\par The provider, Carlos Romano, was located at office [address] [place], [place] at the time of the visit. \par \par  The patient, Mr. ESTRELLA GARCIA  and Physician Carlos Martinez participated in the telehealth encounter.\par \par Verbal consent obtained by  from patient\par \par \par coughing since December very dry no mucus makes him nausea using cough drops\par also had vertigo\par no GERD\par has not been able to get MRI, too much money and sedation\par Covid tested 2 times in DEC 12 \par \par has not gotten booster of Covid\par using CPAP nightly\par did see cardio after last visit\par always sob\par

## 2021-12-30 NOTE — HISTORY OF PRESENT ILLNESS
[TextBox_4] : This visit was provided via telehealth using real-time 2-way audio visual technology. The patient, ESTRELLA GARCIA , was located at home, [address]\[place] [address]  at the time of the visit.\par The provider, Carlos Romano, was located at office [address] [place], [place] at the time of the visit. \par \par  The patient, Mr. ESTRELLA GARCIA  and Physician Carlos Martinez participated in the telehealth encounter.\par \par Verbal consent obtained by  from patient\par \par \par coughing since December very dry no mucus makes him nausea using cough drops\par also had vertigo\par no GERD\par has not been able to get MRI, too much money and sedation\par Covid tested 2 times in DEC 12 \par \par has not gotten booster of Covid\par using CPAP nightly\par did see cardio after last visit\par always sob\par

## 2021-12-30 NOTE — PROCEDURE
[FreeTextEntry1] : 08/24/2021\par Pulmonary function testing\par There is a moderate ventilatory impairment in a combined obstructive and restrictive pattern. There is no significant bronchodilator response. There is a mild reduction in lung volume. There is a mild-mod diffusion impairment Corrects to normal with lung volume correction \par PFT attached relatively stable function.\par \par CT chest 8/2021 reviewed\par \par CPAP data downloaded and reviewed. Needs increased compliance. \par \par \par \par  \par \par \par \par   \par

## 2021-12-30 NOTE — ASSESSMENT
[FreeTextEntry1] : Continue CPAP present settings. Urge compliance\par Needs diet and wt. loss. \par Continue Trelegy  200  has 1 month left\par PRN Beta Agonist.\par \par prednisone jack\par Z-Valente\par will take OTC PPI while on treatment\par \par if no improvement to come to office\par \par \par \par

## 2021-12-30 NOTE — REASON FOR VISIT
[Verbal consent obtained from patient] : the patient, [unfilled] [Home] : at home, [unfilled] , at the time of the visit. [Medical Office: ([facility])___] : at the medical office located in  [Follow-Up] : a follow-up visit [Sleep Apnea] : sleep apnea [TextBox_44] : cough for past 2 weeks

## 2022-01-04 NOTE — ED ADULT NURSE NOTE - GASTROINTESTINAL ASSESSMENT
Pt was able to communicate all of his needs, told writer exact time he would like his evening meds. Spent some time in the day room but stayed in his room mostly, did not socialize with peers. Medcompliant, becomes anxious when he is reminded of his bladder scan and straight cath, easily redirectable.   WDL

## 2022-01-21 ENCOUNTER — NON-APPOINTMENT (OUTPATIENT)
Age: 72
End: 2022-01-21

## 2022-01-24 ENCOUNTER — NON-APPOINTMENT (OUTPATIENT)
Age: 72
End: 2022-01-24

## 2022-01-27 ENCOUNTER — NON-APPOINTMENT (OUTPATIENT)
Age: 72
End: 2022-01-27

## 2022-01-28 ENCOUNTER — NON-APPOINTMENT (OUTPATIENT)
Age: 72
End: 2022-01-28

## 2022-01-31 ENCOUNTER — APPOINTMENT (OUTPATIENT)
Dept: NEUROSURGERY | Facility: CLINIC | Age: 72
End: 2022-01-31

## 2022-02-01 ENCOUNTER — NON-APPOINTMENT (OUTPATIENT)
Age: 72
End: 2022-02-01

## 2022-02-09 NOTE — H&P PST ADULT - VISION (WITH CORRECTIVE LENSES IF THE PATIENT USUALLY WEARS THEM):
Normal vision: sees adequately in most situations; can see medication labels, newsprint/glasses for distance Stable

## 2022-02-14 ENCOUNTER — APPOINTMENT (OUTPATIENT)
Dept: NEUROSURGERY | Facility: CLINIC | Age: 72
End: 2022-02-14

## 2022-02-28 ENCOUNTER — APPOINTMENT (OUTPATIENT)
Dept: NEUROSURGERY | Facility: CLINIC | Age: 72
End: 2022-02-28
Payer: MEDICARE

## 2022-02-28 VITALS
HEIGHT: 70 IN | DIASTOLIC BLOOD PRESSURE: 79 MMHG | WEIGHT: 270 LBS | RESPIRATION RATE: 22 BRPM | SYSTOLIC BLOOD PRESSURE: 145 MMHG | BODY MASS INDEX: 38.65 KG/M2 | HEART RATE: 65 BPM

## 2022-02-28 DIAGNOSIS — Q85.03 SCHWANNOMATOSIS: ICD-10-CM

## 2022-02-28 DIAGNOSIS — D33.4 BENIGN NEOPLASM OF SPINAL CORD: ICD-10-CM

## 2022-02-28 PROCEDURE — 99213 OFFICE O/P EST LOW 20 MIN: CPT

## 2022-03-01 NOTE — PHYSICAL EXAM
[General Appearance - Alert] : alert [Oriented To Time, Place, And Person] : oriented to person, place, and time [Impaired Insight] : insight and judgment were intact [Cranial Nerves Optic (II)] : visual acuity intact bilaterally,  pupils equal round and reactive to light [Cranial Nerves Oculomotor (III)] : extraocular motion intact [Cranial Nerves Trigeminal (V)] : facial sensation intact symmetrically [Cranial Nerves Facial (VII)] : face symmetrical [Cranial Nerves Vestibulocochlear (VIII)] : hearing was intact bilaterally [Cranial Nerves Glossopharyngeal (IX)] : tongue and palate midline [Cranial Nerves Accessory (XI - Cranial And Spinal)] : head turning and shoulder shrug symmetric [Cranial Nerves Hypoglossal (XII)] : there was no tongue deviation with protrusion [] : no respiratory distress [Heart Rate And Rhythm] : heart rate was normal and rhythm regular [FreeTextEntry1] : Ambulates with cane

## 2022-03-01 NOTE — ASSESSMENT
[FreeTextEntry1] : Mr. Jason Mcmahon is a 71 year old man presenting with Schwannomatosis\par Dr. Brown explained that  he is presenting with Trigeminal Schwannoma, Thoracic ( 2 areas) Lumbar spine (multiple lesions in lumbar spine. He is presenting with Type II neurofibromatosis. Recommended genetic screening for neurofibromatosis and NF II to see neurootologist.\par Follow up Lumbar degenerative disease is PT and weight loss.\par \par Please see Dr. Brown's dictation for details.\par I, Dr. Nereida Brown evaluated the patient with the nurse practitioner Mendez Foley and established the plan of care. I personally discuss this patient with the nurse practitioner at the time of the visit. I agree with the assessment and plan as written, unless noted below.\par \par \par

## 2022-03-01 NOTE — DATA REVIEWED
[de-identified] : BRAIN, CERVICAL THORACIC,2/10/22 [facility]; Lumbar 2/10/22 [facility] Persistent Nausea and Vomiting/Swelling that continues/Fever greater than 101/Bleeding that does not stop/Pain not relieved by Medications

## 2022-03-01 NOTE — REASON FOR VISIT
[Follow-Up: _____] : a [unfilled] follow-up visit [FreeTextEntry1] : Mr. 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 Today he reports worsening lowerback pain and difficulty walking

## 2022-03-01 NOTE — REVIEW OF SYSTEMS
[Shortness Of Breath] : shortness of breath [Wheezing] : wheezing [SOB on Exertion] : shortness of breath during exertion [Arthralgias] : arthralgias [Negative] : Heme/Lymph [de-identified] : Difficulty walking

## 2022-03-28 ENCOUNTER — APPOINTMENT (OUTPATIENT)
Dept: ORTHOPEDIC SURGERY | Facility: CLINIC | Age: 72
End: 2022-03-28
Payer: MEDICARE

## 2022-03-28 VITALS
OXYGEN SATURATION: 98 % | HEIGHT: 70 IN | HEART RATE: 67 BPM | SYSTOLIC BLOOD PRESSURE: 132 MMHG | DIASTOLIC BLOOD PRESSURE: 82 MMHG | WEIGHT: 270 LBS | BODY MASS INDEX: 38.65 KG/M2

## 2022-03-28 PROCEDURE — 99214 OFFICE O/P EST MOD 30 MIN: CPT

## 2022-03-28 NOTE — PHYSICAL EXAM

## 2022-03-28 NOTE — HISTORY OF PRESENT ILLNESS
[de-identified] : Today the patient states that he is dealing with back pain symptoms predominantly.  He has been worked up extensively by our neurosurgery colleagues who have diagnosed him with schwannomas.  He denies any bowel bladder issues.  He denies any saddle anesthesia.  He denies any radicular type symptoms.\par \par 11/17/21\par This is a 71-year-old male that is here today for evaluation of his back and leg symptoms.  He denies any new issues in terms of neurologic deficits, weakness but he still dealing with significant back and leg symptoms.  He denies any bowel bladder issues.  He denies any saddle anesthesia.  This pain can be very disabling at times.  He does endorse issues with walking tolerance.

## 2022-04-09 NOTE — ED ADULT TRIAGE NOTE - CHIEF COMPLAINT QUOTE
Pt. c/o right wrist pain radiating to elbow x 2 days. h/o Rheumatoid arthritis in b/l hands. Denies tingling/numbness to extremity. Denies any trauma to area. Unable to get appt. with rheumatologist until July. Took Tylenol for pain with no relief. Private car

## 2022-04-12 ENCOUNTER — APPOINTMENT (OUTPATIENT)
Dept: PULMONOLOGY | Facility: CLINIC | Age: 72
End: 2022-04-12
Payer: MEDICARE

## 2022-04-12 DIAGNOSIS — J01.90 ACUTE SINUSITIS, UNSPECIFIED: ICD-10-CM

## 2022-04-12 PROCEDURE — 99213 OFFICE O/P EST LOW 20 MIN: CPT | Mod: 95

## 2022-04-14 PROBLEM — J01.90 ACUTE SINUSITIS: Status: RESOLVED | Noted: 2022-04-14 | Resolved: 2022-05-14

## 2022-04-14 NOTE — ASSESSMENT
[FreeTextEntry1] : Zithro\par Continue present bronchodilator therapy\par COVID test\par Robitussin DM

## 2022-04-14 NOTE — HISTORY OF PRESENT ILLNESS
[Home] : at home, [unfilled] , at the time of the visit. [Medical Office: ([facility])___] : at the medical office located in  [TextBox_4] : This visit was provided via telehealth using real-time 2-way audio visual technology. The patient, ESTRELLA GARCIA , was located at home, [address]\[place] [address]  at the time of the visit.\par The provider, Carlos Romano, was located at office [address] [place] [address] at the time of the visit. \par \par  The patient, Mr. ESTRELLA GARCIA  and Physician Carlos Martinez participated in the telehealth encounter.\par \par Verbal consent obtained by  from patient\par \par Thinks has sinus infection\par Pers. cough.  Is wheezing chest pain or shortness of breath.  No fever.\par Responds in past to zithro. \par

## 2022-06-13 ENCOUNTER — APPOINTMENT (OUTPATIENT)
Dept: ORTHOPEDIC SURGERY | Facility: CLINIC | Age: 72
End: 2022-06-13

## 2022-08-24 ENCOUNTER — APPOINTMENT (OUTPATIENT)
Dept: ORTHOPEDIC SURGERY | Facility: CLINIC | Age: 72
End: 2022-08-24
Payer: MEDICARE

## 2022-08-24 DIAGNOSIS — M76.822 POSTERIOR TIBIAL TENDINITIS, LEFT LEG: ICD-10-CM

## 2022-08-24 DIAGNOSIS — M54.9 DORSALGIA, UNSPECIFIED: ICD-10-CM

## 2022-08-24 PROCEDURE — 73562 X-RAY EXAM OF KNEE 3: CPT | Mod: RT

## 2022-08-24 PROCEDURE — 99214 OFFICE O/P EST MOD 30 MIN: CPT

## 2022-08-24 PROCEDURE — 73502 X-RAY EXAM HIP UNI 2-3 VIEWS: CPT | Mod: RT

## 2022-08-26 PROBLEM — M54.9 ACUTE BACK PAIN, UNSPECIFIED BACK LOCATION, UNSPECIFIED BACK PAIN LATERALITY: Status: ACTIVE | Noted: 2021-10-14

## 2022-08-26 NOTE — HISTORY OF PRESENT ILLNESS
EXAM DESCRIPTION:  CT RT UPPER EXTREMITY WITHOUT



COMPLETED DATE/TIME:  4/26/2018 1:14 pm



REASON FOR STUDY:  PRIMARY OSTEOARTHRISTIS, RIGHT WRIST M19.031  PRIMARY OSTEOARTHRITIS, RIGHT WRIST



COMPARISON:  None.



TECHNIQUE:  Axial imaging performed through the right wrist with reformatted coronal and sagittal jose david
ging windowed for bone and soft tissues.

Images saved to PACS.

3D IMAGING: Were 3D images as MIP, SSD, or volume rendering performed at the work station? Yes.

All CT scanners at this facility use dose modulation, iterative reconstruction, and/or weight based d
osing when appropriate to reduce radiation dose to as low as reasonably achievable (ALARA).

[facility]: Dose Right  [facility]: CareDose    [facility]: Dose Right    [facility]: Teradose 4D    [facility]: Smart Technologies



LIMITATIONS:  None.



RADIATION DOSE:  CT Rad equipment meets quality standard of care and radiation dose reduction techniq
ues were employed. CTDIvol: 4.6 mGy. DLP: 103 mGy-cm. mGy.



FINDINGS:  SOFT TISSUES: No obvious swelling or foreign body.

BONES: Partial collapse of the proximal carpal row status post fusion of the lunate and scaphoid with
 a metallic screw which is fragmented along the dorsal lateral margin.  The screw tips abut the radio
carpal joint.  There is dorsal tilt of the lunate relative to the capitate.  Osteoarthritic changes i
n the proximal and middle carpal rows.

MINERALIZATION: Osteopenia.

OTHER: No other significant finding.



IMPRESSION:  Old proximal carpal fusion with broken screw and osteoarthritic changes in the proximal 
and middle carpal joints, more advanced in the proximal.



TECHNICAL DOCUMENTATION:  JOB ID:  [id number]

Quality ID # [id number]: Final reports with documentation of one or more dose reduction techniques (e.g., Au
tomated exposure control, adjustment of the mA and/or kV according to patient size, use of iterative 
reconstruction technique)

 2011 [employer]- All Rights Reserved



Reading location - IP/workstation name: [email or web address]
[de-identified] : Today the patient states that the majority of his current pain is surrounding his right hip and right knee.  He does have baseline issues with back pain.  He denies any bowel bladder issues.  He denies any saddle anesthesia.  Of note the patient does have known cardiorespiratory issues which are currently being followed by his cardiologist/primary care physician.\par \par 03/28/22\par Today the patient states that he is dealing with back pain symptoms predominantly.  He has been worked up extensively by our neurosurgery colleagues who have diagnosed him with schwannomas.  He denies any bowel bladder issues.  He denies any saddle anesthesia.  He denies any radicular type symptoms.\par \par 11/17/21\par This is a 71-year-old male that is here today for evaluation of his back and leg symptoms.  He denies any new issues in terms of neurologic deficits, weakness but he still dealing with significant back and leg symptoms.  He denies any bowel bladder issues.  He denies any saddle anesthesia.  This pain can be very disabling at times.  He does endorse issues with walking tolerance.

## 2022-08-26 NOTE — PHYSICAL EXAM
[de-identified] : Lumbar Physical Exam\par \par Gait - Normal\par \par Station - Normal\par \par Sagittal balance - Normal\par \par Compensatory mechanism? - None\par \par \par Reflexes\par Patellar - normal\par Gastroc - normal\par Clonus - No\par \par Hip Exam -reduced range of motion\par Right knee pain with range of motion\par \par Straight leg raise - none\par \par Pulses - 2+ dp/pt\par \par Range of motion - normal\par \par Sensation \par Sensation intact to light touch in L1, L2, L3, L4, L5 and S1 dermatomes bilaterally\par \par Motor\par 	IP	Quad	HS	TA	Gastroc	EHL\par Right	5/5	5/5	5/5	5/5	5/5	5/5\par Left	5/5	5/5	5/5	5/5	5/5	5/5 [de-identified] : right Knee x-rays\par Hardware in appropriate position\par No acute complication\par \par Right hip x-ray\par Moderate to severe osteoarthritis of the right hip\par No significant joint space noted at this point

## 2022-08-26 NOTE — ASSESSMENT
[FreeTextEntry1] : I had a long discussion with the patient regards to his treatment plan and diagnosis.  At this point I discussed with him various treatment options including possible right hip surgery.  He has no appetite for surgery at this point.  We will continue with physical therapy/home exercise program.  I did note some leg swelling and I asked him to follow-up with his primary care physician about this as soon as possible.  I discussed with him red flag symptoms that if present would need to be evaluated immediately in the emergency room.  I will have him follow-up with me in 2 to 3 months.  Encouraged to reach out to me at any point if his symptoms worsen or change in any way.

## 2022-08-29 ENCOUNTER — NON-APPOINTMENT (OUTPATIENT)
Age: 72
End: 2022-08-29

## 2022-08-30 NOTE — PATIENT PROFILE ADULT. - ARE SIGNIFICANT INDICATORS COMPLETE.
Occupational Therapy  Patient not seen in therapy.     On hold due to medical condition    Re-attempt plan: per established plan of care    OT orders received, chart reviewed. Patient to have surgical intervention for pseudoaneurysm. Will await to complete evaluation post surgery.        OBJECTIVE                   Documented in the chart in the following areas: Assessment. Plan.      Therapy procedure time and total treatment time can be found documented on the Time Entry flowsheet   DISPLAY PLAN FREE TEXT Yes

## 2022-09-07 RX ORDER — MELOXICAM 7.5 MG/1
7.5 TABLET ORAL
Qty: 14 | Refills: 0 | Status: ACTIVE | COMMUNITY
Start: 2022-03-28 | End: 1900-01-01

## 2022-10-10 ENCOUNTER — APPOINTMENT (OUTPATIENT)
Dept: ORTHOPEDIC SURGERY | Facility: CLINIC | Age: 72
End: 2022-10-10

## 2022-10-21 ENCOUNTER — APPOINTMENT (OUTPATIENT)
Dept: ELECTROPHYSIOLOGY | Facility: CLINIC | Age: 72
End: 2022-10-21

## 2022-10-21 VITALS
WEIGHT: 253 LBS | DIASTOLIC BLOOD PRESSURE: 75 MMHG | SYSTOLIC BLOOD PRESSURE: 120 MMHG | HEART RATE: 67 BPM | OXYGEN SATURATION: 96 % | HEIGHT: 70 IN | BODY MASS INDEX: 36.22 KG/M2

## 2022-10-21 DIAGNOSIS — R00.1 BRADYCARDIA, UNSPECIFIED: ICD-10-CM

## 2022-10-21 DIAGNOSIS — Z95.818 PRESENCE OF OTHER CARDIAC IMPLANTS AND GRAFTS: ICD-10-CM

## 2022-10-21 DIAGNOSIS — G45.9 TRANSIENT CEREBRAL ISCHEMIC ATTACK, UNSPECIFIED: ICD-10-CM

## 2022-10-21 PROCEDURE — 93000 ELECTROCARDIOGRAM COMPLETE: CPT

## 2022-10-21 PROCEDURE — 99213 OFFICE O/P EST LOW 20 MIN: CPT

## 2022-10-21 RX ORDER — AZITHROMYCIN 250 MG/1
250 TABLET, FILM COATED ORAL
Qty: 1 | Refills: 0 | Status: DISCONTINUED | COMMUNITY
Start: 2021-12-28 | End: 2022-10-21

## 2022-10-21 RX ORDER — ALPRAZOLAM 0.25 MG/1
0.25 TABLET ORAL
Qty: 3 | Refills: 0 | Status: DISCONTINUED | COMMUNITY
Start: 2021-12-03 | End: 2022-10-21

## 2022-10-21 RX ORDER — TIZANIDINE 2 MG/1
2 TABLET ORAL EVERY 6 HOURS
Qty: 24 | Refills: 1 | Status: DISCONTINUED | COMMUNITY
Start: 2022-08-26 | End: 2022-10-21

## 2022-10-21 RX ORDER — PREDNISONE 10 MG/1
10 TABLET ORAL
Qty: 24 | Refills: 0 | Status: DISCONTINUED | COMMUNITY
Start: 2021-12-28 | End: 2022-10-21

## 2022-10-21 RX ORDER — ALBUTEROL SULFATE 90 UG/1
108 (90 BASE) INHALANT RESPIRATORY (INHALATION)
Qty: 1 | Refills: 5 | Status: DISCONTINUED | COMMUNITY
Start: 2021-05-17 | End: 2022-10-21

## 2022-10-21 RX ORDER — AMOXICILLIN 500 MG/1
500 CAPSULE ORAL
Qty: 12 | Refills: 0 | Status: DISCONTINUED | COMMUNITY
Start: 2020-11-21 | End: 2022-10-21

## 2022-10-21 RX ORDER — ROSUVASTATIN CALCIUM 10 MG/1
10 TABLET, FILM COATED ORAL DAILY
Refills: 0 | Status: DISCONTINUED | COMMUNITY
End: 2022-10-21

## 2022-10-21 RX ORDER — ONDANSETRON 4 MG/1
4 TABLET ORAL
Qty: 90 | Refills: 1 | Status: DISCONTINUED | COMMUNITY
Start: 2020-09-18 | End: 2022-10-21

## 2022-10-21 RX ORDER — NIRMATRELVIR AND RITONAVIR 300-100 MG
20 X 150 MG & KIT ORAL
Qty: 30 | Refills: 0 | Status: DISCONTINUED | COMMUNITY
Start: 2022-07-06

## 2022-10-21 RX ORDER — TIZANIDINE 2 MG/1
2 TABLET ORAL EVERY 6 HOURS
Qty: 24 | Refills: 1 | Status: DISCONTINUED | COMMUNITY
Start: 2022-08-24 | End: 2022-10-21

## 2022-10-21 RX ORDER — MECLIZINE HYDROCHLORIDE 12.5 MG/1
12.5 TABLET ORAL 3 TIMES DAILY
Qty: 90 | Refills: 0 | Status: DISCONTINUED | COMMUNITY
Start: 2021-01-04 | End: 2022-10-21

## 2022-10-21 RX ORDER — BENZONATATE 200 MG/1
200 CAPSULE ORAL
Qty: 40 | Refills: 0 | Status: DISCONTINUED | COMMUNITY
Start: 2021-12-29 | End: 2022-10-21

## 2022-10-21 RX ORDER — GABAPENTIN 300 MG/1
300 CAPSULE ORAL
Qty: 30 | Refills: 0 | Status: DISCONTINUED | COMMUNITY
Start: 2021-11-29 | End: 2022-10-21

## 2022-10-21 RX ORDER — FLUTICASONE PROPIONATE 50 UG/1
50 SPRAY, METERED NASAL
Qty: 1 | Refills: 5 | Status: DISCONTINUED | COMMUNITY
Start: 2019-10-11 | End: 2022-10-21

## 2022-10-21 RX ORDER — METFORMIN ER 500 MG 500 MG/1
500 TABLET ORAL DAILY
Refills: 0 | Status: ACTIVE | COMMUNITY
Start: 2022-08-03

## 2022-10-24 PROBLEM — G45.9 TIA (TRANSIENT ISCHEMIC ATTACK): Status: ACTIVE | Noted: 2021-03-17

## 2022-10-24 PROBLEM — Z95.818 STATUS POST PLACEMENT OF IMPLANTABLE LOOP RECORDER: Status: ACTIVE | Noted: 2021-04-10

## 2022-10-24 PROBLEM — R00.1 VAGAL AUTONOMIC BRADYCARDIA: Status: ACTIVE | Noted: 2022-10-24

## 2022-10-24 NOTE — DISCUSSION/SUMMARY
[EKG obtained to assist in diagnosis and management of assessed problem(s)] : EKG obtained to assist in diagnosis and management of assessed problem(s) [Third Degree A-V Block] : third degree  AV block [FreeTextEntry1] : In summary, Mr. Mcmahon is a 72 year old man with obstructive sleep apnea, prior transient ischemic attack, hypertension, hyperlipidemia, a prior bioprosthetic aortic valve replacement, post-operative atrial flutter, and an ILR implant post a TIA event. His ILR interrogation showed a 5-second pause (sinus rhythm with AV block). On review of the tracing there is sinus slowing with an increased p-p interval as well as an increased [place] interval which is consistent with high vagal tone. Based on this event, there is no indication for a pacemaker. We encouraged him to continue to use his CPAP machine.\par \par In addition, his ILR interrogation has shown in the past brief episodes of paroxysmal atrial fibrillation. The events were infrequent and lasted up to 2 minutes.  Given that these episodes were less than 6 minutes in duration, he is not on anticoagulation. He will continue his ongoing care with his Cardiologist. He will follow-up with us as needed.\par \par I, Dr. Kennedy, personally performed the evaluation and management (E/M) services for this new patient. That E/M includes conducting the initial examination, assessing all conditions, and establishing the plan of care. Today, my ACP, Sarai Christine PA-C, was here to observe my evaluation and management services for this patient to be followed going forward.

## 2022-10-24 NOTE — HISTORY OF PRESENT ILLNESS
[FreeTextEntry1] : Mr. Jason Mcmahon is a 72 year old man with obstructive sleep apnea, prior transient ischemic attack, hypertension, hyperlipidemia a prior bioprosthetic aortic valve replacement by Dr. Bond from [facility] on 8/10/2015 and post-operative atrial flutter. He received a [employer] LINQ II implantable loop recorder on 4/9/2021 by Dr. Paul Ceron due to his transient ischemic attack.\par \par He presents today for an initial evaluation of a 5 second pause that was detected on his ILR. This event occurred on 10/9/2022 at 21:33. The patient reports that he was asymptomatic at the time of the event. He also is reportedly non-compliant with his CPAP. On my review of the strip the events is consistent with increased vagal tone. Prior to the pause (sinus with AV block), the p-p interval increases along with the ID interval - these findings are consistent with high vagal tone. He denies any chest pain, shortness of breath, palpitations, near-syncope, or syncope. Although he reports compliance with CPAP his wife states that he often falls asleep during the day while watching TV.\par \par Interrogation of his ILR implant also demonstrated episodes of atrial fibrillation in the past although the episodes all lasting less than 2 minutes.\par \par His current medications include Norvasc 10mg, ASA 81mg, Fe, Gabapentin, HCTZ 12.5, Meloxicam, Crestor, Tizanidine, Trelegy, Valsartan 320mg and Metformin.\par

## 2022-10-24 NOTE — CARDIOLOGY SUMMARY
[de-identified] : ECG from 10/21/22: Sinus rhythm at 68 bpm; first-degree AV block (Shwetha: 224 ms)\par ECG from 10/14/2022: Sinus with first degree AV block (Shwetha: 230ms), normal axis, normal QRSd and QTc [de-identified] : TTE from 8/26/2022: EF: 57%, LA: 5.6, prosthetic aortic valve (tissue), dilated aortic root, severe concentric LVH\par \par ASHELY from 4/9/2021: no PHUC thrombus [de-identified] : [facility] from 8/24/2018: no obstructive coronary artery disease

## 2022-10-28 NOTE — ASU PATIENT PROFILE, ADULT - FALL HARM RISK CONCLUSION
Patient provided with discharge instructions. Verbalized understanding for plan of care at home and follow up. All questions/ concerns addressed prior to discharge. Universal Safety Interventions

## 2023-01-23 ENCOUNTER — APPOINTMENT (OUTPATIENT)
Dept: ORTHOPEDIC SURGERY | Facility: CLINIC | Age: 73
End: 2023-01-23
Payer: MEDICARE

## 2023-01-23 VITALS
TEMPERATURE: 97.9 F | SYSTOLIC BLOOD PRESSURE: 118 MMHG | HEART RATE: 62 BPM | OXYGEN SATURATION: 96 % | BODY MASS INDEX: 36.65 KG/M2 | DIASTOLIC BLOOD PRESSURE: 70 MMHG | HEIGHT: 70 IN | WEIGHT: 256 LBS

## 2023-01-23 PROCEDURE — 73030 X-RAY EXAM OF SHOULDER: CPT | Mod: 50

## 2023-01-23 PROCEDURE — 99214 OFFICE O/P EST MOD 30 MIN: CPT

## 2023-01-23 NOTE — PHYSICAL EXAM
[Rad] : radial 2+ and symmetric bilaterally [Normal] : Alert and in no acute distress [Poor Appearance] : well-appearing [Acute Distress] : not in acute distress [Obese] : not obese [de-identified] : The patient has no respiratory distress. Mood and affect are normal. The patient is alert and oriented to person, place and time.\par Examination of the cervical spine demonstrates no tenderness, no deformity and no muscle spasm. Cervical spine rotation is 60° to the right, 60° to the left, 75° of extension and 45° of flexion. Neurologic exam of the upper extremities reveals intact sensation to light touch. Motor function is 5 over 5 in all groups. Deep tendon reflexes are 2+ and equal at the biceps, triceps and brachioradialis.\par He has painful motion of both shoulders.  He has significant limitation on the left compared to the right.  He has crepitus with left shoulder motion.  He has decreased strength generally around the left shoulder.  Elbows are stable.  The skin is intact.  There is no lymphedema. [de-identified] : AP, transscapular and axillary x-rays of both shoulders taken today demonstrate no fracture or dislocation.  He has moderate to severe degenerative changes of the left shoulder and mild to moderate degenerative changes of the right shoulder

## 2023-01-23 NOTE — HISTORY OF PRESENT ILLNESS
[de-identified] : 72 year old RHD male presents for initial evaluation of bilateral shoulder pain x 1 month. Denies trauma or injury. He complains of constant aching pain, L>R, worse and sharper with movement. He feels some clicking in the shoulders. He also notes the pain is worse at nighttime, does not fluctuate despite changing sleep positions. He has been taking Tylenol with minimal relief. He is unsure if he can take NSAIDs. Denies neck pain. Denies paresthesias. Denies any prior injuries. \par

## 2023-01-23 NOTE — DISCUSSION/SUMMARY
[de-identified] : The patient has osteoarthritis of the shoulders.  I have discussed the pathology, natural history and treatment options with him.  He is unable to take NSAIDs because of his cardiac comorbidities.  He would rather not have a steroid injection.  He does not feel he can afford physical therapy.  He will apply topical ointments and attempt home exercises.  He will return as needed.

## 2023-02-12 ENCOUNTER — NON-APPOINTMENT (OUTPATIENT)
Age: 73
End: 2023-02-12

## 2023-03-03 NOTE — PRE-OP CHECKLIST - SITE MARKED BY ANESTHESIOLOGIST
[Never] : never [TextBox_4] : 51-year-old male, native [place] seen today after a 3-1/2-year hiatus from this office.  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  He was evaluated by cardiology at that time with no clear indication for treatment.\par \par Patient is status post discharge from [facility] from 3/1-3/2/2023.  He was sent from his primary care doctor's office to the ER for EKG changes.  His course as described above is also complicated by an empyema in 2019 which required drainage.  He has been continuing to complain of episodes of dyspnea with exertion as well as atypical chest pressure.  Patient was admitted and underwent a left heart catheterization which was reported as unremarkable.  D-dimer was also negative.  CAT scan performed as well as echocardiogram described below.  Patient denies any hemoptysis fevers chills lightheadedness or dizziness.  (Hospitalization reviewed on sunrise) n/a

## 2023-03-09 NOTE — H&P PST ADULT - ENMT
Wound Care After a Biopsy    What is a skin biopsy?  A skin biopsy allows the doctor to examine a very small piece of tissue under the microscope to determine the diagnosis and the best treatment for the skin condition. A local anesthetic (numbing medicine)  is injected with a very small needle into the skin area to be tested. A small piece of skin is taken from the area. Sometimes a suture (stitch) is used.     What are the risks of a skin biopsy?  I will experience scar, bleeding, swelling, pain, crusting and redness. I may experience incomplete removal or recurrence. Risks of this procedure are excessive bleeding, bruising, infection, nerve damage, numbness, thick (hypertrophic or keloidal) scar and non-diagnostic biopsy.    How should I care for my wound for the first 24 hours?  Keep the wound dry and covered for 24 hours  If it bleeds, hold direct pressure on the area for 15 minutes. If bleeding does not stop then go to the emergency room  Avoid strenuous exercise the first 1-2 days or as your doctor instructs you    How should I care for the wound after 24 hours?  After 24 hours, remove the bandage  You may bathe or shower as normal  If you had a scalp biopsy, you can shampoo as usual and can use shower water to clean the biopsy site daily  Clean the wound twice a day with gentle soap and water  Do not scrub, be gentle  Apply white petroleum/Vaseline after cleaning the wound with a cotton swab or a clean finger, and keep the site covered with a Bandaid /bandage. Bandages are not necessary with a scalp biopsy  If you are unable to cover the site with a Bandaid /bandage, re-apply ointment 2-3 times a day to keep the site moist. Moisture will help with healing  Avoid strenuous activity for first 1-2 days  Avoid lakes, rivers, pools, and oceans until the stitches are removed or the site is healed    How do I clean my wound?  Wash hands thoroughly with soap or use hand  before all wound care  Clean the  wound with gentle soap and water  Apply white petroleum/Vaseline  to wound after it is clean  Replace the Bandaid /bandage to keep the wound covered for the first few days or as instructed by your doctor  If you had a scalp biopsy, warm shower water to the area on a daily basis should suffice    What should I use to clean my wound?   Cotton-tipped applicators (Qtips )  White petroleum jelly (Vaseline ) or Aquaphor. Use a clean new container and use Q-tips to apply.  Bandaids   as needed  Gentle soap     How should I care for my wound long term?  Do not get your wound dirty  Keep up with wound care for one week or until the area is healed.  A small scab will form and fall off by itself when the area is completely healed. The area will be red and will become pink in color as it heals. Sun protection is very important for how your scar will turn out. Sunscreen with an SPF 30 or greater is recommended once the area is healed.  You should have some soreness but it should be mild and slowly go away over several days. Talk to your doctor about using tylenol for pain,    When should I call my doctor?  If you have increased:   Pain or swelling  Pus or drainage (clear or slightly yellow drainage is ok)  Temperature over 100F  Spreading redness or warmth around wound    When will I hear about my results?  The biopsy results can take 2 weeks to come back.  Your results will automatically release to [employer] before your provider has even reviewed them.  The clinic will call you with the results, send you a [email or web address] message, or have you schedule a follow-up clinic or phone time to discuss the results.  Contact our clinics if you do not hear from us in 2 weeks.    Who should I call with questions?  [facility]: [phone]  [facility]: [phone]  For urgent needs outside of business hours call the [facility] at [phone] and ask for the dermatology resident  on call    details… detailed exam mouth

## 2023-05-28 ENCOUNTER — TRANSCRIPTION ENCOUNTER (OUTPATIENT)
Age: 73
End: 2023-05-28

## 2023-05-28 ENCOUNTER — INPATIENT (INPATIENT)
Facility: HOSPITAL | Age: 73
LOS: 0 days | Discharge: HOME | End: 2023-05-29
Attending: STUDENT IN AN ORGANIZED HEALTH CARE EDUCATION/TRAINING PROGRAM | Admitting: STUDENT IN AN ORGANIZED HEALTH CARE EDUCATION/TRAINING PROGRAM
Payer: MEDICARE

## 2023-05-28 VITALS
DIASTOLIC BLOOD PRESSURE: 88 MMHG | TEMPERATURE: 98 F | OXYGEN SATURATION: 94 % | RESPIRATION RATE: 18 BRPM | HEART RATE: 66 BPM | SYSTOLIC BLOOD PRESSURE: 148 MMHG

## 2023-05-28 DIAGNOSIS — Z95.2 PRESENCE OF PROSTHETIC HEART VALVE: Chronic | ICD-10-CM

## 2023-05-28 DIAGNOSIS — I10 ESSENTIAL (PRIMARY) HYPERTENSION: ICD-10-CM

## 2023-05-28 DIAGNOSIS — Z96.651 PRESENCE OF RIGHT ARTIFICIAL KNEE JOINT: Chronic | ICD-10-CM

## 2023-05-28 DIAGNOSIS — Z98.890 OTHER SPECIFIED POSTPROCEDURAL STATES: Chronic | ICD-10-CM

## 2023-05-28 DIAGNOSIS — Z29.9 ENCOUNTER FOR PROPHYLACTIC MEASURES, UNSPECIFIED: ICD-10-CM

## 2023-05-28 DIAGNOSIS — M25.511 PAIN IN RIGHT SHOULDER: ICD-10-CM

## 2023-05-28 DIAGNOSIS — J44.1 CHRONIC OBSTRUCTIVE PULMONARY DISEASE WITH (ACUTE) EXACERBATION: ICD-10-CM

## 2023-05-28 DIAGNOSIS — G45.9 TRANSIENT CEREBRAL ISCHEMIC ATTACK, UNSPECIFIED: ICD-10-CM

## 2023-05-28 DIAGNOSIS — Z98.49 CATARACT EXTRACTION STATUS, UNSPECIFIED EYE: Chronic | ICD-10-CM

## 2023-05-28 DIAGNOSIS — Z86.73 PERSONAL HISTORY OF TRANSIENT ISCHEMIC ATTACK (TIA), AND CEREBRAL INFARCTION WITHOUT RESIDUAL DEFICITS: ICD-10-CM

## 2023-05-28 DIAGNOSIS — Z90.49 ACQUIRED ABSENCE OF OTHER SPECIFIED PARTS OF DIGESTIVE TRACT: Chronic | ICD-10-CM

## 2023-05-28 DIAGNOSIS — E11.9 TYPE 2 DIABETES MELLITUS WITHOUT COMPLICATIONS: ICD-10-CM

## 2023-05-28 LAB
ALBUMIN SERPL ELPH-MCNC: 3.8 G/DL — SIGNIFICANT CHANGE UP (ref 3.3–5)
ALP SERPL-CCNC: 102 U/L — SIGNIFICANT CHANGE UP (ref 40–120)
ALT FLD-CCNC: 11 U/L — SIGNIFICANT CHANGE UP (ref 4–41)
ANION GAP SERPL CALC-SCNC: 16 MMOL/L — HIGH (ref 7–14)
AST SERPL-CCNC: 15 U/L — SIGNIFICANT CHANGE UP (ref 4–40)
BASOPHILS # BLD AUTO: 0.03 K/UL — SIGNIFICANT CHANGE UP (ref 0–0.2)
BASOPHILS NFR BLD AUTO: 0.2 % — SIGNIFICANT CHANGE UP (ref 0–2)
BILIRUB SERPL-MCNC: 0.7 MG/DL — SIGNIFICANT CHANGE UP (ref 0.2–1.2)
BUN SERPL-MCNC: 22 MG/DL — SIGNIFICANT CHANGE UP (ref 7–23)
CALCIUM SERPL-MCNC: 8.4 MG/DL — SIGNIFICANT CHANGE UP (ref 8.4–10.5)
CHLORIDE SERPL-SCNC: 103 MMOL/L — SIGNIFICANT CHANGE UP (ref 98–107)
CO2 SERPL-SCNC: 21 MMOL/L — LOW (ref 22–31)
CREAT SERPL-MCNC: 1.13 MG/DL — SIGNIFICANT CHANGE UP (ref 0.5–1.3)
CRP SERPL-MCNC: 18.1 MG/L — HIGH
EGFR: 70 ML/MIN/1.73M2 — SIGNIFICANT CHANGE UP
EOSINOPHIL # BLD AUTO: 0.14 K/UL — SIGNIFICANT CHANGE UP (ref 0–0.5)
EOSINOPHIL NFR BLD AUTO: 1.1 % — SIGNIFICANT CHANGE UP (ref 0–6)
GLUCOSE BLDC GLUCOMTR-MCNC: 109 MG/DL — HIGH (ref 70–99)
GLUCOSE BLDC GLUCOMTR-MCNC: 112 MG/DL — HIGH (ref 70–99)
GLUCOSE BLDC GLUCOMTR-MCNC: 118 MG/DL — HIGH (ref 70–99)
GLUCOSE BLDC GLUCOMTR-MCNC: 132 MG/DL — HIGH (ref 70–99)
GLUCOSE SERPL-MCNC: 127 MG/DL — HIGH (ref 70–99)
HCT VFR BLD CALC: 36.4 % — LOW (ref 39–50)
HGB BLD-MCNC: 11.8 G/DL — LOW (ref 13–17)
IANC: 10.01 K/UL — HIGH (ref 1.8–7.4)
IMM GRANULOCYTES NFR BLD AUTO: 0.4 % — SIGNIFICANT CHANGE UP (ref 0–0.9)
LYMPHOCYTES # BLD AUTO: 0.96 K/UL — LOW (ref 1–3.3)
LYMPHOCYTES # BLD AUTO: 7.8 % — LOW (ref 13–44)
MCHC RBC-ENTMCNC: 26.9 PG — LOW (ref 27–34)
MCHC RBC-ENTMCNC: 32.4 GM/DL — SIGNIFICANT CHANGE UP (ref 32–36)
MCV RBC AUTO: 83.1 FL — SIGNIFICANT CHANGE UP (ref 80–100)
MONOCYTES # BLD AUTO: 1.04 K/UL — HIGH (ref 0–0.9)
MONOCYTES NFR BLD AUTO: 8.5 % — SIGNIFICANT CHANGE UP (ref 2–14)
NEUTROPHILS # BLD AUTO: 10.01 K/UL — HIGH (ref 1.8–7.4)
NEUTROPHILS NFR BLD AUTO: 82 % — HIGH (ref 43–77)
NRBC # BLD: 0 /100 WBCS — SIGNIFICANT CHANGE UP (ref 0–0)
NRBC # FLD: 0 K/UL — SIGNIFICANT CHANGE UP (ref 0–0)
PLATELET # BLD AUTO: 189 K/UL — SIGNIFICANT CHANGE UP (ref 150–400)
POTASSIUM SERPL-MCNC: 4.3 MMOL/L — SIGNIFICANT CHANGE UP (ref 3.5–5.3)
POTASSIUM SERPL-SCNC: 4.3 MMOL/L — SIGNIFICANT CHANGE UP (ref 3.5–5.3)
PROT SERPL-MCNC: 6.7 G/DL — SIGNIFICANT CHANGE UP (ref 6–8.3)
RBC # BLD: 4.38 M/UL — SIGNIFICANT CHANGE UP (ref 4.2–5.8)
RBC # FLD: 14 % — SIGNIFICANT CHANGE UP (ref 10.3–14.5)
SODIUM SERPL-SCNC: 140 MMOL/L — SIGNIFICANT CHANGE UP (ref 135–145)
TROPONIN T, HIGH SENSITIVITY RESULT: 15 NG/L — SIGNIFICANT CHANGE UP
TROPONIN T, HIGH SENSITIVITY RESULT: 16 NG/L — SIGNIFICANT CHANGE UP
WBC # BLD: 12.23 K/UL — HIGH (ref 3.8–10.5)
WBC # FLD AUTO: 12.23 K/UL — HIGH (ref 3.8–10.5)

## 2023-05-28 PROCEDURE — 73030 X-RAY EXAM OF SHOULDER: CPT | Mod: 26,RT

## 2023-05-28 PROCEDURE — 99053 MED SERV 10PM-8AM 24 HR FAC: CPT

## 2023-05-28 PROCEDURE — 73060 X-RAY EXAM OF HUMERUS: CPT | Mod: 26,RT

## 2023-05-28 PROCEDURE — 99223 1ST HOSP IP/OBS HIGH 75: CPT | Mod: GC

## 2023-05-28 PROCEDURE — 99285 EMERGENCY DEPT VISIT HI MDM: CPT

## 2023-05-28 RX ORDER — DEXTROSE 50 % IN WATER 50 %
12.5 SYRINGE (ML) INTRAVENOUS ONCE
Refills: 0 | Status: DISCONTINUED | OUTPATIENT
Start: 2023-05-28 | End: 2023-05-29

## 2023-05-28 RX ORDER — ROSUVASTATIN CALCIUM 5 MG/1
1 TABLET ORAL
Refills: 0 | DISCHARGE

## 2023-05-28 RX ORDER — SIMVASTATIN 20 MG/1
1 TABLET, FILM COATED ORAL
Qty: 0 | Refills: 0 | DISCHARGE

## 2023-05-28 RX ORDER — TIOTROPIUM BROMIDE 18 UG/1
2 CAPSULE ORAL; RESPIRATORY (INHALATION) DAILY
Refills: 0 | Status: DISCONTINUED | OUTPATIENT
Start: 2023-05-28 | End: 2023-05-29

## 2023-05-28 RX ORDER — INSULIN LISPRO 100/ML
VIAL (ML) SUBCUTANEOUS
Refills: 0 | Status: DISCONTINUED | OUTPATIENT
Start: 2023-05-28 | End: 2023-05-29

## 2023-05-28 RX ORDER — KETOROLAC TROMETHAMINE 30 MG/ML
15 SYRINGE (ML) INJECTION ONCE
Refills: 0 | Status: DISCONTINUED | OUTPATIENT
Start: 2023-05-28 | End: 2023-05-28

## 2023-05-28 RX ORDER — LIDOCAINE 4 G/100G
1 CREAM TOPICAL ONCE
Refills: 0 | Status: COMPLETED | OUTPATIENT
Start: 2023-05-28 | End: 2023-05-28

## 2023-05-28 RX ORDER — VALSARTAN 80 MG/1
320 TABLET ORAL DAILY
Refills: 0 | Status: DISCONTINUED | OUTPATIENT
Start: 2023-05-28 | End: 2023-05-28

## 2023-05-28 RX ORDER — DEXTROSE 50 % IN WATER 50 %
15 SYRINGE (ML) INTRAVENOUS ONCE
Refills: 0 | Status: DISCONTINUED | OUTPATIENT
Start: 2023-05-28 | End: 2023-05-29

## 2023-05-28 RX ORDER — MORPHINE SULFATE 50 MG/1
4 CAPSULE, EXTENDED RELEASE ORAL ONCE
Refills: 0 | Status: DISCONTINUED | OUTPATIENT
Start: 2023-05-28 | End: 2023-05-28

## 2023-05-28 RX ORDER — IBUPROFEN 200 MG
600 TABLET ORAL ONCE
Refills: 0 | Status: COMPLETED | OUTPATIENT
Start: 2023-05-28 | End: 2023-05-28

## 2023-05-28 RX ORDER — LIDOCAINE 4 G/100G
1 CREAM TOPICAL DAILY
Refills: 0 | Status: DISCONTINUED | OUTPATIENT
Start: 2023-05-28 | End: 2023-05-29

## 2023-05-28 RX ORDER — DEXTROSE 50 % IN WATER 50 %
25 SYRINGE (ML) INTRAVENOUS ONCE
Refills: 0 | Status: DISCONTINUED | OUTPATIENT
Start: 2023-05-28 | End: 2023-05-29

## 2023-05-28 RX ORDER — METFORMIN HYDROCHLORIDE 850 MG/1
1 TABLET ORAL
Refills: 0 | DISCHARGE

## 2023-05-28 RX ORDER — METOPROLOL TARTRATE 50 MG
1 TABLET ORAL
Refills: 0 | DISCHARGE

## 2023-05-28 RX ORDER — CYCLOBENZAPRINE HYDROCHLORIDE 10 MG/1
10 TABLET, FILM COATED ORAL THREE TIMES A DAY
Refills: 0 | Status: DISCONTINUED | OUTPATIENT
Start: 2023-05-28 | End: 2023-05-29

## 2023-05-28 RX ORDER — VALSARTAN 80 MG/1
1 TABLET ORAL
Qty: 0 | Refills: 0 | DISCHARGE

## 2023-05-28 RX ORDER — SODIUM CHLORIDE 9 MG/ML
1000 INJECTION, SOLUTION INTRAVENOUS
Refills: 0 | Status: DISCONTINUED | OUTPATIENT
Start: 2023-05-28 | End: 2023-05-29

## 2023-05-28 RX ORDER — ACETAMINOPHEN 500 MG
1000 TABLET ORAL EVERY 6 HOURS
Refills: 0 | Status: DISCONTINUED | OUTPATIENT
Start: 2023-05-28 | End: 2023-05-29

## 2023-05-28 RX ORDER — FLUTICASONE FUROATE, UMECLIDINIUM BROMIDE AND VILANTEROL TRIFENATATE 200; 62.5; 25 UG/1; UG/1; UG/1
1 POWDER RESPIRATORY (INHALATION)
Qty: 0 | Refills: 0 | DISCHARGE

## 2023-05-28 RX ORDER — GLUCAGON INJECTION, SOLUTION 0.5 MG/.1ML
1 INJECTION, SOLUTION SUBCUTANEOUS ONCE
Refills: 0 | Status: DISCONTINUED | OUTPATIENT
Start: 2023-05-28 | End: 2023-05-29

## 2023-05-28 RX ORDER — ASPIRIN/CALCIUM CARB/MAGNESIUM 324 MG
81 TABLET ORAL DAILY
Refills: 0 | Status: DISCONTINUED | OUTPATIENT
Start: 2023-05-28 | End: 2023-05-29

## 2023-05-28 RX ORDER — ENOXAPARIN SODIUM 100 MG/ML
40 INJECTION SUBCUTANEOUS EVERY 24 HOURS
Refills: 0 | Status: DISCONTINUED | OUTPATIENT
Start: 2023-05-28 | End: 2023-05-29

## 2023-05-28 RX ORDER — VALSARTAN 80 MG/1
320 TABLET ORAL DAILY
Refills: 0 | Status: DISCONTINUED | OUTPATIENT
Start: 2023-05-28 | End: 2023-05-29

## 2023-05-28 RX ORDER — AMLODIPINE BESYLATE 2.5 MG/1
1 TABLET ORAL
Refills: 0 | DISCHARGE

## 2023-05-28 RX ORDER — CYCLOBENZAPRINE HYDROCHLORIDE 10 MG/1
1 TABLET, FILM COATED ORAL
Qty: 15 | Refills: 0
Start: 2023-05-28 | End: 2023-06-01

## 2023-05-28 RX ORDER — LANOLIN ALCOHOL/MO/W.PET/CERES
3 CREAM (GRAM) TOPICAL AT BEDTIME
Refills: 0 | Status: DISCONTINUED | OUTPATIENT
Start: 2023-05-28 | End: 2023-05-29

## 2023-05-28 RX ORDER — INSULIN LISPRO 100/ML
VIAL (ML) SUBCUTANEOUS AT BEDTIME
Refills: 0 | Status: DISCONTINUED | OUTPATIENT
Start: 2023-05-28 | End: 2023-05-29

## 2023-05-28 RX ORDER — OXYCODONE HYDROCHLORIDE 5 MG/1
10 TABLET ORAL EVERY 6 HOURS
Refills: 0 | Status: DISCONTINUED | OUTPATIENT
Start: 2023-05-28 | End: 2023-05-28

## 2023-05-28 RX ORDER — ONDANSETRON 8 MG/1
4 TABLET, FILM COATED ORAL EVERY 8 HOURS
Refills: 0 | Status: DISCONTINUED | OUTPATIENT
Start: 2023-05-28 | End: 2023-05-29

## 2023-05-28 RX ORDER — MORPHINE SULFATE 50 MG/1
4 CAPSULE, EXTENDED RELEASE ORAL EVERY 6 HOURS
Refills: 0 | Status: DISCONTINUED | OUTPATIENT
Start: 2023-05-28 | End: 2023-05-28

## 2023-05-28 RX ORDER — ASPIRIN/CALCIUM CARB/MAGNESIUM 324 MG
1 TABLET ORAL
Qty: 0 | Refills: 0 | DISCHARGE

## 2023-05-28 RX ORDER — LIDOCAINE 4 G/100G
1 CREAM TOPICAL
Qty: 1 | Refills: 0
Start: 2023-05-28 | End: 2023-06-01

## 2023-05-28 RX ORDER — ACETAMINOPHEN 500 MG
1000 TABLET ORAL ONCE
Refills: 0 | Status: COMPLETED | OUTPATIENT
Start: 2023-05-28 | End: 2023-05-28

## 2023-05-28 RX ORDER — BUDESONIDE AND FORMOTEROL FUMARATE DIHYDRATE 160; 4.5 UG/1; UG/1
2 AEROSOL RESPIRATORY (INHALATION)
Refills: 0 | Status: DISCONTINUED | OUTPATIENT
Start: 2023-05-28 | End: 2023-05-29

## 2023-05-28 RX ORDER — AMLODIPINE BESYLATE 2.5 MG/1
1 TABLET ORAL
Qty: 0 | Refills: 0 | DISCHARGE

## 2023-05-28 RX ORDER — METHOCARBAMOL 500 MG/1
500 TABLET, FILM COATED ORAL ONCE
Refills: 0 | Status: COMPLETED | OUTPATIENT
Start: 2023-05-28 | End: 2023-05-28

## 2023-05-28 RX ORDER — ACETAMINOPHEN 500 MG
2 TABLET ORAL
Qty: 0 | Refills: 0 | DISCHARGE
Start: 2023-05-28

## 2023-05-28 RX ORDER — METOPROLOL TARTRATE 50 MG
25 TABLET ORAL DAILY
Refills: 0 | Status: DISCONTINUED | OUTPATIENT
Start: 2023-05-28 | End: 2023-05-29

## 2023-05-28 RX ORDER — ALBUTEROL 90 UG/1
2 AEROSOL, METERED ORAL EVERY 6 HOURS
Refills: 0 | Status: DISCONTINUED | OUTPATIENT
Start: 2023-05-28 | End: 2023-05-29

## 2023-05-28 RX ORDER — ALBUTEROL 90 UG/1
2 AEROSOL, METERED ORAL
Qty: 0 | Refills: 0 | DISCHARGE

## 2023-05-28 RX ORDER — OXYCODONE HYDROCHLORIDE 5 MG/1
10 TABLET ORAL EVERY 6 HOURS
Refills: 0 | Status: DISCONTINUED | OUTPATIENT
Start: 2023-05-28 | End: 2023-05-29

## 2023-05-28 RX ORDER — ATORVASTATIN CALCIUM 80 MG/1
80 TABLET, FILM COATED ORAL AT BEDTIME
Refills: 0 | Status: DISCONTINUED | OUTPATIENT
Start: 2023-05-28 | End: 2023-05-29

## 2023-05-28 RX ORDER — OXYCODONE HYDROCHLORIDE 5 MG/1
5 TABLET ORAL EVERY 6 HOURS
Refills: 0 | Status: DISCONTINUED | OUTPATIENT
Start: 2023-05-28 | End: 2023-05-29

## 2023-05-28 RX ORDER — AMLODIPINE BESYLATE 2.5 MG/1
10 TABLET ORAL DAILY
Refills: 0 | Status: DISCONTINUED | OUTPATIENT
Start: 2023-05-28 | End: 2023-05-29

## 2023-05-28 RX ADMIN — ATORVASTATIN CALCIUM 80 MILLIGRAM(S): 80 TABLET, FILM COATED ORAL at 21:43

## 2023-05-28 RX ADMIN — Medication 81 MILLIGRAM(S): at 15:24

## 2023-05-28 RX ADMIN — Medication 1000 MILLIGRAM(S): at 18:32

## 2023-05-28 RX ADMIN — AMLODIPINE BESYLATE 10 MILLIGRAM(S): 2.5 TABLET ORAL at 15:23

## 2023-05-28 RX ADMIN — Medication 600 MILLIGRAM(S): at 05:19

## 2023-05-28 RX ADMIN — Medication 25 MILLIGRAM(S): at 15:23

## 2023-05-28 RX ADMIN — Medication 1000 MILLIGRAM(S): at 12:41

## 2023-05-28 RX ADMIN — MORPHINE SULFATE 4 MILLIGRAM(S): 50 CAPSULE, EXTENDED RELEASE ORAL at 08:30

## 2023-05-28 RX ADMIN — Medication 15 MILLIGRAM(S): at 22:25

## 2023-05-28 RX ADMIN — Medication 15 MILLIGRAM(S): at 08:30

## 2023-05-28 RX ADMIN — BUDESONIDE AND FORMOTEROL FUMARATE DIHYDRATE 2 PUFF(S): 160; 4.5 AEROSOL RESPIRATORY (INHALATION) at 23:54

## 2023-05-28 RX ADMIN — MORPHINE SULFATE 4 MILLIGRAM(S): 50 CAPSULE, EXTENDED RELEASE ORAL at 06:35

## 2023-05-28 RX ADMIN — Medication 1000 MILLIGRAM(S): at 13:11

## 2023-05-28 RX ADMIN — OXYCODONE HYDROCHLORIDE 5 MILLIGRAM(S): 5 TABLET ORAL at 15:55

## 2023-05-28 RX ADMIN — Medication 15 MILLIGRAM(S): at 06:35

## 2023-05-28 RX ADMIN — LIDOCAINE 1 PATCH: 4 CREAM TOPICAL at 04:12

## 2023-05-28 RX ADMIN — MORPHINE SULFATE 4 MILLIGRAM(S): 50 CAPSULE, EXTENDED RELEASE ORAL at 04:22

## 2023-05-28 RX ADMIN — TIOTROPIUM BROMIDE 2 PUFF(S): 18 CAPSULE ORAL; RESPIRATORY (INHALATION) at 17:59

## 2023-05-28 RX ADMIN — LIDOCAINE 1 PATCH: 4 CREAM TOPICAL at 18:02

## 2023-05-28 RX ADMIN — METHOCARBAMOL 500 MILLIGRAM(S): 500 TABLET, FILM COATED ORAL at 07:00

## 2023-05-28 RX ADMIN — LIDOCAINE 1 PATCH: 4 CREAM TOPICAL at 12:31

## 2023-05-28 RX ADMIN — ENOXAPARIN SODIUM 40 MILLIGRAM(S): 100 INJECTION SUBCUTANEOUS at 15:24

## 2023-05-28 RX ADMIN — MORPHINE SULFATE 4 MILLIGRAM(S): 50 CAPSULE, EXTENDED RELEASE ORAL at 03:51

## 2023-05-28 RX ADMIN — Medication 1000 MILLIGRAM(S): at 23:55

## 2023-05-28 RX ADMIN — Medication 400 MILLIGRAM(S): at 05:21

## 2023-05-28 RX ADMIN — OXYCODONE HYDROCHLORIDE 5 MILLIGRAM(S): 5 TABLET ORAL at 15:23

## 2023-05-28 RX ADMIN — Medication 15 MILLIGRAM(S): at 22:10

## 2023-05-28 RX ADMIN — LIDOCAINE 1 PATCH: 4 CREAM TOPICAL at 10:58

## 2023-05-28 RX ADMIN — Medication 1000 MILLIGRAM(S): at 19:02

## 2023-05-28 NOTE — ED ADULT NURSE REASSESSMENT NOTE - NS ED NURSE REASSESS COMMENT FT1
pt A&ox4, denies chest pain and SOB. pt endorses right shoulder pain but states pain level has improved. pt able to move right shoulder with some discomfort. breathing is spontaneous and unlabored. bed in lowest position, call bell within reach, siderails up x2. will continue to monitor.
Patient in stretcher 3b. A&O4. Respirations even and unlabored. no signs of acute distress noted. Repeat labs drawn and sent. medicated per MD orders. Comfort and safety maintained.

## 2023-05-28 NOTE — ED PROVIDER NOTE - OBJECTIVE STATEMENT
72-year-old male history of hypertension, hyperlipidemia, diabetes, aortic valve replacement with bovine valve, presenting  with chief complaint of bilateral shoulder pain for the past couple of days, severe on the right.  Patient describes pain that radiates from the shoulders to the fingertips.  Pain is so severe that patient could not sleep overnight, so came to the ER.  Denying any chest pain or shortness of breath.  Patient states severe limited range of motion secondary to the pain.  Denies any recent head or neck injury.  No nausea, vomiting, fevers or chills.  took some Advil today with minimal relief of symptoms.    Allergies: codeine

## 2023-05-28 NOTE — H&P ADULT - PROBLEM SELECTOR PLAN 1
Pt had acute exacerbation of chronic shoulder pain. Per pt, this is the same quality of pain, just more severe. Pt was seen by ortho sport medicine at the onset of this pain, and was dx with arthritis. Pt has significant crepitus over left shoulder. No redness or warmth, not consistent with septic arthritis. No numbness or tingling, low suspicion for spinal etiology. 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   - Add on ESR and CRP to r/o septic arthritis  - multi model pain control, with tylenols, lidocaine patch, flexeril, oxy, hot and ice pack  - PT/OT eval  - If no improvement, consider spinal imaging.

## 2023-05-28 NOTE — PATIENT PROFILE ADULT - FALL HARM RISK - HARM RISK INTERVENTIONS

## 2023-05-28 NOTE — DISCHARGE NOTE PROVIDER - HOSPITAL COURSE
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 XR consistent with arthritis, no fx no dislocation.  No redness or warmth, not consistent with septic arthritis. No numbness or tingling, low suspicion for spinal etiology. Feel this is exacerbation of chronic arthritis, which has been evaluated by pt's orthopedic sport medicine outpatient already. Pt was started with multi-modal pain management, including tylenols, lidocaine patches, flexeril, and oxycodone for break through pain. Because, low suspicion for spinal etiology. MRI was not ordered. Pt improved with pain management and was discharged.

## 2023-05-28 NOTE — DISCHARGE NOTE PROVIDER - COLLABORATE WITH
"Pt very antsy, pulling off monitor equipment, states he cannot sit back in bed because he's too antsy. States \"I don't want to stay overnight, I just wanted to be treated\" and that he wants to leave. ERP notified and at bedside.   " Resident

## 2023-05-28 NOTE — ED PROVIDER NOTE - ATTENDING CONTRIBUTION TO CARE
DR. LOPEZ, ATTENDING MD-  I performed a face to face bedside interview with the patient regarding history of present illness, review of symptoms and past medical history. I completed an independent physical exam.  I have discussed the patient's plan of care with the resident.   Documentation as above in the note.    72-year-old male history of kidney stones aortic valve replacement diabetes hypertension arthritis here with complaint of bilateral shoulder pain right worse than left ongoing for months but significantly worsened over the last few days.  Denies numbness tingling or weakness.  States unable to move right shoulder due to severe pain.  Denies heavy lifting or recent trauma.  Denies fevers chills skin change rash.  On exam patient appears uncomfortable right shoulder without obvious deformity no overlying skin changes limited range of motion due to pain distally neurovascularly intact.  Evaluate for arthritis calcific tendinitis.  Less likely atypical ACS.  Doubt septic joint as shoulder not warm to touch, no overlying rash, no fever.  Will obtain CBC CMP troponin EKG chest x-ray x-ray shoulder give pain medication may require admission for further pain control.

## 2023-05-28 NOTE — H&P ADULT - NSHPREVIEWOFSYSTEMS_GEN_ALL_CORE
CONSTITUTIONAL: No fever or chill  HEENT: Denies changes in vision and hearing.  RESPIRATORY: Denies SOB and cough.  CV: Denies CP.   GI: Denies abdominal pain, nausea, vomiting and diarrhea.  : Denies dysuria and urinary frequency.  MSK: Bilateral shoulder pain, fingers and toes pain  SKIN: Denies rash   NEUROLOGICAL: Denies headache and syncope. No numbness and tingling

## 2023-05-28 NOTE — DISCHARGE NOTE PROVIDER - NSDCCPCAREPLAN_GEN_ALL_CORE_FT
PRINCIPAL DISCHARGE DIAGNOSIS  Diagnosis: Chronic right shoulder pain  Assessment and Plan of Treatment: You have bilateral shoulder pain.  This is seen by a sports medicine doctor before and is determined to be osteoarthritis.  Today presentation is likely an acute exacerbation of your chronic shoulder pain.  I have been giving you multimodal pain management with improvement of your pain.  This morning your range of motion has significantly improved.  Physical therapist has seen you, recommend outpatient physical therapy.  Occupational Therapy has seen you and recommend no further occupational therapy need.  Plan:  1. Continued to take tylenol 1000 mg, every 6 hours, for the next week, as needed for pain  2. Lidocaine 4% patch, on for 12 hours, off for 12 hours. for the next 5 days. You can get this over the counter  3. Flexeril 10 mg, every 8 hours, as needed for pain. This can make you sleepy. Do not drive with this.   4. Continued exercises given by your physical therapist before. If you are in a place to restart your physical therapy in the office, you can ask your primary care doctor for a referral.   5. Follow up with your ortho sport medicine doctor. Consider steriod injection for this  6. Heat pack as needed.   7. DO NOT TAKE MOTRIN      SECONDARY DISCHARGE DIAGNOSES  Diagnosis: Left shoulder pain  Assessment and Plan of Treatment:     Diagnosis: ELIAS (acute kidney injury)  Assessment and Plan of Treatment: Your creatinine mildly increased today.  I believe this is because you are given Motrin while you are in the hospital.  Plan:  1. STOP  taking your hydrochlorothiazide until you see your primary care doctor  2. DO NOT take Motrin or Aleve      Diagnosis: Neurofibromatosis  Assessment and Plan of Treatment: Reading back on your medical chart, there is concern of neurofibromatosis.   The recommendation at that time was for you to do genetic testing for this, and to see a neuro otologist,  as patient with neurofibromatosis often have hearing loss.  follow-up with your PCP about this    Diagnosis: Pain in hand and fingers  Assessment and Plan of Treatment: For your pain in your hand and finger, you have what we call swan-neck deformity.   I suspect you might have rheumatoid arthritis on top of your osteoarthritis.   Your inflammation marker is elevated, which also leads me to suspect you have underlying inflammatory condition.  You can either of follow-up with your rheumatologist or your primary care doctor for this. We cannot straighten your already bend finger, but properly manage rheumatoid arthritis can prevent your other fingers from bending.

## 2023-05-28 NOTE — DISCHARGE NOTE PROVIDER - ATTENDING DISCHARGE PHYSICAL EXAMINATION:
Patient seen and examined at bedside on day of discharge (5/29/23). Currently reports that his shoulder pain is markedly improved. Patient demonstrating improved ROM. States that he would really like to go home today. Patient denies any fevers, chills, nausea, vomiting or CP/SOB. VSS. ESR/CRP elevated however patient may have component of RA. Exam showing crepitus in b/l shoulder joints but no swelling or erythema. Patient also noted with slight elevation in Cr. Patient instructed to hold HCTZ for 1-2 weeks and follow up with outpatient PCP or cardiologist and have repeat BMP performed. Patient also instructed to hydrate and avoid NSAIDs. Patient amenable to this plan and states that he will schedule appointment. Offered patient additional day in hospital to monitor his renal fxn but he refused. Patient to be discharged home on regimen of tylenol, flexeril and lidocaine patches for his shoulder pain.

## 2023-05-28 NOTE — CHART NOTE - NSCHARTNOTEFT_GEN_A_CORE
Patient discussed with primary team for possible arthritic pain. Patient recently admitted and has not taken pain medications yet. At least 24 hour utilization of pain medications expected for a better understanding of patient's pain needs. No acute findings on x-ray at this time. Pain service to sign off, please reconsult if further assistance is needed. Thank you.

## 2023-05-28 NOTE — H&P ADULT - ASSESSMENT
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

## 2023-05-28 NOTE — DISCHARGE NOTE PROVIDER - NSDCMRMEDTOKEN_GEN_ALL_CORE_FT
acetaminophen 500 mg oral tablet: 2 tab(s) orally every 6 hours  amLODIPine 10 mg oral tablet: 1 orally once a day  aspirin 81 mg oral tablet, chewable: 1 tab(s) orally once a day  cyclobenzaprine 10 mg oral tablet: 1 tab(s) orally 3 times a day as needed for pain Can make you drowsy.  hydroCHLOROthiazide 12.5 mg oral capsule: 1 orally once a day  lidocaine 4% topical film: Apply topically to affected area once a day as needed for pain 12 hours on, 12 hours off in 24 hours  metFORMIN 500 mg oral tablet: 1 orally once a day  metoprolol succinate 25 mg oral capsule, extended release: 1 orally once a day  ProAir HFA 90 mcg/inh inhalation aerosol: 2 puff(s) inhaled 4 times a day as needed for  shortness of breath and/or wheezing  rosuvastatin 20 mg oral tablet: 1 orally once a day  Trelegy Ellipta 100 mcg-62.5 mcg-25 mcg/inh inhalation powder: 1 puff(s) inhaled once a day  valsartan 320 mg oral tablet: 1 tab(s) orally once a day   acetaminophen 500 mg oral tablet: 2 tab(s) orally every 6 hours  amLODIPine 10 mg oral tablet: 1 orally once a day  aspirin 81 mg oral tablet, chewable: 1 tab(s) orally once a day  cyclobenzaprine 10 mg oral tablet: 1 tab(s) orally 3 times a day as needed for pain Can make you drowsy.  lidocaine 4% topical film: Apply topically to affected area once a day as needed for pain 12 hours on, 12 hours off in 24 hours  metFORMIN 500 mg oral tablet: 1 orally once a day  metoprolol succinate 25 mg oral capsule, extended release: 1 orally once a day  ProAir HFA 90 mcg/inh inhalation aerosol: 2 puff(s) inhaled 4 times a day as needed for  shortness of breath and/or wheezing  rosuvastatin 20 mg oral tablet: 1 orally once a day  Trelegy Ellipta 100 mcg-62.5 mcg-25 mcg/inh inhalation powder: 1 puff(s) inhaled once a day  valsartan 320 mg oral tablet: 1 tab(s) orally once a day

## 2023-05-28 NOTE — PATIENT PROFILE ADULT - NSPROIMPLANTSMEDDEV_GEN_A_NUR
2 total knee replacement, metals in ellen knee, metal ellen feet, recontruction ellen foot. cow aortic valve replacement./Heart valve

## 2023-05-28 NOTE — ED PROVIDER NOTE - CLINICAL SUMMARY MEDICAL DECISION MAKING FREE TEXT BOX
Darryn CRESPO PGY-3: 72-year-old male history of hypertension, hyperlipidemia, diabetes, aortic valve replacement with bovine valve, presenting  with chief complaint of bilateral shoulder pain for the past couple of days, severe on the right,  associated with severe decreased range of motion on the right upper extremity.  Patient with significant tenderness with palpation of the right upper extremity.  Neurovascularly intact bilaterally.  Clinical picture concerning for possible calcific tendinitis.  Will obtain chest x-ray imaging to evaluate for possible fractures, dislocation, or calcification.  Will treat pain.  Low suspicion for ACS  given that clinical picture is much more consistent with MSK etiology with decreased range of motion, however will obtain screening EKG and troponin.

## 2023-05-28 NOTE — ED ADULT TRIAGE NOTE - CHIEF COMPLAINT QUOTE
Pt st" I have trouble with both shoulders but for last couple of days the rt shoulder has been unbareable....I went to [facility] couple of months ago for this pain told it was arthritis. I took advil hour ago not helping." hx DM1, Htn,

## 2023-05-28 NOTE — ED PROVIDER NOTE - PROGRESS NOTE DETAILS
Darryn CRESPO PGY-3: R shoulder x-ray showing no signs of fractures or dislocations.  Patient reassessed at bedside, still endorsing severe right shoulder pain with difficulty moving his right arm.  Will give more pain meds and reassess.  Patient to be admitted for pain control if still endorsing pain after reassessment.

## 2023-05-28 NOTE — H&P ADULT - HISTORY OF PRESENT ILLNESS
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 Pt has seen ortho sport med one month after his pain originally started, dx with arthritis on imaging, no fx no dislocation. Pt declined steroid shot at that time. Today pain is similar to his chronic pain but with increase severity. Pt states he was unable to lift his arms secondary to pain. No numbness, no tingling. Some decreased in  strength secondary to pain. Over right shoulder, pain is down to elbow area. No loss of bladder or bowel movement. No saddle paresthesia. Pt come to the ED for pain management.     Pt reports bilateral total knee replacement, and 3 foot surgery. Mother has hx of rheumatoid arthritis.

## 2023-05-28 NOTE — H&P ADULT - ATTENDING COMMENTS
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 Reports last night he was unable to lift his right arm even a little bit which prompted his visit to ED. Says he had seen a orthopedist who had offered him steroid injection but he didn't like the physician as much. During my evaluation patient reports improvement in pain. Able to lift his arm up again to the level of his shoulders. He wants to go home later tonight or tomorrow as he has memorial day plans. Vitals and labs reviewed and stable. Exam significant for decreased ROM of b/l shoulder Right worse than left. Imaging without fracture or dislocation.    #Arthritis of shoulder - suspected shoulder arthritis. Pain management with tylenol ATC, flexeril, lido patch and oxy PRN for severe pain. Recommend outpatient ortho evaluation for intraarticular steroid injection vs surgical intervention if symptoms not controlled with pain meds. If no improvement, patient may also warrant MRI.    Patient reports improvement in symptoms and wishes to go home either tonight or tomorrow morning. Understands risks. Advised to follow up with ortho as outpatient.     Plan discussed with patient and HS

## 2023-05-28 NOTE — H&P ADULT - NSHPLABSRESULTS_GEN_ALL_CORE
LABS:                         11.8   12.23 )-----------( 189      ( 28 May 2023 03:52 )             36.4     05-28    140  |  103  |  22  ----------------------------<  127<H>  4.3   |  21<L>  |  1.13    Ca    8.4      28 May 2023 03:52    TPro  6.7  /  Alb  3.8  /  TBili  0.7  /  DBili  x   /  AST  15  /  ALT  11  /  AlkPhos  102  05-28                  RADIOLOGY, EKG & ADDITIONAL TESTS:

## 2023-05-28 NOTE — ED ADULT NURSE NOTE - OBJECTIVE STATEMENT
Patient received in room 3. PMH: DM and HTN. A&O4. Ambulatory. Came into ED with complaints of bilateral shoulder pain R>L. States this pain has been going on for months and was seen for it and told it was arthritis. Patient states taking advil for pain with minimal to no relief. Denies any trauma to shoulders. 20g IV placed Left AC. Labs drawn and sent. Medicated per MD orders. Wife at bedside. Comfort and safety maintained. Stretcher in lowest position.

## 2023-05-28 NOTE — ED ADULT NURSE NOTE - NSFALLUNIVINTERV_ED_ALL_ED
Bed/Stretcher in lowest position, wheels locked, appropriate side rails in place/Call bell, personal items and telephone in reach/Instruct patient to call for assistance before getting out of bed/chair/stretcher/Non-slip footwear applied when patient is off stretcher/[place] to call system/Physically safe environment - no spills, clutter or unnecessary equipment/Purposeful proactive rounding/Room/bathroom lighting operational, light cord in reach

## 2023-05-28 NOTE — ED PROVIDER NOTE - IV ALTEPLASE DOOR HIDDEN
Detail Level: Detailed
Biopsy Photograph Reviewed: Yes
show
Number Of Curettages: 3
Size Of Lesion In Cm: 0.8
Size Of Lesion After Curettage: 1.3
Add Intralesional Injection: No
Total Volume (Ccs): 1
Anesthesia Type: 1% lidocaine with epinephrine
Anesthesia Volume In Cc: 1.5
Cautery Type: electrodesiccation
What Was Performed First?: Curettage
Final Size Statement: The size of the lesion after curettage was
Additional Information: (Optional): The wound was cleaned, and a pressure dressing was applied.  The patient received detailed post-op instructions.
Consent was obtained from the patient. The risks, benefits and alternatives to therapy were discussed in detail. Specifically, the risks of infection, scarring, bleeding, prolonged wound healing, nerve injury, incomplete removal, allergy to anesthesia and recurrence were addressed. Alternatives to ED&C, such as: surgical removal and XRT were also discussed.  Prior to the procedure, the treatment site was clearly identified and confirmed by the patient. All components of Universal Protocol/PAUSE Rule completed.
Post-Care Instructions: I reviewed with the patient in detail post-care instructions. Patient is to keep the area dry for 48 hours, and not to engage in any swimming until the area is healed. Should the patient develop any fevers, chills, bleeding, severe pain patient will contact the office immediately.
Bill As A Line Item Or As Units: Line Item
Size Of Lesion In Cm: 0.9
Size Of Lesion After Curettage: 1.4

## 2023-05-28 NOTE — DISCHARGE NOTE PROVIDER - NSFOLLOWUPCLINICS_GEN_ALL_ED_FT
[facility] [place]  Internal Medicine  [address] [place] [address]  Phone: [phone]  Fax: [phone]  Follow Up Time: 1 week

## 2023-05-28 NOTE — PATIENT PROFILE ADULT - VISION (WITH CORRECTIVE LENSES IF THE PATIENT USUALLY WEARS THEM):
wear glases for near sighted./Normal vision: sees adequately in most situations; can see medication labels, newsprint

## 2023-05-29 ENCOUNTER — TRANSCRIPTION ENCOUNTER (OUTPATIENT)
Age: 73
End: 2023-05-29

## 2023-05-29 VITALS
RESPIRATION RATE: 18 BRPM | HEART RATE: 64 BPM | SYSTOLIC BLOOD PRESSURE: 130 MMHG | OXYGEN SATURATION: 98 % | TEMPERATURE: 98 F | DIASTOLIC BLOOD PRESSURE: 82 MMHG

## 2023-05-29 LAB
A1C WITH ESTIMATED AVERAGE GLUCOSE RESULT: 6.7 % — HIGH (ref 4–5.6)
ALBUMIN SERPL ELPH-MCNC: 3.8 G/DL — SIGNIFICANT CHANGE UP (ref 3.3–5)
ALP SERPL-CCNC: 99 U/L — SIGNIFICANT CHANGE UP (ref 40–120)
ALT FLD-CCNC: 10 U/L — SIGNIFICANT CHANGE UP (ref 4–41)
ANION GAP SERPL CALC-SCNC: 12 MMOL/L — SIGNIFICANT CHANGE UP (ref 7–14)
AST SERPL-CCNC: 7 U/L — SIGNIFICANT CHANGE UP (ref 4–40)
BASOPHILS # BLD AUTO: 0.02 K/UL — SIGNIFICANT CHANGE UP (ref 0–0.2)
BASOPHILS NFR BLD AUTO: 0.3 % — SIGNIFICANT CHANGE UP (ref 0–2)
BILIRUB SERPL-MCNC: 1.4 MG/DL — HIGH (ref 0.2–1.2)
BUN SERPL-MCNC: 25 MG/DL — HIGH (ref 7–23)
CALCIUM SERPL-MCNC: 8.7 MG/DL — SIGNIFICANT CHANGE UP (ref 8.4–10.5)
CHLORIDE SERPL-SCNC: 103 MMOL/L — SIGNIFICANT CHANGE UP (ref 98–107)
CHOLEST SERPL-MCNC: 113 MG/DL — SIGNIFICANT CHANGE UP
CO2 SERPL-SCNC: 24 MMOL/L — SIGNIFICANT CHANGE UP (ref 22–31)
CREAT SERPL-MCNC: 1.36 MG/DL — HIGH (ref 0.5–1.3)
CRP SERPL-MCNC: 169.6 MG/L — HIGH
EGFR: 56 ML/MIN/1.73M2 — LOW
EOSINOPHIL # BLD AUTO: 0.17 K/UL — SIGNIFICANT CHANGE UP (ref 0–0.5)
EOSINOPHIL NFR BLD AUTO: 2.3 % — SIGNIFICANT CHANGE UP (ref 0–6)
ERYTHROCYTE [SEDIMENTATION RATE] IN BLOOD: 67 MM/HR — HIGH (ref 1–15)
ESTIMATED AVERAGE GLUCOSE: 146 — SIGNIFICANT CHANGE UP
GLUCOSE BLDC GLUCOMTR-MCNC: 137 MG/DL — HIGH (ref 70–99)
GLUCOSE SERPL-MCNC: 115 MG/DL — HIGH (ref 70–99)
HCT VFR BLD CALC: 34.5 % — LOW (ref 39–50)
HDLC SERPL-MCNC: 45 MG/DL — SIGNIFICANT CHANGE UP
HGB BLD-MCNC: 11 G/DL — LOW (ref 13–17)
IANC: 5.59 K/UL — SIGNIFICANT CHANGE UP (ref 1.8–7.4)
IMM GRANULOCYTES NFR BLD AUTO: 0.3 % — SIGNIFICANT CHANGE UP (ref 0–0.9)
LIPID PNL WITH DIRECT LDL SERPL: 46 MG/DL — SIGNIFICANT CHANGE UP
LYMPHOCYTES # BLD AUTO: 1 K/UL — SIGNIFICANT CHANGE UP (ref 1–3.3)
LYMPHOCYTES # BLD AUTO: 13.5 % — SIGNIFICANT CHANGE UP (ref 13–44)
MAGNESIUM SERPL-MCNC: 2.1 MG/DL — SIGNIFICANT CHANGE UP (ref 1.6–2.6)
MCHC RBC-ENTMCNC: 26.6 PG — LOW (ref 27–34)
MCHC RBC-ENTMCNC: 31.9 GM/DL — LOW (ref 32–36)
MCV RBC AUTO: 83.5 FL — SIGNIFICANT CHANGE UP (ref 80–100)
MONOCYTES # BLD AUTO: 0.6 K/UL — SIGNIFICANT CHANGE UP (ref 0–0.9)
MONOCYTES NFR BLD AUTO: 8.1 % — SIGNIFICANT CHANGE UP (ref 2–14)
NEUTROPHILS # BLD AUTO: 5.59 K/UL — SIGNIFICANT CHANGE UP (ref 1.8–7.4)
NEUTROPHILS NFR BLD AUTO: 75.5 % — SIGNIFICANT CHANGE UP (ref 43–77)
NON HDL CHOLESTEROL: 68 MG/DL — SIGNIFICANT CHANGE UP
NRBC # BLD: 0 /100 WBCS — SIGNIFICANT CHANGE UP (ref 0–0)
NRBC # FLD: 0 K/UL — SIGNIFICANT CHANGE UP (ref 0–0)
PHOSPHATE SERPL-MCNC: 4 MG/DL — SIGNIFICANT CHANGE UP (ref 2.5–4.5)
PLATELET # BLD AUTO: 176 K/UL — SIGNIFICANT CHANGE UP (ref 150–400)
POTASSIUM SERPL-MCNC: 3.9 MMOL/L — SIGNIFICANT CHANGE UP (ref 3.5–5.3)
POTASSIUM SERPL-SCNC: 3.9 MMOL/L — SIGNIFICANT CHANGE UP (ref 3.5–5.3)
PROT SERPL-MCNC: 6.7 G/DL — SIGNIFICANT CHANGE UP (ref 6–8.3)
RBC # BLD: 4.13 M/UL — LOW (ref 4.2–5.8)
RBC # FLD: 14.4 % — SIGNIFICANT CHANGE UP (ref 10.3–14.5)
SODIUM SERPL-SCNC: 139 MMOL/L — SIGNIFICANT CHANGE UP (ref 135–145)
TRIGL SERPL-MCNC: 112 MG/DL — SIGNIFICANT CHANGE UP
WBC # BLD: 7.4 K/UL — SIGNIFICANT CHANGE UP (ref 3.8–10.5)
WBC # FLD AUTO: 7.4 K/UL — SIGNIFICANT CHANGE UP (ref 3.8–10.5)

## 2023-05-29 PROCEDURE — 99239 HOSP IP/OBS DSCHRG MGMT >30: CPT

## 2023-05-29 RX ORDER — CYCLOBENZAPRINE HYDROCHLORIDE 10 MG/1
1 TABLET, FILM COATED ORAL
Qty: 15 | Refills: 0
Start: 2023-05-29 | End: 2023-06-02

## 2023-05-29 RX ORDER — HYDROCHLOROTHIAZIDE 25 MG
1 TABLET ORAL
Refills: 0 | DISCHARGE

## 2023-05-29 RX ORDER — LIDOCAINE 4 G/100G
1 CREAM TOPICAL
Qty: 1 | Refills: 0
Start: 2023-05-29 | End: 2023-06-02

## 2023-05-29 RX ADMIN — Medication 1000 MILLIGRAM(S): at 00:55

## 2023-05-29 RX ADMIN — LIDOCAINE 1 PATCH: 4 CREAM TOPICAL at 00:23

## 2023-05-29 RX ADMIN — BUDESONIDE AND FORMOTEROL FUMARATE DIHYDRATE 2 PUFF(S): 160; 4.5 AEROSOL RESPIRATORY (INHALATION) at 09:33

## 2023-05-29 RX ADMIN — Medication 1000 MILLIGRAM(S): at 07:00

## 2023-05-29 RX ADMIN — Medication 25 MILLIGRAM(S): at 06:01

## 2023-05-29 RX ADMIN — AMLODIPINE BESYLATE 10 MILLIGRAM(S): 2.5 TABLET ORAL at 06:01

## 2023-05-29 RX ADMIN — Medication 1000 MILLIGRAM(S): at 06:00

## 2023-05-29 RX ADMIN — VALSARTAN 320 MILLIGRAM(S): 80 TABLET ORAL at 06:02

## 2023-05-29 NOTE — OCCUPATIONAL THERAPY INITIAL EVALUATION ADULT - RANGE OF MOTION EXAMINATION, UPPER EXTREMITY
except right shoulder active ROM grossly 0-130 degrees flexion, left shoulder grossly 0-100 degrees. Bilateral shoulder active assisted ROM WFL/bilateral UE Active ROM was WFL  (within functional limits) except right shoulder active ROM grossly 0-130 degrees flexion, right elbow flexion lacking ~50 degrees, left shoulder grossly 0-100 degrees. Bilateral shoulder active assisted ROM WFL/bilateral UE Active ROM was WFL  (within functional limits)

## 2023-05-29 NOTE — PROGRESS NOTE ADULT - ASSESSMENT
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

## 2023-05-29 NOTE — DISCHARGE NOTE NURSING/CASE MANAGEMENT/SOCIAL WORK - PATIENT PORTAL LINK FT
You can access the FollowMyHealth Patient Portal offered by [facility] by registering at the following website: [email or web address]. By joining [facility]’s FollowMyHealth portal, you will also be able to view your health information using other applications (apps) compatible with our system.

## 2023-05-29 NOTE — PROGRESS NOTE ADULT - PROBLEM SELECTOR PLAN 3
cont home med.   on amlodipine, valsartan, metoprolol, HCTZ
Adequate: hears normal conversation without difficulty

## 2023-05-29 NOTE — PROGRESS NOTE ADULT - SUBJECTIVE AND OBJECTIVE BOX
Progress Note    05-28-23 (1d)    Patient is a 72y old  Male who presents with a chief complaint of Bilateral shoulder pain (28 May 2023 19:23)      Subjective / Overnight Events :  - No acute events overnight.  - Pt seen and examined at bedside.     Additional ROS (if any):    MEDICATIONS  (STANDING):  acetaminophen     Tablet .. 1000 milliGRAM(s) Oral every 6 hours  amLODIPine   Tablet 10 milliGRAM(s) Oral daily  aspirin enteric coated 81 milliGRAM(s) Oral daily  atorvastatin 80 milliGRAM(s) Oral at bedtime  budesonide  80 MICROgram(s)/formoterol 4.5 MICROgram(s) Inhaler 2 Puff(s) Inhalation two times a day  dextrose 5%. 1000 milliLiter(s) (100 mL/Hr) IV Continuous <Continuous>  dextrose 5%. 1000 milliLiter(s) (50 mL/Hr) IV Continuous <Continuous>  dextrose 50% Injectable 25 Gram(s) IV Push once  dextrose 50% Injectable 12.5 Gram(s) IV Push once  dextrose 50% Injectable 25 Gram(s) IV Push once  enoxaparin Injectable 40 milliGRAM(s) SubCutaneous every 24 hours  glucagon  Injectable 1 milliGRAM(s) IntraMuscular once  hydrochlorothiazide 12.5 milliGRAM(s) Oral daily  insulin lispro (ADMELOG) corrective regimen sliding scale   SubCutaneous at bedtime  insulin lispro (ADMELOG) corrective regimen sliding scale   SubCutaneous three times a day before meals  lidocaine   4% Patch 1 Patch Transdermal daily  metoprolol succinate ER 25 milliGRAM(s) Oral daily  tiotropium 2.5 MICROgram(s) Inhaler 2 Puff(s) Inhalation daily  valsartan 320 milliGRAM(s) Oral daily    MEDICATIONS  (PRN):  albuterol    90 MICROgram(s) HFA Inhaler 2 Puff(s) Inhalation every 6 hours PRN Shortness of Breath and/or Wheezing  aluminum hydroxide/magnesium hydroxide/simethicone Suspension 30 milliLiter(s) Oral every 4 hours PRN Dyspepsia  cyclobenzaprine 10 milliGRAM(s) Oral three times a day PRN Muscle Spasm  dextrose Oral Gel 15 Gram(s) Oral once PRN Blood Glucose LESS THAN 70 milliGRAM(s)/deciliter  melatonin 3 milliGRAM(s) Oral at bedtime PRN Insomnia  ondansetron Injectable 4 milliGRAM(s) IV Push every 8 hours PRN Nausea and/or Vomiting  oxyCODONE    IR 5 milliGRAM(s) Oral every 6 hours PRN Moderate Pain (4 - 6)  oxyCODONE    IR 10 milliGRAM(s) Oral every 6 hours PRN Severe Pain (7 - 10)      CAPILLARY BLOOD GLUCOSE      POCT Blood Glucose.: 132 mg/dL (28 May 2023 22:23)  POCT Blood Glucose.: 118 mg/dL (28 May 2023 17:49)  POCT Blood Glucose.: 112 mg/dL (28 May 2023 12:28)  POCT Blood Glucose.: 109 mg/dL (28 May 2023 11:04)    I&O's Summary    28 May 2023 07:01  -  29 May 2023 07:00  --------------------------------------------------------  IN: 350 mL / OUT: 0 mL / NET: 350 mL        PHYSICAL EXAM:  Vital Signs Last 24 Hrs  T(C): 36.6 (29 May 2023 05:50), Max: 36.8 (28 May 2023 15:21)  T(F): 97.8 (29 May 2023 05:50), Max: 98.3 (28 May 2023 15:21)  HR: 64 (29 May 2023 05:50) (63 - 88)  BP: 130/82 (29 May 2023 05:50) (127/68 - 152/77)  BP(mean): --  RR: 18 (29 May 2023 05:50) (17 - 18)  SpO2: 98% (29 May 2023 05:50) (95% - 98%)    Parameters below as of 29 May 2023 05:50  Patient On (Oxygen Delivery Method): room air    Physical Exam: GENERAL: Alert. No acute distress.   EYES: EOMI grossly normal. Anicteric.   HENT: Moist mucous membranes.   RESP: No conversation dyspnea, no resp distress, CTAB   CARDIOVASCULAR: RRR, no m/g/r. 2+ radial and DP pulses  ABDOMEN: soft, non distended, nttp  MSK: Able to use BUE below the elbow. Pt appears to avoid movement of shoulders. Left shoulder with significant crepitus to palpation. No redness or warmth overlying. No skin changes. limited ROM of bilateral shoulder secondary to pain. swan neck deformity seen on multiple fingers    SKIN: warm and dry  NEUROLOGIC: Alert and oriented x3, Sensation grossly intact in all 4 extremities. Decrease in  strength bilaterally, pt relates this to pain.   PSYCHIATRIC: Cooperative. Appropriate mood and affect      LABS:                          11.0   7.40  )-----------( 176      ( 29 May 2023 05:36 )             34.5       WBC Trend: 7.40<--, 12.23<--  Hb Trend: 11.0<--, 11.8<--    05-29    139  |  103  |  25<H>  ----------------------------<  115<H>  3.9   |  24  |  1.36<H>    Ca    8.7      29 May 2023 05:36  Phos  4.0     05-29  Mg     2.10     05-29    TPro  6.7  /  Alb  3.8  /  TBili  1.4<H>  /  DBili  x   /  AST  7   /  ALT  10  /  AlkPhos  99  05-29                  RADIOLOGY & ADDITIONAL TESTS: Reviewed Progress Note    05-28-23 (1d)    Patient is a 72y old  Male who presents with a chief complaint of Bilateral shoulder pain (28 May 2023 19:23)      Subjective / Overnight Events :  - No acute events overnight. shoulder pain much improved. discussed PT. pt states have financial difficult with PT right now  - Pt seen and examined at bedside.     Additional ROS (if any):    MEDICATIONS  (STANDING):  acetaminophen     Tablet .. 1000 milliGRAM(s) Oral every 6 hours  amLODIPine   Tablet 10 milliGRAM(s) Oral daily  aspirin enteric coated 81 milliGRAM(s) Oral daily  atorvastatin 80 milliGRAM(s) Oral at bedtime  budesonide  80 MICROgram(s)/formoterol 4.5 MICROgram(s) Inhaler 2 Puff(s) Inhalation two times a day  dextrose 5%. 1000 milliLiter(s) (100 mL/Hr) IV Continuous <Continuous>  dextrose 5%. 1000 milliLiter(s) (50 mL/Hr) IV Continuous <Continuous>  dextrose 50% Injectable 25 Gram(s) IV Push once  dextrose 50% Injectable 12.5 Gram(s) IV Push once  dextrose 50% Injectable 25 Gram(s) IV Push once  enoxaparin Injectable 40 milliGRAM(s) SubCutaneous every 24 hours  glucagon  Injectable 1 milliGRAM(s) IntraMuscular once  hydrochlorothiazide 12.5 milliGRAM(s) Oral daily  insulin lispro (ADMELOG) corrective regimen sliding scale   SubCutaneous at bedtime  insulin lispro (ADMELOG) corrective regimen sliding scale   SubCutaneous three times a day before meals  lidocaine   4% Patch 1 Patch Transdermal daily  metoprolol succinate ER 25 milliGRAM(s) Oral daily  tiotropium 2.5 MICROgram(s) Inhaler 2 Puff(s) Inhalation daily  valsartan 320 milliGRAM(s) Oral daily    MEDICATIONS  (PRN):  albuterol    90 MICROgram(s) HFA Inhaler 2 Puff(s) Inhalation every 6 hours PRN Shortness of Breath and/or Wheezing  aluminum hydroxide/magnesium hydroxide/simethicone Suspension 30 milliLiter(s) Oral every 4 hours PRN Dyspepsia  cyclobenzaprine 10 milliGRAM(s) Oral three times a day PRN Muscle Spasm  dextrose Oral Gel 15 Gram(s) Oral once PRN Blood Glucose LESS THAN 70 milliGRAM(s)/deciliter  melatonin 3 milliGRAM(s) Oral at bedtime PRN Insomnia  ondansetron Injectable 4 milliGRAM(s) IV Push every 8 hours PRN Nausea and/or Vomiting  oxyCODONE    IR 5 milliGRAM(s) Oral every 6 hours PRN Moderate Pain (4 - 6)  oxyCODONE    IR 10 milliGRAM(s) Oral every 6 hours PRN Severe Pain (7 - 10)      CAPILLARY BLOOD GLUCOSE      POCT Blood Glucose.: 132 mg/dL (28 May 2023 22:23)  POCT Blood Glucose.: 118 mg/dL (28 May 2023 17:49)  POCT Blood Glucose.: 112 mg/dL (28 May 2023 12:28)  POCT Blood Glucose.: 109 mg/dL (28 May 2023 11:04)    I&O's Summary    28 May 2023 07:01  -  29 May 2023 07:00  --------------------------------------------------------  IN: 350 mL / OUT: 0 mL / NET: 350 mL        PHYSICAL EXAM:  Vital Signs Last 24 Hrs  T(C): 36.6 (29 May 2023 05:50), Max: 36.8 (28 May 2023 15:21)  T(F): 97.8 (29 May 2023 05:50), Max: 98.3 (28 May 2023 15:21)  HR: 64 (29 May 2023 05:50) (63 - 88)  BP: 130/82 (29 May 2023 05:50) (127/68 - 152/77)  BP(mean): --  RR: 18 (29 May 2023 05:50) (17 - 18)  SpO2: 98% (29 May 2023 05:50) (95% - 98%)    Parameters below as of 29 May 2023 05:50  Patient On (Oxygen Delivery Method): room air    Physical Exam: GENERAL: Alert. No acute distress.   EYES: EOMI grossly normal. Anicteric.   HENT: Moist mucous membranes.   RESP: No conversation dyspnea, no resp distress, CTAB   CARDIOVASCULAR: RRR, no m/g/r. 2+ radial and DP pulses  ABDOMEN: soft, non distended, nttp  MSK: No redness or warmth overlying. No skin changes. significantly improved ROM on right shoulder. Now can raised arm to above 90 degree and can internal rotate his shower. swan neck deformity seen on multiple fingers    SKIN: warm and dry  NEUROLOGIC: Alert and oriented x3, Sensation grossly intact in all 4 extremities. Decrease in  strength bilaterally, pt relates this to pain.   PSYCHIATRIC: Cooperative. Appropriate mood and affect      LABS:                          11.0   7.40  )-----------( 176      ( 29 May 2023 05:36 )             34.5       WBC Trend: 7.40<--, 12.23<--  Hb Trend: 11.0<--, 11.8<--    05-29    139  |  103  |  25<H>  ----------------------------<  115<H>  3.9   |  24  |  1.36<H>    Ca    8.7      29 May 2023 05:36  Phos  4.0     05-29  Mg     2.10     05-29    TPro  6.7  /  Alb  3.8  /  TBili  1.4<H>  /  DBili  x   /  AST  7   /  ALT  10  /  AlkPhos  99  05-29                  RADIOLOGY & ADDITIONAL TESTS: Reviewed

## 2023-05-29 NOTE — PROGRESS NOTE ADULT - PROBLEM SELECTOR PLAN 1
Pt had acute exacerbation of chronic shoulder pain. Per pt, this is the same quality of pain, just more severe. Pt was seen by ortho sport medicine at the onset of this pain, and was dx with arthritis. Pt has significant crepitus over left shoulder. No redness or warmth, not consistent with septic arthritis. No numbness or tingling, low suspicion for spinal etiology. 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   - Add on ESR and CRP to r/o septic arthritis  - multi model pain control, with tylenols, lidocaine patch, flexeril, oxy, hot and ice pack  - PT/OT eval  - If no improvement, consider spinal imaging. Pt had acute exacerbation of chronic shoulder pain. Per pt, this is the same quality of pain, just more severe. Pt was seen by ortho sport medicine at the onset of this pain, and was dx with arthritis. Pt has significant crepitus over left shoulder. No redness or warmth, not consistent with septic arthritis. No numbness or tingling, low suspicion for spinal etiology. 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   - Add on ESR and CRP, no suspicion for septic arthritis. However, think pt as underlying RA.   - multi model pain control, with tylenols, lidocaine patch, flexeril, oxy, hot and ice pack  - PT/OT eval  - If no improvement, consider spinal imaging.

## 2023-05-29 NOTE — DISCHARGE NOTE NURSING/CASE MANAGEMENT/SOCIAL WORK - WILL THE PATIENT ACCEPT THE PFIZER COVID-19 VACCINE IF ELIGIBLE AND IT IS AVAILABLE?
Discharge Instructions for:  Flex Sig / Colonoscopy  Activity  • Due to the medication you received during your Flexible Sigmoidoscopy or Colonoscopy, do not drive a motor vehicle, operate machinery or appliances, make important decisions, sign legal documents, or drink alcohol for 24 hours.    • Because of the medication you received, you may or may not remember your procedure or the doctor’s explanation of what your examination showed.    What to Expect  • You may feel abdominal fullness and cramping.  This is normal and is caused by air used during the procedure to inflate the stomach and colon. This can be relieved with walking around, lying on your left side with knees bent and passing gas.  • Do not use laxatives or enemas for one week following your procedure.        - If you were taking any previously you may resume them when you feel necessary; however, you may not need to due to the prep for the colonoscopy.  • You may notice a small amount of blood on your toilet paper, a little red is okay and can discolor the toilet bowl to a pink/light-reddish color.  If you have more than 1 or 2 times of chunks or clots of blood in the toilet, call the office.  • It may take a few days for you to return to your normal bowel habits.    Diet  · Resume your regular diet. Follow the high fiber diet instructions. If you are  nauseated or have abdominal pain:  continue with clear liquids and progress slowly to your regular diet.    Managing nausea     Some people have an upset stomach after procedures. This is often because of anesthesia, pain, or pain medicine, or the stress of surgery. These tips will help you handle nausea and eat healthy foods as you get better. If you were on a special food plan before surgery, ask your doctor if you should follow it while you get better. These tips may help:  • Do not push yourself to eat. Your body will tell you when to eat and how much.  • Start off with clear liquids and soup. They  are easier to digest.  • Next try semi-solid foods, such as mashed potatoes, applesauce, and gelatin, as you feel ready.  • Slowly move to solid foods. Don’t eat fatty, rich, or spicy foods at first.  • Do not force yourself to have 3 large meals a day. Instead eat smaller amounts more often.  • Take pain medicines with a small amount of solid food, such as crackers or toast, to avoid nausea.  Special Instructions     • During your procedure you had:    [] Biopsy(ies) taken.    [] Polyp(s) removed.    [x] No tissue removed. Follow up as needed.    •  If you had specimens removed, they have been sent to Pathology. The specimen results will be:     [] Called to you within the next week.    [] Discussed with you at your post op visit.     Call your doctor if any of the following problems occur.  Dr. Domingo's office number is: [phone]:   • Severe pain in abdomen.  • Ongoing or worsening chest pain or shortness of breath  • Excessive nausea and/or vomiting  • Abdominal bloating not relieved by passing gas or inability to pass gas  • New or increased bleeding from rectum.  • Black tarry stools  • Temperature of 101.?F  or above     You can greatly reduce your risk of colon polyps and colorectal cancer by:           1. Having regular screenings.          2. Eating fruits, vegetables, and whole grains.          3. Reducing your fat intake.           4. Limiting your alcohol consumption.           5. Not using tobacco.            6. Staying physically active and maintaining a healthy body weight.           7. Reducing red meat consumption.             No

## 2023-05-29 NOTE — PHYSICAL THERAPY INITIAL EVALUATION ADULT - ACTIVE RANGE OF MOTION EXAMINATION, REHAB EVAL
except R shoulder flexion 120 degrees, R elbow extension -10 degrees  and  left shoulder flexion 0~95 degrees/bilateral upper extremity Active ROM was WFL (within functional limits)/bilateral  lower extremity Active ROM was WFL (within functional limits) except R shoulder flexion 130 degrees, R elbow extension -10 degrees  and  left shoulder flexion 0~100 degrees/bilateral upper extremity Active ROM was WFL (within functional limits)/bilateral  lower extremity Active ROM was WFL (within functional limits)

## 2023-05-29 NOTE — DISCHARGE NOTE NURSING/CASE MANAGEMENT/SOCIAL WORK - NSDCPEFALRISK_GEN_ALL_CORE
For information on Fall & Injury Prevention, visit: [email or web address] OR  [email or web address] OR  https://www.cdc.gov/steadi/patient.html

## 2023-05-29 NOTE — OCCUPATIONAL THERAPY INITIAL EVALUATION ADULT - PERTINENT HX OF CURRENT PROBLEM, REHAB EVAL
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 Xray of right shoulder/elbow negative for acute abnormality.

## 2023-05-29 NOTE — PROGRESS NOTE ADULT - ATTENDING COMMENTS
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     Patient seen and examined at bedside. Currently reports that his shoulder pain is markedly improved. Patient demonstrating improved ROM. States that he would really like to go home today. Patient denies any fevers, chills, nausea, vomiting or CP/SOB. VSS. ESR/CRP elevated however patient may have component of RA. Exam showing crepitus in b/l shoulder joints but no swelling or erythema. Patient also noted with slight elevation in Cr. Patient instructed to hold HCTZ for 1-2 weeks and follow up with outpatient PCP or cardiologist and have repeat BMP performed. Patient amenable to this plan and states that he will schedule appointment. Offered patient additional day in hospital to monitor his renal fxn but he refused. Patient to be discharged home on regimen of tylenol, flexeril and lidocaine patches for his shoulder pain.    Plan discussed with patient and HS6.

## 2023-05-29 NOTE — PHYSICAL THERAPY INITIAL EVALUATION ADULT - MANUAL MUSCLE TESTING RESULTS, REHAB EVAL
UE's 4/5 LE's 4+/5 throughtout/grossly assessed due to except bilateral shoulders 3+/5 throughtout/no strength deficits were identified

## 2023-05-29 NOTE — PHYSICAL THERAPY INITIAL EVALUATION ADULT - CRITERIA FOR SKILLED THERAPEUTIC INTERVENTIONS
PT evaluation done. Patient is functioning well, ambulates without assistive device without any loss of balance and with supervision ,Pt is functioning at baseline , hence Restorative PT is not indicated at this time. Pt would benefit from Outpatient PT/OT services for bilateral shoulder to improve functional ROM and strengthening .

## 2023-05-29 NOTE — PHYSICAL THERAPY INITIAL EVALUATION ADULT - PERTINENT HX OF CURRENT PROBLEM, REHAB EVAL
This is a 73 yo M came here for nontraumatic bilateral shoulder pain starting 5-6 months ago, worsening in the past 2-3 days.

## 2023-08-27 NOTE — DISCHARGE NOTE PROVIDER - CARE PROVIDER_API CALL
No
Eduardo Bajwa  Nephrology  [address], [address]  [place] [address]  Phone: [phone]  Fax: [phone]  Established Patient  Follow Up Time: 1 week

## 2023-09-08 ENCOUNTER — APPOINTMENT (OUTPATIENT)
Dept: PULMONOLOGY | Facility: CLINIC | Age: 73
End: 2023-09-08
Payer: MEDICARE

## 2023-09-08 VITALS — DIASTOLIC BLOOD PRESSURE: 73 MMHG | HEART RATE: 64 BPM | SYSTOLIC BLOOD PRESSURE: 124 MMHG | OXYGEN SATURATION: 93 %

## 2023-09-08 LAB — POCT - HEMOGLOBIN (HGB), QUANTITATIVE, TRANSCUTANEOUS: 13.3

## 2023-09-08 PROCEDURE — 99214 OFFICE O/P EST MOD 30 MIN: CPT | Mod: 25

## 2023-09-08 PROCEDURE — 94010 BREATHING CAPACITY TEST: CPT

## 2023-09-08 PROCEDURE — ZZZZZ: CPT

## 2023-09-08 PROCEDURE — 94727 GAS DIL/WSHOT DETER LNG VOL: CPT

## 2023-09-08 PROCEDURE — 71046 X-RAY EXAM CHEST 2 VIEWS: CPT

## 2023-09-08 PROCEDURE — 94729 DIFFUSING CAPACITY: CPT

## 2023-09-08 PROCEDURE — 88738 HGB QUANT TRANSCUTANEOUS: CPT

## 2023-09-08 RX ORDER — FLUTICASONE PROPIONATE 50 UG/1
50 SPRAY, METERED NASAL
Qty: 1 | Refills: 5 | Status: ACTIVE | COMMUNITY
Start: 2023-09-08 | End: 1900-01-01

## 2023-09-08 NOTE — PROCEDURE
[FreeTextEntry1] : cpap data downloaded and discussed with patient  09/08/2023 Pulmonary function testing There is a mild ventilatory impairment in a restrictive pattern. Normal Lung Volumes. There is a mild diffusion impairment. Corrects to normal with lung volume correction  PFT attached relatively stable function.

## 2023-09-08 NOTE — ASSESSMENT
[FreeTextEntry1] : encouraged to go back on Cpap more regularly.  Course of Zithromax Flonase dialy  if no improvement may need steroid. no change in inhalers

## 2023-09-08 NOTE — HISTORY OF PRESENT ILLNESS
[Never] : never [TextBox_4] : Called to come in today because of increase sob for 1 week  also started with cough with very little mucus slight yellow today.  does not feel sick has been living in 2 places and has not been as good with cpap use.  saw cardio recently  had recent cataract surgery taking trelegy daily rare albuterol

## 2023-09-08 NOTE — REASON FOR VISIT
[Acute] : an acute visit [Sleep Apnea] : sleep apnea [COPD] : COPD [TextBox_44] : increase sob for past week

## 2023-09-13 RX ORDER — BENZONATATE 100 MG/1
100 CAPSULE ORAL
Qty: 30 | Refills: 2 | Status: ACTIVE | COMMUNITY
Start: 2023-09-13 | End: 1900-01-01

## 2023-10-04 ENCOUNTER — NON-APPOINTMENT (OUTPATIENT)
Age: 73
End: 2023-10-04

## 2023-10-04 DIAGNOSIS — U07.1 COVID-19: ICD-10-CM

## 2023-10-04 RX ORDER — AZITHROMYCIN 250 MG/1
250 TABLET, FILM COATED ORAL
Qty: 1 | Refills: 0 | Status: DISCONTINUED | COMMUNITY
Start: 2023-10-03 | End: 2023-10-04

## 2023-10-04 RX ORDER — ALBUTEROL SULFATE 90 UG/1
108 (90 BASE) INHALANT RESPIRATORY (INHALATION)
Qty: 1 | Refills: 5 | Status: DISCONTINUED | COMMUNITY
Start: 2020-11-03 | End: 2023-10-04

## 2023-10-04 RX ORDER — AZITHROMYCIN 250 MG/1
250 TABLET, FILM COATED ORAL
Qty: 1 | Refills: 0 | Status: DISCONTINUED | COMMUNITY
Start: 2023-09-08 | End: 2023-10-04

## 2023-10-04 RX ORDER — NIRMATRELVIR AND RITONAVIR 300-100 MG
20 X 150 MG & KIT ORAL
Qty: 1 | Refills: 0 | Status: ACTIVE | COMMUNITY
Start: 2023-10-04 | End: 1900-01-01

## 2023-10-10 NOTE — ED ADULT TRIAGE NOTE - NS ED NOTE AC HIGH RISK COUNTRIES
"Admitting Provider: Omaira Shea MD  Discharge Provider: Alex Shrestha MD  Primary Care Physician at Discharge: Lizbeth Richards, APRN--[phone]   Admission Date: 10/3/2023     Discharge Date: 10/9/2023  Current Planned Discharge Disposition:      Discharge Diagnoses  Principal Problem:    Hyponatremia  Active Problems:    Anemia    Benign essential hypertension    Osteoarthritis of knee    Other chronic pain    VTE (venous thromboembolism)    Rotator cuff arthropathy of both shoulders      Hospital Course  89 yoF with hyponatremia on outside labs (was 129 on 9/25 and now 118 on 10/3).   Of note, she recently had a fall with suspected trauma; her right shoulder was subsequently noted to be bruised; I suspect she sustained a rotator cuff tear from this trauma.     Suspicion for SIADH. Cymbalta has been stopped. Fluid restriction was enacted.   10/5: Na not budging much. Will add salt tabs.   Sodium has subsequently gradually climbed up to close to the normal range. On discharge, she will continue salt tab daily with prn lasix for leg swelling. She was encouraged solute intact with continued fluid restriction. Discharged to SNF in stable condition.     Test Results Pending At Discharge  Pending Labs       Order Current Status    Osmolality, urine Collected (10/04/23 0116)    Sodium, Urine Random Collected (10/04/23 0116)    Extra Tubes In process    Extra Tubes In process    Clark Top In process    Light Blue Top In process            Pertinent Physical Exam At Time of Discharge  /83   Pulse 86   Temp 36.4 °C (97.5 °F) (Oral)   Resp 18   Ht 1.549 m (5' 1\")   Wt 56 kg (123 lb 7.3 oz)   SpO2 96%   BMI 23.33 kg/m²   Physical Exam  Cardiovascular:      Rate and Rhythm: Normal rate and regular rhythm.      Heart sounds: Normal heart sounds.   Pulmonary:      Breath sounds: Normal breath sounds.   Abdominal:      General: Bowel sounds are normal.      Palpations: Abdomen is soft.   Musculoskeletal: " No      Comments: She cannot lift either arm above her head   Neurological:      General: No focal deficit present.      Mental Status: She is alert and oriented to person, place, and time.   Psychiatric:         Mood and Affect: Mood normal.         Home Medications     Medication List      START taking these medications     furosemide 20 mg tablet; Commonly known as: Lasix; Take 1 tablet (20 mg)   by mouth once daily as needed (leg swelling).   mineral oil enema; Insert 1 enema into the rectum once daily as needed   for constipation.   sennosides 8.6 mg tablet; Commonly known as: Senokot; Take 2 tablets   (17.2 mg) by mouth 2 times a day.   sodium chloride 1,000 mg tablet; Take 1 tablet (1 g) by mouth once   daily.     CHANGE how you take these medications     lisinopril 10 mg tablet; WOULD HOLD LISINOPRIL UNLESS BP CONSISTENTLY   MORE THAN 140; What changed: how much to take, how to take this, when to   take this, additional instructions     CONTINUE taking these medications     acetaminophen 650 mg ER tablet; Commonly known as: Tylenol 8 HOUR   ascorbic acid (vitamin C) 500 mg capsule   bisacodyl 10 mg suppository; Commonly known as: Dulcolax   cholecalciferol 25 MCG (1000 UT) capsule; Commonly known as: Vitamin D-3   cyanocobalamin 500 mcg tablet; Commonly known as: Vitamin B-12   dicyclomine 10 mg capsule; Commonly known as: Bentyl   Eliquis 5 mg tablet; Generic drug: apixaban   Milk of Magnesia 400 mg/5 mL suspension; Generic drug: magnesium   hydroxide     STOP taking these medications     DULoxetine 20 mg DR capsule; Commonly known as: Cymbalta   sodium phosphates 19-7 gram/118 mL enema enema; Commonly known as:   Leroy       Outpatient Follow-Up  Future Appointments   Date Time Provider Department Center   10/30/2023 11:00 AM HELEN Noriega-CNP [id number] [facility]       Alex Shrestha MD    I spent more than 30 min coordinating this patient's discharge.

## 2023-11-17 ENCOUNTER — APPOINTMENT (OUTPATIENT)
Dept: PULMONOLOGY | Facility: CLINIC | Age: 73
End: 2023-11-17
Payer: MEDICARE

## 2023-11-17 VITALS — OXYGEN SATURATION: 93 % | SYSTOLIC BLOOD PRESSURE: 140 MMHG | HEART RATE: 93 BPM | DIASTOLIC BLOOD PRESSURE: 76 MMHG

## 2023-11-17 DIAGNOSIS — J44.1 CHRONIC OBSTRUCTIVE PULMONARY DISEASE WITH (ACUTE) EXACERBATION: ICD-10-CM

## 2023-11-17 DIAGNOSIS — R05.9 COUGH, UNSPECIFIED: ICD-10-CM

## 2023-11-17 DIAGNOSIS — E11.9 TYPE 2 DIABETES MELLITUS W/OUT COMPLICATIONS: ICD-10-CM

## 2023-11-17 LAB
GLUCOSE BLDC GLUCOMTR-MCNC: 128
HBA1C MFR BLD HPLC: 7

## 2023-11-17 PROCEDURE — 71046 X-RAY EXAM CHEST 2 VIEWS: CPT

## 2023-11-17 PROCEDURE — 82962 GLUCOSE BLOOD TEST: CPT

## 2023-11-17 PROCEDURE — 83036 HEMOGLOBIN GLYCOSYLATED A1C: CPT | Mod: QW

## 2023-11-17 PROCEDURE — 99214 OFFICE O/P EST MOD 30 MIN: CPT | Mod: 25

## 2023-11-17 RX ORDER — METHYLPREDNISOLONE 4 MG/1
4 TABLET ORAL
Qty: 1 | Refills: 0 | Status: ACTIVE | COMMUNITY
Start: 2023-11-17 | End: 1900-01-01

## 2023-11-20 LAB
INFLUENZA A RESULT: NOT DETECTED
INFLUENZA B RESULT: NOT DETECTED
RESP SYN VIRUS RESULT: NOT DETECTED
SARS-COV-2 RESULT: NOT DETECTED

## 2024-01-27 ENCOUNTER — EMERGENCY (EMERGENCY)
Facility: HOSPITAL | Age: 74
LOS: 1 days | Discharge: HOME | End: 2024-01-27
Admitting: EMERGENCY MEDICINE
Payer: MEDICARE

## 2024-01-27 VITALS
OXYGEN SATURATION: 95 % | DIASTOLIC BLOOD PRESSURE: 90 MMHG | SYSTOLIC BLOOD PRESSURE: 154 MMHG | TEMPERATURE: 98 F | RESPIRATION RATE: 17 BRPM | HEART RATE: 75 BPM

## 2024-01-27 VITALS
HEART RATE: 74 BPM | OXYGEN SATURATION: 99 % | DIASTOLIC BLOOD PRESSURE: 71 MMHG | RESPIRATION RATE: 17 BRPM | SYSTOLIC BLOOD PRESSURE: 125 MMHG

## 2024-01-27 DIAGNOSIS — Z98.890 OTHER SPECIFIED POSTPROCEDURAL STATES: Chronic | ICD-10-CM

## 2024-01-27 DIAGNOSIS — Z95.2 PRESENCE OF PROSTHETIC HEART VALVE: Chronic | ICD-10-CM

## 2024-01-27 DIAGNOSIS — Z90.49 ACQUIRED ABSENCE OF OTHER SPECIFIED PARTS OF DIGESTIVE TRACT: Chronic | ICD-10-CM

## 2024-01-27 DIAGNOSIS — Z96.651 PRESENCE OF RIGHT ARTIFICIAL KNEE JOINT: Chronic | ICD-10-CM

## 2024-01-27 DIAGNOSIS — Z98.49 CATARACT EXTRACTION STATUS, UNSPECIFIED EYE: Chronic | ICD-10-CM

## 2024-01-27 LAB
ALBUMIN SERPL ELPH-MCNC: 3.9 G/DL — SIGNIFICANT CHANGE UP (ref 3.3–5)
ALP SERPL-CCNC: 140 U/L — HIGH (ref 40–120)
ALT FLD-CCNC: 10 U/L — SIGNIFICANT CHANGE UP (ref 4–41)
ANION GAP SERPL CALC-SCNC: 11 MMOL/L — SIGNIFICANT CHANGE UP (ref 7–14)
AST SERPL-CCNC: 10 U/L — SIGNIFICANT CHANGE UP (ref 4–40)
BASOPHILS # BLD AUTO: 0.04 K/UL — SIGNIFICANT CHANGE UP (ref 0–0.2)
BASOPHILS NFR BLD AUTO: 0.4 % — SIGNIFICANT CHANGE UP (ref 0–2)
BILIRUB SERPL-MCNC: 0.8 MG/DL — SIGNIFICANT CHANGE UP (ref 0.2–1.2)
BUN SERPL-MCNC: 12 MG/DL — SIGNIFICANT CHANGE UP (ref 7–23)
CALCIUM SERPL-MCNC: 8.9 MG/DL — SIGNIFICANT CHANGE UP (ref 8.4–10.5)
CHLORIDE SERPL-SCNC: 103 MMOL/L — SIGNIFICANT CHANGE UP (ref 98–107)
CO2 SERPL-SCNC: 27 MMOL/L — SIGNIFICANT CHANGE UP (ref 22–31)
CREAT SERPL-MCNC: 0.99 MG/DL — SIGNIFICANT CHANGE UP (ref 0.5–1.3)
CRP SERPL-MCNC: 57.3 MG/L — HIGH
EGFR: 81 ML/MIN/1.73M2 — SIGNIFICANT CHANGE UP
EOSINOPHIL # BLD AUTO: 0.1 K/UL — SIGNIFICANT CHANGE UP (ref 0–0.5)
EOSINOPHIL NFR BLD AUTO: 1 % — SIGNIFICANT CHANGE UP (ref 0–6)
ERYTHROCYTE [SEDIMENTATION RATE] IN BLOOD: 77 MM/HR — HIGH (ref 1–15)
GLUCOSE SERPL-MCNC: 111 MG/DL — HIGH (ref 70–99)
HCT VFR BLD CALC: 41.3 % — SIGNIFICANT CHANGE UP (ref 39–50)
HGB BLD-MCNC: 13.1 G/DL — SIGNIFICANT CHANGE UP (ref 13–17)
IANC: 8.22 K/UL — HIGH (ref 1.8–7.4)
IMM GRANULOCYTES NFR BLD AUTO: 0.4 % — SIGNIFICANT CHANGE UP (ref 0–0.9)
LYMPHOCYTES # BLD AUTO: 1.1 K/UL — SIGNIFICANT CHANGE UP (ref 1–3.3)
LYMPHOCYTES # BLD AUTO: 10.8 % — LOW (ref 13–44)
MCHC RBC-ENTMCNC: 27.1 PG — SIGNIFICANT CHANGE UP (ref 27–34)
MCHC RBC-ENTMCNC: 31.7 GM/DL — LOW (ref 32–36)
MCV RBC AUTO: 85.5 FL — SIGNIFICANT CHANGE UP (ref 80–100)
MONOCYTES # BLD AUTO: 0.71 K/UL — SIGNIFICANT CHANGE UP (ref 0–0.9)
MONOCYTES NFR BLD AUTO: 7 % — SIGNIFICANT CHANGE UP (ref 2–14)
NEUTROPHILS # BLD AUTO: 8.22 K/UL — HIGH (ref 1.8–7.4)
NEUTROPHILS NFR BLD AUTO: 80.4 % — HIGH (ref 43–77)
NRBC # BLD: 0 /100 WBCS — SIGNIFICANT CHANGE UP (ref 0–0)
NRBC # FLD: 0 K/UL — SIGNIFICANT CHANGE UP (ref 0–0)
PLATELET # BLD AUTO: 283 K/UL — SIGNIFICANT CHANGE UP (ref 150–400)
POTASSIUM SERPL-MCNC: 4.4 MMOL/L — SIGNIFICANT CHANGE UP (ref 3.5–5.3)
POTASSIUM SERPL-SCNC: 4.4 MMOL/L — SIGNIFICANT CHANGE UP (ref 3.5–5.3)
PROT SERPL-MCNC: 7.6 G/DL — SIGNIFICANT CHANGE UP (ref 6–8.3)
RBC # BLD: 4.83 M/UL — SIGNIFICANT CHANGE UP (ref 4.2–5.8)
RBC # FLD: 14 % — SIGNIFICANT CHANGE UP (ref 10.3–14.5)
SODIUM SERPL-SCNC: 141 MMOL/L — SIGNIFICANT CHANGE UP (ref 135–145)
URATE SERPL-MCNC: 6.8 MG/DL — SIGNIFICANT CHANGE UP (ref 3.4–8.8)
WBC # BLD: 10.21 K/UL — SIGNIFICANT CHANGE UP (ref 3.8–10.5)
WBC # FLD AUTO: 10.21 K/UL — SIGNIFICANT CHANGE UP (ref 3.8–10.5)

## 2024-01-27 PROCEDURE — 73130 X-RAY EXAM OF HAND: CPT | Mod: 26,LT

## 2024-01-27 PROCEDURE — 73110 X-RAY EXAM OF WRIST: CPT | Mod: 26,LT

## 2024-01-27 PROCEDURE — 99284 EMERGENCY DEPT VISIT MOD MDM: CPT

## 2024-01-27 PROCEDURE — 73090 X-RAY EXAM OF FOREARM: CPT | Mod: 26,LT

## 2024-01-27 RX ORDER — ACETAMINOPHEN 500 MG
975 TABLET ORAL ONCE
Refills: 0 | Status: COMPLETED | OUTPATIENT
Start: 2024-01-27 | End: 2024-01-27

## 2024-01-27 RX ORDER — KETOROLAC TROMETHAMINE 30 MG/ML
30 SYRINGE (ML) INJECTION ONCE
Refills: 0 | Status: DISCONTINUED | OUTPATIENT
Start: 2024-01-27 | End: 2024-01-27

## 2024-01-27 RX ORDER — IBUPROFEN 200 MG
1 TABLET ORAL
Qty: 15 | Refills: 0
Start: 2024-01-27 | End: 2024-01-31

## 2024-01-27 RX ADMIN — Medication 975 MILLIGRAM(S): at 16:33

## 2024-01-27 RX ADMIN — Medication 30 MILLIGRAM(S): at 18:44

## 2024-01-27 NOTE — ED PROVIDER NOTE - NSFOLLOWUPINSTRUCTIONS_ED_ALL_ED_FT
Advance activity as tolerated.  Continue all previously prescribed medications as directed unless otherwise instructed.  Take Motrin 600 mg every 8 hours as needed for moderate pain or fevers -- take with food.  Take Tylenol 650mg (Two 325 mg pills) every 4-6 hours as needed for pain or fevers.  Keep extremity elevated and wrapped.  Apply cool compresses to affected area for 15 minutes, 3-4 times per day.  Follow up with your primary care physician and orthopedics (referral list provided or call [phone] to make an appointment with the orthopedics clinic)  in 48-72 hours- bring copies of your results.  Return to the ER for worsening or persistent symptoms, including but not limited to worsening/persistent pain, fever, redness, swelling, numbness, weakness, difficulty standing/walking, falls, and/or ANY NEW OR CONCERNING SYMPTOMS. If you have issues obtaining follow up, please call: [phone] to obtain a doctor or specialist who takes your insurance in your area.  You may call [phone] to make an appointment with the internal medicine clinic.

## 2024-01-27 NOTE — ED PROVIDER NOTE - PROVIDER TOKENS
PROVIDER:[TOKEN:[[id number]]],PROVIDER:[TOKEN:[[id number]]],PROVIDER:[TOKEN:[[id number]]]

## 2024-01-27 NOTE — ED PROVIDER NOTE - OBJECTIVE STATEMENT
Patient is a 73-year-old male with past medical history of hypertension, COPD, aortic valve replacement, TIA, gout presents with left wrist pain x 3 days.  Patient reports gradual onset, worsening pain at left wrist.  Patient reports pain worsens with range of motion at wrist.  Patient reports he has had similar swelling in the left wrist in the past of unclear etiology.  Patient reports he has numbness at the fingertips of the left hand at baseline for several years; unchanged recently.  Patient denies any fevers, chills, weakness, trauma, falls, chest pain, shortness of breath, illicit drug use, alcohol abuse.

## 2024-01-27 NOTE — ED PROVIDER NOTE - PHYSICAL EXAMINATION
LUE:  +tenderness, swelling at wrist; no redness; no warmth; +pain with ROM at wrist; soft compartments; reduced sensation at finger tips (baseline per pt); 5/5 strength; 2+ pulses; < 2 sec cap refill; no tenderness, induration, redness, crepitus at hand LUE:  +tenderness, swelling at wrist; no redness; no warmth; +pain with ROM at wrist; soft compartments; reduced sensation at finger tips (baseline per pt); 5/5 strength; 2+ pulses; < 2 sec cap refill; no tenderness, induration, redness, crepitus at hand; no forearm/upper arm tenderness/swelling; no palpable cords

## 2024-01-27 NOTE — ED ADULT NURSE NOTE - OBJECTIVE STATEMENT
Received pt to wellness, A+Ox4, ambulatory. C/O left hand swelling x 2 days, no injury. left hand noted to be swollen, limited ROM due to swelling and pain. Pt denies any chest pain, SOB, headache, dizziness, N+V, diarrhea, fever, chills.  20G to RAC, Labs sent, Medicated as per Provider orders, plan of care ongoing, no further concerns as of present, patient expresses no other needs at this time, call light within reach.

## 2024-01-27 NOTE — ED PROVIDER NOTE - CARE PROVIDER_API CALL
Radha Keane  Surgery of the Hand  [address], [address]  [place] [address]  Phone: [phone]  Fax: [phone]  Follow Up Time:     Luis Angel Anguiano  Orthopaedic Surgery  [address], [address]  [place] [address]  Phone: [phone]  Fax: [phone]  Follow Up Time:     Kennedy Lozano)  Plastic Surgery  [address] [place] [address], [address]  [place] [address]  Phone: [phone]  Fax: [phone]  Follow Up Time:

## 2024-01-27 NOTE — ED ADULT NURSE NOTE - TEMPLATE LIST FOR HEAD TO TOE ASSESSMENT
General You can access the FollowMyHealth Patient Portal offered by [facility] by registering at the following website: [email or web address]. By joining [employer]’s FollowMyHealth portal, you will also be able to view your health information using other applications (apps) compatible with our system.

## 2024-01-27 NOTE — ED PROVIDER NOTE - CLINICAL SUMMARY MEDICAL DECISION MAKING FREE TEXT BOX
Patient is a 73-year-old male with past medical history of hypertension, COPD, aortic valve replacement, TIA, gout presents with left wrist pain x 3 days.  Patient reports gradual onset, worsening pain at left wrist.  Patient reports pain worsens with range of motion at wrist.  Patient reports he has had similar swelling in the left wrist in the past of unclear etiology.  Patient reports he has numbness at the fingertips of the left hand at baseline for several years; unchanged recently.  Patient denies any fevers, chills, weakness, trauma, falls, chest pain, shortness of breath, illicit drug use, alcohol abuse.  This is a patient with atraumatic wrist pain and swelling.  Plan to order labs, ESR, CRP, x-rays, pain medication.  Disposition pending workup. Patient is a 73-year-old male with past medical history of hypertension, COPD, aortic valve replacement, TIA, gout presents with left wrist pain x 3 days.  Patient reports gradual onset, worsening pain at left wrist.  Patient reports pain worsens with range of motion at wrist.  Patient reports he has had similar swelling in the left wrist in the past of unclear etiology.  Patient reports he has numbness at the fingertips of the left hand at baseline for several years; unchanged recently.  Patient denies any fevers, chills, weakness, trauma, falls, chest pain, shortness of breath, illicit drug use, alcohol abuse.  This is a patient with atraumatic wrist pain and swelling.  Given FROM and lack of pain with axial load of wrist, lack of fever, very low clinical suspicion for septic joint.  Not clinically concerning for cellulitis, deep infection of hand, or compartment syndrome.  Plan to order labs, ESR, CRP, x-rays, pain medication.  Disposition pending workup.

## 2024-01-27 NOTE — ED PROVIDER NOTE - PROGRESS NOTE DETAILS
IRINA US:  Pt medically stable for discharge.  Strict return precautions given.  Pt to follow up with PMD and ortho (referral list provided).

## 2024-01-27 NOTE — ED PROVIDER NOTE - NS_EDPROVIDERDISPOUSERTYPE_ED_A_ED
I have personally evaluated and examined the patient. The Attending was available to me as a supervising provider if needed. Never

## 2024-01-27 NOTE — ED PROVIDER NOTE - CARE PROVIDERS DIRECT ADDRESSES
[email or web address]

## 2024-01-27 NOTE — ED PROVIDER NOTE - PATIENT PORTAL LINK FT
You can access the FollowMyHealth Patient Portal offered by [facility] by registering at the following website: [email or web address]. By joining [employer]’s FollowMyHealth portal, you will also be able to view your health information using other applications (apps) compatible with our system.

## 2024-01-30 NOTE — ED POST DISCHARGE NOTE - RESULT SUMMARY
Called patient back.  Patient reports swelling and pain improved.  Patient denies any fevers, new numbness, weakness, redness.  Patient reports he has an appointment to see orthopedics in 2 days.  Patient directed to return to the emergency department with any worsening pain, redness, swelling, fevers, numbness, weakness.

## 2024-02-01 ENCOUNTER — APPOINTMENT (OUTPATIENT)
Dept: ORTHOPEDIC SURGERY | Facility: CLINIC | Age: 74
End: 2024-02-01
Payer: MEDICARE

## 2024-02-01 VITALS — BODY MASS INDEX: 36.36 KG/M2 | WEIGHT: 254 LBS | HEIGHT: 70 IN

## 2024-02-01 DIAGNOSIS — M79.642 PAIN IN LEFT HAND: ICD-10-CM

## 2024-02-01 DIAGNOSIS — M25.512 PAIN IN LEFT SHOULDER: ICD-10-CM

## 2024-02-01 DIAGNOSIS — G56.02 CARPAL TUNNEL SYNDROME, LEFT UPPER LIMB: ICD-10-CM

## 2024-02-01 PROCEDURE — 99213 OFFICE O/P EST LOW 20 MIN: CPT

## 2024-02-01 PROCEDURE — 73030 X-RAY EXAM OF SHOULDER: CPT | Mod: LT

## 2024-02-01 NOTE — DISCUSSION/SUMMARY
[de-identified] : The underlying pathophysiology was reviewed with the patient. XR films were reviewed with the patient. Discussed at length the nature of the patient's condition osteoarthritis of the left shoulder and carpal tunnel on the left hand.   Patient was advised to take OTC medications and topical analgesic for pain management. Treatment options were discussed for the carpal tunnel which is cortisone injection or surgery. Given the severity of his symptoms, I recommended surgery for the carpal tunnel release. For the shoulder he was advised to try conservative measure of treatment first.   Risks and benefits discussed. Nature of the operation reviewed which is Carpel Tunnel Release Left Hand. Post-operative recovery and patient experience were reviewed. Patient will be put in contact with my surgical coordinator. He will contact my surgical coordinator IF HE wishes to pursue with the surgery.   All questions answered, understanding verbalized. Patient in agreement with plan of care.   Patient was advised to follow up as needed.

## 2024-02-01 NOTE — END OF VISIT
[FreeTextEntry3] :  All medical record entries made by the Scribe were at my,  Dr. Dimas Perkins MD., direction and personally dictated by me on 02/01/2024. I have personally reviewed the chart and agree that the record accurately reflects my personal performance of the history, physical exam, assessment and plan.

## 2024-02-01 NOTE — HISTORY OF PRESENT ILLNESS
[de-identified] : Pt is a 72 y/o male c/o left wrist and hand swelling x 1 week.  He states that the swelling started on 1/25/24.  He went to the ED on 1/27/24 where xrays were negative for fracture.  He was told that he does not have an infection.  He was given anti-inflammatory medication which helped but he continues to have pain.  He also c/o left shoulder pain.  He is unable to lift his arm overhead.  He has difficulty sleeping.  He cannot reach for anything.  Xrays were not taken of the shoulder in the ED.

## 2024-02-01 NOTE — ADDENDUM
[FreeTextEntry1] :  I, Lior Kaur wrote this note acting as a scribe for Dr. Dimas Perkins on Feb 01, 2024.

## 2024-02-01 NOTE — PHYSICAL EXAM
[de-identified] : Patient is WDWN, alert, and in no acute distress. Breathing is unlabored. He is grossly oriented to person, place, and time.  Patient ambulates with a cane.   Left Shoulder:   Inspection/ Palpation: there is no tenderness, swelling, or deformities.   Range of Motion: active flexion, passive flexion, abduction, external rotation is limited.   Strength: forward elevation, internal rotation, external rotation, adduction, and abduction are limited  Stability: no joint instability on provocative testing.   Left Wrist:   No tenderness, edema, or deformities. Thenar atrophy is present. Full ROM with decreased sensation along median nerve distribution. Tests/Signs: Tinel's sign is positive over carpal tunnel, Phalen's test is positive. [de-identified] : AP, transcapula, and axillary views of the Left shoulder were obtained and revealed osteoarthritis.

## 2024-02-24 NOTE — PROGRESS NOTE ADULT - PROBLEM SELECTOR PLAN 1
- Cont NGT  - NPO  - Pain control  - Cont stephen for deep pelvic dissection  - OOB/ AMB Statement Selected

## 2024-03-07 RX ORDER — IBUPROFEN 600 MG/1
600 TABLET, FILM COATED ORAL
Qty: 60 | Refills: 0 | Status: ACTIVE | COMMUNITY
Start: 2024-03-07 | End: 1900-01-01

## 2024-03-18 ENCOUNTER — APPOINTMENT (OUTPATIENT)
Dept: PULMONOLOGY | Facility: CLINIC | Age: 74
End: 2024-03-18
Payer: MEDICARE

## 2024-03-18 VITALS — DIASTOLIC BLOOD PRESSURE: 76 MMHG | OXYGEN SATURATION: 95 % | SYSTOLIC BLOOD PRESSURE: 122 MMHG | HEART RATE: 65 BPM

## 2024-03-18 DIAGNOSIS — J44.89 OTHER SPECIFIED CHRONIC OBSTRUCTIVE PULMONARY DISEASE: ICD-10-CM

## 2024-03-18 DIAGNOSIS — G47.33 OBSTRUCTIVE SLEEP APNEA (ADULT) (PEDIATRIC): ICD-10-CM

## 2024-03-18 DIAGNOSIS — R06.02 SHORTNESS OF BREATH: ICD-10-CM

## 2024-03-18 PROCEDURE — 94729 DIFFUSING CAPACITY: CPT

## 2024-03-18 PROCEDURE — 94727 GAS DIL/WSHOT DETER LNG VOL: CPT

## 2024-03-18 PROCEDURE — 94010 BREATHING CAPACITY TEST: CPT

## 2024-03-18 PROCEDURE — 99214 OFFICE O/P EST MOD 30 MIN: CPT | Mod: 25

## 2024-03-18 PROCEDURE — ZZZZZ: CPT

## 2024-03-18 PROCEDURE — 94618 PULMONARY STRESS TESTING: CPT

## 2024-03-18 NOTE — REASON FOR VISIT
[Sleep Apnea] : sleep apnea [COPD] : COPD [Shortness of Breath] : shortness of breath [Follow-Up] : a follow-up visit

## 2024-03-18 NOTE — HISTORY OF PRESENT ILLNESS
[Never] : never [TextBox_4] : Feeling some CASTILLO. ? progressive. Some wheezing Minimal cough non productive.  No chest discomfort. Remains overweight has not lost weight. on Trelegy.  Compliant. Trying to use CPAP more. Not always home.

## 2024-03-18 NOTE — PROCEDURE
[FreeTextEntry1] : 03/18/2024 Pulmonary function testing There is a mild ventilatory impairment in a combined obstructive and restrictive pattern. Normal Lung Volumes. There is a mild diffusion impairment. Corrects to normal with lung volume correction  No significant change in function compared to September 2023.  6-minute walk attached.  Patient walked 1320 feet.  No desaturation.  See attached.  3/26/24 6 minute walk

## 2024-03-18 NOTE — ASSESSMENT
[FreeTextEntry1] : Continue Trelegy PRN beta agonist. Needs program of exercise and wt. loss.  Discussed. Urge compliance with CPAP.    35 minutes spent in evaluation management and review of studies.

## 2024-03-18 NOTE — PHYSICAL EXAM
[No Acute Distress] : no acute distress [Normal Oropharynx] : normal oropharynx [Normal Appearance] : normal appearance [Supple] : supple [No Neck Mass] : no neck mass [No JVD] : no jvd [Normal Rate/Rhythm] : normal rate/rhythm [No Murmurs] : no murmurs [Normal S1, S2] : normal s1, s2 [No Resp Distress] : no resp distress [Normal to Percussion] : normal to percussion [No Abnormalities] : no abnormalities [Benign] : benign [Normal Gait] : normal gait [No Clubbing] : no clubbing [No Cyanosis] : no cyanosis [No Edema] : no edema [Normal Color/ Pigmentation] : normal color/ pigmentation [No Focal Deficits] : no focal deficits [Oriented x3] : oriented x3 [Normal Mood] : normal mood [Normal Affect] : normal affect [TextBox_2] : Overweight [TextBox_68] : 1-2+ bilateral rhonchi and wheezing.

## 2024-03-18 NOTE — DISCUSSION/SUMMARY
[FreeTextEntry1] : COPD/ asthma.  Clinically and functionally stable. Component of restrictive disease likely related to body habitus. SAMMY needs better compliance. history of cva Diabetes mellitus.

## 2024-06-06 ENCOUNTER — APPOINTMENT (OUTPATIENT)
Dept: ORTHOPEDIC SURGERY | Facility: CLINIC | Age: 74
End: 2024-06-06
Payer: MEDICARE

## 2024-06-06 VITALS
HEIGHT: 70 IN | DIASTOLIC BLOOD PRESSURE: 62 MMHG | BODY MASS INDEX: 35.79 KG/M2 | SYSTOLIC BLOOD PRESSURE: 116 MMHG | WEIGHT: 250 LBS | HEART RATE: 51 BPM

## 2024-06-06 DIAGNOSIS — M25.511 PAIN IN RIGHT SHOULDER: ICD-10-CM

## 2024-06-06 DIAGNOSIS — M19.012 PRIMARY OSTEOARTHRITIS, LEFT SHOULDER: ICD-10-CM

## 2024-06-06 DIAGNOSIS — M25.512 PAIN IN RIGHT SHOULDER: ICD-10-CM

## 2024-06-06 DIAGNOSIS — M19.011 PRIMARY OSTEOARTHRITIS, RIGHT SHOULDER: ICD-10-CM

## 2024-06-06 PROCEDURE — 99213 OFFICE O/P EST LOW 20 MIN: CPT

## 2024-06-06 PROCEDURE — 73030 X-RAY EXAM OF SHOULDER: CPT | Mod: RT

## 2024-06-06 NOTE — PROGRESS NOTE ADULT - SUBJECTIVE AND OBJECTIVE BOX
To whom it may concern:     Jason Carlton 12-October-1950 has been under inpatient care at [facility] since 11-March-2021. He was discharged from the hospital 12-March-2021. Mr. Mcmahon was instructed to return to work only when he no longer has symptoms. When he returns to work without symptoms he may work without restrictions. Please make any necessary accommodations Thank you.         If there are any questions please do not hesitate to call        Cheri Finley M.D   [phone] PAST MEDICAL HISTORY:  ADHD     Anxiety and depression     Asthma     History of Asperger's syndrome     Nutcracker phenomenon of renal vein     Postural orthostatic tachycardia syndrome [POTS]      PAST MEDICAL HISTORY:  ADHD     Anxiety and depression     Asthma     Entrapment syndrome of left renal vein     History of Asperger's syndrome     Migraines     Postural orthostatic tachycardia syndrome [POTS]

## 2024-06-06 NOTE — PHYSICAL EXAM

## 2024-06-06 NOTE — HISTORY OF PRESENT ILLNESS
[de-identified] : The patient presents for reevaluation of bilateral shoulder OA. Symptoms have worsened in the left shoulder, stable in the right shoulder. He saw Dr. Perkins in February who repeated x-rays of the left shoulder which we have for review. He takes Tylenol for pain.

## 2024-06-06 NOTE — HISTORY OF PRESENT ILLNESS
[de-identified] : The patient presents for reevaluation of bilateral shoulder OA. Symptoms have worsened in the left shoulder, stable in the right shoulder. He saw Dr. Perkins in February who repeated x-rays of the left shoulder which we have for review. He takes Tylenol for pain.

## 2024-06-06 NOTE — PHYSICAL EXAM
[Rad] : radial 2+ and symmetric bilaterally [Normal] : Alert and in no acute distress [Poor Appearance] : well-appearing [Acute Distress] : not in acute distress [Obese] : not obese [de-identified] : The patient has no respiratory distress. Mood and affect are normal. The patient is alert and oriented to person, place and time.\par  Examination of the cervical spine demonstrates no tenderness, no deformity and no muscle spasm. Cervical spine rotation is 60 to the right, 60 to the left, 75 of extension and 45 of flexion. Neurologic exam of the upper extremities reveals intact sensation to light touch. Motor function is 5 over 5 in all groups. Deep tendon reflexes are 2+ and equal at the biceps, triceps and brachioradialis.\par  He has painful motion of both shoulders.  He has significant limitation on the left compared to the right.  He has crepitus with left shoulder motion.  He has decreased strength generally around the left shoulder.  Elbows are stable.  The skin is intact.  There is no lymphedema. [de-identified] : AP, transscapular and axillary x-rays of the right shoulder taken today demonstrate no fracture or dislocation.  He has glenohumeral osteoarthritis.  X-rays of the left shoulder taken February 1, 2024 reviewed.  He has moderate to severe glenohumeral osteoarthritis.

## 2024-06-06 NOTE — DISCUSSION/SUMMARY
[de-identified] : The patient has osteoarthritis of both shoulders.  The left shoulder is more symptomatic than the right.  I have discussed the pathology, natural history and treatment options with the patient and his wife.  At this point he does not want a corticosteroid injection and cannot take NSAIDs.  He will apply topical ointments.  If he wishes to have an injection he will return.

## 2024-06-06 NOTE — DISCUSSION/SUMMARY
[de-identified] : The patient has osteoarthritis of both shoulders.  The left shoulder is more symptomatic than the right.  I have discussed the pathology, natural history and treatment options with the patient and his wife.  At this point he does not want a corticosteroid injection and cannot take NSAIDs.  He will apply topical ointments.  If he wishes to have an injection he will return.

## 2024-06-07 ENCOUNTER — EMERGENCY (EMERGENCY)
Facility: HOSPITAL | Age: 74
LOS: 1 days | Discharge: HOME | End: 2024-06-07
Attending: EMERGENCY MEDICINE | Admitting: EMERGENCY MEDICINE
Payer: MEDICARE

## 2024-06-07 VITALS
DIASTOLIC BLOOD PRESSURE: 93 MMHG | TEMPERATURE: 98 F | RESPIRATION RATE: 16 BRPM | HEART RATE: 84 BPM | SYSTOLIC BLOOD PRESSURE: 142 MMHG | OXYGEN SATURATION: 95 %

## 2024-06-07 DIAGNOSIS — Z90.49 ACQUIRED ABSENCE OF OTHER SPECIFIED PARTS OF DIGESTIVE TRACT: Chronic | ICD-10-CM

## 2024-06-07 DIAGNOSIS — Z98.890 OTHER SPECIFIED POSTPROCEDURAL STATES: Chronic | ICD-10-CM

## 2024-06-07 DIAGNOSIS — Z98.49 CATARACT EXTRACTION STATUS, UNSPECIFIED EYE: Chronic | ICD-10-CM

## 2024-06-07 DIAGNOSIS — Z95.2 PRESENCE OF PROSTHETIC HEART VALVE: Chronic | ICD-10-CM

## 2024-06-07 DIAGNOSIS — Z96.651 PRESENCE OF RIGHT ARTIFICIAL KNEE JOINT: Chronic | ICD-10-CM

## 2024-06-07 LAB
ADD ON TEST-SPECIMEN IN LAB: SIGNIFICANT CHANGE UP
ALBUMIN SERPL ELPH-MCNC: 3.9 G/DL — SIGNIFICANT CHANGE UP (ref 3.3–5)
ALP SERPL-CCNC: 113 U/L — SIGNIFICANT CHANGE UP (ref 40–120)
ALT FLD-CCNC: 9 U/L — SIGNIFICANT CHANGE UP (ref 4–41)
ANION GAP SERPL CALC-SCNC: 15 MMOL/L — HIGH (ref 7–14)
AST SERPL-CCNC: 10 U/L — SIGNIFICANT CHANGE UP (ref 4–40)
BASOPHILS # BLD AUTO: 0.05 K/UL — SIGNIFICANT CHANGE UP (ref 0–0.2)
BASOPHILS NFR BLD AUTO: 0.4 % — SIGNIFICANT CHANGE UP (ref 0–2)
BILIRUB SERPL-MCNC: 0.9 MG/DL — SIGNIFICANT CHANGE UP (ref 0.2–1.2)
BUN SERPL-MCNC: 23 MG/DL — SIGNIFICANT CHANGE UP (ref 7–23)
CALCIUM SERPL-MCNC: 8.5 MG/DL — SIGNIFICANT CHANGE UP (ref 8.4–10.5)
CHLORIDE SERPL-SCNC: 101 MMOL/L — SIGNIFICANT CHANGE UP (ref 98–107)
CO2 SERPL-SCNC: 22 MMOL/L — SIGNIFICANT CHANGE UP (ref 22–31)
CREAT SERPL-MCNC: 1.37 MG/DL — HIGH (ref 0.5–1.3)
EGFR: 55 ML/MIN/1.73M2 — LOW
EOSINOPHIL # BLD AUTO: 0.1 K/UL — SIGNIFICANT CHANGE UP (ref 0–0.5)
EOSINOPHIL NFR BLD AUTO: 0.9 % — SIGNIFICANT CHANGE UP (ref 0–6)
GLUCOSE SERPL-MCNC: 132 MG/DL — HIGH (ref 70–99)
HCT VFR BLD CALC: 40 % — SIGNIFICANT CHANGE UP (ref 39–50)
HGB BLD-MCNC: 13.4 G/DL — SIGNIFICANT CHANGE UP (ref 13–17)
IANC: 9.14 K/UL — HIGH (ref 1.8–7.4)
IMM GRANULOCYTES NFR BLD AUTO: 0.4 % — SIGNIFICANT CHANGE UP (ref 0–0.9)
LYMPHOCYTES # BLD AUTO: 1.17 K/UL — SIGNIFICANT CHANGE UP (ref 1–3.3)
LYMPHOCYTES # BLD AUTO: 10.4 % — LOW (ref 13–44)
MAGNESIUM SERPL-MCNC: 2 MG/DL — SIGNIFICANT CHANGE UP (ref 1.6–2.6)
MCHC RBC-ENTMCNC: 27.9 PG — SIGNIFICANT CHANGE UP (ref 27–34)
MCHC RBC-ENTMCNC: 33.5 GM/DL — SIGNIFICANT CHANGE UP (ref 32–36)
MCV RBC AUTO: 83.2 FL — SIGNIFICANT CHANGE UP (ref 80–100)
MONOCYTES # BLD AUTO: 0.7 K/UL — SIGNIFICANT CHANGE UP (ref 0–0.9)
MONOCYTES NFR BLD AUTO: 6.3 % — SIGNIFICANT CHANGE UP (ref 2–14)
NEUTROPHILS # BLD AUTO: 9.14 K/UL — HIGH (ref 1.8–7.4)
NEUTROPHILS NFR BLD AUTO: 81.6 % — HIGH (ref 43–77)
NRBC # BLD: 0 /100 WBCS — SIGNIFICANT CHANGE UP (ref 0–0)
NRBC # FLD: 0 K/UL — SIGNIFICANT CHANGE UP (ref 0–0)
PHOSPHATE SERPL-MCNC: 3.8 MG/DL — SIGNIFICANT CHANGE UP (ref 2.5–4.5)
PLATELET # BLD AUTO: 237 K/UL — SIGNIFICANT CHANGE UP (ref 150–400)
POTASSIUM SERPL-MCNC: 4 MMOL/L — SIGNIFICANT CHANGE UP (ref 3.5–5.3)
POTASSIUM SERPL-SCNC: 4 MMOL/L — SIGNIFICANT CHANGE UP (ref 3.5–5.3)
PROT SERPL-MCNC: 7.3 G/DL — SIGNIFICANT CHANGE UP (ref 6–8.3)
RBC # BLD: 4.81 M/UL — SIGNIFICANT CHANGE UP (ref 4.2–5.8)
RBC # FLD: 13.4 % — SIGNIFICANT CHANGE UP (ref 10.3–14.5)
SODIUM SERPL-SCNC: 138 MMOL/L — SIGNIFICANT CHANGE UP (ref 135–145)
WBC # BLD: 11.2 K/UL — HIGH (ref 3.8–10.5)
WBC # FLD AUTO: 11.2 K/UL — HIGH (ref 3.8–10.5)

## 2024-06-07 PROCEDURE — 12031 INTMD RPR S/A/T/EXT 2.5 CM/<: CPT

## 2024-06-07 PROCEDURE — 71046 X-RAY EXAM CHEST 2 VIEWS: CPT | Mod: 26

## 2024-06-07 PROCEDURE — 93010 ELECTROCARDIOGRAM REPORT: CPT

## 2024-06-07 PROCEDURE — 73080 X-RAY EXAM OF ELBOW: CPT | Mod: 26,RT

## 2024-06-07 PROCEDURE — 99285 EMERGENCY DEPT VISIT HI MDM: CPT | Mod: 25

## 2024-06-07 PROCEDURE — 12052 INTMD RPR FACE/MM 2.6-5.0 CM: CPT

## 2024-06-07 RX ORDER — TETANUS TOXOID, REDUCED DIPHTHERIA TOXOID AND ACELLULAR PERTUSSIS VACCINE, ADSORBED 5; 2.5; 8; 8; 2.5 [IU]/.5ML; [IU]/.5ML; UG/.5ML; UG/.5ML; UG/.5ML
0.5 SUSPENSION INTRAMUSCULAR ONCE
Refills: 0 | Status: COMPLETED | OUTPATIENT
Start: 2024-06-07 | End: 2024-06-07

## 2024-06-07 RX ORDER — ACETAMINOPHEN 500 MG
650 TABLET ORAL ONCE
Refills: 0 | Status: COMPLETED | OUTPATIENT
Start: 2024-06-07 | End: 2024-06-07

## 2024-06-07 RX ADMIN — TETANUS TOXOID, REDUCED DIPHTHERIA TOXOID AND ACELLULAR PERTUSSIS VACCINE, ADSORBED 0.5 MILLILITER(S): 5; 2.5; 8; 8; 2.5 SUSPENSION INTRAMUSCULAR at 22:27

## 2024-06-07 RX ADMIN — Medication 650 MILLIGRAM(S): at 22:28

## 2024-06-07 NOTE — ED ADULT NURSE NOTE - NSFALLHARMRISKINTERV_ED_ALL_ED
Assistance OOB with selected safe patient handling equipment if applicable/Assistance with ambulation/Communicate risk of Fall with Harm to all staff, patient, and family/Encourage patient to sit up slowly, dangle for a short time, stand at bedside before walking/Monitor gait and stability/Orthostatic vital signs/Provide visual cue: red socks, yellow wristband, yellow gown, etc/Reinforce activity limits and safety measures with patient and family/Bed in lowest position, wheels locked, appropriate side rails in place/Call bell, personal items and telephone in reach/Instruct patient to call for assistance before getting out of bed/chair/stretcher/Non-slip footwear applied when patient is off stretcher/[place] to call system/Physically safe environment - no spills, clutter or unnecessary equipment/Purposeful Proactive Rounding/Room/bathroom lighting operational, light cord in reach

## 2024-06-07 NOTE — ED ADULT TRIAGE NOTE - CHIEF COMPLAINT QUOTE
Patient c/o laceration. States lost his balance and fell in apartment earlier today, hit head on table. Wound to R side of forehead and R elbow. No blood thinners, no active bleeding. Endorses HA and nausea. PMH HTN, Dm.

## 2024-06-07 NOTE — ED ADULT NURSE NOTE - TEMPLATE
Wounds

## 2024-06-08 VITALS
OXYGEN SATURATION: 97 % | DIASTOLIC BLOOD PRESSURE: 84 MMHG | HEART RATE: 74 BPM | RESPIRATION RATE: 16 BRPM | SYSTOLIC BLOOD PRESSURE: 137 MMHG

## 2024-06-08 LAB — TROPONIN T, HIGH SENSITIVITY RESULT: 20 NG/L — SIGNIFICANT CHANGE UP

## 2024-06-08 PROCEDURE — 73200 CT UPPER EXTREMITY W/O DYE: CPT | Mod: 26,RT,MC

## 2024-06-08 PROCEDURE — 70486 CT MAXILLOFACIAL W/O DYE: CPT | Mod: 26,MC

## 2024-06-08 PROCEDURE — 70450 CT HEAD/BRAIN W/O DYE: CPT | Mod: 26,MC

## 2024-06-08 PROCEDURE — 72125 CT NECK SPINE W/O DYE: CPT | Mod: 26,MC

## 2024-06-08 RX ORDER — LIDOCAINE HYDROCHLORIDE AND EPINEPHRINE 10; 10 MG/ML; UG/ML
10 INJECTION, SOLUTION INFILTRATION; PERINEURAL ONCE
Refills: 0 | Status: COMPLETED | OUTPATIENT
Start: 2024-06-08 | End: 2024-06-08

## 2024-06-08 RX ADMIN — Medication 1 MILLIGRAM(S): at 01:50

## 2024-06-08 RX ADMIN — LIDOCAINE HYDROCHLORIDE AND EPINEPHRINE 10 MILLILITER(S): 10; 10 INJECTION, SOLUTION INFILTRATION; PERINEURAL at 07:09

## 2024-06-08 RX ADMIN — Medication 1 TABLET(S): at 08:46

## 2024-06-08 NOTE — ED ADULT NURSE REASSESSMENT NOTE - NS ED NURSE REASSESS COMMENT FT1
Break RN: Pt is A&Ox4, resting in stretcher with complaints of anxiety at this time. Respirations even and unlabored, chest rise equal b/l. Pt denies chest pain, SOB, fever, cough, chills, abdominal pain, N/V/D, h/a, dizziness, numbness/tingling or any urinary symptoms at this time. No acute distress noted. Pt medicated as per order, see MAR. Safety maintained throughout.

## 2024-06-08 NOTE — ED PROVIDER NOTE - ATTENDING CONTRIBUTION TO CARE
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  Patient ambulates with a cane in home.  He reports losing his balance and falling onto a table striking the right side of his forehead and elbow.  He denies loss of consciousness or amnestic event.  Currently complaining of right-sided headache and right-sided elbow pain.  He was able to get to a standing position and ambulate after fall.  Last tetanus is unknown.  Denies nausea, vomiting, neck pain, chest pain, back pain, abdominal pain, numbness, tingling, weakness, Syncope.  Denies alcohol or illicit drug use.    Vitals:   Reviewed    Exam:    GEN:  Non-toxic appearing, non-distressed, speaking full sentences, non-diaphoretic, AAOx3  HEENT:  4 cm curvilinear full thickness laceration to right frontal scalp, exposed frontalis muscle which is intact, no active bleeding; neck supple, EOMI, PERRLA, sclera anicteric, no conjunctival pallor or injection, no stridor, normal voice, no tonsillar exudate, uvula midline  SPINE:  no midline c/t/l spine tenderness   CV:  regular rhythm and rate, s1/s2 audible, no murmurs, rubs or gallops, peripheral pulses 2+ and symmetric  PULM:  non-labored respirations, lungs clear to auscultation bilaterally, no wheezes, crackles or rales  ABD:  non distended, non-tender, no rebound, no guarding, negative Posey's sign, bowel sounds normal, no cvat  MSK:  swelling and 1 cm laceration over ulnar aspect of elbow, no active bleeding, unable to visualize wound base;  chronic appearing bony deformity of right ankle; tender right elbow; range of motion grossly normal appearing, no extremity edema, extremities warm and well perfused   NEURO:  AAOx3, CN II-XII intact, motor 5/5 in upper and lower extremities bilaterally, sensation grossly intact in extremities and trunk, finger to nose testing wnl, no nystagmus, negative Romberg, no pronator drift  SKIN: as above    A/P:   73-year-old male past medical history of hypertension, diabetes, 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  GCS 15, no focal neurodeficits.  Patient has a scalp laceration.  Right elbow laceration without visible wound base.  Chronic appearing deformity of right ankle for which patient states is longstanding and has had previous surgery.  Will obtain CT imaging, CT of elbow to evaluate for traumatic arthrotomy although low concern overall, x-rays, labs, laceration repair.  Disposition pending.

## 2024-06-08 NOTE — ED PROCEDURE NOTE - PROCEDURE ADDITIONAL DETAILS
7 x 6-0 non-absorbable sutures  1 x 4-0 deep subcutaneous suture
1 x 5-0 deep subcutaneous absorbable suture  4 x 4-0 non-absorbable sutures

## 2024-06-08 NOTE — ED PROVIDER NOTE - OBJECTIVE STATEMENT
74 yo M w/ PMHx of HTN, DM, aortic valve repair no longer on blood thinners, umbilical hernia repair, former smoker, and multiple back surgeries presents for right forehead and right elbow laceration after losing balance and fall.  He got up to walk without his cane at home and fell onto a table.  He denies any loss consciousness, preceding lightheadedness/dizziness, fever/chills/cough/sore throat, CP, SOB.  He states that he lost balance, but cannot recall exact circumstances.  He has had multiple falls in the past.  He has had a stress test 1 month ago which was normal.  He cannot remember his last tetanus vaccination.    Cardiologist: Dr. Yahir Oreilly

## 2024-06-08 NOTE — ED PROVIDER NOTE - CLINICAL SUMMARY MEDICAL DECISION MAKING FREE TEXT BOX
74 yo M w/ PMHx of HTN, DM, aortic valve repair no longer on blood thinners, umbilical hernia repair, former smoker, and multiple back surgeries presents for right forehead and right elbow laceration after losing balance and fall.  Vital signs unremarkable physical exam is remarkable for 3 cm laceration overlying right eyebrow with intact surrounding musculature and 1 cm laceration of the right elbow.  Concern for falls secondary to not using cane with deconditioning vs arrhythmia versus electrolyte abnormality versus infectious etiology.  Also concern for ICH and underlying fracture secondary to mechanism of injury.  Plan for syncopal workup, CT head, CT maxillofacial, and CT RUE.

## 2024-06-08 NOTE — ED PROCEDURE NOTE - CPROC ED ANATOMIC LOCATION1
Here for hormone therapy injection, no complaints at this time, Injection given as ordered, tolerated well, no report of pain prior to or after injection. Return to clinic as scheduled.     Site - LB    Testosterone 76 mg  Depo Estradiol 5 mg    Clinic Supplied Medication  
elbow
face

## 2024-06-08 NOTE — ED PROVIDER NOTE - PATIENT PORTAL LINK FT
You can access the FollowMyHealth Patient Portal offered by [facility] by registering at the following website: [email or web address]. By joining [facility]’s FollowMyHealth portal, you will also be able to view your health information using other applications (apps) compatible with our system.

## 2024-06-08 NOTE — ED PROVIDER NOTE - PROGRESS NOTE DETAILS
[facility] PGY2: Labs remarkable for WBC 11.20 and troponin 22.  CT scans remarkable for right posterior elbow wound laceration with stranding/edema and air tracking to level olecranon process periosteum with no intra-articular gas identified.  CT head and CT maxillofacial are unremarkable except for right frontal supraorbital soft tissue defect and small hematoma.  No radiopaque objects outside.    Lacerations repaired.  Pending Augmentin 875-125 x 1 and repeat troponin.  Plan for discharge home with antibiotics and follow-up in 5 to 7 days for suture assessment/removal.

## 2024-06-08 NOTE — ED PROCEDURE NOTE - CPROC ED LACER REPAIR DETAIL1
The wound was explored to base in bloodless field./No foreign body/Non-extensive debridement was performed.
The wound was explored to base in bloodless field./No foreign body/Non-extensive debridement was performed.

## 2024-06-08 NOTE — ED PROVIDER NOTE - NSFOLLOWUPINSTRUCTIONS_ED_ALL_ED_FT
Reason for ED Visit:  -Right forehead and right elbow lacerations after mechanical fall    Findings/Diagnosis:  - Laceration requiring sutures  - Right elbow laceration communicating with olecranon process periosteum -washed out profusely before suturing    Please return to the ED immediately for any new, worsening, or concerning symptoms including, but not limited to:   - Pus-like drainage from laceration site   - Fevers   - Worsening headache especially with vomiting, vision change   - Arm/leg weakness    Please take the following medications at home:   - Augmentin (amoxicillin 875–clavulanate 125) every 12 hours for 7 days   - Acetaminophen (Tylenol) 500 to 1000 mg every 6-8 hours as needed for pain   - Ibuprofen (Advil or Motrin) 400 to 600 mg every 6-8 hours as needed for pain, take with food   - Alternate acetaminophen and ibuprofen every 3 hours for optimal pain coverage    Please follow up with your primary care provider regarding this ED visit.    Thank you for choosing us for your care. Reason for ED Visit:  -Right forehead and right elbow lacerations after mechanical fall    Findings/Diagnosis:  - Laceration requiring sutures  - Right elbow laceration communicating with olecranon process periosteum -washed out profusely before suturing    Forehead: 7 non-absorbable sutures + 1 deep absorbable suture  Right Elbow: 4 non-absorbable sutures + 1 deep absorbable suture    Please keep antibiotics and gauze dressing on wound for 2 days.  After 2 days, please lightly rinse the area with soap and water, pat dry, apply antibiotic ointment, and dressed with gauze.  Keep area dry.  Do not flex your elbow all the way and avoid extensive physical activity involving arms.  Please present to any medical care provider in the next 5 to 7 days to have sutures removed.    Please return to the ED immediately for any new, worsening, or concerning symptoms including, but not limited to:   - Pus-like drainage from laceration site   - Fevers   - Worsening headache especially with vomiting, vision change   - Arm/leg weakness    Please take the following medications at home:   - Augmentin (amoxicillin 875–clavulanate 125) every 12 hours for 7 days   - Acetaminophen (Tylenol) 500 to 1000 mg every 6-8 hours as needed for pain   - Ibuprofen (Advil or Motrin) 400 to 600 mg every 6-8 hours as needed for pain, take with food   - Alternate acetaminophen and ibuprofen every 3 hours for optimal pain coverage    Please follow up with your primary care provider regarding this ED visit.    Thank you for choosing us for your care.

## 2024-06-08 NOTE — ED PROVIDER NOTE - SHIFT CHANGE DETAILS
CINDY:  Patient signed out to Dr. Bui pending labs, ct, x-rays, laceration repair.  HD stable at time of signout.

## 2024-06-12 ENCOUNTER — EMERGENCY (EMERGENCY)
Facility: HOSPITAL | Age: 74
LOS: 1 days | Discharge: HOME | End: 2024-06-12
Attending: EMERGENCY MEDICINE | Admitting: EMERGENCY MEDICINE
Payer: MEDICARE

## 2024-06-12 VITALS
DIASTOLIC BLOOD PRESSURE: 92 MMHG | OXYGEN SATURATION: 97 % | TEMPERATURE: 99 F | SYSTOLIC BLOOD PRESSURE: 136 MMHG | RESPIRATION RATE: 8 BRPM | HEART RATE: 73 BPM

## 2024-06-12 VITALS
OXYGEN SATURATION: 100 % | TEMPERATURE: 98 F | RESPIRATION RATE: 18 BRPM | WEIGHT: 154.98 LBS | HEART RATE: 93 BPM | SYSTOLIC BLOOD PRESSURE: 110 MMHG | DIASTOLIC BLOOD PRESSURE: 68 MMHG

## 2024-06-12 DIAGNOSIS — Z98.49 CATARACT EXTRACTION STATUS, UNSPECIFIED EYE: Chronic | ICD-10-CM

## 2024-06-12 DIAGNOSIS — Z95.2 PRESENCE OF PROSTHETIC HEART VALVE: Chronic | ICD-10-CM

## 2024-06-12 DIAGNOSIS — Z96.651 PRESENCE OF RIGHT ARTIFICIAL KNEE JOINT: Chronic | ICD-10-CM

## 2024-06-12 DIAGNOSIS — Z98.890 OTHER SPECIFIED POSTPROCEDURAL STATES: Chronic | ICD-10-CM

## 2024-06-12 DIAGNOSIS — Z90.49 ACQUIRED ABSENCE OF OTHER SPECIFIED PARTS OF DIGESTIVE TRACT: Chronic | ICD-10-CM

## 2024-06-12 PROCEDURE — 73110 X-RAY EXAM OF WRIST: CPT | Mod: 26,RT

## 2024-06-12 PROCEDURE — 99284 EMERGENCY DEPT VISIT MOD MDM: CPT

## 2024-06-12 PROCEDURE — 73080 X-RAY EXAM OF ELBOW: CPT | Mod: 26,RT

## 2024-06-12 PROCEDURE — 73090 X-RAY EXAM OF FOREARM: CPT | Mod: 26,RT

## 2024-06-12 RX ORDER — ACETAMINOPHEN 500 MG
975 TABLET ORAL ONCE
Refills: 0 | Status: COMPLETED | OUTPATIENT
Start: 2024-06-12 | End: 2024-06-12

## 2024-06-12 RX ORDER — IBUPROFEN 200 MG
600 TABLET ORAL ONCE
Refills: 0 | Status: COMPLETED | OUTPATIENT
Start: 2024-06-12 | End: 2024-06-12

## 2024-06-12 RX ADMIN — Medication 975 MILLIGRAM(S): at 16:39

## 2024-06-12 RX ADMIN — Medication 975 MILLIGRAM(S): at 17:49

## 2024-06-12 RX ADMIN — Medication 600 MILLIGRAM(S): at 14:58

## 2024-06-12 NOTE — ED ADULT NURSE NOTE - NSFALLRISKINTERV_ED_ALL_ED

## 2024-06-12 NOTE — ED PROVIDER NOTE - PHYSICAL EXAMINATION
right hand with diffuse swelling, tenderness along 2nd digit, limited ROM at wrist 2/2 pain, near FROM of digits on right hand  distal pulses intact and equal b/l, sensation intact and equal b/l  right elbow with sutures in place, +swelling to olecranon bursa which is nontender, no overlying warmth, FROM of elbow  sutures to right forehead

## 2024-06-12 NOTE — ED ADULT TRIAGE NOTE - CHIEF COMPLAINT QUOTE
Pt reporting to the ED to Fall on Friday, no LOC, no anticoagulant use. +hitting his head, seen in ED on Saturday for stiches to R fore head. Today reporting R wrist and hand pain starting Saturday.

## 2024-06-12 NOTE — ED ADULT NURSE REASSESSMENT NOTE - NS ED NURSE REASSESS COMMENT FT1
Patient received, appears to be resting comfortably in bed, no complaints noted at this time. Breathing even and unlabored, pallor/diaphoresis not noted. will continue to monitor.
Patient is being discharged today. Education provided via teachback method and written materials to patient. Patient verbalize understanding. No complaints/signs/symptoms of pain, distress, or discomfort at this time.

## 2024-06-12 NOTE — ED ADULT NURSE NOTE - CCCP TRG CHIEF CMPLNT
Refill for: hydroCORTisone (CORTIZONE) 2.5 % cream  LOV: 6/18/20  Last Refill: 11/19/19 - Outside Provider    Refill for: clotrimazole (LOTRIMIN) 1 % cream   LOV: 6/18/20  Last Refill: 11/19/19 - Outside Provider    Refill for: sennosides-docusate sodium (SENOKOT-S) 8.6-50 MG tablet   LOV: 6/18/20  Last Refill: 9/25/17 - Outside Provider fall

## 2024-06-12 NOTE — ED ADULT NURSE NOTE - NSWEIGHTCALCTOOLDRUG_GEN_A_CORE
SUBJECTIVE:   CC: Danitza Soto is an 43 year old woman who presents for preventive health visit.       Patient has been advised of split billing requirements and indicates understanding: Yes  Healthy Habits:     Getting at least 3 servings of Calcium per day:  Yes    Bi-annual eye exam:  Yes    Dental care twice a year:  NO    Sleep apnea or symptoms of sleep apnea:  Sleep apnea    Diet:  Regular (no restrictions)    Frequency of exercise:  2-3 days/week    Duration of exercise:  30-45 minutes    Taking medications regularly:  Yes    Medication side effects:  None    PHQ-2 Total Score: 1    Additional concerns today:  Yes          1)BV infection - a month ago did ephraimll and got a pill for BV metronidazole 500 mg twice a day which helped but now she has had issues again :  Now clearish discharge , odor , no itching , not white, worse  in the past couple weeks , no new partners   2) HTN - she is on lisinopril 40 mg and also recently saw her cardiologist who started her on hydrochlorothiazide 12.5 mgand that seems to be working well for getting her BP under control , she denies any side effects from the hydrochlorothiazide and she had labs done with her cardiologist , she will come back for fasting lipids   3)ANxiety and depression - on Effexor XR 75 mg daily , she has been on this for a while now and is happy that it controls her anxiety and depression , no side effects doing well , needs refills on this   Today's PHQ-2 Score:   PHQ-2 ( 1999 [employer]) 9/18/2020   Q1: Little interest or pleasure in doing things 1   Q2: Feeling down, depressed or hopeless 0   PHQ-2 Score 1   Q1: Little interest or pleasure in doing things Several days   Q2: Feeling down, depressed or hopeless Not at all   PHQ-2 Score 1       Abuse: Current or Past (Physical, Sexual or Emotional) - No  Do you feel safe in your environment? Yes        Social History     Tobacco Use     Smoking status: Former Smoker     Packs/day: 1.00     Years:  10.00     Pack years: 10.00     Types: Cigarettes     Start date: 2004     Last attempt to quit: 8/15/2018     Years since quittin.0     Smokeless tobacco: Never Used     Tobacco comment:  pack or less a day   Substance Use Topics     Alcohol use: Yes     Alcohol/week: 0.0 standard drinks     Comment: Occas.     If you drink alcohol do you typically have >3 drinks per day or >7 drinks per week? YES    Alcohol Use 2020   Prescreen: >3 drinks/day or >7 drinks/week? Not Applicable   Prescreen: >3 drinks/day or >7 drinks/week? -   AUDIT SCORE  -     AUDIT - Alcohol Use Disorders Identification Test - Reproduced from the World Health Organization Audit 2001 (Second Edition) 2018   1.  How often do you have a drink containing alcohol? Never   2.  How many drinks containing alcohol do you have on a typical day when you are drinking? 1 or 2   3.  How often do you have five or more drinks on one occasion? Never   4.  How often during the last year have you found that you were not able to stop drinking once you had started? Never   5.  How often during the last year have you failed to do what was normally expected of you because of drinking? Never   6.  How often during the last year have you needed a first drink in the morning to get yourself going after a heavy drinking session? Never   7.  How often during the last year have you had a feeling of guilt or remorse after drinking? Never   8.  How often during the last year have you been unable to remember what happened the night before because of your drinking? Never   9.  Have you or someone else been injured because of your drinking? No   10. Has a relative, friend, doctor or other health care worker been concerned about your drinking or suggested you cut down? No   TOTAL SCORE 0       Reviewed orders with patient.  Reviewed health maintenance and updated orders accordingly - Yes  Lab work is in process  Labs reviewed in EPIC  BP Readings from Last 3  Encounters:   20 126/83   20 116/78   20 118/76    Wt Readings from Last 3 Encounters:   20 116.6 kg (257 lb)   20 117.5 kg (259 lb)   20 116.1 kg (256 lb)                  Patient Active Problem List   Diagnosis     Anxiety state     Gastroesophageal reflux disease without esophagitis     Morbid obesity due to excess calories (H)     Tobacco use disorder     CARDIOVASCULAR SCREENING; LDL GOAL LESS THAN 130     Hypertension goal BP (blood pressure) < 140/90     Acne     Papanicolaou smear of cervix with low grade squamous intraepithelial lesion (LGSIL)     Mixed hyperlipidemia     LEONARDO (obstructive sleep apnea)     Obesity hypoventilation syndrome (H)     Paroxysmal atrial fibrillation (H)     Morbid (severe) obesity due to excess calories (H)     Recurrent major depressive disorder, in full remission (H)     S/P ablation of atrial fibrillation     Past Surgical History:   Procedure Laterality Date     EP ABLATION FOCAL AFIB N/A 10/3/2019    Procedure: EP ABLATION FOCAL AFIB;  Surgeon: Harpreet Arevalo MD;  Location:  OR      TOOTH EXTRACTION W/FORCEP      [place] Teeth Extraction     LAPAROSCOPIC GASTRIC SLEEVE N/A 2018    Procedure: Laparoscopic Sleeve Gastrectomy Latex Free;  Surgeon: Artis Tse MD;  Location:  OR     LAPAROSCOPIC HERNIORRHAPHY HIATAL  2018    Procedure: LAPAROSCOPIC  HIATAL Hernia Repair;  Surgeon: Artis Tse MD;  Location:  OR       Social History     Tobacco Use     Smoking status: Former Smoker     Packs/day: 1.00     Years: 10.00     Pack years: 10.00     Types: Cigarettes     Start date: 2004     Last attempt to quit: 8/15/2018     Years since quittin.0     Smokeless tobacco: Never Used     Tobacco comment:  pack or less a day   Substance Use Topics     Alcohol use: Not Currently     Alcohol/week: 0.0 standard drinks     Comment: Occas.     Family History   Problem Relation Age of Onset     Heart Disease  Mother         ,mother had emphesema and  at 62     Hypertension Mother      Allergies Mother      Lipids Mother      Obesity Mother      Respiratory Mother         Emphysema     Diabetes Maternal Grandmother         Type 2?     Hypertension Maternal Grandmother      Circulatory Maternal Grandmother         Due to diabetes     Eye Disorder Maternal Grandmother         Macular degeneration/Macular degeneration     Obesity Maternal Grandmother      Hypertension Father      Lipids Father      Cancer Father      Prostate Cancer Father      Other Cancer Father      Cerebrovascular Disease Paternal Grandmother      Alcohol/Drug Maternal Grandfather      Obesity Maternal Grandfather      Psychotic Disorder Paternal Grandfather         Committed Suicide     Depression Paternal Grandfather      Allergies Sister      Genitourinary Problems Sister          Current Outpatient Medications   Medication Sig Dispense Refill     acetaminophen (TYLENOL) 325 MG tablet Take 2 tablets (650 mg) by mouth every 4 hours as needed for other (For optimal non-opioid multimodal pain management to improve pain control and physical function.) 100 tablet 0     calcium carbonate (OS-CHIKI) 500 MG tablet Take 1 tablet by mouth daily        ferrous gluconate (FERGON) 324 (38 Fe) MG tablet TAKE 1 TABLET(324 MG) BY MOUTH DAILY WITH BREAKFAST 30 tablet 3     fish oil-omega-3 fatty acids 1000 MG capsule Take 2 g by mouth every morning        hydrochlorothiazide (MICROZIDE) 12.5 MG capsule Take 1 capsule (12.5 mg) by mouth daily 90 capsule 3     lisinopril (ZESTRIL) 40 MG tablet Take 1 tablet (40 mg) by mouth daily 90 tablet 1     Multiple Vitamins-Minerals (CENTRUM PO) 2 tablets per day       naltrexone (DEPADE/REVIA) 50 MG tablet Take 1 tablet (50 mg) by mouth daily 30 tablet 5     omeprazole (PRILOSEC) 40 MG DR capsule Take 1 capsule (40 mg) by mouth daily 60 capsule 5     Probiotic Product (PROBIOTIC ADVANCED PO)        topiramate (TOPAMAX) 25 MG  tablet Take 1 tablet (25 mg) by mouth 2 times daily 60 tablet 5     venlafaxine (EFFEXOR-XR) 75 MG 24 hr capsule Take 1 capsule (75 mg) by mouth daily 90 capsule 1     VENTOLIN  (90 Base) MCG/ACT Inhaler INHALE 1-2 PUFFS EVERY 4-6 HOURS AS NEEDED FOR WHEEZING  0     vitamin B complex with vitamin C (VITAMIN  B COMPLEX) tablet Take 1 tablet by mouth daily       LORazepam (ATIVAN) 0.5 MG tablet Take 1 tablet (0.5 mg) by mouth every 8 hours as needed for anxiety (Patient not taking: Reported on 9/1/2020) 20 tablet 0     Allergies   Allergen Reactions     Wellbutrin [Bupropion]      Wellbutrin: Panic attacks, heart/chest pain     Recent Labs   Lab Test 09/17/20  0950 12/05/19  1021 10/03/19  0608 03/07/19  1251  09/24/18  1452 07/24/18  1023  03/21/17  0926 08/24/16  0915 01/13/16  1138  10/08/13  0808   A1C  --  5.4  --   --   --   --  6.1*  --   --   --   --   --  5.4   LDL  --   --   --   --   --   --   --   --  155* 174* 162*  --  149*   HDL  --   --   --   --   --   --   --   --  30* 29* 33*  --  33*   TRIG  --   --   --   --   --   --   --   --  203* 231* 207*  --  179*   ALT  --  21  --  18  --   --  22   < > 24  --  26  --  26   CR  --  0.69 0.65 0.66   < >  --  0.63   < > 0.62 0.61 0.59   < > 0.60   GFRESTIMATED  --  >90 >90 >90   < >  --  >90   < > >90  Non  GFR Calc   >90  Non  GFR Calc   >90  Non  GFR Calc     < > >90   GFRESTBLACK  --  >90 >90 >90   < >  --  >90   < > >90   GFR Calc   >90   GFR Calc   >90   GFR Calc     < > >90   POTASSIUM 3.6 3.8 4.0 3.9   < >  --  3.7   < > 4.0 3.9 4.0   < > 3.8   TSH  --   --   --   --   --  1.48  --   --   --   --  1.79   < >  --     < > = values in this interval not displayed.        Mammogram Screening: Patient under age 50, mutual decision reflected in health maintenance.      Pertinent mammograms are reviewed under the imaging tab.  History of abnormal Pap smear:  NO - age 30- 65 PAP every 3 years recommended  PAP / HPV Latest Ref Rng & Units 4/8/2019 3/21/2017 1/13/2016   PAP - NIL NIL ASC-US(A)   HPV 16 DNA NEG:Negative Negative Negative Negative   HPV 18 DNA NEG:Negative Negative Negative Negative   OTHER HR HPV NEG:Negative Negative Negative Negative     Reviewed and updated as needed this visit by clinical staff         Reviewed and updated as needed this visit by Provider        Past Medical History:   Diagnosis Date     Antiplatelet or antithrombotic long-term use      Arrhythmia      Depressive disorder 1990     Esophageal reflux      Hearing problem 2018     Hyperlipidemia LDL goal <130 7/6/2015     Hypertension      LSIL (low grade squamous intraepithelial lesion) on Pap smear 6/2014    + HPV, unable to type colp - BRISEYDA I     LEONARDO on CPAP      Other anxiety states       Past Surgical History:   Procedure Laterality Date     EP ABLATION FOCAL AFIB N/A 10/3/2019    Procedure: EP ABLATION FOCAL AFIB;  Surgeon: Harpreet Arevalo MD;  Location:  OR      TOOTH EXTRACTION W/FORCEP  1995    [place] Teeth Extraction     LAPAROSCOPIC GASTRIC SLEEVE N/A 11/27/2018    Procedure: Laparoscopic Sleeve Gastrectomy Latex Free;  Surgeon: Artis Tse MD;  Location:  OR     LAPAROSCOPIC HERNIORRHAPHY HIATAL  11/27/2018    Procedure: LAPAROSCOPIC  HIATAL Hernia Repair;  Surgeon: Artis Tse MD;  Location:  OR       Review of Systems  CONSTITUTIONAL: NEGATIVE for fever, chills, change in weight  INTEGUMENTARU/SKIN: NEGATIVE for worrisome rashes, moles or lesions  EYES: NEGATIVE for vision changes or irritation  ENT: NEGATIVE for ear, mouth and throat problems  RESP: NEGATIVE for significant cough or SOB  BREAST: NEGATIVE for masses, tenderness or discharge  CV: NEGATIVE for chest pain, palpitations or peripheral edema  GI: NEGATIVE for nausea, abdominal pain, heartburn, or change in bowel habits  : NEGATIVE for unusual urinary or vaginal symptoms. Periods are  regular.  MUSCULOSKELETAL: NEGATIVE for significant arthralgias or myalgia  NEURO: NEGATIVE for weakness, dizziness or paresthesias  PSYCHIATRIC: NEGATIVE for changes in mood or affect     OBJECTIVE:   There were no vitals taken for this visit.  Physical Exam  GENERAL: healthy, alert and no distress  EYES: Eyes grossly normal to inspection, PERRL and conjunctivae and sclerae normal  HENT: ear canals and TM's normal, nose and mouth without ulcers or lesions  NECK: no adenopathy, no asymmetry, masses, or scars and thyroid normal to palpation  RESP: lungs clear to auscultation - no rales, rhonchi or wheezes  BREAST: normal without masses, tenderness or nipple discharge and no palpable axillary masses or adenopathy  CV: regular rate and rhythm, normal S1 S2, no S3 or S4, no murmur, click or rub, no peripheral edema and peripheral pulses strong  ABDOMEN: soft, nontender, no hepatosplenomegaly, no masses and bowel sounds normal  MS: no gross musculoskeletal defects noted, no edema  SKIN: no suspicious lesions or rashes  NEURO: Normal strength and tone, mentation intact and speech normal  PSYCH: mentation appears normal, affect normal/bright    Diagnostic Test Results:  Labs reviewed in Epic  No results found for this or any previous visit (from the past 24 hour(s)).    ASSESSMENT/PLAN:   1. Routine general medical examination at a health care facility  Discussed diet,calcium,exercise.Went over self breast exam.Thin prep was NOT done.Eyes and teeth UTD.No immunizations needed today.See orders below for tests ordered and screening needed.    - INFLUENZA VACCINE IM > 6 MONTHS VALENT IIV4 [[id number]]  - MA Screen Bilateral w/Missael; Future  - Lipid panel reflex to direct LDL Fasting; Future    2. Essential hypertension  Seems to be doing well on the current meds and she denies any side effects , she got enough supplies on the hydrochlorothiazide but needs refills on the lisinopril, declined labs today   - lisinopril (ZESTRIL) 40 MG  "tablet; Take 1 tablet (40 mg) by mouth daily  Dispense: 90 tablet; Refill: 1        3. Gastroesophageal reflux disease without esophagitis  On omeprazole 20 mg daily and doing well . No side effects     5. BV (bacterial vaginosis)  Sent Rx for metronidazole oral  500mg twice a day for a week and also clindamycin vaginal suppositories once at HS for theee nights   - Wet prep    6. Recurrent major depressive disorder, in full remission (H)  She is doing well on the effexor and no side effects ,refilled   - venlafaxine (EFFEXOR-XR) 75 MG 24 hr capsule; Take 1 capsule (75 mg) by mouth daily  Dispense: 90 capsule; Refill: 1    Patient has been advised of split billing requirements and indicates understanding: Yes  COUNSELING:  Reviewed preventive health counseling, as reflected in patient instructions       Regular exercise       Healthy diet/nutrition       Vision screening    Estimated body mass index is 40.57 kg/m  as calculated from the following:    Height as of 7/2/20: 1.702 m (5' 7\").    Weight as of 7/2/20: 117.5 kg (259 lb).        She reports that she quit smoking about 2 years ago. Her smoking use included cigarettes. She started smoking about 16 years ago. She has a 10.00 pack-year smoking history. She has never used smokeless tobacco.      Counseling Resources:  ATP IV Guidelines  Pooled Cohorts Equation Calculator  Breast Cancer Risk Calculator  BRCA-Related Cancer Risk Assessment: FHS-7 Tool  FRAX Risk Assessment  ICSI Preventive Guidelines  Dietary Guidelines for Americans, 2010  USDA's MyPlate  ASA Prophylaxis  Lung CA Screening    Polly Mcbride MD  [facility]  "  used

## 2024-06-12 NOTE — ED ADULT NURSE NOTE - OBJECTIVE STATEMENT
patient alert ox4 came in c/o swelling and pain to right wrist and hand and feel more tired. , s/p fall on Friday was seen and d/c from ED with stitches to right eye brow and right elbow. patient noticed pain and swelling to right wrist and hand which was not there after the fall. bruises t both knees noted. denies any head ache, breathing even and unlabored. positive radial pulse to both wrists. patient able to move his right arm. awaiting reassessment.

## 2024-06-12 NOTE — ED PROVIDER NOTE - PATIENT PORTAL LINK FT
You can access the FollowMyHealth Patient Portal offered by [facility] by registering at the following website: [email or web address]. By joining [employer]’s FollowMyHealth portal, you will also be able to view your health information using other applications (apps) compatible with our system.

## 2024-06-12 NOTE — ED PROVIDER NOTE - PROGRESS NOTE DETAILS
IRINA Francois: xray without evidence of fracture, pt placed in thumb spica splint given snuffbox tenderness. pt to follow up with orthopedics or hand specialist. Discussed discharge with ED attending. pt aware it is too soon to remove stitches. Pt will return to the ER with any worsening or concerning symptoms.

## 2024-06-12 NOTE — ED PROVIDER NOTE - CLINICAL SUMMARY MEDICAL DECISION MAKING FREE TEXT BOX
73yM w/pmhx HTN, DM, aortic valve repair no longer on blood thinners, umbilical hernia repair, former smoker, and multiple back surgeries presenting to the ED with right wrist and hand pain after a fall on 6/07. Pt was initially evaluated in the ED on for fall on 6/07-6/08, had CT head, c-spine and max/face performed which did not show facial fracture or ICH, had CT elbow which did not show fracture. He had lacerations repaired to the right forehead and right elbow. Pt states 2 days ago he developed pain and swelling to the right hand/wrist. On exam pt is well appearing, afebrile, right hand with diffuse swelling, tenderness along 2nd digit, limited ROM at wrist 2/2 pain, near FROM of digits on right hand, right elbow with sutures in place, +swelling to olecranon bursa which is nontender, no overlying warmth, FROM of elbow, NV intact. Concern for fracture vs contusion of right hand/wrist. pt is currently on Augmentin as CT elbow showed stranding/edema near the wound. Pt with FROM of elbow, is afebrile, no swelling to forearm, exam not consistent with cellulitis.

## 2024-06-12 NOTE — ED PROVIDER NOTE - NSFOLLOWUPINSTRUCTIONS_ED_ALL_ED_FT
Follow-up with an orthopedic doctor or hand specialist within 1 week, referral list attached please call to make an appointment   Follow up with your primary care doctor within 1 week  Return to the ER in 2-3 days to have sutures removed  Take ibuprofen 400 mg every 6 hours as needed for pain, take with food  – You can also take Tylenol 650 mg every 6 hours  Leave splint intact and dry until you are evaluated by hand specialist or orthopedic doctor.  Return to the ER sooner with any worsening or concerning symptoms, increased pain or swelling, numbness, weakness, fever or any other concerns. Follow-up with an orthopedic doctor or hand specialist within 1 week, referral list attached please call to make an appointment   Follow up with your primary care doctor within 1 week  Return to the ER in 2-3 days to have sutures removed  Take ibuprofen 400 mg every 6 hours as needed for pain, take with food  – You can also take Tylenol 650 mg every 6 hours  Rest, ice and elevate the affected area  Continue taking antibiotic as previously prescribed   Leave splint intact and dry until you are evaluated by hand specialist or orthopedic doctor.  Return to the ER sooner with any worsening or concerning symptoms, increased pain or swelling, numbness, weakness, fever or any other concerns.

## 2024-06-12 NOTE — ED PROVIDER NOTE - OBJECTIVE STATEMENT
73yM w/pmhx HTN, DM, aortic valve repair no longer on blood thinners, umbilical hernia repair, former smoker, and multiple back surgeries presenting to the ED with right wrist and hand pain after a fall on 6/07. Pt was initially evaluated in the ED on for fall on 6/07-6/08, had CT head, c-spine and max/face performed which did not show facial fracture or ICH, had CT elbow which did not show fracture. He had lacerations repaired to the right forehead and right elbow. Pt states 2 days ago he developed pain and swelling to the right hand/wrist. Pt denies numbness, tingling weakness, cp, sob, headache, dizziness, abd pain, n/v/d, recent travel or illness or any other concerns.

## 2024-06-15 ENCOUNTER — EMERGENCY (EMERGENCY)
Facility: HOSPITAL | Age: 74
LOS: 1 days | Discharge: HOME | End: 2024-06-15
Attending: EMERGENCY MEDICINE | Admitting: EMERGENCY MEDICINE
Payer: MEDICARE

## 2024-06-15 VITALS
OXYGEN SATURATION: 100 % | DIASTOLIC BLOOD PRESSURE: 81 MMHG | TEMPERATURE: 98 F | SYSTOLIC BLOOD PRESSURE: 135 MMHG | RESPIRATION RATE: 16 BRPM | HEIGHT: 70 IN | HEART RATE: 108 BPM

## 2024-06-15 DIAGNOSIS — Z96.651 PRESENCE OF RIGHT ARTIFICIAL KNEE JOINT: Chronic | ICD-10-CM

## 2024-06-15 DIAGNOSIS — Z98.890 OTHER SPECIFIED POSTPROCEDURAL STATES: Chronic | ICD-10-CM

## 2024-06-15 DIAGNOSIS — Z90.49 ACQUIRED ABSENCE OF OTHER SPECIFIED PARTS OF DIGESTIVE TRACT: Chronic | ICD-10-CM

## 2024-06-15 DIAGNOSIS — Z95.2 PRESENCE OF PROSTHETIC HEART VALVE: Chronic | ICD-10-CM

## 2024-06-15 DIAGNOSIS — Z98.49 CATARACT EXTRACTION STATUS, UNSPECIFIED EYE: Chronic | ICD-10-CM

## 2024-06-15 PROCEDURE — L9995: CPT

## 2024-06-15 NOTE — ED PROVIDER NOTE - OBJECTIVE STATEMENT
72 y/o male pt here for suture removal to right elbow and right forehead. pt had sutures placed s/p fall. No increased erythema, no pus drainage, no fevers. 74 y/o male pt here for suture removal to right elbow and right forehead. pt had sutures placed s/p fall. No increased erythema, no pus drainage, no fevers.    Attending/Pallavi: 73-year-old male seen evaluated in the emergency department on June 8 status post fall with subsequent lacerations to forehead and right elbow s/p imaging and laceration repair. Patient was seen evaluated again on June 12 for right wrist pain, wrist splint placed.   Patient is here today for suture removal.  Denies headaches, nausea/vomiting,, discharge, weakness or lightheadedness

## 2024-06-15 NOTE — ED PROVIDER NOTE - PHYSICAL EXAMINATION
GENERAL: NAD  HEENT:  Atraumatic  CHEST/LUNG: Chest rise equal bilaterally  HEART: Regular rate and rhythm  EXTREMITIES:  Extremities warm  PSYCH: A&Ox3  SKIN: healing wound noted to right forehead and right elbow. wound c/d/i. no drainage noted. GENERAL: NAD  HEENT:  Atraumatic  CHEST/LUNG: Chest rise equal bilaterally  HEART: Regular rate and rhythm  EXTREMITIES:  Extremities warm  PSYCH: A&Ox3  SKIN: healing wound noted to right forehead and right elbow. wound c/d/i. no drainage noted.    Attending/Pallavi: NAD; PERRL/EOMI, non-icterus, supple, no SALLY, no JVD, RRR, CTAB; Abd-soft, NT/ND, no HSM; no LE edema, A&Ox3, nonfocal; Skin-warm/dry; Forehead-+7 interrupted sutures, no min STS, no d/c or bleeding; Rt elbow-normal pronation/supination, +boggy, +3 interrupted sutures, min STS, no d/c or bleeding

## 2024-06-15 NOTE — ED PROVIDER NOTE - CLINICAL SUMMARY MEDICAL DECISION MAKING FREE TEXT BOX
wound looks c/d/i, pt is afebrile, well appearing. will remove sutures and d/c. give pt hand ortho f/u for scaphoid tenderness. placed thumb spica splint wound looks c/d/i, pt is afebrile, well appearing. will remove sutures and d/c. give pt hand ortho f/u for scaphoid tenderness. placed thumb spica splint  Relevant EMR

## 2024-06-15 NOTE — ED ADULT TRIAGE NOTE - CHIEF COMPLAINT QUOTE
Pt returns for suture removal to forehead and rt elbow that were placed on 6/8. also requesting to have splint on rt forearm removed.

## 2024-06-15 NOTE — ED PROVIDER NOTE - PATIENT PORTAL LINK FT
You can access the FollowMyHealth Patient Portal offered by [facility] by registering at the following website: [email or web address]. By joining [employer]’s FollowMyHealth portal, you will also be able to view your health information using other applications (apps) compatible with our system. Negative

## 2024-06-15 NOTE — ED PROVIDER NOTE - ATTENDING CONTRIBUTION TO CARE
Patient seen and evaluated as noted above, agree with patient care plan Abnormal as indicated, otherwise normal...

## 2024-06-24 ENCOUNTER — APPOINTMENT (OUTPATIENT)
Dept: ORTHOPEDIC SURGERY | Facility: CLINIC | Age: 74
End: 2024-06-24

## 2024-06-27 ENCOUNTER — APPOINTMENT (OUTPATIENT)
Dept: ORTHOPEDIC SURGERY | Facility: CLINIC | Age: 74
End: 2024-06-27

## 2024-06-27 DIAGNOSIS — M25.521 PAIN IN RIGHT ELBOW: ICD-10-CM

## 2024-06-27 DIAGNOSIS — M25.531 PAIN IN RIGHT WRIST: ICD-10-CM

## 2024-06-27 PROCEDURE — 99213 OFFICE O/P EST LOW 20 MIN: CPT | Mod: 25

## 2024-06-27 PROCEDURE — 73130 X-RAY EXAM OF HAND: CPT | Mod: RT

## 2024-06-27 PROCEDURE — 73080 X-RAY EXAM OF ELBOW: CPT | Mod: RT

## 2024-06-27 PROCEDURE — 29075 APPL CST ELBW FNGR SHORT ARM: CPT | Mod: RT

## 2024-07-01 ENCOUNTER — APPOINTMENT (OUTPATIENT)
Dept: ORTHOPEDIC SURGERY | Facility: CLINIC | Age: 74
End: 2024-07-01

## 2024-07-18 ENCOUNTER — APPOINTMENT (OUTPATIENT)
Dept: ORTHOPEDIC SURGERY | Facility: CLINIC | Age: 74
End: 2024-07-18
Payer: MEDICARE

## 2024-07-18 VITALS — BODY MASS INDEX: 36.79 KG/M2 | HEIGHT: 70 IN | WEIGHT: 257 LBS

## 2024-07-18 DIAGNOSIS — S62.91XA UNSPECIFIED FRACTURE OF RIGHT WRIST AND HAND, INITIAL ENCOUNTER FOR CLOSED FRACTURE: ICD-10-CM

## 2024-07-18 PROCEDURE — 99213 OFFICE O/P EST LOW 20 MIN: CPT

## 2024-07-18 PROCEDURE — 73130 X-RAY EXAM OF HAND: CPT | Mod: RT

## 2024-08-22 ENCOUNTER — APPOINTMENT (OUTPATIENT)
Dept: ORTHOPEDIC SURGERY | Facility: CLINIC | Age: 74
End: 2024-08-22

## 2024-08-22 VITALS — BODY MASS INDEX: 36.65 KG/M2 | HEIGHT: 70 IN | WEIGHT: 256 LBS

## 2024-08-22 DIAGNOSIS — M25.512 PAIN IN LEFT SHOULDER: ICD-10-CM

## 2024-08-22 DIAGNOSIS — S62.91XA UNSPECIFIED FRACTURE OF RIGHT WRIST AND HAND, INITIAL ENCOUNTER FOR CLOSED FRACTURE: ICD-10-CM

## 2024-08-22 PROCEDURE — 99213 OFFICE O/P EST LOW 20 MIN: CPT | Mod: 25

## 2024-08-22 PROCEDURE — 20610 DRAIN/INJ JOINT/BURSA W/O US: CPT | Mod: LT

## 2024-08-22 NOTE — HISTORY OF PRESENT ILLNESS
[de-identified] : Pt is a 74 y/o male c/o right elbow, right hand and right wrist pain.  He tripped and fell on June 7th, 2024 and landed on his right side.  Xrays were taken at [facility] which were negative for fracture, but he continues to have pain.  He has swelling in the right elbow.  He states that his right hand is numb constantly.   Today, 08/22/2024 patient is here for a follow up and further evaluation. He presents today for left shoulder pain. He has difficulty raising his arm above his shoulder. He reports mild improvement in his symptoms for his right hand and wrist. The patient uses Motrin (200 mg), which provides only minimal relief for his pain. He endorses pain in the posterior and lateral aspects pf his shoulder that radiates down his arm. His right hand, wrist, and elbow pain are well controlled.

## 2024-08-22 NOTE — ADDENDUM
[FreeTextEntry1] : I, Suleiman Rodríguez wrote this note acting as a scribe for Dr. Dimas Perkins on 08/22/2024.   All medical record entries made by the Scribe were at my, Dr. Dimas Perkins MD., direction and personally dictated by me on 08/22/2024. I have personally reviewed the chart and agree that the record accurately reflects my personal performance of the history, physical exam, assessment and plan.

## 2024-08-22 NOTE — PHYSICAL EXAM
[de-identified] : Patient is WDWN, alert, and in no acute distress. Breathing is unlabored. He is grossly oriented to person, place, and time.  Left Shoulder: Inspection/ Palpation: there is no tenderness, swelling, or deformities. Range of Motion: active flexion, passive flexion, abduction, external rotation is limited with pain in all planes with no crepitus. Strength: forward elevation, internal rotation, external rotation, adduction, and abduction are 5/5. Stability: no joint instability on provocative testing. [de-identified] : X-ray of the left shoulder from 02/01/2024 reviewed in the office today which revealed a large humeral head inferior osteophyte consistent with osteoarthritis.

## 2024-08-22 NOTE — DISCUSSION/SUMMARY
[de-identified] : The underlying pathophysiology was reviewed with the patient. XR films were reviewed with the patient. Discussed at length the nature of the patients condition. The patient wishes to proceed with a cortisone injection today. Under sterile precautions, an injection of 5cc 1% lidocaine without epinephrine and 0.5cc Kenalog 40mg, 0.5cc Dexamethasone was administered into the left posterior subacromial joint space. The patient tolerated the procedure well. Rest and apply ice. Patient was advised to take OTC medications and topical analgesic for pain management. All questions answered, understanding verbalized. Patient in agreement with plan of care. Patient may follow up prn.

## 2024-08-25 NOTE — PHYSICAL THERAPY INITIAL EVALUATION ADULT - PATIENT/FAMILY/SIGNIFICANT OTHER GOALS STATEMENT, PT EVAL
To return home.

## 2024-09-26 ENCOUNTER — APPOINTMENT (OUTPATIENT)
Dept: ORTHOPEDIC SURGERY | Facility: CLINIC | Age: 74
End: 2024-09-26
Payer: MEDICARE

## 2024-09-26 VITALS — BODY MASS INDEX: 36.65 KG/M2 | WEIGHT: 256 LBS | HEIGHT: 70 IN

## 2024-09-26 DIAGNOSIS — M19.019 PRIMARY OSTEOARTHRITIS, UNSPECIFIED SHOULDER: ICD-10-CM

## 2024-09-26 PROCEDURE — 20611 DRAIN/INJ JOINT/BURSA W/US: CPT | Mod: LT

## 2024-09-26 PROCEDURE — 73030 X-RAY EXAM OF SHOULDER: CPT | Mod: LT

## 2024-09-26 PROCEDURE — 99214 OFFICE O/P EST MOD 30 MIN: CPT | Mod: 25

## 2024-09-26 NOTE — PHYSICAL EXAM
[de-identified] : Constitutional o Appearance : well-nourished, well developed, alert, in no acute distress  Head and Face o Head :  Inspection : atraumatic, normocephalic o Face :  Inspection : no visible rash or discoloration Respiratory o Respiratory Effort: breathing unlabored  Neurologic o Sensation : Normal sensation  Psychiatric o Mood and Affect: mood normal, affect appropriate  Lymphatic o Additional Nodes : No palpable lymph nodes present   Cervical Spine o Inspection/Palpation :  Inspection : alignment midline, normal degree of lordosis present  Skin : normal appearance, no masses or tenderness, trachea midline  Palpation : musculature is nontender to palpation o Range of Motion : arc of motion full in all planes, no crepitance or pain with ROM o Tests: Negative Spurlings test  Right Upper Extremity o Right Shoulder :  Inspection/Palpation : no tenderness, no swelling or deformities  Range of Motion : full and painless in all planes, no crepitance  Strength : forward elevation 5/5, IR 5/5, ER 5/5, ER at 90 of abduction 5/5, supraspinatus 5/5, adduction 5/5, abduction 5/5, biceps/triceps 5/5,  5/5  Stability : no joint instability on provocative testing   Tests: Moreau negative, Neer negative, Tanja negative, drop arm test negative, Castalia's test negative  Left Upper Extremity o Left Shoulder :  Inspection/Palpation : anterior capsular  tenderness, no swelling or deformities  Range of Motion : painful external and limited internal rotation, pain with forward flexion,  no crepitance  Strength : forward elevation 5/5, IR 5/5, ER 5/5, ER at 90 of abduction 5/5, supraspinatus 5/5, adduction 5/5, abduction 5/5, biceps/triceps 5/5,  5/5  Stability : no joint instability on provocative testing   Tests: Moreau negative, Neer negative, Tanja negative, drop arm test negative, Castalia's test negative  Gait and Station: Gait: gait normal, no significant extremity swelling or lymphedema, good proprioception and balance  Radiology Results 9/26/2204 o Left Shoulder : AP internal/external rotation and outlet views were obtained and revealed significant degenerative arthritis of the glenohumeral  joint mild degenerative arthritis of the AC joint no lytic lesions   o Shoulder: Indication- shoulder arthritis, Anatomic location left intra-articular joint, Spray-area was sterilized with Betadine and alcohol and anesthetized with Ethyl Chloride , Needle used - 22G, Medications given- 5cc's lidocaine, 0.5cc's kenalog, 0.5cc's dexamethasone. The patient tolerated the procedure well. This was done under Ultrasound guidance. Normal gait / station

## 2024-09-26 NOTE — HISTORY OF PRESENT ILLNESS
[de-identified] : 73 year old male presents complaining of left shoulder pain. The patient cannot attribute his pain to any injury, fall, or trauma. He had diabetes and his A1C is 7.0. He has difficulty with internal rotation and external rotation. Patient denies that he has any numbness or tingling. He presents with his wife.  He claims that he did get a cortisone shot a few months ago which only lasted for a day.  He is not sure where that shot was placed.  He notes pain at night when he sleeps especially when he lies on it.

## 2024-09-26 NOTE — DISCUSSION/SUMMARY
[de-identified] : I went over the pathophysiology of the patient's symptoms in great detail with the patient. I discussed the underlying pathophysiology of the patient's condition in great detail with the patient. I went over the patient's x-rays with them in great detail. The patient elected to receive a cortisone injection into his left shoulder today, and tolerated it well. I instructed the patient on ROM exercises, and told them to take it easy. The use of ice and rest was reviewed with the patient. The patient may resume activities tomorrow.   All of their questions were answered. They understand and consent to the plan.   FU in 4-6 weeks.

## 2024-09-27 RX ORDER — AZITHROMYCIN 250 MG/1
250 TABLET, FILM COATED ORAL
Qty: 1 | Refills: 0 | Status: ACTIVE | COMMUNITY
Start: 2024-09-27 | End: 1900-01-01

## 2024-10-14 ENCOUNTER — APPOINTMENT (OUTPATIENT)
Dept: ORTHOPEDIC SURGERY | Facility: CLINIC | Age: 74
End: 2024-10-14
Payer: MEDICARE

## 2024-10-14 VITALS — WEIGHT: 250 LBS | HEIGHT: 70 IN | BODY MASS INDEX: 35.79 KG/M2

## 2024-10-14 DIAGNOSIS — M19.019 PRIMARY OSTEOARTHRITIS, UNSPECIFIED SHOULDER: ICD-10-CM

## 2024-10-14 DIAGNOSIS — M67.912 UNSPECIFIED DISORDER OF SYNOVIUM AND TENDON, LEFT SHOULDER: ICD-10-CM

## 2024-10-14 PROCEDURE — 99214 OFFICE O/P EST MOD 30 MIN: CPT

## 2024-10-14 NOTE — ADDENDUM
[FreeTextEntry1] : I, TUCKER LEDESMA, acted solely as a scribe for Dr. Simeon Beard on this date 09/26/2024.  All medical record entries made by the Scribe were at my, Dr. Simeon Beard, direction and personally dictated by me on 09/26/2024. I have reviewed the chart and agree that the record accurately reflects my personal performance of the history, physical exam, assessment and plan. I have also personally directed, reviewed, and agreed with the chart.								  Detail Level: Zone Photo Preface (Leave Blank If You Do Not Want): Photographs were obtained today

## 2024-11-01 ENCOUNTER — OUTPATIENT (OUTPATIENT)
Dept: OUTPATIENT SERVICES | Facility: HOSPITAL | Age: 74
LOS: 1 days | End: 2024-11-01
Payer: MEDICARE

## 2024-11-01 ENCOUNTER — APPOINTMENT (OUTPATIENT)
Dept: MRI IMAGING | Facility: CLINIC | Age: 74
End: 2024-11-01

## 2024-11-01 DIAGNOSIS — Z96.651 PRESENCE OF RIGHT ARTIFICIAL KNEE JOINT: Chronic | ICD-10-CM

## 2024-11-01 DIAGNOSIS — M67.912 UNSPECIFIED DISORDER OF SYNOVIUM AND TENDON, LEFT SHOULDER: ICD-10-CM

## 2024-11-01 DIAGNOSIS — Z98.890 OTHER SPECIFIED POSTPROCEDURAL STATES: Chronic | ICD-10-CM

## 2024-11-01 DIAGNOSIS — M19.012 PRIMARY OSTEOARTHRITIS, LEFT SHOULDER: ICD-10-CM

## 2024-11-01 DIAGNOSIS — Z98.49 CATARACT EXTRACTION STATUS, UNSPECIFIED EYE: Chronic | ICD-10-CM

## 2024-11-01 DIAGNOSIS — Z95.2 PRESENCE OF PROSTHETIC HEART VALVE: Chronic | ICD-10-CM

## 2024-11-01 DIAGNOSIS — Z90.49 ACQUIRED ABSENCE OF OTHER SPECIFIED PARTS OF DIGESTIVE TRACT: Chronic | ICD-10-CM

## 2024-11-01 PROCEDURE — 73221 MRI JOINT UPR EXTREM W/O DYE: CPT | Mod: 26,LT

## 2024-11-01 PROCEDURE — 73221 MRI JOINT UPR EXTREM W/O DYE: CPT

## 2024-11-11 ENCOUNTER — APPOINTMENT (OUTPATIENT)
Dept: ORTHOPEDIC SURGERY | Facility: CLINIC | Age: 74
End: 2024-11-11
Payer: MEDICARE

## 2024-11-11 VITALS — HEIGHT: 70 IN | BODY MASS INDEX: 35.79 KG/M2 | WEIGHT: 250 LBS

## 2024-11-11 DIAGNOSIS — M19.019 PRIMARY OSTEOARTHRITIS, UNSPECIFIED SHOULDER: ICD-10-CM

## 2024-11-11 DIAGNOSIS — M65.919 UNSP SYNOVITIS AND TENOSYNOVITIS, UNSPECIFIED SHOULDER: ICD-10-CM

## 2024-11-11 PROCEDURE — 99214 OFFICE O/P EST MOD 30 MIN: CPT

## 2024-11-20 ENCOUNTER — APPOINTMENT (OUTPATIENT)
Dept: ORTHOPEDIC SURGERY | Facility: CLINIC | Age: 74
End: 2024-11-20
Payer: MEDICARE

## 2024-11-20 VITALS — WEIGHT: 250 LBS | BODY MASS INDEX: 35.79 KG/M2 | HEIGHT: 70 IN

## 2024-11-20 DIAGNOSIS — M19.012 PRIMARY OSTEOARTHRITIS, LEFT SHOULDER: ICD-10-CM

## 2024-11-20 PROCEDURE — 99214 OFFICE O/P EST MOD 30 MIN: CPT

## 2024-11-20 RX ORDER — TRAMADOL HYDROCHLORIDE 50 MG/1
50 TABLET, COATED ORAL
Qty: 20 | Refills: 0 | Status: ACTIVE | COMMUNITY
Start: 2024-11-20 | End: 1900-01-01

## 2024-11-27 ENCOUNTER — APPOINTMENT (OUTPATIENT)
Dept: CT IMAGING | Facility: IMAGING CENTER | Age: 74
End: 2024-11-27
Payer: MEDICARE

## 2024-11-27 ENCOUNTER — OUTPATIENT (OUTPATIENT)
Dept: OUTPATIENT SERVICES | Facility: HOSPITAL | Age: 74
LOS: 1 days | End: 2024-11-27
Payer: MEDICARE

## 2024-11-27 DIAGNOSIS — Z98.890 OTHER SPECIFIED POSTPROCEDURAL STATES: Chronic | ICD-10-CM

## 2024-11-27 DIAGNOSIS — M19.012 PRIMARY OSTEOARTHRITIS, LEFT SHOULDER: ICD-10-CM

## 2024-11-27 DIAGNOSIS — Z96.651 PRESENCE OF RIGHT ARTIFICIAL KNEE JOINT: Chronic | ICD-10-CM

## 2024-11-27 DIAGNOSIS — Z98.49 CATARACT EXTRACTION STATUS, UNSPECIFIED EYE: Chronic | ICD-10-CM

## 2024-11-27 DIAGNOSIS — Z95.2 PRESENCE OF PROSTHETIC HEART VALVE: Chronic | ICD-10-CM

## 2024-11-27 DIAGNOSIS — Z90.49 ACQUIRED ABSENCE OF OTHER SPECIFIED PARTS OF DIGESTIVE TRACT: Chronic | ICD-10-CM

## 2024-11-27 PROCEDURE — 73200 CT UPPER EXTREMITY W/O DYE: CPT | Mod: 26,LT

## 2024-11-27 PROCEDURE — 73200 CT UPPER EXTREMITY W/O DYE: CPT

## 2024-12-11 RX ORDER — TRAMADOL HYDROCHLORIDE 50 MG/1
50 TABLET, COATED ORAL 3 TIMES DAILY
Qty: 20 | Refills: 0 | Status: ACTIVE | COMMUNITY
Start: 2024-12-11 | End: 1900-01-01

## 2024-12-22 NOTE — PATIENT PROFILE ADULT - NSPRESCRUSEDDRG_GEN_A_NUR
50 y/o female w/ hx T2DM, HTN, HLD, CAD s/c PCI ('21), gastric bypass surgery, cholecystectomy uterine fibroids s/p myomectomy (2021), multiple miscarriages s/p medical management + D&C, p/w b/l intermittent upper abd pain, nausea, nonbloody vomiting, and diarrhea today.  +belching.  Pain not pleuritic or exertional.  Notes hx similar over past few mo.  Admits to eating spicy food everyday and had etoh yesterday.  Denies f/c, cp, sob, dysuria, hematuria.  Denies recent travel or sick contacts.  Smokes.  Denies exogenous hormone use or hx dvt/pe.
No

## 2025-01-27 ENCOUNTER — APPOINTMENT (OUTPATIENT)
Dept: PULMONOLOGY | Facility: CLINIC | Age: 75
End: 2025-01-27
Payer: MEDICARE

## 2025-01-27 ENCOUNTER — OUTPATIENT (OUTPATIENT)
Dept: OUTPATIENT SERVICES | Facility: HOSPITAL | Age: 75
LOS: 1 days | End: 2025-01-27
Payer: MEDICARE

## 2025-01-27 VITALS
SYSTOLIC BLOOD PRESSURE: 117 MMHG | HEART RATE: 68 BPM | DIASTOLIC BLOOD PRESSURE: 68 MMHG | OXYGEN SATURATION: 95 % | TEMPERATURE: 98.5 F

## 2025-01-27 VITALS
TEMPERATURE: 98 F | WEIGHT: 251.11 LBS | OXYGEN SATURATION: 94 % | DIASTOLIC BLOOD PRESSURE: 88 MMHG | HEART RATE: 64 BPM | HEIGHT: 61 IN | SYSTOLIC BLOOD PRESSURE: 146 MMHG | RESPIRATION RATE: 14 BRPM

## 2025-01-27 DIAGNOSIS — Z95.2 PRESENCE OF PROSTHETIC HEART VALVE: Chronic | ICD-10-CM

## 2025-01-27 DIAGNOSIS — Z96.651 PRESENCE OF RIGHT ARTIFICIAL KNEE JOINT: Chronic | ICD-10-CM

## 2025-01-27 DIAGNOSIS — Z98.49 CATARACT EXTRACTION STATUS, UNSPECIFIED EYE: Chronic | ICD-10-CM

## 2025-01-27 DIAGNOSIS — Z01.818 ENCOUNTER FOR OTHER PREPROCEDURAL EXAMINATION: ICD-10-CM

## 2025-01-27 DIAGNOSIS — Z98.890 OTHER SPECIFIED POSTPROCEDURAL STATES: Chronic | ICD-10-CM

## 2025-01-27 DIAGNOSIS — R93.89 ABNORMAL FINDINGS ON DIAGNOSTIC IMAGING OF OTHER SPECIFIED BODY STRUCTURES: ICD-10-CM

## 2025-01-27 DIAGNOSIS — J44.89 OTHER SPECIFIED CHRONIC OBSTRUCTIVE PULMONARY DISEASE: ICD-10-CM

## 2025-01-27 DIAGNOSIS — Z01.811 ENCOUNTER FOR PREPROCEDURAL RESPIRATORY EXAMINATION: ICD-10-CM

## 2025-01-27 DIAGNOSIS — M19.012 PRIMARY OSTEOARTHRITIS, LEFT SHOULDER: ICD-10-CM

## 2025-01-27 DIAGNOSIS — G47.33 OBSTRUCTIVE SLEEP APNEA (ADULT) (PEDIATRIC): ICD-10-CM

## 2025-01-27 DIAGNOSIS — G47.30 SLEEP APNEA, UNSPECIFIED: ICD-10-CM

## 2025-01-27 DIAGNOSIS — M25.512 PAIN IN LEFT SHOULDER: ICD-10-CM

## 2025-01-27 DIAGNOSIS — Z90.49 ACQUIRED ABSENCE OF OTHER SPECIFIED PARTS OF DIGESTIVE TRACT: Chronic | ICD-10-CM

## 2025-01-27 LAB
A1C WITH ESTIMATED AVERAGE GLUCOSE RESULT: 7.4 % — HIGH (ref 4–5.6)
ALBUMIN SERPL ELPH-MCNC: 3.5 G/DL — SIGNIFICANT CHANGE UP (ref 3.3–5)
ALP SERPL-CCNC: 140 U/L — HIGH (ref 30–120)
ALT FLD-CCNC: 19 U/L — SIGNIFICANT CHANGE UP (ref 10–60)
ANION GAP SERPL CALC-SCNC: 11 MMOL/L — SIGNIFICANT CHANGE UP (ref 5–17)
APTT BLD: 33.2 SEC — SIGNIFICANT CHANGE UP (ref 24.5–35.6)
AST SERPL-CCNC: 10 U/L — SIGNIFICANT CHANGE UP (ref 10–40)
BILIRUB SERPL-MCNC: 1.2 MG/DL — SIGNIFICANT CHANGE UP (ref 0.2–1.2)
BUN SERPL-MCNC: 19 MG/DL — SIGNIFICANT CHANGE UP (ref 7–23)
CALCIUM SERPL-MCNC: 8.8 MG/DL — SIGNIFICANT CHANGE UP (ref 8.4–10.5)
CHLORIDE SERPL-SCNC: 101 MMOL/L — SIGNIFICANT CHANGE UP (ref 96–108)
CO2 SERPL-SCNC: 28 MMOL/L — SIGNIFICANT CHANGE UP (ref 22–31)
CREAT SERPL-MCNC: 1.15 MG/DL — SIGNIFICANT CHANGE UP (ref 0.5–1.3)
EGFR: 68 ML/MIN/1.73M2 — SIGNIFICANT CHANGE UP
ESTIMATED AVERAGE GLUCOSE: 166 MG/DL — HIGH (ref 68–114)
GLUCOSE SERPL-MCNC: 124 MG/DL — HIGH (ref 70–99)
HCT VFR BLD CALC: 41.4 % — SIGNIFICANT CHANGE UP (ref 39–50)
HGB BLD-MCNC: 13.5 G/DL — SIGNIFICANT CHANGE UP (ref 13–17)
INR BLD: 1.03 RATIO — SIGNIFICANT CHANGE UP (ref 0.85–1.16)
MCHC RBC-ENTMCNC: 28.1 PG — SIGNIFICANT CHANGE UP (ref 27–34)
MCHC RBC-ENTMCNC: 32.6 G/DL — SIGNIFICANT CHANGE UP (ref 32–36)
MCV RBC AUTO: 86.1 FL — SIGNIFICANT CHANGE UP (ref 80–100)
NRBC # BLD: 0 /100 WBCS — SIGNIFICANT CHANGE UP (ref 0–0)
NRBC BLD-RTO: 0 /100 WBCS — SIGNIFICANT CHANGE UP (ref 0–0)
PLATELET # BLD AUTO: 236 K/UL — SIGNIFICANT CHANGE UP (ref 150–400)
POTASSIUM SERPL-MCNC: 3.9 MMOL/L — SIGNIFICANT CHANGE UP (ref 3.5–5.3)
POTASSIUM SERPL-SCNC: 3.9 MMOL/L — SIGNIFICANT CHANGE UP (ref 3.5–5.3)
PROT SERPL-MCNC: 7.8 G/DL — SIGNIFICANT CHANGE UP (ref 6–8.3)
PROTHROM AB SERPL-ACNC: 12.1 SEC — SIGNIFICANT CHANGE UP (ref 9.9–13.4)
RBC # BLD: 4.81 M/UL — SIGNIFICANT CHANGE UP (ref 4.2–5.8)
RBC # FLD: 13.2 % — SIGNIFICANT CHANGE UP (ref 10.3–14.5)
SODIUM SERPL-SCNC: 140 MMOL/L — SIGNIFICANT CHANGE UP (ref 135–145)
WBC # BLD: 9.3 K/UL — SIGNIFICANT CHANGE UP (ref 3.8–10.5)
WBC # FLD AUTO: 9.3 K/UL — SIGNIFICANT CHANGE UP (ref 3.8–10.5)

## 2025-01-27 PROCEDURE — 85610 PROTHROMBIN TIME: CPT

## 2025-01-27 PROCEDURE — 36415 COLL VENOUS BLD VENIPUNCTURE: CPT

## 2025-01-27 PROCEDURE — 94729 DIFFUSING CAPACITY: CPT

## 2025-01-27 PROCEDURE — 86900 BLOOD TYPING SEROLOGIC ABO: CPT

## 2025-01-27 PROCEDURE — 99214 OFFICE O/P EST MOD 30 MIN: CPT | Mod: 25

## 2025-01-27 PROCEDURE — 71046 X-RAY EXAM CHEST 2 VIEWS: CPT

## 2025-01-27 PROCEDURE — 86901 BLOOD TYPING SEROLOGIC RH(D): CPT

## 2025-01-27 PROCEDURE — 94727 GAS DIL/WSHOT DETER LNG VOL: CPT

## 2025-01-27 PROCEDURE — G0463: CPT

## 2025-01-27 PROCEDURE — 87640 STAPH A DNA AMP PROBE: CPT

## 2025-01-27 PROCEDURE — 83036 HEMOGLOBIN GLYCOSYLATED A1C: CPT

## 2025-01-27 PROCEDURE — 80053 COMPREHEN METABOLIC PANEL: CPT

## 2025-01-27 PROCEDURE — 86850 RBC ANTIBODY SCREEN: CPT

## 2025-01-27 PROCEDURE — 85027 COMPLETE CBC AUTOMATED: CPT

## 2025-01-27 PROCEDURE — 94010 BREATHING CAPACITY TEST: CPT

## 2025-01-27 PROCEDURE — 85730 THROMBOPLASTIN TIME PARTIAL: CPT

## 2025-01-27 PROCEDURE — 87641 MR-STAPH DNA AMP PROBE: CPT

## 2025-01-27 NOTE — H&P PST ADULT - HISTORY OF PRESENT ILLNESS
this is a 75 y/o male who has had left shoulder pain for about 1 yr; tests show arthritis and bone on bone; to have left shoulder replacement

## 2025-01-27 NOTE — H&P PST ADULT - PROBLEM SELECTOR PLAN 1
left reverse total shoulder replacement; preop instructions given; to go for medical and pulm clearance; went for cardio clearance and had ekg

## 2025-01-27 NOTE — H&P PST ADULT - NSICDXPASTMEDICALHX_GEN_ALL_CORE_FT
PAST MEDICAL HISTORY:  Amaurosis fugax "every once in awhile my right eye goes out for about a minute"    Aortic valve disease     Arthritis     COPD (Chronic Obstructive Pulmonary Disease)     HTN (Hypertension)     Hypercholesterolemia     Kidney Stone     Microscopic Hematuria     OA (Osteoarthritis)     Obesity     Pilonidal Abscess     Prediabetes     Sleep apnea     Spinal Stenosis     Stenosis of carotid artery, unspecified laterality     Varicose Vein B/L lower extremities    Venous Insufficiency

## 2025-01-27 NOTE — H&P PST ADULT - NSICDXPASTSURGICALHX_GEN_ALL_CORE_FT
PAST SURGICAL HISTORY:  H/O aortic valve replacement 2015    H/O cataract removal with insertion of prosthetic lens right-9/7/2004    H/O total knee replacement, right 2011    H/O umbilical hernia repair x2-2011, 2016    History of Appendectomy     History of Lt Total Knee Replacement 4/2011    History of Nasal Septoplasty 1979    I & D of Pilonidal cyst     Plantar Tendonitis Repair- B L    Renal Calculi lithotripsy    S/P Cardiac Cath     S/P Cataract Surgery Right - with lens placement    S/P colon resection and umbilical hernia repair-6/12/2017    S/P Cystoscopy 2010    S/P foot surgery     S/P reconstruction procedure "my left foot I had no arch"-12/2015    S/P Right Inguinal Hernia Repair 1979    Skin Tag Excision Right Leg-2010    Tibialis Posterior Tendonitis Repair B/L revision left 2016

## 2025-01-28 LAB
MRSA PCR RESULT.: SIGNIFICANT CHANGE UP
S AUREUS DNA NOSE QL NAA+PROBE: SIGNIFICANT CHANGE UP

## 2025-02-07 RX ORDER — ANTISEPTIC SURGICAL SCRUB 0.04 MG/ML
1 SOLUTION TOPICAL ONCE
Refills: 0 | Status: COMPLETED | OUTPATIENT
Start: 2025-02-11 | End: 2025-02-11

## 2025-02-07 RX ORDER — APREPITANT 40 MG/1
40 CAPSULE ORAL ONCE
Refills: 0 | Status: COMPLETED | OUTPATIENT
Start: 2025-02-11 | End: 2025-02-11

## 2025-02-11 ENCOUNTER — INPATIENT (INPATIENT)
Facility: HOSPITAL | Age: 75
LOS: 1 days | Discharge: HOME | DRG: 483 | End: 2025-02-13
Attending: ORTHOPAEDIC SURGERY | Admitting: ORTHOPAEDIC SURGERY
Payer: MEDICARE

## 2025-02-11 ENCOUNTER — TRANSCRIPTION ENCOUNTER (OUTPATIENT)
Age: 75
End: 2025-02-11

## 2025-02-11 ENCOUNTER — APPOINTMENT (OUTPATIENT)
Dept: ORTHOPEDIC SURGERY | Facility: HOSPITAL | Age: 75
End: 2025-02-11

## 2025-02-11 VITALS — WEIGHT: 242.51 LBS | HEIGHT: 70 IN

## 2025-02-11 DIAGNOSIS — M19.012 PRIMARY OSTEOARTHRITIS, LEFT SHOULDER: ICD-10-CM

## 2025-02-11 DIAGNOSIS — Z98.49 CATARACT EXTRACTION STATUS, UNSPECIFIED EYE: Chronic | ICD-10-CM

## 2025-02-11 DIAGNOSIS — Z98.890 OTHER SPECIFIED POSTPROCEDURAL STATES: Chronic | ICD-10-CM

## 2025-02-11 DIAGNOSIS — Z96.651 PRESENCE OF RIGHT ARTIFICIAL KNEE JOINT: Chronic | ICD-10-CM

## 2025-02-11 DIAGNOSIS — Z95.2 PRESENCE OF PROSTHETIC HEART VALVE: Chronic | ICD-10-CM

## 2025-02-11 DIAGNOSIS — Z01.818 ENCOUNTER FOR OTHER PREPROCEDURAL EXAMINATION: ICD-10-CM

## 2025-02-11 DIAGNOSIS — Z90.49 ACQUIRED ABSENCE OF OTHER SPECIFIED PARTS OF DIGESTIVE TRACT: Chronic | ICD-10-CM

## 2025-02-11 LAB
GLUCOSE BLDC GLUCOMTR-MCNC: 120 MG/DL — HIGH (ref 70–99)
GLUCOSE BLDC GLUCOMTR-MCNC: 140 MG/DL — HIGH (ref 70–99)

## 2025-02-11 PROCEDURE — 23472 RECONSTRUCT SHOULDER JOINT: CPT | Mod: LT

## 2025-02-11 PROCEDURE — 73030 X-RAY EXAM OF SHOULDER: CPT | Mod: 26,LT

## 2025-02-11 PROCEDURE — 23472 RECONSTRUCT SHOULDER JOINT: CPT | Mod: AS,LT

## 2025-02-11 DEVICE — PIN REVERSE SHOULDER: Type: IMPLANTABLE DEVICE | Site: LEFT | Status: FUNCTIONAL

## 2025-02-11 DEVICE — HEAD HUM VERSA-DIAL 40MM STD: Type: IMPLANTABLE DEVICE | Site: LEFT | Status: FUNCTIONAL

## 2025-02-11 DEVICE — SCREW CENTRAL 6.5X20MM: Type: IMPLANTABLE DEVICE | Site: LEFT | Status: FUNCTIONAL

## 2025-02-11 DEVICE — SCREW COMP LOKG 3.5 HEX 4.75X20MM: Type: IMPLANTABLE DEVICE | Site: LEFT | Status: FUNCTIONAL

## 2025-02-11 DEVICE — IMPLANTABLE DEVICE: Type: IMPLANTABLE DEVICE | Site: LEFT | Status: FUNCTIONAL

## 2025-02-11 DEVICE — PIN FIX HEX 2.5X70MM: Type: IMPLANTABLE DEVICE | Site: LEFT | Status: FUNCTIONAL

## 2025-02-11 DEVICE — TRAY HUMERAL  NEUTRAL 6MM EXT STRL: Type: IMPLANTABLE DEVICE | Site: LEFT | Status: FUNCTIONAL

## 2025-02-11 DEVICE — SCREW LOKG FIXED 3.5 HEX 4.75X25MM: Type: IMPLANTABLE DEVICE | Site: LEFT | Status: FUNCTIONAL

## 2025-02-11 DEVICE — BEARING HUM STD VIVACIT-E 40MM: Type: IMPLANTABLE DEVICE | Site: LEFT | Status: FUNCTIONAL

## 2025-02-11 DEVICE — BASEPLATE AUG COMPR ADP SM: Type: IMPLANTABLE DEVICE | Site: LEFT | Status: FUNCTIONAL

## 2025-02-11 RX ORDER — HYDROMORPHONE HYDROCHLORIDE 4 MG/ML
1 INJECTION, SOLUTION INTRAMUSCULAR; INTRAVENOUS; SUBCUTANEOUS
Refills: 0 | Status: DISCONTINUED | OUTPATIENT
Start: 2025-02-11 | End: 2025-02-11

## 2025-02-11 RX ORDER — SODIUM CHLORIDE 9 G/ML
1000 INJECTION, SOLUTION INTRAVENOUS
Refills: 0 | Status: DISCONTINUED | OUTPATIENT
Start: 2025-02-11 | End: 2025-02-11

## 2025-02-11 RX ORDER — ASPIRIN 81 MG/1
81 TABLET, COATED ORAL EVERY 12 HOURS
Refills: 0 | Status: DISCONTINUED | OUTPATIENT
Start: 2025-02-12 | End: 2025-02-13

## 2025-02-11 RX ORDER — OXYCODONE HYDROCHLORIDE 30 MG/1
5 TABLET ORAL
Refills: 0 | Status: DISCONTINUED | OUTPATIENT
Start: 2025-02-11 | End: 2025-02-13

## 2025-02-11 RX ORDER — METOPROLOL SUCCINATE 25 MG
25 TABLET, EXTENDED RELEASE 24 HR ORAL DAILY
Refills: 0 | Status: DISCONTINUED | OUTPATIENT
Start: 2025-02-11 | End: 2025-02-13

## 2025-02-11 RX ORDER — DM/PSEUDOEPHED/ACETAMINOPHEN 10-30-250
25 CAPSULE ORAL ONCE
Refills: 0 | Status: DISCONTINUED | OUTPATIENT
Start: 2025-02-11 | End: 2025-02-13

## 2025-02-11 RX ORDER — SODIUM CHLORIDE 9 G/ML
1000 INJECTION, SOLUTION INTRAVENOUS
Refills: 0 | Status: DISCONTINUED | OUTPATIENT
Start: 2025-02-11 | End: 2025-02-13

## 2025-02-11 RX ORDER — ACETAMINOPHEN 160 MG/5ML
1000 SUSPENSION ORAL ONCE
Refills: 0 | Status: COMPLETED | OUTPATIENT
Start: 2025-02-11 | End: 2025-02-11

## 2025-02-11 RX ORDER — OXYCODONE HYDROCHLORIDE 30 MG/1
10 TABLET ORAL
Refills: 0 | Status: DISCONTINUED | OUTPATIENT
Start: 2025-02-11 | End: 2025-02-13

## 2025-02-11 RX ORDER — TRANEXAMIC ACID 100 MG/ML
1000 INJECTION, SOLUTION INTRAVENOUS ONCE
Refills: 0 | Status: COMPLETED | OUTPATIENT
Start: 2025-02-11 | End: 2025-02-11

## 2025-02-11 RX ORDER — GLUCAGON 3 MG/1
1 POWDER NASAL ONCE
Refills: 0 | Status: DISCONTINUED | OUTPATIENT
Start: 2025-02-11 | End: 2025-02-13

## 2025-02-11 RX ORDER — VALSARTAN 80 MG
320 TABLET ORAL DAILY
Refills: 0 | Status: DISCONTINUED | OUTPATIENT
Start: 2025-02-13 | End: 2025-02-13

## 2025-02-11 RX ORDER — CEFAZOLIN SODIUM IN 0.9 % NACL 2 G/10 ML
2000 SYRINGE (ML) INTRAVENOUS ONCE
Refills: 0 | Status: COMPLETED | OUTPATIENT
Start: 2025-02-11 | End: 2025-02-11

## 2025-02-11 RX ORDER — METFORMIN HYDROCHLORIDE 1000 MG/1
1 TABLET, COATED ORAL
Refills: 0 | DISCHARGE

## 2025-02-11 RX ORDER — ROSUVASTATIN CALCIUM 10 MG/1
20 TABLET, FILM COATED ORAL AT BEDTIME
Refills: 0 | Status: DISCONTINUED | OUTPATIENT
Start: 2025-02-11 | End: 2025-02-13

## 2025-02-11 RX ORDER — HYDROMORPHONE HYDROCHLORIDE 4 MG/ML
0.5 INJECTION, SOLUTION INTRAMUSCULAR; INTRAVENOUS; SUBCUTANEOUS
Refills: 0 | Status: DISCONTINUED | OUTPATIENT
Start: 2025-02-11 | End: 2025-02-11

## 2025-02-11 RX ORDER — DM/PSEUDOEPHED/ACETAMINOPHEN 10-30-250
12.5 CAPSULE ORAL ONCE
Refills: 0 | Status: DISCONTINUED | OUTPATIENT
Start: 2025-02-11 | End: 2025-02-13

## 2025-02-11 RX ORDER — CELECOXIB 200 MG
200 CAPSULE ORAL EVERY 12 HOURS
Refills: 0 | Status: DISCONTINUED | OUTPATIENT
Start: 2025-02-11 | End: 2025-02-12

## 2025-02-11 RX ORDER — SENNOSIDES 8.6 MG
2 TABLET ORAL AT BEDTIME
Refills: 0 | Status: DISCONTINUED | OUTPATIENT
Start: 2025-02-11 | End: 2025-02-13

## 2025-02-11 RX ORDER — PANTOPRAZOLE 20 MG/1
40 TABLET, DELAYED RELEASE ORAL
Refills: 0 | Status: DISCONTINUED | OUTPATIENT
Start: 2025-02-11 | End: 2025-02-13

## 2025-02-11 RX ORDER — HYDROMORPHONE HYDROCHLORIDE 4 MG/ML
0.5 INJECTION, SOLUTION INTRAMUSCULAR; INTRAVENOUS; SUBCUTANEOUS
Refills: 0 | Status: DISCONTINUED | OUTPATIENT
Start: 2025-02-11 | End: 2025-02-13

## 2025-02-11 RX ORDER — ONDANSETRON 4 MG/1
4 TABLET, ORALLY DISINTEGRATING ORAL ONCE
Refills: 0 | Status: DISCONTINUED | OUTPATIENT
Start: 2025-02-11 | End: 2025-02-11

## 2025-02-11 RX ORDER — ACETAMINOPHEN 160 MG/5ML
1000 SUSPENSION ORAL EVERY 8 HOURS
Refills: 0 | Status: DISCONTINUED | OUTPATIENT
Start: 2025-02-12 | End: 2025-02-13

## 2025-02-11 RX ORDER — FLUTICASONE FUROATE, UMECLIDINIUM BROMIDE AND VILANTEROL TRIFENATATE 200; 62.5; 25 UG/1; UG/1; UG/1
1 POWDER RESPIRATORY (INHALATION)
Refills: 0 | DISCHARGE

## 2025-02-11 RX ORDER — HYDROCHLOROTHIAZIDE 50 MG
1 TABLET ORAL
Refills: 0 | DISCHARGE

## 2025-02-11 RX ORDER — ONDANSETRON 4 MG/1
4 TABLET, ORALLY DISINTEGRATING ORAL EVERY 6 HOURS
Refills: 0 | Status: DISCONTINUED | OUTPATIENT
Start: 2025-02-11 | End: 2025-02-13

## 2025-02-11 RX ORDER — POLYETHYLENE GLYCOL 3350 17 G/17G
17 POWDER, FOR SOLUTION ORAL AT BEDTIME
Refills: 0 | Status: DISCONTINUED | OUTPATIENT
Start: 2025-02-11 | End: 2025-02-13

## 2025-02-11 RX ORDER — METFORMIN HYDROCHLORIDE 1000 MG/1
500 TABLET, COATED ORAL DAILY
Refills: 0 | Status: DISCONTINUED | OUTPATIENT
Start: 2025-02-12 | End: 2025-02-13

## 2025-02-11 RX ORDER — CEFAZOLIN SODIUM IN 0.9 % NACL 2 G/10 ML
2000 SYRINGE (ML) INTRAVENOUS EVERY 8 HOURS
Refills: 0 | Status: COMPLETED | OUTPATIENT
Start: 2025-02-12 | End: 2025-02-12

## 2025-02-11 RX ORDER — INSULIN LISPRO 100/ML
VIAL (ML) SUBCUTANEOUS
Refills: 0 | Status: DISCONTINUED | OUTPATIENT
Start: 2025-02-11 | End: 2025-02-13

## 2025-02-11 RX ORDER — BACTERIOSTATIC SODIUM CHLORIDE 0.9 %
500 VIAL (ML) INJECTION ONCE
Refills: 0 | Status: COMPLETED | OUTPATIENT
Start: 2025-02-11 | End: 2025-02-11

## 2025-02-11 RX ORDER — SODIUM CHLORIDE 9 G/ML
1000 INJECTION, SOLUTION INTRAVENOUS
Refills: 0 | Status: DISCONTINUED | OUTPATIENT
Start: 2025-02-12 | End: 2025-02-13

## 2025-02-11 RX ORDER — AMLODIPINE BESYLATE 5 MG
10 TABLET ORAL DAILY
Refills: 0 | Status: DISCONTINUED | OUTPATIENT
Start: 2025-02-13 | End: 2025-02-13

## 2025-02-11 RX ORDER — DM/PSEUDOEPHED/ACETAMINOPHEN 10-30-250
15 CAPSULE ORAL ONCE
Refills: 0 | Status: DISCONTINUED | OUTPATIENT
Start: 2025-02-11 | End: 2025-02-13

## 2025-02-11 RX ORDER — MAGNESIUM, ALUMINUM HYDROXIDE 200-225/5
30 SUSPENSION, ORAL (FINAL DOSE FORM) ORAL
Refills: 0 | Status: DISCONTINUED | OUTPATIENT
Start: 2025-02-11 | End: 2025-02-13

## 2025-02-11 RX ORDER — DEXAMETHASONE SODIUM PHOSPHATE 4 MG/ML
8 INJECTION, SOLUTION INTRA-ARTICULAR; INTRALESIONAL; INTRAMUSCULAR; INTRAVENOUS; SOFT TISSUE ONCE
Refills: 0 | Status: COMPLETED | OUTPATIENT
Start: 2025-02-12 | End: 2025-02-12

## 2025-02-11 RX ADMIN — ANTISEPTIC SURGICAL SCRUB 1 APPLICATION(S): 0.04 SOLUTION TOPICAL at 13:12

## 2025-02-11 RX ADMIN — ROSUVASTATIN CALCIUM 20 MILLIGRAM(S): 10 TABLET, FILM COATED ORAL at 21:41

## 2025-02-11 RX ADMIN — Medication 500 MILLILITER(S): at 19:46

## 2025-02-11 RX ADMIN — ACETAMINOPHEN 400 MILLIGRAM(S): 160 SUSPENSION ORAL at 22:58

## 2025-02-11 RX ADMIN — APREPITANT 40 MILLIGRAM(S): 40 CAPSULE ORAL at 13:11

## 2025-02-11 RX ADMIN — Medication 500 MILLILITER(S): at 21:19

## 2025-02-11 RX ADMIN — ACETAMINOPHEN 1000 MILLIGRAM(S): 160 SUSPENSION ORAL at 23:05

## 2025-02-11 RX ADMIN — SODIUM CHLORIDE 100 MILLILITER(S): 9 INJECTION, SOLUTION INTRAVENOUS at 19:46

## 2025-02-11 NOTE — BRIEF OPERATIVE NOTE - NSICDXBRIEFPREOP_GEN_ALL_CORE_FT
PRE-OP DIAGNOSIS:  Secondary osteoarthritis of left shoulder due to rotator cuff arthropathy 11-Feb-2025 18:59:53  Jamal Harris   74

## 2025-02-11 NOTE — BRIEF OPERATIVE NOTE - NSICDXBRIEFPOSTOP_GEN_ALL_CORE_FT
POST-OP DIAGNOSIS:  Secondary osteoarthritis of left shoulder due to rotator cuff arthropathy 11-Feb-2025 18:59:39  Jamal Harris

## 2025-02-11 NOTE — PATIENT PROFILE ADULT - FALL HARM RISK - HARM RISK INTERVENTIONS

## 2025-02-11 NOTE — PRE-OP CHECKLIST - STERILIZATION AFFIRMATION
[de-identified] : no

## 2025-02-12 ENCOUNTER — TRANSCRIPTION ENCOUNTER (OUTPATIENT)
Age: 75
End: 2025-02-12

## 2025-02-12 LAB
ANION GAP SERPL CALC-SCNC: 11 MMOL/L — SIGNIFICANT CHANGE UP (ref 5–17)
BUN SERPL-MCNC: 19 MG/DL — SIGNIFICANT CHANGE UP (ref 7–23)
CALCIUM SERPL-MCNC: 8 MG/DL — LOW (ref 8.4–10.5)
CHLORIDE SERPL-SCNC: 105 MMOL/L — SIGNIFICANT CHANGE UP (ref 96–108)
CO2 SERPL-SCNC: 25 MMOL/L — SIGNIFICANT CHANGE UP (ref 22–31)
CREAT SERPL-MCNC: 1.31 MG/DL — HIGH (ref 0.5–1.3)
EGFR: 58 ML/MIN/1.73M2 — LOW
GLUCOSE BLDC GLUCOMTR-MCNC: 192 MG/DL — HIGH (ref 70–99)
GLUCOSE BLDC GLUCOMTR-MCNC: 194 MG/DL — HIGH (ref 70–99)
GLUCOSE BLDC GLUCOMTR-MCNC: 293 MG/DL — HIGH (ref 70–99)
GLUCOSE SERPL-MCNC: 194 MG/DL — HIGH (ref 70–99)
HCT VFR BLD CALC: 36 % — LOW (ref 39–50)
HGB BLD-MCNC: 11.6 G/DL — LOW (ref 13–17)
MCHC RBC-ENTMCNC: 28.1 PG — SIGNIFICANT CHANGE UP (ref 27–34)
MCHC RBC-ENTMCNC: 32.2 G/DL — SIGNIFICANT CHANGE UP (ref 32–36)
MCV RBC AUTO: 87.2 FL — SIGNIFICANT CHANGE UP (ref 80–100)
NRBC BLD AUTO-RTO: 0 /100 WBCS — SIGNIFICANT CHANGE UP (ref 0–0)
PLATELET # BLD AUTO: 225 K/UL — SIGNIFICANT CHANGE UP (ref 150–400)
POTASSIUM SERPL-MCNC: 4.3 MMOL/L — SIGNIFICANT CHANGE UP (ref 3.5–5.3)
POTASSIUM SERPL-SCNC: 4.3 MMOL/L — SIGNIFICANT CHANGE UP (ref 3.5–5.3)
RBC # BLD: 4.13 M/UL — LOW (ref 4.2–5.8)
RBC # FLD: 13.2 % — SIGNIFICANT CHANGE UP (ref 10.3–14.5)
SODIUM SERPL-SCNC: 141 MMOL/L — SIGNIFICANT CHANGE UP (ref 135–145)
WBC # BLD: 11.63 K/UL — HIGH (ref 3.8–10.5)
WBC # FLD AUTO: 11.63 K/UL — HIGH (ref 3.8–10.5)

## 2025-02-12 PROCEDURE — 99221 1ST HOSP IP/OBS SF/LOW 40: CPT

## 2025-02-12 RX ORDER — OXYCODONE HYDROCHLORIDE 30 MG/1
1 TABLET ORAL
Qty: 42 | Refills: 0
Start: 2025-02-12 | End: 2025-02-18

## 2025-02-12 RX ORDER — CEFADROXIL 500 MG
1 CAPSULE ORAL
Qty: 14 | Refills: 0
Start: 2025-02-12 | End: 2025-02-18

## 2025-02-12 RX ORDER — ASPIRIN 81 MG/1
1 TABLET, COATED ORAL
Qty: 60 | Refills: 0
Start: 2025-02-12 | End: 2025-03-13

## 2025-02-12 RX ORDER — OXYCODONE HYDROCHLORIDE 30 MG/1
1 TABLET ORAL
Qty: 42 | Refills: 0
Start: 2025-02-12

## 2025-02-12 RX ORDER — CELECOXIB 200 MG
1 CAPSULE ORAL
Qty: 42 | Refills: 0
Start: 2025-02-12 | End: 2025-03-04

## 2025-02-12 RX ORDER — SENNOSIDES 8.6 MG
2 TABLET ORAL
Qty: 0 | Refills: 0 | DISCHARGE
Start: 2025-02-12

## 2025-02-12 RX ORDER — NALOXONE HYDROCHLORIDE 3 MG/.1ML
1 SPRAY NASAL
Qty: 1 | Refills: 0
Start: 2025-02-12

## 2025-02-12 RX ORDER — POLYETHYLENE GLYCOL 3350 17 G/17G
17 POWDER, FOR SOLUTION ORAL
Qty: 0 | Refills: 0 | DISCHARGE
Start: 2025-02-12

## 2025-02-12 RX ORDER — ACETAMINOPHEN 160 MG/5ML
2 SUSPENSION ORAL
Qty: 0 | Refills: 0 | DISCHARGE
Start: 2025-02-12

## 2025-02-12 RX ORDER — OMEPRAZOLE 20 MG/1
1 CAPSULE, DELAYED RELEASE ORAL
Qty: 30 | Refills: 0
Start: 2025-02-12 | End: 2025-03-13

## 2025-02-12 RX ORDER — CELECOXIB 200 MG
100 CAPSULE ORAL EVERY 12 HOURS
Refills: 0 | Status: DISCONTINUED | OUTPATIENT
Start: 2025-02-12 | End: 2025-02-13

## 2025-02-12 RX ORDER — TRAMADOL HYDROCHLORIDE 100 MG/1
1 TABLET, EXTENDED RELEASE ORAL
Qty: 0 | Refills: 0 | DISCHARGE

## 2025-02-12 RX ADMIN — ACETAMINOPHEN 1000 MILLIGRAM(S): 160 SUSPENSION ORAL at 21:29

## 2025-02-12 RX ADMIN — ACETAMINOPHEN 1000 MILLIGRAM(S): 160 SUSPENSION ORAL at 21:06

## 2025-02-12 RX ADMIN — ACETAMINOPHEN 1000 MILLIGRAM(S): 160 SUSPENSION ORAL at 15:03

## 2025-02-12 RX ADMIN — ASPIRIN 81 MILLIGRAM(S): 81 TABLET, COATED ORAL at 17:16

## 2025-02-12 RX ADMIN — Medication 100 MILLIGRAM(S): at 09:18

## 2025-02-12 RX ADMIN — ACETAMINOPHEN 1000 MILLIGRAM(S): 160 SUSPENSION ORAL at 05:16

## 2025-02-12 RX ADMIN — METFORMIN HYDROCHLORIDE 500 MILLIGRAM(S): 1000 TABLET, COATED ORAL at 12:33

## 2025-02-12 RX ADMIN — Medication 100 MILLIGRAM(S): at 07:43

## 2025-02-12 RX ADMIN — ACETAMINOPHEN 1000 MILLIGRAM(S): 160 SUSPENSION ORAL at 05:50

## 2025-02-12 RX ADMIN — Medication 100 MILLIGRAM(S): at 00:18

## 2025-02-12 RX ADMIN — Medication 25 MILLIGRAM(S): at 05:16

## 2025-02-12 RX ADMIN — ASPIRIN 81 MILLIGRAM(S): 81 TABLET, COATED ORAL at 05:16

## 2025-02-12 RX ADMIN — Medication 3: at 12:26

## 2025-02-12 RX ADMIN — OXYCODONE HYDROCHLORIDE 5 MILLIGRAM(S): 30 TABLET ORAL at 15:32

## 2025-02-12 RX ADMIN — Medication 100 MILLIGRAM(S): at 09:48

## 2025-02-12 RX ADMIN — SODIUM CHLORIDE 125 MILLILITER(S): 9 INJECTION, SOLUTION INTRAVENOUS at 05:16

## 2025-02-12 RX ADMIN — DEXAMETHASONE SODIUM PHOSPHATE 101.6 MILLIGRAM(S): 4 INJECTION, SOLUTION INTRA-ARTICULAR; INTRALESIONAL; INTRAMUSCULAR; INTRAVENOUS; SOFT TISSUE at 05:16

## 2025-02-12 RX ADMIN — ACETAMINOPHEN 1000 MILLIGRAM(S): 160 SUSPENSION ORAL at 15:33

## 2025-02-12 RX ADMIN — OXYCODONE HYDROCHLORIDE 5 MILLIGRAM(S): 30 TABLET ORAL at 15:02

## 2025-02-12 RX ADMIN — Medication 100 MILLIGRAM(S): at 21:29

## 2025-02-12 RX ADMIN — Medication 100 MILLIGRAM(S): at 21:06

## 2025-02-12 RX ADMIN — ROSUVASTATIN CALCIUM 20 MILLIGRAM(S): 10 TABLET, FILM COATED ORAL at 21:06

## 2025-02-12 RX ADMIN — PANTOPRAZOLE 40 MILLIGRAM(S): 20 TABLET, DELAYED RELEASE ORAL at 05:16

## 2025-02-12 RX ADMIN — Medication 1: at 07:51

## 2025-02-12 RX ADMIN — Medication 1: at 17:13

## 2025-02-12 NOTE — PHYSICAL THERAPY INITIAL EVALUATION ADULT - GAIT TRAINING, PT EVAL
Pt will ambulate 150 feet with cane independently in 1-2 days . Pt will negotiate 10 steps with handrail or cane independently in 1-2 days

## 2025-02-12 NOTE — DISCHARGE NOTE PROVIDER - CARE PROVIDER_API CALL
Abundio Carey  Orthopaedic Surgery  [address], [address]  [place] [address]  Phone: [phone]  Fax: [phone]  Established Patient  Follow Up Time: 2 weeks

## 2025-02-12 NOTE — CARE COORDINATION ASSESSMENT. - NSCAREPROVIDERS_GEN_ALL_CORE_FT
CARE PROVIDERS:  Administration: Marychuy Farley  Administration: Óscar Davis  Admitting: Abundio Carey  Attending: Abundio Carey  Consultant: Kosta Freeman  Consultant: Elijah Burton  Consultant: Ty Bonilla  Covering Team: Juanjo Wallace  Infection Control: Mira Muse  Nurse: Charity Rushing  Nurse: India More  Occupational Therapy: Kia Liang  Override: Vanessa Pemberton  Physical Therapy: Drea Pandya  Primary Team: Cristino Elaine  Primary Team: Jamal Harris  Primary Team: Prashanth Marin  Primary Team: Suleiman Holder  Primary Team: Naila Ramos  Primary Team: Giselle Coon  Primary Team: Antonette Sue  Team: STEPHEN ESCOBAR Hospitalists, Team  UR// Supp. Assoc.: Mary Akins

## 2025-02-12 NOTE — DISCHARGE NOTE PROVIDER - INSTRUCTIONS
For Constipation :   • Increase your water intake. Drink at least 8 glasses of water daily.  • Try adding fiber to your diet by eating fruits, vegetables and foods that are rich in grains.  • If you do experience constipation, you may take an over-the-counter stool softener/laxative such as Dafne Colace, Senekot, miralax or  Milk of Magnesia.

## 2025-02-12 NOTE — PHYSICAL THERAPY INITIAL EVALUATION ADULT - PRECAUTIONS/LIMITATIONS, REHAB EVAL
Left UE forward flexion only, codmans ok, elbow wrist hand flexion./fall precautions/surgical precautions

## 2025-02-12 NOTE — PHYSICAL THERAPY INITIAL EVALUATION ADULT - ACTIVE RANGE OF MOTION EXAMINATION, REHAB EVAL
Left wrist hand WFL. left shoulder immobilized in sling/Right UE Active ROM was WNL (within normal limits)/bilateral lower extremity Active ROM was WNL (within normal limits)/deficits as listed below

## 2025-02-12 NOTE — DISCHARGE NOTE PROVIDER - NSDCFUADDINST_GEN_ALL_CORE_FT
- Call your doctor if you experience:  • An increase in pain not controlled by pain medication or change in activity or  position.  • Temperature greater than 101° F.  • Redness, increased swelling or foul smelling drainage from or around the  incision.  • Numbness, tingling or a change in color or temperature of the operative arms or legs.  • Call your doctor immediately if you experience chest pain, shortness of breath or calf pain.

## 2025-02-12 NOTE — DISCHARGE NOTE PROVIDER - NSDCFUADDAPPT_GEN_ALL_CORE_FT
Please call Dr Carey's office for followup appt in 2 weeks.  It is advisable to follow up w/ your pcp in 2-3 weeks to be sure there are no underlying problems.

## 2025-02-12 NOTE — OCCUPATIONAL THERAPY INITIAL EVALUATION ADULT - ADDITIONAL COMMENTS
Pt lives with wife in apt with 4STE, landing then 2 additional steps. Pt was ADL and transfer I using cane. Pt has tub at home. Pt has RW and cane at home. Pt reports multiple falls PTA.

## 2025-02-12 NOTE — DISCHARGE NOTE PROVIDER - HOSPITAL COURSE
The patient is a 74  y.o. male with severe osteoarthritis of the left shoulder. He was evaluated by the PST dept at [facility] and obtained the appropriate medical clearances.  After admission on 2/11/25 and receiving pre-operative parenteral prophylactic antibiotics (ancef), the patient  underwent an  uncomplicated left reverse TSA by orthopedic surgeon Dr. Abundio Carey.    A medical consultation from the Hospitalist service was obtained for post-operative medical co-management. Typical Physical & occupational therapy modalities post reverse TSA were performed including ambulation training, range of motion, ADL's, and transfers. Ecotrin every 12 hrs, along w/ bilat venodynes, was given for DVT prophylaxis (caprini 9).  The patient had a clean appearing surgical incision with no sign of surgical site infections and had a stable neuro / vascular exam of the operated extremity.  After progression of mobility guided by the PT/ OT staff,  the patient was felt to benefit from further rehabilitative care for restoration to level of function. This was felt to best be accomplished at  home w/ home PT/rehab.  Discharge and Orthopedic Care instructions were delineated in the Discharge Plan and reviewed with the patient. All medications were delineated in the medication reconciliation tool and key points were reviewed with the patient. They were deemed stable from an Orthopedic & medical standpoint for discharge today.  He will be  following up with Dr. Carey for office  follow up Orthopedic care.

## 2025-02-12 NOTE — CONSULT NOTE ADULT - SUBJECTIVE AND OBJECTIVE BOX
Patient is a 74y old  Male who presents with a chief complaint of     HPI:  74M    HOSPITAL COURSE:     SUBJECTIVE / OVERNIGHT EVENTS:    REVIEW OF SYSTEMS:   GEN: no night sweats or change in appetite  EYES: no changes in vision or diplopia   ENT: no epistaxis, sinus pain, gingival bleeding, odynophagia or dysphagia  CV: no CP, PND or palpitations  RESP: no cough, wheezing, or hemoptysis  GI: no hematemesis, hematochezia, or melena  : no dysuria, polyuria, or hematuria  MSK: no arthralgias or joint swelling   NEURO: no gross sensory changes, numbness, focal deficits  PSYCH: no depression or changes in concentration  HEME/ONC: no purpura, petechiae or night sweats  SKIN: no pruritus, hair loss or skin lesions  ALL: no photosensitivity, no complaints of anaphylaxis (SOB, throat swelling)      PAST MEDICAL & SURGICAL HISTORY:  COPD (Chronic Obstructive Pulmonary Disease)      Kidney Stone      Pilonidal Abscess      HTN (Hypertension)      Spinal Stenosis      Varicose Vein  B/L lower extremities      Obesity      OA (Osteoarthritis)      Microscopic Hematuria      Venous Insufficiency      Sleep apnea      Amaurosis fugax  "every once in awhile my right eye goes out for about a minute"      Aortic valve disease      Stenosis of carotid artery, unspecified laterality      Hypercholesterolemia      Arthritis      Prediabetes      History of Appendectomy      S/P Cardiac Cath      I & D of Pilonidal cyst      Renal Calculi  lithotripsy      Plantar Tendonitis  Repair- B L      Tibialis Posterior Tendonitis  Repair B/L revision left 2016      S/P Cataract Surgery  Right - with lens placement      S/P Right Inguinal Hernia Repair  1979      Skin Tag Excision  Right Leg-2010      History of Nasal Septoplasty  1979      S/P Cystoscopy  2010      History of Lt Total Knee Replacement  4/2011      H/O total knee replacement, right  2011      S/P colon resection  and umbilical hernia repair-6/12/2017      H/O aortic valve replacement  2015      H/O umbilical hernia repair  x2-2011, 2016      H/O cataract removal with insertion of prosthetic lens  right-9/7/2004      S/P reconstruction procedure  "my left foot I had no arch"-12/2015      S/P foot surgery          FAMILY HISTORY:  Family history of COPD (chronic obstructive pulmonary disease)    Family history of lung cancer        Social History:      MEDICATIONS  (STANDING):  acetaminophen     Tablet .. 1000 milliGRAM(s) Oral every 8 hours  aspirin enteric coated 81 milliGRAM(s) Oral every 12 hours  ceFAZolin   IVPB 2000 milliGRAM(s) IV Intermittent every 8 hours  celecoxib 100 milliGRAM(s) Oral every 12 hours  dextrose 5%. 1000 milliLiter(s) (50 mL/Hr) IV Continuous <Continuous>  dextrose 5%. 1000 milliLiter(s) (100 mL/Hr) IV Continuous <Continuous>  dextrose 50% Injectable 25 Gram(s) IV Push once  dextrose 50% Injectable 12.5 Gram(s) IV Push once  dextrose 50% Injectable 25 Gram(s) IV Push once  glucagon  Injectable 1 milliGRAM(s) IntraMuscular once  insulin lispro (ADMELOG) corrective regimen sliding scale   SubCutaneous three times a day before meals  lactated ringers. 1000 milliLiter(s) (125 mL/Hr) IV Continuous <Continuous>  metFORMIN 500 milliGRAM(s) Oral daily  metoprolol succinate ER 25 milliGRAM(s) Oral daily  pantoprazole    Tablet 40 milliGRAM(s) Oral before breakfast  polyethylene glycol 3350 17 Gram(s) Oral at bedtime  rosuvastatin 20 milliGRAM(s) Oral at bedtime  senna 2 Tablet(s) Oral at bedtime    MEDICATIONS  (PRN):  aluminum hydroxide/magnesium hydroxide/simethicone Suspension 30 milliLiter(s) Oral four times a day PRN Indigestion  dextrose Oral Gel 15 Gram(s) Oral once PRN Blood Glucose LESS THAN 70 milliGRAM(s)/deciliter  HYDROmorphone  Injectable 0.5 milliGRAM(s) IV Push every 3 hours PRN Breakthrough Pain  ondansetron Injectable 4 milliGRAM(s) IV Push every 6 hours PRN Nausea and/or Vomiting  oxyCODONE    IR 5 milliGRAM(s) Oral every 3 hours PRN Moderate Pain (4 - 6)  oxyCODONE    IR 10 milliGRAM(s) Oral every 3 hours PRN Severe Pain (7 - 10)      CAPILLARY BLOOD GLUCOSE      POCT Blood Glucose.: 192 mg/dL (12 Feb 2025 07:39)  POCT Blood Glucose.: 140 mg/dL (11 Feb 2025 20:04)  POCT Blood Glucose.: 120 mg/dL (11 Feb 2025 13:16)    I&O's Summary    11 Feb 2025 07:01  -  12 Feb 2025 07:00  --------------------------------------------------------  IN: 1720 mL / OUT: 850 mL / NET: 870 mL        PHYSICAL EXAM:  Vital Signs Last 24 Hrs  T(C): 36.4 (12 Feb 2025 03:30), Max: 36.9 (11 Feb 2025 23:25)  T(F): 97.5 (12 Feb 2025 03:30), Max: 98.4 (11 Feb 2025 23:25)  HR: 65 (12 Feb 2025 03:30) (63 - 71)  BP: 122/70 (12 Feb 2025 03:30) (115/72 - 132/86)  BP(mean): --  RR: 18 (12 Feb 2025 03:30) (15 - 25)  SpO2: 92% (12 Feb 2025 03:30) (92% - 98%)    Parameters below as of 12 Feb 2025 03:30  Patient On (Oxygen Delivery Method): nasal cannula        GEN: (fe)male in NAD, appears comfortable, no diaphoresis  EYES: No scleral injection, PERRL, EOMI  ENTM: neck supple & symmetric without tracheal deviation, moist membranes, no gross hearing impairment, thyroid gland not enlarged  CV: +S1/S2, no m/r/g, no abdominal bruit, no LE edema  RESP: breathing comfortably, no respiratory accessory muscle use, CTAB, no w/r/r  GI: normoactive BS, soft, NTND, no rebounding/guarding, no palpable masses  LYMPHATICS: no LAD or tenderness to palpation  NEURO: AOx3, no focal deficits, CNII-XII grossly intact  PSYCH: No SI/HI/AVH, appropriate affect, appropriate insight/judgment   SKIN: no petechiae, ecchymosis or maculopapular rash noted    LABS:                      RADIOLOGY & ADDITIONAL TESTS:  Results Reviewed:   Imaging Personally Reviewed:  Electrocardiogram Personally Reviewed:    COORDINATION OF CARE:  Care Discussed with Consultants/Other Providers [Y/N]:  Prior or Outpatient Records Reviewed [Y/N]: Patient is a 74y old  Male who presents with a chief complaint of     HPI:  74M with PMHx of HTN, T2DM, and COPD presenting for elective left shoulder reverse replacement in setting of severe OA. Patient has been dealing with pain in the left shoulder for many months. He failed conservative treatment.    HOSPITAL COURSE: Patient s/p left shoulder reverse replacement on 2/11.    SUBJECTIVE / OVERNIGHT EVENTS: Patient seen on 2W. No complaints. Able to wiggle fingers without issues.    REVIEW OF SYSTEMS:   GEN: no night sweats or change in appetite  EYES: no changes in vision or diplopia   ENT: no epistaxis, sinus pain, gingival bleeding, odynophagia or dysphagia  CV: no CP, PND or palpitations  RESP: no cough, wheezing, or hemoptysis  GI: no hematemesis, hematochezia, or melena  : no dysuria, polyuria, or hematuria  MSK: no arthralgias or joint swelling   NEURO: no gross sensory changes, numbness, focal deficits  PSYCH: no depression or changes in concentration  HEME/ONC: no purpura, petechiae or night sweats  SKIN: no pruritus, hair loss or skin lesions  ALL: no photosensitivity, no complaints of anaphylaxis (SOB, throat swelling)      PAST MEDICAL & SURGICAL HISTORY:  COPD (Chronic Obstructive Pulmonary Disease)  Kidney Stone  Pilonidal Abscess  HTN (Hypertension)    Spinal Stenosis    Varicose Vein  B/L lower extremities    Obesity  OA (Osteoarthritis)    Microscopic Hematuria    Venous Insufficiency    Sleep apnea    Amaurosis fugax  "every once in awhile my right eye goes out for about a minute"    Aortic valve disease    Stenosis of carotid artery, unspecified laterality    Hypercholesterolemia    Arthritis    Prediabetes    History of Appendectomy    S/P Cardiac Cath    I & D of Pilonidal cyst      Renal Calculi  lithotripsy      Plantar Tendonitis  Repair- B L      Tibialis Posterior Tendonitis  Repair B/L revision left 2016      S/P Cataract Surgery  Right - with lens placement      S/P Right Inguinal Hernia Repair  1979      Skin Tag Excision  Right Leg-2010      History of Nasal Septoplasty  1979      S/P Cystoscopy  2010      History of Lt Total Knee Replacement  4/2011      H/O total knee replacement, right  2011      S/P colon resection  and umbilical hernia repair-6/12/2017      H/O aortic valve replacement  2015      H/O umbilical hernia repair  x2-2011, 2016      H/O cataract removal with insertion of prosthetic lens  right-9/7/2004      S/P reconstruction procedure  "my left foot I had no arch"-12/2015      S/P foot surgery    FAMILY HISTORY:  Family history of COPD (chronic obstructive pulmonary disease)    Family history of lung cancer        Social History: Patient denies tobacco or IVDU.        MEDICATIONS  (STANDING):  acetaminophen     Tablet .. 1000 milliGRAM(s) Oral every 8 hours  aspirin enteric coated 81 milliGRAM(s) Oral every 12 hours  ceFAZolin   IVPB 2000 milliGRAM(s) IV Intermittent every 8 hours  celecoxib 100 milliGRAM(s) Oral every 12 hours  dextrose 5%. 1000 milliLiter(s) (50 mL/Hr) IV Continuous <Continuous>  dextrose 5%. 1000 milliLiter(s) (100 mL/Hr) IV Continuous <Continuous>  dextrose 50% Injectable 25 Gram(s) IV Push once  dextrose 50% Injectable 12.5 Gram(s) IV Push once  dextrose 50% Injectable 25 Gram(s) IV Push once  glucagon  Injectable 1 milliGRAM(s) IntraMuscular once  insulin lispro (ADMELOG) corrective regimen sliding scale   SubCutaneous three times a day before meals  lactated ringers. 1000 milliLiter(s) (125 mL/Hr) IV Continuous <Continuous>  metFORMIN 500 milliGRAM(s) Oral daily  metoprolol succinate ER 25 milliGRAM(s) Oral daily  pantoprazole    Tablet 40 milliGRAM(s) Oral before breakfast  polyethylene glycol 3350 17 Gram(s) Oral at bedtime  rosuvastatin 20 milliGRAM(s) Oral at bedtime  senna 2 Tablet(s) Oral at bedtime    MEDICATIONS  (PRN):  aluminum hydroxide/magnesium hydroxide/simethicone Suspension 30 milliLiter(s) Oral four times a day PRN Indigestion  dextrose Oral Gel 15 Gram(s) Oral once PRN Blood Glucose LESS THAN 70 milliGRAM(s)/deciliter  HYDROmorphone  Injectable 0.5 milliGRAM(s) IV Push every 3 hours PRN Breakthrough Pain  ondansetron Injectable 4 milliGRAM(s) IV Push every 6 hours PRN Nausea and/or Vomiting  oxyCODONE    IR 5 milliGRAM(s) Oral every 3 hours PRN Moderate Pain (4 - 6)  oxyCODONE    IR 10 milliGRAM(s) Oral every 3 hours PRN Severe Pain (7 - 10)      CAPILLARY BLOOD GLUCOSE      POCT Blood Glucose.: 192 mg/dL (12 Feb 2025 07:39)  POCT Blood Glucose.: 140 mg/dL (11 Feb 2025 20:04)  POCT Blood Glucose.: 120 mg/dL (11 Feb 2025 13:16)    I&O's Summary    11 Feb 2025 07:01  -  12 Feb 2025 07:00  --------------------------------------------------------  IN: 1720 mL / OUT: 850 mL / NET: 870 mL        PHYSICAL EXAM:  Vital Signs Last 24 Hrs  T(C): 36.4 (12 Feb 2025 03:30), Max: 36.9 (11 Feb 2025 23:25)  T(F): 97.5 (12 Feb 2025 03:30), Max: 98.4 (11 Feb 2025 23:25)  HR: 65 (12 Feb 2025 03:30) (63 - 71)  BP: 122/70 (12 Feb 2025 03:30) (115/72 - 132/86)  BP(mean): --  RR: 18 (12 Feb 2025 03:30) (15 - 25)  SpO2: 92% (12 Feb 2025 03:30) (92% - 98%)    Parameters below as of 12 Feb 2025 03:30  Patient On (Oxygen Delivery Method): nasal cannula        GEN: male in NAD, appears comfortable, no diaphoresis  EYES: No scleral injection, PERRL, EOMI  ENTM: neck supple & symmetric without tracheal deviation, moist membranes, no gross hearing impairment, thyroid gland not enlarged  CV: +S1/S2, no m/r/g, no abdominal bruit, no LE edema  RESP: breathing comfortably, no respiratory accessory muscle use, CTAB, no w/r/r  GI: normoactive BS, soft, NTND, no rebounding/guarding, no palpable masses  LYMPHATICS: no LAD or tenderness to palpation  NEURO: AOx3, no focal deficits, CNII-XII grossly intact  PSYCH: No SI/HI/AVH, appropriate affect, appropriate insight/judgment   SKIN: no petechiae, ecchymosis or maculopapular rash noted    LABS:                      RADIOLOGY & ADDITIONAL TESTS:  Results Reviewed:   Imaging Personally Reviewed:  Electrocardiogram Personally Reviewed:    COORDINATION OF CARE:  Care Discussed with Consultants/Other Providers [Y/N]:  Prior or Outpatient Records Reviewed [Y/N]:

## 2025-02-12 NOTE — DISCHARGE NOTE PROVIDER - NSDCCPTREATMENT_GEN_ALL_CORE_FT
PRINCIPAL PROCEDURE  Procedure: Reverse total replacement of left shoulder joint  Findings and Treatment: osteoarthritris left shoulder due to rotator cuff arthopathy

## 2025-02-12 NOTE — PROGRESS NOTE ADULT - SUBJECTIVE AND OBJECTIVE BOX
Post Op     ESTRELLA GARCIA      74y        Male                                                                                                                 T(C): 36.4 (02-12-25 @ 03:30), Max: 36.9 (02-11-25 @ 23:25)  HR: 65 (02-12-25 @ 03:30) (63 - 71)  BP: 122/70 (02-12-25 @ 03:30) (115/72 - 132/86)  RR: 18 (02-12-25 @ 03:30) (15 - 25)  SpO2: 92% (02-12-25 @ 03:30) (92% - 98%)  Wt(kg): --    S/P   total shoulder    Patient denies shortness of breath, chest pain, dyspnea, No complaints  Pain is 3 /10    Physical Exam    Extremity: Bilaterally:  No holmon                                           No Cord                                          PAS on                                          Neurovascular intact                                          Motor intact EHL/FHL                                          Sensation intact                                          Pulses intact DP/PT                                         Calves Soft                                          Dressing Clean / Dry / Intact meplix radial pulse intact  pronate /supinate  , wiggle fingers  moves wrist and hand  ok sign sensation intact                                           Capillary refill with 5 seconds                A/P  -- S/P total shoulder     -  Medicine To Follow   - DVT prophylaxis PAS [id number] po bid  - PT & OT   - Analagesia  - Incentive Spirometry  - Discharge Planning  - Safety Precautions  -  CBC , BMP daily   sling , nwb    Post Op     ESTRELLA GARCIA      74y        Male                                                                                                                 T(C): 36.4 (02-12-25 @ 03:30), Max: 36.9 (02-11-25 @ 23:25)  HR: 65 (02-12-25 @ 03:30) (63 - 71)  BP: 122/70 (02-12-25 @ 03:30) (115/72 - 132/86)  RR: 18 (02-12-25 @ 03:30) (15 - 25)  SpO2: 92% (02-12-25 @ 03:30) (92% - 98%)  Wt(kg): --    S/P   total shoulder    Patient denies shortness of breath, chest pain, dyspnea, No complaints  Pain is 3 /10    Physical Exam    Extremity: Bilaterally:  No holmon                                           No Cord                                          PAS on                                          Neurovascular intact                                          Motor intact EHL/FHL                                          Sensation intact                                          Pulses intact DP/PT                                         Calves Soft                                          Dressing Clean / Dry / Intact meplix radial pulse intact  pronate /supinate  , wiggle fingers  moves wrist and hand  ok sign sensation intact                                           Capillary refill with 5 seconds                A/P  -- S/P total shoulder     -  Medicine To Follow   - DVT prophylaxis PAS [id number] po bid  - PT & OT   - Analagesia  - Incentive Spirometry  - Discharge Planning most likely  tomorrow as per patient  - Safety Precautions  -  CBC , BMP daily   sling , nwb

## 2025-02-12 NOTE — PHYSICAL THERAPY INITIAL EVALUATION ADULT - PERTINENT HX OF CURRENT PROBLEM, REHAB EVAL
this is a 73 y/o male who has had left shoulder pain for about 1 yr; tests show arthritis and bone on bone; to have left shoulder replacement

## 2025-02-12 NOTE — CARE COORDINATION ASSESSMENT. - NSPASTMEDSURGHISTORY_GEN_ALL_CORE_FT
PAST MEDICAL & SURGICAL HISTORY:  Pilonidal Abscess      Kidney Stone      COPD (Chronic Obstructive Pulmonary Disease)      History of Appendectomy      S/P Cardiac Cath      Obesity      Varicose Vein  B/L lower extremities      Spinal Stenosis      HTN (Hypertension)      S/P Cataract Surgery  Right - with lens placement      Tibialis Posterior Tendonitis  Repair B/L revision left 2016      Plantar Tendonitis  Repair- B L      Renal Calculi  lithotripsy      I & D of Pilonidal cyst      Venous Insufficiency      Microscopic Hematuria      OA (Osteoarthritis)      S/P Cystoscopy  2010      History of Nasal Septoplasty  1979      Skin Tag Excision  Right Leg-2010      S/P Right Inguinal Hernia Repair  1979      History of Lt Total Knee Replacement  4/2011      Sleep apnea      Amaurosis fugax  "every once in awhile my right eye goes out for about a minute"      Stenosis of carotid artery, unspecified laterality      Aortic valve disease      H/O total knee replacement, right  2011      Arthritis      Hypercholesterolemia      S/P reconstruction procedure  "my left foot I had no arch"-12/2015      H/O cataract removal with insertion of prosthetic lens  right-9/7/2004      H/O umbilical hernia repair  x2-2011, 2016      H/O aortic valve replacement  2015      S/P colon resection  and umbilical hernia repair-6/12/2017      Prediabetes      S/P foot surgery

## 2025-02-12 NOTE — PROGRESS NOTE ADULT - SUBJECTIVE AND OBJECTIVE BOX
Discharge Medication Counseling:  [id number] BID x 4 weeks  Omeprazole 20 mg QD x 4 weeks  Celebrex 100mg BID x 2-3 weeks  APAP 1000mg PO Q8h x 2-3 weeks  Narcotic PRN  Docusate 100mg TID while taking narcotic  Miralax, Senna, or Bisacodyl PRN for treatment of constipation  cefadroxil 500mg BID x 7 days

## 2025-02-12 NOTE — PHYSICAL THERAPY INITIAL EVALUATION ADULT - REFERRING PHYSICIAN, REHAB EVAL
eMERGENCY dEPARTMENT eNCOUnter      CHIEF COMPLAINT    Chief Complaint   Patient presents with   • Wrist Pain       HPI    Gilberto Dunn is a 51 year old female who presents to the emergency department in order to “get checked out”.  Patient had been visiting a family member in the hospital and decided to come to the emergency department for evaluation.  She says she has been having continuing left wrist pain and right wrist pain.  She also has pain in the lower leg.  She dislocated her left wrist a few months ago but never followed up.  She has been wearing her brace.  She says now her right wrist has been hurting.  She rates her pain 8/10, achy type pains.  She also complains of pain in the bilateral knees.  She has pain in the right leg for the past week.  No fevers.  She says she has not taken anything for her symptoms.    I have reviewed the nurse's notes    ALLERGIES    ALLERGIES:   Allergen Reactions   • Ibuprofen    • Aspirin VOMITING   • Bee Sting HIVES       CURRENT MEDICATIONS    No current facility-administered medications for this encounter.      Current Outpatient Medications   Medication Sig Dispense Refill   • butalbital-acetaminophen-caffeine (FIORICET) -40 MG capsule Take 1-2 capsules by mouth every 4 hours as needed (For Headache.). 9 capsule 0   • clopidogrel (PLAVIX) 75 MG tablet Take 1 tablet by mouth daily. 30 tablet 0   • ferrous sulfate 325 (65 FE) MG tablet Take 1 tablet by mouth daily (with breakfast). 30 tablet 0   • hydrochlorothiazide (HYDRODIURIL) 25 MG tablet Take 1 tablet by mouth daily. 30 tablet 0   • metoPROLOL succinate (TOPROL-XL) 50 MG 24 hr tablet Take 1 tablet by mouth daily. 30 tablet 0   • amLODIPine (NORVASC) 10 MG tablet Take 10 mg by mouth daily.     • oxycodone-acetaminophen (PERCOCET) 7.5-325 MG per tablet Take 1 tablet by mouth every 8 hours as needed for Pain.     • zolpidem (AMBIEN) 10 MG tablet Take 10 mg by mouth nightly as needed for Sleep.     •  ALPRAZolam (XANAX) 1 MG tablet Take 1 tablet by mouth nightly. 15 tablet 0   • divalproex (DEPAKOTE) 125 MG sprinkle Take 4 capsules by mouth nightly. 120 capsule 2   • sertraline (ZOLOFT) 50 MG tablet Take 1 tablet by mouth daily. 30 tablet 2   • fluticasone-vilanterol (BREO ELLIPTA) 100-25 MCG/INH inhaler Inhale 1 puff into the lungs once daily.     • omeprazole (PRILOSEC) 20 MG capsule Take 20 mg by mouth daily.     • albuterol 108 (90 BASE) MCG/ACT inhaler Inhale 2 puffs into the lungs 3 times daily as needed.         PAST MEDICAL HISTORY    Past Medical History:   Diagnosis Date   • Anxiety    • Arthritis    • Asthma    • Bipolar 1 disorder (CMS/HCC)    • Bronchitis    • Collapsed lung    • Depressive disorder    • DVT (deep venous thrombosis) (CMS/HCC)     Right leg   • Essential (primary) hypertension    • H/O blood clots    • Headache(784.0)    • High cholesterol    • Mental disorder    • Neuropathy    • Other chronic pain     right leg (collapsed lung caused blood clot)   • PTSD (post-traumatic stress disorder)        SURGICAL HISTORY    Past Surgical History:   Procedure Laterality Date   • Breast reduction surgery      and biopsies   • Hernia repair     • Tubal ligation         SOCIAL HISTORY    Social History     Tobacco Use   • Smoking status: Current Some Day Smoker     Packs/day: 0.25     Years: 5.00     Pack years: 1.25     Types: Cigarettes   • Smokeless tobacco: Never Used   Substance Use Topics   • Alcohol use: No     Frequency: Never     Drinks per session: 1 or 2     Binge frequency: Never   • Drug use: Yes     Types: Cocaine, Marijuana     Comment: last use two weeks ago per patient       FAMILY HISTORY    Family History   Problem Relation Age of Onset   • Stroke Mother    • Clotting Disorder Mother    • Depression Mother    • Stroke Father    • Diabetes Father    • Depression Father    • Asthma Daughter    • Asthma Daughter    • Asthma Daughter    • Asthma Son    • Asthma Son        REVIEW OF  SYSTEMS    Constitutional:  Denies fever or chills.    Respiratory:  Denies cough or shortness of breath.   Cardiovascular:  Denies chest pain or palpitations.   GI:  Denies abdominal pain. No nausea or vomiting.  Continues to be well  Musculoskeletal:  Denies back pain.  Chronic joint pain.  Myalgias.  Skin:  Denies rash. No ecchymosis.   Neurologic:  Denies headache. No weakness. No paresthesias.   All others reviewed and are negative.    PHYSICAL EXAM    Vitals:    09/08/19 0137   BP: (!) 158/101   Pulse: 78   Resp: 14   Temp: 98.4 °F (36.9 °C)   TempSrc: Oral   SpO2: 100%   Weight: 67.1 kg   Height: 5' 7\" (1.702 m)       Pulse Ox / Interpretation:  100% on room air, normal    Constitutional:  Well developed, well nourished. No acute distress, non-toxic appearance.   Eyes:  PERRL, EOMI.  Conjunctivae normal.   HENT:  Atraumatic/normocephalic. External ears normal. Nose normal. Oropharynx moist.  Respiratory:  No respiratory distress, normal breath sounds. No rales. No wheezing.   Cardiovascular:  Normal rate, normal rhythm. No murmurs, gallops, or rubs.  No edema in all 4 extremities.   GI:  Soft, nondistended, nonrigid. No tenderness.  Normal bowel sounds. No hepatomegaly or splenomegaly.  No rebound or guarding. Negative Boateng's sign. Negative McBurney's point.   Musculoskeletal:  No tenderness appreciated in the joints.  I do not appreciate significant tenderness in the wrists, elbows, shoulders.  There is good range of motion of all joints with flexion extension. No deformities. Full range of motion of all 4 extremities. 5/5 strengths in the extremities.   Neurologic:  Alert & oriented x 3.  Normal motor function. Normal sensory function. No focal deficits noted. GCS 15.  Psychiatric:  Speech and behavior appropriate.   Pulses:   Radial 2+. Good capillary refill      LABS    Results for orders placed or performed during the hospital encounter of 09/08/19   Chem 8 Panel - Point of Care   Result Value     Sodium     Potassium POC 3.6    Chloride  (H)    CALCIUM IONIZED-POC 1.23    CO2 Total 22    GLUCOSE  (H)     Comment: Reference range is for a fasting sample.    BUN POC 20    HEMATOCRIT POC 35.0 (L)    Hemoglobin POC 11.9 (L)    ANION GAP POC 14    Creatinine POC 0.90    Estimated GFR  (POC) 87     Comment: eGFR 60 - 89 mL/min/1.73m2 = Mild decrease in kidney function.    Estimated GFR Non- (POC) 75     Comment: eGFR 60 - 89 mL/min/1.73m2 = Mild decrease in kidney function.         ED MEDS  Medications   acetaminophen (TYLENOL) tablet 650 mg (650 mg Oral Given 9/8/19 0208)         ED COURSE & MEDICAL DECISION MAKING    51 year old female presents to the emergency department with complaints of continued achy type pain.  She has not taken anything for her arthritis.  Patient also admits that she has not followed up with her physicians as she was supposed to.  Her examination is fairly normal.  I do not appreciate any gross abnormalities.  I do not feel any imaging is necessary.  Patient does have chronic arthritic pain.  We did provide her some acetaminophen at this time.  She was complaining of myalgias.  I did agree to assess electrolytes, specifically potassium level.  Those were normal    I do not feel any further workup is necessary in the emergency department.  I do feel she is appropriate for discharge.  I have expressed the importance of having a follow-up with her PCP for further chronic pain issues.    IMPRESSION:  1. Arthritis     2. Other chronic pain             Patient discharged stable condition     Ladarius Meek,   09/08/19 0250     Dr Carey

## 2025-02-12 NOTE — DISCHARGE NOTE PROVIDER - NSDCMRMEDTOKEN_GEN_ALL_CORE_FT
acetaminophen 500 mg oral tablet: 2 tab(s) orally every 8 hours  amLODIPine 10 mg oral tablet: 1 orally once a day  aspirin 81 mg oral tablet, chewable: 1 tab(s) orally once a day resume after twice daily dosing is completed  Aspirin EC 81 mg oral delayed release tablet: 1 tab(s) orally every 12 hours 2  hours prior to celebrex MDD: 2  cefadroxil 500 mg oral capsule: 1 cap(s) orally every 12 hours MDD: 2  CeleBREX 100 mg oral capsule: 1 cap(s) orally every 12 hours 2 hours after aspirin MDD: 2  hydroCHLOROthiazide 12.5 mg oral tablet: 1 tab(s) orally once a day  metFORMIN 500 mg oral tablet, extended release: 1 tab(s) orally once a day  metoprolol succinate 25 mg oral capsule, extended release: 1 orally once a day  Narcan 4 mg/0.1 mL nasal spray: 1 spray(s) intranasally once as needed for opioid overdose may repeat in other nostril in 2-3 min if unsuccessful while waiting for EMS  omeprazole 20 mg oral delayed release tablet: 1 tab(s) orally once a day  oxyCODONE 5 mg oral tablet: 1 tab(s) orally every 4 hours as needed for  severe pain MDD: 6  polyethylene glycol 3350 oral powder for reconstitution: 17 gram(s) orally once a day (at bedtime)  rosuvastatin 20 mg oral tablet: 1 orally once a day  senna leaf extract oral tablet: 2 tab(s) orally once a day (at bedtime)  Trelegy Ellipta 200 mcg-62.5 mcg-25 mcg/inh inhalation powder: 1 inhaled once a day as needed for  shortness of breath and/or wheezing  valsartan 320 mg oral tablet: 1 tab(s) orally once a day

## 2025-02-12 NOTE — PHYSICAL THERAPY INITIAL EVALUATION ADULT - ADDITIONAL COMMENTS
Pt lives in a apartment with 4 + 2 steps to enter with HR. 0 steps inside. Has a tub shower. Owns RW, cane. Pt states spouse will be primary caregiver upon  discharge to home to provide assistance as needed.

## 2025-02-12 NOTE — CARE COORDINATION ASSESSMENT. - NSDCPLANSERVICES_GEN_ALL_CORE
CM met with patient at bedside.  Patient. A&O x 4. Pt s/p  PROCEDURES: Total left shoulder  replacement.   Pt  resting comfortably / Pt has no complaints at this time.  CM explained role of CM and availability to assist with discharge planning throughout stay.  Pt lives in a apartment with 4 + 2 steps to enter with HR. 0 steps inside. Has a tub shower. Owns RW, cane. Pt states spouse will be primary caregiver upon  discharge to home to provide assistance as needed.     Patient identified his wife   as his caregivers at home who will assist him during his recuperation and will transport him home and to MD appointments.   PT giuseppe  is recommending HCPT, however patient is declining and states he will go out patient therapy  after he follows up with surgeon, Ortho team made aware.  Patient anticipating discharge 2/13/2025.   Pt verbalizing understanding and in agreement with d/c plans.    PCP: Dr Bajwa   Pharm:[facility]/No Anticipated Discharge Needs

## 2025-02-12 NOTE — DISCHARGE NOTE PROVIDER - NSDCCPCAREPLAN_GEN_ALL_CORE_FT
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  No ROM other than forward flexion.  May do codmn's and pendulum exercises.  Remove sling for excercises of elbow, wrist and hand.  You have a mepilex dressing. It is water resistant and may get slightly wet in the shower.  Remove dressing in 7 days and leave wound open to air.  Wound may get wet in the shower as long as there is no drainage from wound.  Dermabond was used for skin closure. It is water resistant and may get wet in shower as long as there is no drainage from wound.  Cold pack on continously for 72 hrs.  Change cold insert every 4 hrs.  then use as needed for pain/swelling  Opioid safety :  Do not keep opioid in the medicine cabinet in bathroom or on kitchen counter where others may have access to it.  No sharing w/ others. Do not drive while taking opioid.  To dispose of it some pharmacies do have drop boxes as do police stations.  Do not flush or put in trash.

## 2025-02-12 NOTE — CONSULT NOTE ADULT - ASSESSMENT
74M with PMHx of HTN, T2DM, and COPD presenting for elective left shoulder reverse replacement in setting of severe OA. Patient s/p left shoulder reverse replacement on 2/11.    #Left Shoulder OA s/p Replacement  - Pain control & DVT PPx per ortho  - PT consult    #HTN  - Can resume Valsartan (or formulary equivalent), Amlodipine, and HCTZ sequentially    #COPD  - Takes Trelegy at home which is not available  - If patient is staying past today can start Spiriva and Advair    No medical barrier to DC

## 2025-02-13 ENCOUNTER — TRANSCRIPTION ENCOUNTER (OUTPATIENT)
Age: 75
End: 2025-02-13

## 2025-02-13 VITALS
HEART RATE: 69 BPM | TEMPERATURE: 98 F | OXYGEN SATURATION: 96 % | RESPIRATION RATE: 18 BRPM | DIASTOLIC BLOOD PRESSURE: 71 MMHG | SYSTOLIC BLOOD PRESSURE: 126 MMHG

## 2025-02-13 LAB
ANION GAP SERPL CALC-SCNC: 9 MMOL/L — SIGNIFICANT CHANGE UP (ref 5–17)
BUN SERPL-MCNC: 22 MG/DL — SIGNIFICANT CHANGE UP (ref 7–23)
CALCIUM SERPL-MCNC: 8.1 MG/DL — LOW (ref 8.4–10.5)
CHLORIDE SERPL-SCNC: 104 MMOL/L — SIGNIFICANT CHANGE UP (ref 96–108)
CO2 SERPL-SCNC: 27 MMOL/L — SIGNIFICANT CHANGE UP (ref 22–31)
CREAT SERPL-MCNC: 1.1 MG/DL — SIGNIFICANT CHANGE UP (ref 0.5–1.3)
EGFR: 71 ML/MIN/1.73M2 — SIGNIFICANT CHANGE UP
GLUCOSE BLDC GLUCOMTR-MCNC: 127 MG/DL — HIGH (ref 70–99)
GLUCOSE BLDC GLUCOMTR-MCNC: 223 MG/DL — HIGH (ref 70–99)
GLUCOSE SERPL-MCNC: 128 MG/DL — HIGH (ref 70–99)
HCT VFR BLD CALC: 36.1 % — LOW (ref 39–50)
HGB BLD-MCNC: 11.8 G/DL — LOW (ref 13–17)
MCHC RBC-ENTMCNC: 28.5 PG — SIGNIFICANT CHANGE UP (ref 27–34)
MCHC RBC-ENTMCNC: 32.7 G/DL — SIGNIFICANT CHANGE UP (ref 32–36)
MCV RBC AUTO: 87.2 FL — SIGNIFICANT CHANGE UP (ref 80–100)
NRBC BLD AUTO-RTO: 0 /100 WBCS — SIGNIFICANT CHANGE UP (ref 0–0)
PLATELET # BLD AUTO: 226 K/UL — SIGNIFICANT CHANGE UP (ref 150–400)
POTASSIUM SERPL-MCNC: 4.4 MMOL/L — SIGNIFICANT CHANGE UP (ref 3.5–5.3)
POTASSIUM SERPL-SCNC: 4.4 MMOL/L — SIGNIFICANT CHANGE UP (ref 3.5–5.3)
RBC # BLD: 4.14 M/UL — LOW (ref 4.2–5.8)
RBC # FLD: 13.4 % — SIGNIFICANT CHANGE UP (ref 10.3–14.5)
SODIUM SERPL-SCNC: 140 MMOL/L — SIGNIFICANT CHANGE UP (ref 135–145)
WBC # BLD: 14.96 K/UL — HIGH (ref 3.8–10.5)
WBC # FLD AUTO: 14.96 K/UL — HIGH (ref 3.8–10.5)

## 2025-02-13 PROCEDURE — 73030 X-RAY EXAM OF SHOULDER: CPT

## 2025-02-13 PROCEDURE — C1713: CPT

## 2025-02-13 PROCEDURE — C1776: CPT

## 2025-02-13 PROCEDURE — 85027 COMPLETE CBC AUTOMATED: CPT

## 2025-02-13 PROCEDURE — 36415 COLL VENOUS BLD VENIPUNCTURE: CPT

## 2025-02-13 PROCEDURE — 97165 OT EVAL LOW COMPLEX 30 MIN: CPT

## 2025-02-13 PROCEDURE — 82962 GLUCOSE BLOOD TEST: CPT

## 2025-02-13 PROCEDURE — 80048 BASIC METABOLIC PNL TOTAL CA: CPT

## 2025-02-13 PROCEDURE — 97535 SELF CARE MNGMENT TRAINING: CPT

## 2025-02-13 PROCEDURE — 97116 GAIT TRAINING THERAPY: CPT

## 2025-02-13 PROCEDURE — 97530 THERAPEUTIC ACTIVITIES: CPT

## 2025-02-13 PROCEDURE — 99232 SBSQ HOSP IP/OBS MODERATE 35: CPT

## 2025-02-13 PROCEDURE — 97161 PT EVAL LOW COMPLEX 20 MIN: CPT

## 2025-02-13 RX ORDER — CEFADROXIL 500 MG
500 CAPSULE ORAL
Refills: 0 | Status: DISCONTINUED | OUTPATIENT
Start: 2025-02-13 | End: 2025-02-13

## 2025-02-13 RX ADMIN — OXYCODONE HYDROCHLORIDE 10 MILLIGRAM(S): 30 TABLET ORAL at 08:58

## 2025-02-13 RX ADMIN — ACETAMINOPHEN 1000 MILLIGRAM(S): 160 SUSPENSION ORAL at 15:26

## 2025-02-13 RX ADMIN — Medication 2: at 12:43

## 2025-02-13 RX ADMIN — Medication 100 MILLIGRAM(S): at 08:58

## 2025-02-13 RX ADMIN — ACETAMINOPHEN 1000 MILLIGRAM(S): 160 SUSPENSION ORAL at 05:12

## 2025-02-13 RX ADMIN — PANTOPRAZOLE 40 MILLIGRAM(S): 20 TABLET, DELAYED RELEASE ORAL at 05:10

## 2025-02-13 RX ADMIN — Medication 25 MILLIGRAM(S): at 05:10

## 2025-02-13 RX ADMIN — ACETAMINOPHEN 1000 MILLIGRAM(S): 160 SUSPENSION ORAL at 05:09

## 2025-02-13 RX ADMIN — ACETAMINOPHEN 1000 MILLIGRAM(S): 160 SUSPENSION ORAL at 14:56

## 2025-02-13 RX ADMIN — Medication 100 MILLIGRAM(S): at 08:28

## 2025-02-13 RX ADMIN — METFORMIN HYDROCHLORIDE 500 MILLIGRAM(S): 1000 TABLET, COATED ORAL at 12:43

## 2025-02-13 RX ADMIN — ASPIRIN 81 MILLIGRAM(S): 81 TABLET, COATED ORAL at 05:09

## 2025-02-13 RX ADMIN — OXYCODONE HYDROCHLORIDE 10 MILLIGRAM(S): 30 TABLET ORAL at 15:55

## 2025-02-13 RX ADMIN — OXYCODONE HYDROCHLORIDE 10 MILLIGRAM(S): 30 TABLET ORAL at 13:13

## 2025-02-13 RX ADMIN — Medication 500 MILLIGRAM(S): at 12:43

## 2025-02-13 RX ADMIN — OXYCODONE HYDROCHLORIDE 10 MILLIGRAM(S): 30 TABLET ORAL at 12:43

## 2025-02-13 RX ADMIN — OXYCODONE HYDROCHLORIDE 10 MILLIGRAM(S): 30 TABLET ORAL at 08:28

## 2025-02-13 RX ADMIN — OXYCODONE HYDROCHLORIDE 10 MILLIGRAM(S): 30 TABLET ORAL at 04:02

## 2025-02-13 RX ADMIN — OXYCODONE HYDROCHLORIDE 10 MILLIGRAM(S): 30 TABLET ORAL at 04:32

## 2025-02-13 RX ADMIN — OXYCODONE HYDROCHLORIDE 10 MILLIGRAM(S): 30 TABLET ORAL at 16:25

## 2025-02-13 NOTE — DISCHARGE NOTE NURSING/CASE MANAGEMENT/SOCIAL WORK - NURSING SECTION COMPLETE
Patient is here for a recurrent Pilonidal cyst on Rt Buttock.  Patient had noticed a lump on Rt buttock a few weeks ago. Very tender to touch but no drainage or redness noted.  She is still having lower back pain and radiated to lower legs.    She had undergone surgery with Dr Roberts in 10-16-23 for a Excision of Pilonidal Cyst with Karaydakis flap   Patient/Caregiver provided printed discharge information.

## 2025-02-13 NOTE — PROGRESS NOTE ADULT - SUBJECTIVE AND OBJECTIVE BOX
Patient is a 74y old  Male who presents with a chief complaint of left shoulder pain--for left reverse TSA (13 Feb 2025 07:41)      SUBJECTIVE / OVERNIGHT EVENTS: Patient seen and examined at bedside. No acute events overnight. Walking around with PT without issues.    MEDICATIONS  (STANDING):  acetaminophen     Tablet .. 1000 milliGRAM(s) Oral every 8 hours  amLODIPine   Tablet 10 milliGRAM(s) Oral daily  aspirin enteric coated 81 milliGRAM(s) Oral every 12 hours  celecoxib 100 milliGRAM(s) Oral every 12 hours  dextrose 5%. 1000 milliLiter(s) (50 mL/Hr) IV Continuous <Continuous>  dextrose 5%. 1000 milliLiter(s) (100 mL/Hr) IV Continuous <Continuous>  dextrose 50% Injectable 25 Gram(s) IV Push once  dextrose 50% Injectable 12.5 Gram(s) IV Push once  dextrose 50% Injectable 25 Gram(s) IV Push once  glucagon  Injectable 1 milliGRAM(s) IntraMuscular once  insulin lispro (ADMELOG) corrective regimen sliding scale   SubCutaneous three times a day before meals  lactated ringers. 1000 milliLiter(s) (125 mL/Hr) IV Continuous <Continuous>  metFORMIN 500 milliGRAM(s) Oral daily  metoprolol succinate ER 25 milliGRAM(s) Oral daily  pantoprazole    Tablet 40 milliGRAM(s) Oral before breakfast  polyethylene glycol 3350 17 Gram(s) Oral at bedtime  rosuvastatin 20 milliGRAM(s) Oral at bedtime  senna 2 Tablet(s) Oral at bedtime  valsartan 320 milliGRAM(s) Oral daily    MEDICATIONS  (PRN):  aluminum hydroxide/magnesium hydroxide/simethicone Suspension 30 milliLiter(s) Oral four times a day PRN Indigestion  dextrose Oral Gel 15 Gram(s) Oral once PRN Blood Glucose LESS THAN 70 milliGRAM(s)/deciliter  HYDROmorphone  Injectable 0.5 milliGRAM(s) IV Push every 3 hours PRN Breakthrough Pain  ondansetron Injectable 4 milliGRAM(s) IV Push every 6 hours PRN Nausea and/or Vomiting  oxyCODONE    IR 5 milliGRAM(s) Oral every 3 hours PRN Moderate Pain (4 - 6)  oxyCODONE    IR 10 milliGRAM(s) Oral every 3 hours PRN Severe Pain (7 - 10)      CAPILLARY BLOOD GLUCOSE      POCT Blood Glucose.: 127 mg/dL (13 Feb 2025 07:26)  POCT Blood Glucose.: 194 mg/dL (12 Feb 2025 16:52)  POCT Blood Glucose.: 293 mg/dL (12 Feb 2025 12:03)    I&O's Summary      PHYSICAL EXAM:  Vital Signs Last 24 Hrs  T(C): 36.3 (13 Feb 2025 08:13), Max: 37.1 (12 Feb 2025 16:24)  T(F): 97.3 (13 Feb 2025 08:13), Max: 98.7 (12 Feb 2025 16:24)  HR: 60 (13 Feb 2025 08:13) (60 - 76)  BP: 154/86 (13 Feb 2025 08:13) (110/81 - 154/86)  BP(mean): --  RR: 16 (13 Feb 2025 08:13) (16 - 18)  SpO2: 92% (13 Feb 2025 08:13) (92% - 95%)        GEN: male in NAD, appears comfortable, no diaphoresis  EYES: No scleral injection, EOMI  ENTM: neck supple & symmetric without tracheal deviation, moist membranes, no gross hearing impairment  CV: +S1/S2, no m/r/g, no abdominal bruit, no LE edema  RESP: breathing comfortably, no respiratory accessory muscle use, CTAB, no w/r/r  GI: normoactive BS, soft, NTND, no rebounding/guarding, no palpable masses    LABS:                        11.8   14.96 )-----------( 226      ( 13 Feb 2025 08:21 )             36.1     02-13    140  |  104  |  22  ----------------------------<  128[H]  4.4   |  27  |  1.10    Ca    8.1[L]      13 Feb 2025 08:21            Urinalysis Basic - ( 13 Feb 2025 08:21 )    Color: x / Appearance: x / SG: x / pH: x  Gluc: 128 mg/dL / Ketone: x  / Bili: x / Urobili: x   Blood: x / Protein: x / Nitrite: x   Leuk Esterase: x / RBC: x / WBC x   Sq Epi: x / Non Sq Epi: x / Bacteria: x          RADIOLOGY & ADDITIONAL TESTS:  Results Reviewed:   Imaging Personally Reviewed:  Electrocardiogram Personally Reviewed:    COORDINATION OF CARE:  Care Discussed with Consultants/Other Providers [Y/N]:  Prior or Outpatient Records Reviewed [Y/N]:

## 2025-02-13 NOTE — DISCHARGE NOTE NURSING/CASE MANAGEMENT/SOCIAL WORK - FINANCIAL ASSISTANCE
Fyi  Pt mom to see you in am [facility] provides services at a reduced cost to those who are determined to be eligible through [facility]’s financial assistance program. Information regarding [facility]’s financial assistance program can be found by going to [email or web address] or by calling [phone].

## 2025-02-13 NOTE — PROGRESS NOTE ADULT - SUBJECTIVE AND OBJECTIVE BOX
Procedure: Left reverse Total shoulder arthroplasty  POD#:     S: Pt without complaints. No SOB,CP, N/V. Tolerated Fluids / Diet well.   Pain comfortable (X/10 ) on IV PCA, Interval Rx - Oxy 5 & 10mg + Tylenol + Celebrex. No BM yet + flatus, No abdominal pain.  Voiding    O: General: Pt Alert and oriented, On exam NAD,   VS: Vital Signs Last 24 Hrs  T(C): 36.6 (12 Feb 2025 23:30), Max: 37.1 (12 Feb 2025 16:24)  T(F): 97.8 (12 Feb 2025 23:30), Max: 98.7 (12 Feb 2025 16:24)  HR: 69 (13 Feb 2025 04:03) (61 - 76)  BP: 110/81 (13 Feb 2025 04:03) (110/81 - 141/84)  BP(mean): --  RR: 18 (12 Feb 2025 23:30) (16 - 18)  SpO2: 95% (12 Feb 2025 23:30) (92% - 95%)    General: AAOx3, NAD, resting comfortably in bed.  Heart: RRR no murmur  Lungs: BS clear bilat.  Abdomen: Soft; no distention, benign exam      Left shoulder:  mepilex clean and intact. Cold pack in place. Sling in place  Upper extremity:  Compartments soft and NT in forearm B/L. 2+pulses. NVI. 5/5  strength B/L.  Good capillary refill. 2+ radial pulse    VTEP: On Bilat. Venodynes + aspirin enteric coated 81 milliGRAM(s) Oral every 12 hours     Activity in PT yesterday: walked 50'                          11.6   11.63 )-----------( 225      ( 12 Feb 2025 07:59 )             36.0     02-12    141  |  105  |  19  ----------------------------<  194[H]  4.3   |  25  |  1.31[H]    Ca    8.0[L]      12 Feb 2025 07:59        Hospitalist input noted    Primary Orthopedic Assessment:  • Stable from Orthopedic perspective  • Neuro motor exam stable  • Labs: stable yesterday      Plan:   • Continue:  PT/OT/Weightbearing as tolerated with assistance of a walker/Posterior THR precautions/Ice to hip/          Incentive spirometry encouraged / Celebrex for H.O  • Continue DVT prophylaxis as prescribed, including use of compression devices and ankle pumps  • Continue Pain Rx  • Posterior THR hip precautions reviewed with patient  • Plans per Medicine  • Discharge planning – anticipated discharge is Home  when medically stable & cleared by PT/OT--toay

## 2025-02-13 NOTE — DISCHARGE NOTE NURSING/CASE MANAGEMENT/SOCIAL WORK - NSDCPEFALRISK_GEN_ALL_CORE
For information on Fall & Injury Prevention, visit: [email or web address] OR  [email or web address] OR  https://www.cdc.gov/steadi/patient.html

## 2025-02-13 NOTE — CASE MANAGEMENT PROGRESS NOTE - NSCMPROGRESSNOTE_GEN_ALL_CORE
Patient discussed in morning rounds and cleared for discharge home today. Patient states he feels well and is ready to go home. Patient wife will be available to transport patient home and assist as needed. Patient declined HCPT and will follow up with orthopedic surgeon as instructed. [facility] team aware.

## 2025-02-13 NOTE — DISCHARGE NOTE NURSING/CASE MANAGEMENT/SOCIAL WORK - PATIENT PORTAL LINK FT
You can access the FollowMyHealth Patient Portal offered by [facility] by registering at the following website: [email or web address]. By joining [employer]’s FollowMyHealth portal, you will also be able to view your health information using other applications (apps) compatible with our system.

## 2025-02-13 NOTE — PROGRESS NOTE ADULT - ASSESSMENT
74M with PMHx of HTN, T2DM, and COPD presenting for elective left shoulder reverse replacement in setting of severe OA. Patient s/p left shoulder reverse replacement on 2/11.    #Left Shoulder OA s/p Replacement  - Pain control & DVT PPx per ortho  - PT consult rec HOME    #HTN  - Can resume Valsartan (or formulary equivalent), Amlodipine, and HCTZ sequentially    #COPD  - Takes Trelegy at home which is not available  - If patient is staying past today can start Spiriva and Advair    No medical barrier to DC

## 2025-02-13 NOTE — DISCHARGE NOTE NURSING/CASE MANAGEMENT/SOCIAL WORK - NSDCVIVACCINE_GEN_ALL_CORE_FT
Tdap; 07-Jun-2024 22:27; Michael Alvarez (ANABELL); [employer]; [id number] (Exp. Date: 08-Feb-2026); IntraMuscular; Deltoid Left.; 0.5 milliLiter(s); VIS (VIS Published: 09-May-2013, VIS Presented: 07-Jun-2024);

## 2025-02-15 PROBLEM — R73.03 PREDIABETES: Chronic | Status: ACTIVE | Noted: 2025-01-27

## 2025-02-21 ENCOUNTER — NON-APPOINTMENT (OUTPATIENT)
Age: 75
End: 2025-02-21

## 2025-02-26 ENCOUNTER — APPOINTMENT (OUTPATIENT)
Dept: ORTHOPEDIC SURGERY | Facility: CLINIC | Age: 75
End: 2025-02-26
Payer: MEDICARE

## 2025-02-26 VITALS — HEIGHT: 70 IN | BODY MASS INDEX: 35.79 KG/M2 | WEIGHT: 250 LBS

## 2025-02-26 DIAGNOSIS — Z96.619 PRESENCE OF UNSPECIFIED ARTIFICIAL SHOULDER JOINT: ICD-10-CM

## 2025-02-26 PROCEDURE — 73030 X-RAY EXAM OF SHOULDER: CPT | Mod: RT

## 2025-02-26 PROCEDURE — 99024 POSTOP FOLLOW-UP VISIT: CPT

## 2025-03-26 ENCOUNTER — APPOINTMENT (OUTPATIENT)
Dept: ORTHOPEDIC SURGERY | Facility: CLINIC | Age: 75
End: 2025-03-26
Payer: MEDICARE

## 2025-03-26 VITALS — WEIGHT: 251 LBS | BODY MASS INDEX: 35.93 KG/M2 | HEIGHT: 70 IN

## 2025-03-26 DIAGNOSIS — Z96.619 PRESENCE OF UNSPECIFIED ARTIFICIAL SHOULDER JOINT: ICD-10-CM

## 2025-03-26 PROCEDURE — 73030 X-RAY EXAM OF SHOULDER: CPT | Mod: RT

## 2025-03-26 PROCEDURE — 99024 POSTOP FOLLOW-UP VISIT: CPT

## 2025-04-25 ENCOUNTER — APPOINTMENT (OUTPATIENT)
Dept: PULMONOLOGY | Facility: CLINIC | Age: 75
End: 2025-04-25
Payer: MEDICARE

## 2025-04-25 DIAGNOSIS — J44.1 CHRONIC OBSTRUCTIVE PULMONARY DISEASE WITH (ACUTE) EXACERBATION: ICD-10-CM

## 2025-04-25 PROCEDURE — 99212 OFFICE O/P EST SF 10 MIN: CPT | Mod: 93

## 2025-04-25 RX ORDER — ALBUTEROL SULFATE 90 UG/1
108 (90 BASE) INHALANT RESPIRATORY (INHALATION)
Qty: 1 | Refills: 5 | Status: ACTIVE | COMMUNITY
Start: 2025-04-25 | End: 1900-01-01

## 2025-04-25 RX ORDER — AZITHROMYCIN 250 MG/1
250 TABLET, FILM COATED ORAL
Qty: 1 | Refills: 0 | Status: ACTIVE | COMMUNITY
Start: 2025-04-25 | End: 1900-01-01

## 2025-05-01 RX ORDER — IPRATROPIUM BROMIDE AND ALBUTEROL SULFATE 2.5; .5 MG/3ML; MG/3ML
0.5-2.5 (3) SOLUTION RESPIRATORY (INHALATION)
Qty: 1080 | Refills: 1 | Status: ACTIVE | COMMUNITY
Start: 2025-05-01 | End: 1900-01-01

## 2025-05-02 RX ORDER — BLOOD-GLUCOSE METER
KIT MISCELLANEOUS
Qty: 1 | Refills: 0 | Status: ACTIVE | COMMUNITY
Start: 2025-05-01 | End: 1900-01-01

## 2025-05-07 ENCOUNTER — APPOINTMENT (OUTPATIENT)
Dept: PULMONOLOGY | Facility: CLINIC | Age: 75
End: 2025-05-07
Payer: MEDICARE

## 2025-05-07 VITALS — SYSTOLIC BLOOD PRESSURE: 124 MMHG | DIASTOLIC BLOOD PRESSURE: 68 MMHG | HEART RATE: 66 BPM | OXYGEN SATURATION: 94 %

## 2025-05-07 DIAGNOSIS — J44.1 CHRONIC OBSTRUCTIVE PULMONARY DISEASE WITH (ACUTE) EXACERBATION: ICD-10-CM

## 2025-05-07 PROCEDURE — 99214 OFFICE O/P EST MOD 30 MIN: CPT

## 2025-05-07 RX ORDER — SODIUM CHLORIDE FOR INHALATION 0.9 %
0.9 VIAL, NEBULIZER (ML) INHALATION
Qty: 1 | Refills: 3 | Status: ACTIVE | COMMUNITY
Start: 2025-05-07 | End: 1900-01-01

## 2025-05-07 RX ORDER — FLUTICASONE PROPIONATE 50 UG/1
50 SPRAY, METERED NASAL TWICE DAILY
Qty: 1 | Refills: 3 | Status: ACTIVE | COMMUNITY
Start: 2025-05-07 | End: 1900-01-01

## 2025-05-28 ENCOUNTER — APPOINTMENT (OUTPATIENT)
Dept: ORTHOPEDIC SURGERY | Facility: CLINIC | Age: 75
End: 2025-05-28
Payer: MEDICARE

## 2025-05-28 VITALS — WEIGHT: 251 LBS | HEIGHT: 70 IN | BODY MASS INDEX: 35.93 KG/M2

## 2025-05-28 DIAGNOSIS — Z96.619 PRESENCE OF UNSPECIFIED ARTIFICIAL SHOULDER JOINT: ICD-10-CM

## 2025-05-28 PROCEDURE — 99213 OFFICE O/P EST LOW 20 MIN: CPT

## 2025-06-30 ENCOUNTER — APPOINTMENT (OUTPATIENT)
Dept: PULMONOLOGY | Facility: CLINIC | Age: 75
End: 2025-06-30

## 2025-06-30 ENCOUNTER — APPOINTMENT (OUTPATIENT)
Dept: PULMONOLOGY | Facility: CLINIC | Age: 75
End: 2025-06-30
Payer: MEDICARE

## 2025-06-30 VITALS
DIASTOLIC BLOOD PRESSURE: 80 MMHG | RESPIRATION RATE: 26 BRPM | BODY MASS INDEX: 36.02 KG/M2 | HEART RATE: 88 BPM | WEIGHT: 251 LBS | OXYGEN SATURATION: 94 % | SYSTOLIC BLOOD PRESSURE: 120 MMHG

## 2025-06-30 PROBLEM — J44.9 CHRONIC OBSTRUCTIVE PULMONARY DISEASE, UNSPECIFIED COPD TYPE: Status: ACTIVE | Noted: 2025-06-30

## 2025-06-30 PROCEDURE — 94727 GAS DIL/WSHOT DETER LNG VOL: CPT

## 2025-06-30 PROCEDURE — ZZZZZ: CPT

## 2025-06-30 PROCEDURE — 94618 PULMONARY STRESS TESTING: CPT

## 2025-06-30 PROCEDURE — 94010 BREATHING CAPACITY TEST: CPT

## 2025-06-30 PROCEDURE — 99214 OFFICE O/P EST MOD 30 MIN: CPT | Mod: 25

## 2025-06-30 PROCEDURE — 71046 X-RAY EXAM CHEST 2 VIEWS: CPT

## 2025-06-30 PROCEDURE — 94729 DIFFUSING CAPACITY: CPT

## 2025-07-03 ENCOUNTER — APPOINTMENT (OUTPATIENT)
Dept: PULMONOLOGY | Facility: CLINIC | Age: 75
End: 2025-07-03

## 2025-07-07 ENCOUNTER — APPOINTMENT (OUTPATIENT)
Dept: PULMONOLOGY | Facility: CLINIC | Age: 75
End: 2025-07-07
Payer: MEDICARE

## 2025-07-07 PROCEDURE — 95800 SLP STDY UNATTENDED: CPT

## 2025-07-08 PROCEDURE — 95800 SLP STDY UNATTENDED: CPT | Mod: 52

## 2025-07-21 ENCOUNTER — APPOINTMENT (OUTPATIENT)
Dept: PULMONOLOGY | Facility: CLINIC | Age: 75
End: 2025-07-21

## 2025-07-23 ENCOUNTER — OUTPATIENT (OUTPATIENT)
Dept: OUTPATIENT SERVICES | Facility: HOSPITAL | Age: 75
LOS: 1 days | End: 2025-07-23
Payer: MEDICARE

## 2025-07-23 ENCOUNTER — APPOINTMENT (OUTPATIENT)
Dept: CT IMAGING | Facility: IMAGING CENTER | Age: 75
End: 2025-07-23
Payer: MEDICARE

## 2025-07-23 DIAGNOSIS — Z98.890 OTHER SPECIFIED POSTPROCEDURAL STATES: Chronic | ICD-10-CM

## 2025-07-23 DIAGNOSIS — Z98.49 CATARACT EXTRACTION STATUS, UNSPECIFIED EYE: Chronic | ICD-10-CM

## 2025-07-23 DIAGNOSIS — Z96.651 PRESENCE OF RIGHT ARTIFICIAL KNEE JOINT: Chronic | ICD-10-CM

## 2025-07-23 DIAGNOSIS — R05.9 COUGH, UNSPECIFIED: ICD-10-CM

## 2025-07-23 DIAGNOSIS — Z95.2 PRESENCE OF PROSTHETIC HEART VALVE: Chronic | ICD-10-CM

## 2025-07-23 DIAGNOSIS — Z90.49 ACQUIRED ABSENCE OF OTHER SPECIFIED PARTS OF DIGESTIVE TRACT: Chronic | ICD-10-CM

## 2025-07-23 DIAGNOSIS — Z00.8 ENCOUNTER FOR OTHER GENERAL EXAMINATION: ICD-10-CM

## 2025-07-23 PROCEDURE — 71250 CT THORAX DX C-: CPT

## 2025-07-23 PROCEDURE — 71250 CT THORAX DX C-: CPT | Mod: 26

## (undated) DEVICE — GLV 8 PROTEXIS (WHITE)

## (undated) DEVICE — SUT POLYSORB 1 27IN GS-12 UD 36/BX

## (undated) DEVICE — WARMING BLANKET LOWER ADULT

## (undated) DEVICE — NDL HYPO SAFE 22G X 1.5" (BLACK)

## (undated) DEVICE — SUT MONOCRYL 3-0 18" PS-1

## (undated) DEVICE — SYR ASEPTO

## (undated) DEVICE — SUT NDL REGULAR SURGEON 1/2 CIRCLE REVERSE CUTTING 0.037" X 1.521"

## (undated) DEVICE — PLASTIC SOLUTION BOWL 160Z

## (undated) DEVICE — DRAPE 3/4 SHEET 52X76"

## (undated) DEVICE — DRSG DERMABOND PRINEO 60CM

## (undated) DEVICE — POSITIONER S&N SPIDER STABILIZATION KIT SHOULDER

## (undated) DEVICE — SUT ETHIBOND EXCEL O 30" OS-4

## (undated) DEVICE — SAW BLADE STRYKER SAGITTAL AGGRESSIVE 25X86.5X1.32MM

## (undated) DEVICE — STRYKER PULSE LAVAGE WITH COAXIAL MULTI-ORIFICE TIP

## (undated) DEVICE — SUT POLYSORB 0 30" GS-12 UNDYED

## (undated) DEVICE — HOOD FLYTE STRYKER HELMET SHIELD

## (undated) DEVICE — WOUND IRR IRRISEPT W 0.5 CHG

## (undated) DEVICE — SUT POLYSORB 2-0 30" GS-11 UNDYED

## (undated) DEVICE — SOLIDIFIER CANN EXPRESS 3K

## (undated) DEVICE — POSITIONER STIRRUP STRAP W SLIP RING 19X3.5"

## (undated) DEVICE — SUT RETRIEVER S&N LAVENDER

## (undated) DEVICE — SYR TOOMEY 50ML

## (undated) DEVICE — DRILL BIT BRASSELER TWIST 2.4MM 3/32 X 5"

## (undated) DEVICE — HOOD T5 PEELAWAY

## (undated) DEVICE — BIOMET DRILL WITH STOP 2.7MM

## (undated) DEVICE — SLING SHOULDER ULTRASLING LARGE

## (undated) DEVICE — VENODYNE/SCD SLEEVE CALF MEDIUM

## (undated) DEVICE — DRILL BIT BIOMET 2.7MM

## (undated) DEVICE — REAMER ZIMMER GUIDE BUSHING COMP AUG

## (undated) DEVICE — DRILL BIT BIOMET 3.2MM

## (undated) DEVICE — DRAPE TOP SHEET 53" X 101"

## (undated) DEVICE — POSITIONER STRAP ARMBOARD VELCRO TS-30

## (undated) DEVICE — SUT FIBERWIRE #2 38" STRAND 1 BLUE T-5 TAPER

## (undated) DEVICE — SOL IRR BAG NS 0.9% 3000ML

## (undated) DEVICE — PACK TOTAL HIP